# Patient Record
Sex: FEMALE | Race: BLACK OR AFRICAN AMERICAN | Employment: FULL TIME | URBAN - METROPOLITAN AREA
[De-identification: names, ages, dates, MRNs, and addresses within clinical notes are randomized per-mention and may not be internally consistent; named-entity substitution may affect disease eponyms.]

---

## 2015-04-10 LAB — LEFT VENTRICULAR EJECTION FRACTION, EXTERNAL: 55

## 2017-01-04 ENCOUNTER — OFFICE VISIT (OUTPATIENT)
Dept: CARDIOLOGY CLINIC | Age: 57
End: 2017-01-04

## 2017-01-04 VITALS
SYSTOLIC BLOOD PRESSURE: 124 MMHG | HEART RATE: 88 BPM | HEIGHT: 66 IN | BODY MASS INDEX: 33.59 KG/M2 | DIASTOLIC BLOOD PRESSURE: 70 MMHG | WEIGHT: 209 LBS

## 2017-01-04 DIAGNOSIS — I50.32 CHRONIC DIASTOLIC HEART FAILURE (HCC): Primary | ICD-10-CM

## 2017-01-04 DIAGNOSIS — E78.2 MIXED HYPERLIPIDEMIA: ICD-10-CM

## 2017-01-04 DIAGNOSIS — I10 ESSENTIAL HYPERTENSION: ICD-10-CM

## 2017-01-04 DIAGNOSIS — Z86.79 HISTORY OF SUBARACHNOID HEMORRHAGE: ICD-10-CM

## 2017-01-04 RX ORDER — FLUTICASONE PROPIONATE 50 MCG
2 SPRAY, SUSPENSION (ML) NASAL
COMMUNITY
End: 2020-07-09 | Stop reason: SDUPTHER

## 2017-01-04 RX ORDER — ROSUVASTATIN CALCIUM 10 MG/1
10 TABLET, COATED ORAL
COMMUNITY
End: 2017-08-01 | Stop reason: SDUPTHER

## 2017-01-04 RX ORDER — EZETIMIBE 10 MG/1
TABLET ORAL
COMMUNITY
End: 2017-04-20 | Stop reason: ALTCHOICE

## 2017-01-04 NOTE — PATIENT INSTRUCTIONS
Medications Discontinued During This Encounter   Medication Reason    ezetimibe-simvastatin (VYTORIN) 10-10 mg per tablet Alternate Therapy       No orders of the defined types were placed in this encounter. Body Mass Index: Care Instructions  Your Care Instructions    Body mass index (BMI) can help you see if your weight is raising your risk for health problems. It uses a formula to compare how much you weigh with how tall you are. A BMI between 18.5 and 24.9 is considered healthy. A BMI between 25 and 29.9 is considered overweight. A BMI of 30 or higher is considered obese. If your BMI is in the normal range, it means that you have a lower risk for weight-related health problems. If your BMI is in the overweight or obese range, you may be at increased risk for weight-related health problems, such as high blood pressure, heart disease, stroke, arthritis or joint pain, and diabetes. BMI is just one measure of your risk for weight-related health problems. You may be at higher risk for health problems if you are not active, you eat an unhealthy diet, or you drink too much alcohol or use tobacco products. Follow-up care is a key part of your treatment and safety. Be sure to make and go to all appointments, and call your doctor if you are having problems. It's also a good idea to know your test results and keep a list of the medicines you take. How can you care for yourself at home? · Practice healthy eating habits. This includes eating plenty of fruits, vegetables, whole grains, lean protein, and low-fat dairy. · Get at least 30 minutes of exercise 5 days a week or more. Brisk walking is a good choice. You also may want to do other activities, such as running, swimming, cycling, or playing tennis or team sports. · Do not smoke. Smoking can increase your risk for health problems. If you need help quitting, talk to your doctor about stop-smoking programs and medicines.  These can increase your chances of quitting for good. · Limit alcohol to 2 drinks a day for men and 1 drink a day for women. Too much alcohol can cause health problems. If you have a BMI higher than 25  · Your doctor may do other tests to check your risk for weight-related health problems. This may include measuring the distance around your waist. A waist measurement of more than 40 inches in men or 35 inches in women can increase the risk of weight-related health problems. · Talk with your doctor about steps you can take to stay healthy or improve your health. You may need to make lifestyle changes to lose weight and stay healthy, such as changing your diet and getting regular exercise. Where can you learn more? Go to http://monique-digna.info/. Enter S176 in the search box to learn more about \"Body Mass Index: Care Instructions. \"  Current as of: February 16, 2016  Content Version: 11.1  © 5768-8841 [employer]. Care instructions adapted under license by [employer] (which disclaims liability or warranty for this information). If you have questions about a medical condition or this instruction, always ask your healthcare professional. [address] any warranty or liability for your use of this information.

## 2017-01-04 NOTE — PROGRESS NOTES
HISTORY OF PRESENT ILLNESS  Marletta Cheadle is a 60 y.o. female. CHF   The history is provided by the medical records. This is a chronic problem. Associated symptoms include shortness of breath. Pertinent negatives include no chest pain and no headaches. Hypertension   The history is provided by the medical records and patient. This is a chronic problem. Associated symptoms include shortness of breath. Pertinent negatives include no chest pain and no headaches. Cholesterol Problem   The history is provided by the medical records. This is a chronic problem. Associated symptoms include shortness of breath. Pertinent negatives include no chest pain and no headaches. Shortness of Breath   The history is provided by the patient. This is a recurrent problem. The problem occurs frequently. The current episode started more than 1 week ago. The problem has not changed since onset. Associated symptoms include leg swelling. Pertinent negatives include no fever, no headaches, no cough, no wheezing, no PND, no orthopnea, no chest pain, no vomiting, no rash and no claudication. The problem's precipitants include exercise (walking thru parking lot; 9/15 more; 1/17 better except when exosed to smoke/dust). She has tried beta-agonist inhalers for the symptoms. The treatment provided significant relief. Leg Swelling   The history is provided by the patient. This is a recurrent problem. The current episode started more than 1 week ago. The problem occurs daily (R>L). The problem has not changed since onset. Associated symptoms include shortness of breath. Pertinent negatives include no chest pain and no headaches. The symptoms are aggravated by standing. The symptoms are relieved by rest.       Review of Systems   Constitutional: Negative for chills, fever, malaise/fatigue and weight loss. HENT: Negative for nosebleeds. Eyes: Negative for discharge. Respiratory: Positive for shortness of breath.  Negative for cough and wheezing. Cardiovascular: Positive for leg swelling. Negative for chest pain, palpitations, orthopnea, claudication and PND. Gastrointestinal: Negative for diarrhea, nausea and vomiting. Genitourinary: Negative for dysuria and hematuria. Musculoskeletal: Negative for joint pain. Skin: Negative for rash. Neurological: Negative for dizziness, seizures, loss of consciousness and headaches. Endo/Heme/Allergies: Negative for polydipsia. Does not bruise/bleed easily. Psychiatric/Behavioral: Negative for depression and substance abuse. The patient does not have insomnia. Allergies   Allergen Reactions    Banana Hives     Asthma attach, throat swelling, throat scratching, lip swelling    Clonidine Other (comments)     Memory loss    Iodine Rash    Keflex [Cephalexin] Hives    Seafood Hives    Watermelon Hives     Throat closes       Past Medical History   Diagnosis Date    Asthma 2/4/2010    Chronic diastolic heart failure ([facility] 3/18/2015    COPD (chronic obstructive pulmonary disease) ([facility] [facility]      PFT confirmed 8/18/16    Diabetes mellitus ([facility] [address] 5/27/2014    HTN (hypertension) 2/4/2010    Hyperlipidemia LDL goal < 70 2014    Magnesium deficiency     Obesity, unspecified 4/15/2015    SAH (subarachnoid hemorrhage) ([facility] [address] 2/20/15     admitted in [facility], Dr. Darwin Olea. [facility] and ref to Dr. Tracie Aase, Neurology    Vitamin D deficiency 2000       Family History   Problem Relation Age of Onset    Diabetes Mother     Hypertension Mother           Heart Attack Mother 62    Alcohol abuse Father      cirrhosis    Diabetes Sister     Heart Attack Sister 64       Social History   Substance Use Topics    Smoking status: Never Smoker    Smokeless tobacco: Never Used    Alcohol use 0.0 oz/week     0 Standard drinks or equivalent per week      Comment: occasionally - social,        Current Outpatient Prescriptions   Medication Sig    rosuvastatin (CRESTOR) 10 mg tablet Take 10 mg by mouth nightly.     ezetimibe (ZETIA) 10 mg tablet Take  by mouth.  fluticasone (FLONASE) 50 mcg/actuation nasal spray 2 Sprays by Both Nostrils route daily.  TOPAMAX 25 mg tablet TAKE ONE TABLET BY MOUTH TWICE DAILY    Olmesartan-amLODIPine-HCTZ (TRIBENZOR) 20-5-12.5 mg tab Take 1 Tab by mouth daily.  dexlansoprazole (DEXILANT) 30 mg capsule Take 1 Cap by mouth daily as needed. Indications: GASTROESOPHAGEAL REFLUX    fluticasone-salmeterol (ADVAIR) 250-50 mcg/dose diskus inhaler Take 1 Puff by inhalation every twelve (12) hours.  SITagliptin-metFORMIN (JANUMET) 50-1,000 mg per tablet Take 1 Tab by mouth two (2) times daily (with meals).  multivitamin-iron-FA, hematinic, (CENTRUM COMPLETE)  mg-mcg tab tablet Take 1 Tab by mouth daily.  albuterol (PROVENTIL HFA, VENTOLIN HFA, PROAIR HFA) 90 mcg/actuation inhaler Take 2 Puffs by inhalation every four (4) hours as needed for Wheezing.  ergocalciferol (VITAMIN D2) 50,000 unit capsule Take 1 Cap by mouth every seven (7) days.  magnesium oxide (MAG-OX) 400 mg tablet Take 1 Tab by mouth daily. Indications: HYPOMAGNESEMIA    Cetirizine (ZYRTEC) 10 mg cap Take 10 mg PE/day by mouth as needed. No current facility-administered medications for this visit. Past Surgical History   Procedure Laterality Date    Hx hysterectomy  2004     abd approach with ovaries and cervix intact    Hx other surgical  8/25/15     SAH, cerebral arteriopathy, Dr. Amado Hare, Interventional Neurology [facility]       Diagnostic Studies:  CARDIOLOGY STUDIES 4/10/2015   Echocardiogram - Complete Result 55%EF, tr MR       Visit Vitals    /70    Pulse 88    Ht 5' 6\" (1.676 m)    Wt 94.8 kg (209 lb)    BMI 33.73 kg/m2       Ms. Arely Lala has a reminder for a \"due or due soon\" health maintenance. I have asked that she contact her primary care provider for follow-up on this health maintenance. Physical Exam   Constitutional: She is oriented to person, place, and time.  She appears well-developed and well-nourished. No distress. HENT:   Head: Normocephalic and atraumatic. Mouth/Throat: Normal dentition. Eyes: Right eye exhibits no discharge. Left eye exhibits no discharge. No scleral icterus. Neck: Neck supple. No JVD present. Carotid bruit is not present. No thyromegaly present. Cardiovascular: Normal rate, regular rhythm, S1 normal, S2 normal, normal heart sounds and intact distal pulses. Exam reveals no gallop and no friction rub. No murmur heard. Pulmonary/Chest: Effort normal and breath sounds normal. She has no wheezes. She has no rales. Abdominal: Soft. She exhibits no mass. There is no tenderness. Musculoskeletal: She exhibits edema (trace). Lymphadenopathy:        Right cervical: No superficial cervical adenopathy present. Left cervical: No superficial cervical adenopathy present. Neurological: She is alert and oriented to person, place, and time. Skin: Skin is warm and dry. No rash noted. Psychiatric: She has a normal mood and affect. Her behavior is normal.       ASSESSMENT and PLAN    Discussed the patient's above normal BMI with her. I have recommended the following interventions: dietary management education, guidance, and counseling . The BMI follow up plan is as follows: BMI is out of normal parameters and plan is as follows: I have counseled this patient on diet and exercise regimens           Evans Del Rosario was seen today for chf, hypertension, cholesterol problem, shortness of breath and leg swelling.     Diagnoses and all orders for this visit:    Chronic diastolic heart failure ([address]  Comments:  1/17 Bradford Torrez; SOB with smoke/dust ?sec to asthma; exposed to passive smoke    Essential hypertension  Comments:  1/17 controlled      Mixed hyperlipidemia  Comments:  10/16 LDL57; 1/16 LDL56; 4/15 [id number]      History of subarachnoid hemorrhage  Comments:  2/15      BMI 33.0-33.9,adult  Comments:  Weight loss has been strongly encouraged by following dietary restrictions and an exercise routine. Pertinent laboratory and test data reviewed and discussed with patient. See patient instructions also for other medical advice given    Medications Discontinued During This Encounter   Medication Reason    ezetimibe-simvastatin (VYTORIN) 10-10 mg per tablet Alternate Therapy       Follow-up Disposition:  Return in about 1 year (around 1/4/2018), or if symptoms worsen or fail to improve.

## 2017-01-04 NOTE — PROGRESS NOTES
1. Have you been to the ER, urgent care clinic since your last visit? Hospitalized since your last visit? No    2. Have you seen or consulted any other health care providers outside of the [address] [place] [address] since your last visit? Include any pap smears or colon screening. No     3. Since your last visit, have you had any of the following symptoms? SOB/ swelling in leg    4. Have you had any blood work, X-rays or cardiac testing? Labs in [facility]      5. Where do you normally have your labs drawn? [facility]    6. Do you need any refills today?   None

## 2017-01-04 NOTE — MR AVS SNAPSHOT
Visit Information Date & Time Provider Department Dept. Phone Encounter #  
 1/4/2017  2:00 PM Estefani Castro MD [facility] [address] [place] [address] [id number] Follow-up Instructions Return in about 1 year (around 1/4/2018), or if symptoms worsen or fail to improve. Your Appointments 4/12/2017  9:30 AM  
Follow Up with Finesse Garcia NP [address] [facility] ([address]) Appt Note: 5 mth f/u  
 [address] [facility] [id number] [address] [facility] [id number]  
  
    
 1/3/2018  1:00 PM  
ESTABLISHED PATIENT with Estefani Castro MD  
[facility]) Appt Note: 1 yr  
 [address] [facility], [address] [facility] [address]  
[address] [facility], [address] Upcoming Health Maintenance Date Due  
 FOOT EXAM Q1 8/3/1968 EYE EXAM RETINAL OR DILATED Q1 8/3/1968 DTaP/Tdap/Td series (1 - Tdap) 8/3/1979 FOBT Q 1 YEAR AGE 50-75 8/3/2008 MICROALBUMIN Q1 1/20/2017 BREAST CANCER SCRN MAMMOGRAM 3/17/2017 HEMOGLOBIN A1C Q6M 4/26/2017 LIPID PANEL Q1 10/26/2017 Allergies as of 1/4/2017  Review Complete On: 1/4/2017 By: Estefani Castro MD  
  
 Severity Noted Reaction Type Reactions Banana  04/02/2015    Hives Asthma attach, throat swelling, throat scratching, lip swelling Clonidine  08/24/2015    Other (comments) Memory loss Iodine  02/04/2010   Systemic Rash Keflex [Cephalexin]  09/26/2011    Hives Seafood  03/13/2015    Hives Watermelon  09/08/2015    Hives Throat closes Current Immunizations  Reviewed on 1/27/2016 Name Date Influenza Vaccine 10/29/2014 Influenza Vaccine (Quad) PF 11/2/2016, 1/27/2016 Influenza Vaccine Split 12/7/2010 Not reviewed this visit You Were Diagnosed With   
  
 Codes Comments Chronic diastolic heart failure ([facility]    -  Primary ICD-10-CM: I50.32 
ICD-9-CM: 428.32 1/17 NYHA2; SOB with smoke/dust ?sec to asthma; exposed to passive smoke Essential hypertension     ICD-10-CM: I10 
ICD-9-CM: 401.9 1/17 controlled Mixed hyperlipidemia     ICD-10-CM: E78.2 ICD-9-CM: 272.2 10/16 LDL57; 1/16 PFA75; 4/15 QQV517 History of subarachnoid hemorrhage     ICD-10-CM: Z86.79 
ICD-9-CM: V12.59 2/15 BMI 33.0-33.9,adult     ICD-10-CM: O05.62 
ICD-9-CM: V85.33 Weight loss has been strongly encouraged by following dietary restrictions and an exercise routine. Vitals BP Pulse Height(growth percentile) Weight(growth percentile) BMI OB Status 124/70 88 5' 6\" (1.676 m) 209 lb (94.8 kg) 33.73 kg/m2 Hysterectomy Smoking Status Never Smoker Vitals History BMI and BSA Data Body Mass Index Body Surface Area  
 33.73 kg/m 2 2.1 m 2 Preferred Pharmacy Pharmacy Name Phone [facility] [address] [phone] Your Updated Medication List  
  
   
This list is accurate as of: 1/4/17  2:49 PM.  Always use your most recent med list.  
  
  
  
  
 albuterol 90 mcg/actuation inhaler Commonly known as:  PROVENTIL HFA, VENTOLIN HFA, PROAIR HFA Take 2 Puffs by inhalation every four (4) hours as needed for Wheezing. CRESTOR 10 mg tablet Generic drug:  rosuvastatin Take 10 mg by mouth nightly. dexlansoprazole 30 mg capsule Commonly known as:  DEXILANT Take 1 Cap by mouth daily as needed. Indications: GASTROESOPHAGEAL REFLUX  
  
 ergocalciferol 50,000 unit capsule Commonly known as:  VITAMIN D2 Take 1 Cap by mouth every seven (7) days. FLONASE 50 mcg/actuation nasal spray Generic drug:  fluticasone 2 Sprays by Both Nostrils route daily. fluticasone-salmeterol 250-50 mcg/dose diskus inhaler Commonly known as:  ADVAIR  
 Take 1 Puff by inhalation every twelve (12) hours. magnesium oxide 400 mg tablet Commonly known as:  MAG-OX Take 1 Tab by mouth daily. Indications: HYPOMAGNESEMIA  
  
 multivitamin-iron-FA (hematinic)  mg-mcg Tab tablet Commonly known as:  CENTRUM COMPLETE Take 1 Tab by mouth daily. Olmesartan-amLODIPine-HCTZ 20-5-12.5 mg Tab Commonly known as:  [employer] Take 1 Tab by mouth daily. SITagliptin-metFORMIN 50-1,000 mg per tablet Commonly known as:  Odella Ruth Take 1 Tab by mouth two (2) times daily (with meals). TOPAMAX 25 mg tablet Generic drug:  topiramate TAKE ONE TABLET BY MOUTH TWICE DAILY  
  
 ZETIA 10 mg tablet Generic drug:  ezetimibe Take  by mouth. ZyrTEC 10 mg Cap Generic drug:  Cetirizine Take 10 mg PE/day by mouth as needed. Follow-up Instructions Return in about 1 year (around 1/4/2018), or if symptoms worsen or fail to improve. Patient Instructions Medications Discontinued During This Encounter Medication Reason  ezetimibe-simvastatin (VYTORIN) 10-10 mg per tablet Alternate Therapy No orders of the defined types were placed in this encounter. Body Mass Index: Care Instructions Your Care Instructions Body mass index (BMI) can help you see if your weight is raising your risk for health problems. It uses a formula to compare how much you weigh with how tall you are. A BMI between 18.5 and 24.9 is considered healthy. A BMI between 25 and 29.9 is considered overweight. A BMI of 30 or higher is considered obese. If your BMI is in the normal range, it means that you have a lower risk for weight-related health problems. If your BMI is in the overweight or obese range, you may be at increased risk for weight-related health problems, such as high blood pressure, heart disease, stroke, arthritis or joint pain, and diabetes. BMI is just one measure of your risk for weight-related health problems. You may be at higher risk for health problems if you are not active, you eat an unhealthy diet, or you drink too much alcohol or use tobacco products. Follow-up care is a key part of your treatment and safety. Be sure to make and go to all appointments, and call your doctor if you are having problems. It's also a good idea to know your test results and keep a list of the medicines you take. How can you care for yourself at home? · Practice healthy eating habits. This includes eating plenty of fruits, vegetables, whole grains, lean protein, and low-fat dairy. · Get at least 30 minutes of exercise 5 days a week or more. Brisk walking is a good choice. You also may want to do other activities, such as running, swimming, cycling, or playing tennis or team sports. · Do not smoke. Smoking can increase your risk for health problems. If you need help quitting, talk to your doctor about stop-smoking programs and medicines. These can increase your chances of quitting for good. · Limit alcohol to 2 drinks a day for men and 1 drink a day for women. Too much alcohol can cause health problems. If you have a BMI higher than 25 · Your doctor may do other tests to check your risk for weight-related health problems. This may include measuring the distance around your waist. A waist measurement of more than 40 inches in men or 35 inches in women can increase the risk of weight-related health problems. · Talk with your doctor about steps you can take to stay healthy or improve your health. You may need to make lifestyle changes to lose weight and stay healthy, such as changing your diet and getting regular exercise. Where can you learn more? Go to http://monique-digna.info/. Enter S176 in the search box to learn more about \"Body Mass Index: Care Instructions. \" Current as of: February 16, 2016 Content Version: 11.1 © 1542-3735 [employer].  Care instructions adapted under license by [address] (which disclaims liability or warranty for this information). If you have questions about a medical condition or this instruction, always ask your healthcare professional. [address] any warranty or liability for your use of this information. Introducing [facility]! Tiburcio Daniel introduces [facility] patient portal. Now you can access parts of your medical record, email your doctor's office, and request medication refills online. 1. In your internet browser, go to [email or web address] 2. Click on the First Time User? Click Here link in the Sign In box. You will see the New Member Sign Up page. 3. Enter your [facility] Access Code exactly as it appears below. You will not need to use this code after youve completed the sign-up process. If you do not sign up before the expiration date, you must request a new code. · [facility] Access Code: [email or web address] Expires: 1/31/2017  9:00 AM 
 
4. Enter the last four digits of your Social Security Number (xxxx) and Date of Birth (mm/dd/yyyy) as indicated and click Submit. You will be taken to the next sign-up page. 5. Create a [facility] ID. This will be your [facility] login ID and cannot be changed, so think of one that is secure and easy to remember. 6. Create a [facility] password. You can change your password at any time. 7. Enter your Password Reset Question and Answer. This can be used at a later time if you forget your password. 8. Enter your e-mail address. You will receive e-mail notification when new information is available in [address] 9. Click Sign Up. You can now view and download portions of your medical record. 10. Click the Download Summary menu link to download a portable copy of your medical information. If you have questions, please visit the Frequently Asked Questions section of the [facility] website.  Remember, [facility] is NOT to be used for urgent needs. For medical emergencies, dial 911. Now available from your iPhone and Android! Please provide this summary of care documentation to your next provider. Your primary care clinician is listed as Baby Adia. If you have any questions after today's visit, please call [phone].

## 2017-01-04 NOTE — LETTER
Evangelista Whitejean-paul 1960 
 
1/4/2017 Dear Lalitha Pena NP I had the pleasure of evaluating  Ms. Anaya Stein in office today. Below are the relevant portions of my assessment and plan of care. ICD-10-CM ICD-9-CM 1. Chronic diastolic heart failure (HCC) I50.32 428.32   
 1/17 NYHA2; SOB with smoke/dust ?sec to asthma; exposed to passive smoke 2. Essential hypertension I10 401.9 1/17 controlled 3. Mixed hyperlipidemia E78.2 272.2   
 10/16 LDL57; 1/16 LDL56; 4/15 [id number] 
  
4. History of subarachnoid hemorrhage Z86.79 V12.59   
 2/15 5. BMI 33.0-33.9,adult Z68.33 V85.33 Weight loss has been strongly encouraged by following dietary restrictions and an exercise routine. Current Outpatient Prescriptions Medication Sig Dispense Refill  rosuvastatin (CRESTOR) 10 mg tablet Take 10 mg by mouth nightly.  ezetimibe (ZETIA) 10 mg tablet Take  by mouth.  fluticasone (FLONASE) 50 mcg/actuation nasal spray 2 Sprays by Both Nostrils route daily.  TOPAMAX 25 mg tablet TAKE ONE TABLET BY MOUTH TWICE DAILY 60 Tab 0  
 Olmesartan-amLODIPine-HCTZ (TRIBENZOR) 20-5-12.5 mg tab Take 1 Tab by mouth daily. 90 Tab 3  
 dexlansoprazole (DEXILANT) 30 mg capsule Take 1 Cap by mouth daily as needed. Indications: GASTROESOPHAGEAL REFLUX 90 Cap 3  
 fluticasone-salmeterol (ADVAIR) 250-50 mcg/dose diskus inhaler Take 1 Puff by inhalation every twelve (12) hours. 3 Inhaler 3  
 SITagliptin-metFORMIN (JANUMET) 50-1,000 mg per tablet Take 1 Tab by mouth two (2) times daily (with meals). 180 Tab 3  
 multivitamin-iron-FA, hematinic, (CENTRUM COMPLETE)  mg-mcg tab tablet Take 1 Tab by mouth daily. 90 Tab 3  
 albuterol (PROVENTIL HFA, VENTOLIN HFA, PROAIR HFA) 90 mcg/actuation inhaler Take 2 Puffs by inhalation every four (4) hours as needed for Wheezing. 3 Inhaler 3  
 ergocalciferol (VITAMIN D2) 50,000 unit capsule Take 1 Cap by mouth every seven (7) days.  4 Cap 11  
  magnesium oxide (MAG-OX) 400 mg tablet Take 1 Tab by mouth daily. Indications: HYPOMAGNESEMIA 30 Tab 11  Cetirizine (ZYRTEC) 10 mg cap Take 10 mg PE/day by mouth as needed. Orders Placed This Encounter  rosuvastatin (CRESTOR) 10 mg tablet Sig: Take 10 mg by mouth nightly.  ezetimibe (ZETIA) 10 mg tablet Sig: Take  by mouth.  fluticasone (FLONASE) 50 mcg/actuation nasal spray Si Sprays by Both Nostrils route daily. If you have questions, please do not hesitate to call me. I look forward to following Ms. Leyla Nunez along with you. Sincerely, Yinka Thomas MD

## 2017-01-05 ENCOUNTER — TELEPHONE (OUTPATIENT)
Dept: FAMILY MEDICINE CLINIC | Age: 57
End: 2017-01-05

## 2017-01-05 DIAGNOSIS — E78.5 HYPERLIPIDEMIA LDL GOAL <100: Primary | ICD-10-CM

## 2017-01-05 RX ORDER — EZETIMIBE AND SIMVASTATIN 10; 10 MG/1; MG/1
1 TABLET ORAL
Qty: 90 TAB | Refills: 3 | Status: SHIPPED | COMMUNITY
Start: 2017-01-05 | End: 2017-08-02 | Stop reason: ALTCHOICE

## 2017-01-05 NOTE — TELEPHONE ENCOUNTER
Per last visit with PCP. Crestor is no longer available via [facility]. The patient is also on zetia. VYtorin was prescribed to combine the Zetia and statin. On visit 11/2/16 The patient was instructed to continue whats left of Crestor and Zetia until complete then start Vytorin and the patient verbalized understanding. Vytorin was D/C'd 1/4/17 when the patient visited Cardiology because the patient stated that she didn't have this medication. The medication has been at [facility] since 12/1/16 and the patient was notified for  on 12/5/16. Spoke with [facility] pharm tech and the patient has not picked up any medications. Will re-enter order for Vytorin. Designated tech will call the patient to  medications again and instruct the patient to complete whats left of the zetia and crestor then start the vytorin as previously prescribed.        Arya Nascimento, MSN, RN, FNP-C     [facility]

## 2017-01-17 DIAGNOSIS — E78.5 HYPERLIPIDEMIA, UNSPECIFIED HYPERLIPIDEMIA TYPE: Primary | ICD-10-CM

## 2017-02-06 DIAGNOSIS — E55.9 VITAMIN D DEFICIENCY: ICD-10-CM

## 2017-02-06 DIAGNOSIS — E83.42 HYPOMAGNESEMIA: ICD-10-CM

## 2017-02-07 RX ORDER — ERGOCALCIFEROL 1.25 MG/1
50000 CAPSULE ORAL
Qty: 4 CAP | Refills: 11 | Status: SHIPPED | OUTPATIENT
Start: 2017-02-07 | End: 2018-03-26 | Stop reason: SDUPTHER

## 2017-02-07 RX ORDER — LANOLIN ALCOHOL/MO/W.PET/CERES
400 CREAM (GRAM) TOPICAL DAILY
Qty: 30 TAB | Refills: 11 | Status: SHIPPED | OUTPATIENT
Start: 2017-02-07 | End: 2018-02-21 | Stop reason: SDUPTHER

## 2017-03-28 ENCOUNTER — TELEPHONE (OUTPATIENT)
Dept: FAMILY MEDICINE CLINIC | Age: 57
End: 2017-03-28

## 2017-03-29 NOTE — TELEPHONE ENCOUNTER
Medication: Ventolin HFA, dose:2 puffs, how often: every 4 hours as needed, current number of medication days provided: 90, refill per application. LOT #[id number], EXP.05/18    . This medication was received and verified for the following 1. Correct Patient, 2. Correct Diagnosis, 3. Correct Drug, 4. Correct route, and no current allergy to medication. Medication: Advair 250/50, dose: 1 inhalation, how often: twice daily, current number of medication days provided: 90, refill per application. LOT #[id number], EXP.10/18This medication was received and verified for the following 1. Correct Patient, 2. Correct Diagnosis, 3. Correct Drug, 4. Correct route, and no current allergy to medication. Medication: Janumet 50/1000mg , dose: 1 tab, how often: BID , current number of medication days provided: 90, refill per application. LOT #[id number], EXP.10/18     This medication was received and verified for the following 1. Correct Patient, 2. Correct Diagnosis, 3. Correct Drug, 4. Correct route, and no current allergy to medication. Medication: Tribenzor 20/5/12.5 , dose: 1 tab, how often: qd , current number of medication days provided: 90, refill per application. LOT #[id number], EXP.02/18 This medication was received and verified for the following 1. Correct Patient, 2. Correct Diagnosis, 3. Correct Drug, 4. Correct route, and no current allergy to medication. Medication: Vytorin 10/10mg, dose: 1 tab, how often: daily , current number of medication days provided: 90, refill per application.     , LOT #[id number], EXP.03/18This medication was received and verified for the following 1. Correct Patient, 2. Correct Diagnosis, 3. Correct Drug, 4. Correct route, and no current allergy to medication.

## 2017-04-18 ENCOUNTER — TELEPHONE (OUTPATIENT)
Dept: FAMILY MEDICINE CLINIC | Age: 57
End: 2017-04-18

## 2017-04-19 ENCOUNTER — OFFICE VISIT (OUTPATIENT)
Dept: FAMILY MEDICINE CLINIC | Age: 57
End: 2017-04-19

## 2017-04-19 VITALS
SYSTOLIC BLOOD PRESSURE: 124 MMHG | OXYGEN SATURATION: 96 % | RESPIRATION RATE: 18 BRPM | HEIGHT: 66 IN | TEMPERATURE: 98.6 F | DIASTOLIC BLOOD PRESSURE: 67 MMHG | WEIGHT: 206.6 LBS | HEART RATE: 93 BPM | BODY MASS INDEX: 33.2 KG/M2

## 2017-04-19 DIAGNOSIS — E78.2 MIXED HYPERLIPIDEMIA: ICD-10-CM

## 2017-04-19 DIAGNOSIS — J32.9 CHRONIC SINUSITIS, UNSPECIFIED LOCATION: ICD-10-CM

## 2017-04-19 DIAGNOSIS — E11.9 CONTROLLED TYPE 2 DIABETES MELLITUS WITHOUT COMPLICATION, WITHOUT LONG-TERM CURRENT USE OF INSULIN (HCC): ICD-10-CM

## 2017-04-19 DIAGNOSIS — I10 ESSENTIAL HYPERTENSION: ICD-10-CM

## 2017-04-19 DIAGNOSIS — I50.32 CHRONIC DIASTOLIC HEART FAILURE (HCC): ICD-10-CM

## 2017-04-19 DIAGNOSIS — I50.32 CHRONIC DIASTOLIC HEART FAILURE (HCC): Primary | ICD-10-CM

## 2017-04-19 RX ORDER — SULFAMETHOXAZOLE AND TRIMETHOPRIM 800; 160 MG/1; MG/1
1 TABLET ORAL 2 TIMES DAILY
Qty: 20 TAB | Refills: 0 | Status: SHIPPED | OUTPATIENT
Start: 2017-04-19 | End: 2017-04-29

## 2017-04-19 NOTE — PROGRESS NOTES
Chronic Illness Visit    Today's Date:  2017  Patient's Name: Rupert Dunn   Patient's :  1960     History:     Chief Complaint   Patient presents with   [address]     brain bleed two years ago     Pressure Behind the Eyes     headache, coughing up clear to yellow mucus, tried zyrtec, albuteral, and advair       Rupert Dunn is a 60 y.o. female presenting for a follow up of Chronic Illness. Diabetes/Hypertension/Hyperlipidemia    BP today is at goal today <130/80. Patients risk factors include: obese  Home Blood Sugars are not being checked. .Denies hypoglycemic episodes  Recent hospitalizations: denies  Medications regimen is as follows: see below  Last HgbA1C: 5.8 (Oct 2016)  Daily exercise and diet are as follows: denies healthy diet stays active  Weight is stable  Takes the below meds regularly with no side effects. Patient is on a statin denies myalgia  Last LDL: 57  Last DM eye exam: unknown  Last DM foot exam: unknown  Last urine microalbumin: unknown    Bronchitis    Onset: intermittent symptoms off and on   Reports productive cough w/ yellow sputum, fevers, chills, chest congestion  Aggravating/Alleviating factors: worse with weather changes  Treatment at home:  On advair, zyrtec (was told by Neuro that she can't use flonase due to hx of brain bleed)  SIck contacts: denies  Smoking history: denies      Past Medical History:   Diagnosis Date    Asthma 2010    Brain bleed ([address] 2015    Chronic diastolic heart failure ([address] 3/18/2015    COPD (chronic obstructive pulmonary disease) ([address]     PFT confirmed 16    Diabetes mellitus ([address] 2014    HTN (hypertension) 2010    Hyperlipidemia LDL goal < 70     Magnesium deficiency     Obesity, unspecified 4/15/2015    SAH (subarachnoid hemorrhage) ([address] 2/20/15    ad[facility] in [facility], Dr. Marguerite Troy.  Juliano Hartley and ref to Dr. Hortensia Mccormick, Neurology    Stroke [facility])     Vitamin D deficiency      Past Surgical History: Procedure Laterality Date    HX HYSTERECTOMY  2004    abd approach with ovaries and cervix intact    HX HYSTERECTOMY N/A 2004    HX OTHER SURGICAL  8/25/15    SAH, cerebral arteriopathy, Dr. Mita Simmons, Interventional Neurology [facility]     Social History     Social History    Marital status: SINGLE     Spouse name: N/A    Number of children: 1    Years of education: 13     Occupational History     [employer]     unemployed since Jan 2014, applied to [employer] for job July 2014     Social History Main Topics    Smoking status: Never Smoker    Smokeless tobacco: Never Used    Alcohol use 1.2 oz/week     0 Standard drinks or equivalent, 2 Shots of liquor per week      Comment: occasionally - social,    Drug use: No    Sexual activity: Yes     Partners: Male     Birth control/ protection: Condom     Other Topics Concern     Service No    Blood Transfusions Yes    Caffeine Concern Yes    Occupational Exposure No    Hobby Hazards No    Sleep Concern No    Stress Concern No    Weight Concern No    Special Diet No    Back Care No    Exercise No    Bike Helmet No    Seat Belt Yes    Self-Exams Yes     Social History Narrative     Family History   Problem Relation Age of Onset    Diabetes Mother     Hypertension Mother           Heart Attack Mother 62    Alcohol abuse Father      cirrhosis    Diabetes Sister     Heart Attack Sister 64     Allergies   Allergen Reactions    Banana Hives     Asthma attach, throat swelling, throat scratching, lip swelling    Clonidine Other (comments)     Memory loss    Iodine Rash    Keflex [Cephalexin] Hives    Seafood Hives    Watermelon Hives     Throat closes       Problem List:      Patient Active Problem List   Diagnosis Code    Asthma J45.909    Anxiety F41.9    Mixed hyperlipidemia E78.2    Vitamin D deficiency E55.9    Asthma with exacerbation J45. [address] and depression F41.9, F32.9    Encounter for long-term (current) use of other medications Z79.899    Subarachnoid hemorrhage (HCC) I60.9    Abnormal glucose R73.09    Essential hypertension I10    Chronic diastolic heart failure (HCC) I50.32    Obesity, unspecified E66.9    New onset of headaches after age 46 R46    Drug therapy continued Z79.899    Iron deficiency anemia D50.9    Gastroesophageal reflux disease without esophagitis K21.9    Hypomagnesemia E83.42    History of subarachnoid hemorrhage Z86.79    BMI 33.0-33.9,adult Z68.33    Hypokalemia E87.6    Post-menopausal P04.1    Uncomplicated asthma H82.968       Medications:     Current Outpatient Prescriptions   Medication Sig    UBIDECARENONE/VITAMIN E MIXED (COQ10  PO) Take  by mouth.  trimethoprim-sulfamethoxazole (BACTRIM DS, SEPTRA DS) 160-800 mg per tablet Take 1 Tab by mouth two (2) times a day for 10 days.  ergocalciferol (VITAMIN D2) 50,000 unit capsule Take 1 Cap by mouth every seven (7) days.  magnesium oxide (MAG-OX) 400 mg tablet Take 1 Tab by mouth daily. Indications: HYPOMAGNESEMIA    ezetimibe-simvastatin (VYTORIN) 10-10 mg per tablet Take 1 Tab by mouth nightly.  fluticasone (FLONASE) 50 mcg/actuation nasal spray 2 Sprays by Both Nostrils route daily.  TOPAMAX 25 mg tablet TAKE ONE TABLET BY MOUTH TWICE DAILY    Olmesartan-amLODIPine-HCTZ (TRIBENZOR) 20-5-12.5 mg tab Take 1 Tab by mouth daily.  dexlansoprazole (DEXILANT) 30 mg capsule Take 1 Cap by mouth daily as needed. Indications: GASTROESOPHAGEAL REFLUX    fluticasone-salmeterol (ADVAIR) 250-50 mcg/dose diskus inhaler Take 1 Puff by inhalation every twelve (12) hours.  SITagliptin-metFORMIN (JANUMET) 50-1,000 mg per tablet Take 1 Tab by mouth two (2) times daily (with meals).  multivitamin-iron-FA, hematinic, (CENTRUM COMPLETE)  mg-mcg tab tablet Take 1 Tab by mouth daily.     albuterol (PROVENTIL HFA, VENTOLIN HFA, PROAIR HFA) 90 mcg/actuation inhaler Take 2 Puffs by inhalation every four (4) hours as needed for Wheezing.  Cetirizine (ZYRTEC) 10 mg cap Take 10 mg PE/day by mouth as needed.  rosuvastatin (CRESTOR) 10 mg tablet Take 10 mg by mouth nightly. No current facility-administered medications for this visit. Constitutional: negative for fevers, chills, sweats and fatigue  Respiratory: negative for cough, sputum or wheezing  Cardiovascular: negative for chest pain, chest pressure/discomfort, dyspnea  Gastrointestinal: negative for nausea, vomiting, diarrhea, constipation and abdominal pain  Musculoskeletal:negative for myalgias and arthralgias  Neurological: positive for headaches, negative for dizziness  Behavioral/Psych: negative for anxiety and depression    Physical Assessment:   VS:    Visit Vitals    /67 (BP 1 Location: Right arm, BP Patient Position: Sitting)    Pulse 93    Temp 98.6 °F (37 °C) (Oral)    Resp 18    Ht 5' 6\" (1.676 m)    Wt 206 lb 9.6 oz (93.7 kg)    SpO2 96%    BMI 33.35 kg/m2       Lab Results   Component Value Date/Time    Sodium 143 10/26/2016 09:04 AM    Potassium 3.8 10/26/2016 09:04 AM    Chloride 106 10/26/2016 09:04 AM    CO2 31 10/26/2016 09:04 AM    Anion gap 6 10/26/2016 09:04 AM    Glucose 114 10/26/2016 09:04 AM    BUN 17 10/26/2016 09:04 AM    Creatinine 1.03 10/26/2016 09:04 AM    BUN/Creatinine ratio 17 10/26/2016 09:04 AM    GFR est AA >60 10/26/2016 09:04 AM    GFR est non-AA 55 10/26/2016 09:04 AM    Calcium 8.8 10/26/2016 09:04 AM       General:   Well-groomed, well-nourished, in no distress, pleasant, alert, appropriate and conversant. ENT:  Normal TMs and nasal mucsoa  Mouth:  Good dentition, oropharynx WNL without membranes, exudates, petechiae or ulcers  Neck:   Neck supple, no swelling, mass or tenderness, no thyromegaly  Cardiovasc:   RRR, no MRG. Pulses 2+ and symmetric at distal extremities. Pulmonary:   Lungs clear bilaterally. Normal respiratory effort. Abdomen:   Abdomen soft, NT, ND, NAB.    Extremities:   No edema, no TTP bilateral calves. LEs warm and well-perfused. Neuro:   Alert and oriented, no focal deficits. No facial asymmetry noted. Skin:    No rashes or jaundice  MSK:   Normal ROM, 5/5 muscle strength  Psych:  No pressured speech or abnormal thought content        Assessment/Plan & Orders:       1. Chronic diastolic heart failure ([address]    2. Mixed hyperlipidemia    3. Essential hypertension    4. Chronic sinusitis, unspecified location        Orders Placed This Encounter    LIPID PANEL    METABOLIC PANEL, COMPREHENSIVE    HEMOGLOBIN A1C WITH EAG    VITAMIN D, 25 HYDROXY    MAGNESIUM    UBIDECARENONE/VITAMIN E MIXED (COQ10  PO)    trimethoprim-sulfamethoxazole (BACTRIM DS, SEPTRA DS) 160-800 mg per tablet       DM stable on current meds. Baseline labs and HgbA1c ordered. HTN/Hyperlipidemia stable continue with current meds  Acute on chronic sinusitis recommend patient continue with antihistamine and will add a course of bactrim    Follow up in 6 months    *Plan of care reviewed with patient. Patient in agreement with plan and expresses understanding. All questions answered and patient encouraged to call or RTO if further questions or concerns.     Elsy Patel MD  Family Medicine  4/19/2017  11:21 AM

## 2017-04-19 NOTE — TELEPHONE ENCOUNTER
Medication: Dexilant 30mg  , dose: 1 cap, how often: every day as needed , current number of medication days provided: 90, refill per application. LOT #[id number], EXP.04/18   . This medication was received and verified for the following 1. Correct Patient, 2. Correct Diagnosis, 3. Correct Drug, 4. Correct route, and no current allergy to medication.

## 2017-05-03 ENCOUNTER — HOSPITAL ENCOUNTER (OUTPATIENT)
Dept: LAB | Age: 57
Discharge: HOME | End: 2017-05-03

## 2017-05-03 LAB
A-G RATIO,AGRAT: 1.3 RATIO (ref 1.1–2.6)
ALBUMIN SERPL-MCNC: 4.1 G/DL (ref 3.5–5)
ALP SERPL-CCNC: 62 U/L (ref 25–115)
ALT SERPL-CCNC: 12 U/L (ref 5–40)
ANION GAP SERPL CALC-SCNC: 16 MMOL/L
AST SERPL W P-5'-P-CCNC: 10 U/L (ref 10–37)
AVG GLU, 10930: 141 MG/DL (ref 91–123)
BILIRUB SERPL-MCNC: 0.5 MG/DL (ref 0.2–1.2)
BUN SERPL-MCNC: 13 MG/DL (ref 6–22)
CALCIUM SERPL-MCNC: 9.4 MG/DL (ref 8.4–10.5)
CHLORIDE SERPL-SCNC: 98 MMOL/L (ref 98–110)
CHOLEST SERPL-MCNC: 151 MG/DL (ref 110–200)
CO2 SERPL-SCNC: 26 MMOL/L (ref 20–32)
CREAT SERPL-MCNC: 1 MG/DL (ref 0.5–1.2)
GFRAA, 66117: >60
GFRNA, 66118: 54.4
GLOBULIN,GLOB: 3.1 G/DL (ref 2–4)
GLUCOSE SERPL-MCNC: 109 MG/DL (ref 65–99)
HBA1C MFR BLD HPLC: 6.5 % (ref 4.8–5.9)
HDLC SERPL-MCNC: 42 MG/DL (ref 40–59)
LDLC SERPL CALC-MCNC: 94 MG/DL (ref 50–99)
POTASSIUM SERPL-SCNC: 4.2 MMOL/L (ref 3.5–5.5)
PROT SERPL-MCNC: 7.2 G/DL (ref 6.4–8.3)
SENTARA SPECIMEN COL,SENBCF: NORMAL
SODIUM SERPL-SCNC: 140 MMOL/L (ref 133–145)
TRIGL SERPL-MCNC: 73 MG/DL (ref 40–149)
VLDLC SERPL CALC-MCNC: 15 MG/DL (ref 8–30)

## 2017-05-03 PROCEDURE — 99001 SPECIMEN HANDLING PT-LAB: CPT | Performed by: FAMILY MEDICINE

## 2017-07-26 DIAGNOSIS — E11.9 TYPE 2 DIABETES MELLITUS WITHOUT COMPLICATION, WITHOUT LONG-TERM CURRENT USE OF INSULIN (HCC): ICD-10-CM

## 2017-07-26 DIAGNOSIS — I10 ESSENTIAL HYPERTENSION: ICD-10-CM

## 2017-07-26 NOTE — TELEPHONE ENCOUNTER
Call made to Pt using two identifiers, name and . Pt made aware that her refill request will lbe sent to the provider but also that she needed to schedule a Chronic Disease f/u with DR. Lg Espinal in Oct. Pt verbalized understanding and was scheduled for Oct. 12-.  Forwarded to provider for refill approval.

## 2017-07-26 NOTE — TELEPHONE ENCOUNTER
Requested Prescriptions     Pending Prescriptions Disp Refills    Olmesartan-amLODIPine-HCTZ (TRIBENZOR) 20-5-12.5 mg tab 90 Tab 3     Sig: Take 1 Tab by mouth daily.  SITagliptin-metFORMIN (JANUMET) 50-1,000 mg per tablet 180 Tab 3     Sig: Take 1 Tab by mouth two (2) times daily (with meals).      The metformin is to go to the [facility] on [address]

## 2017-07-27 RX ORDER — OLMESARTAN MEDOXOMIL, AMLODIPINE AND HYDROCHLOROTHIAZIDE TABLET 20/5/12.5 MG 20; 5; 12.5 MG/1; MG/1; MG/1
1 TABLET ORAL DAILY
Qty: 90 TAB | Refills: 0 | Status: SHIPPED | OUTPATIENT
Start: 2017-07-27 | End: 2017-08-01 | Stop reason: SDUPTHER

## 2017-07-27 NOTE — TELEPHONE ENCOUNTER
7/27/2017  4:04 PM    Chief Complaint   Patient presents with    Medication Refill       Noted refill request for below medications. PCP Dr Maria T Viera. Last visit 4/2017 and upcoming 10/2017. Refills completed. Requested Prescriptions     Pending Prescriptions Disp Refills    Olmesartan-amLODIPine-HCTZ (TRIBENZOR) 20-5-12.5 mg tab 90 Tab 3     Sig: Take 1 Tab by mouth daily.  SITagliptin-metFORMIN (JANUMET) 50-1,000 mg per tablet 180 Tab 3     Sig: Take 1 Tab by mouth two (2) times daily (with meals).

## 2017-08-01 DIAGNOSIS — I10 ESSENTIAL HYPERTENSION: ICD-10-CM

## 2017-08-02 RX ORDER — OLMESARTAN MEDOXOMIL, AMLODIPINE AND HYDROCHLOROTHIAZIDE TABLET 20/5/12.5 MG 20; 5; 12.5 MG/1; MG/1; MG/1
1 TABLET ORAL DAILY
Qty: 90 TAB | Refills: 1 | Status: SHIPPED | OUTPATIENT
Start: 2017-08-02 | End: 2017-08-14 | Stop reason: CLARIF

## 2017-08-02 RX ORDER — ROSUVASTATIN CALCIUM 10 MG/1
10 TABLET, COATED ORAL
Qty: 90 TAB | Refills: 1 | Status: SHIPPED | OUTPATIENT
Start: 2017-08-02 | End: 2017-12-27 | Stop reason: ALTCHOICE

## 2017-08-14 ENCOUNTER — TELEPHONE (OUTPATIENT)
Dept: FAMILY MEDICINE CLINIC | Age: 57
End: 2017-08-14

## 2017-08-14 DIAGNOSIS — I10 ESSENTIAL HYPERTENSION: Primary | ICD-10-CM

## 2017-08-14 RX ORDER — OLMESARTAN MEDOXOMIL AND HYDROCHLOROTHIAZIDE 20/12.5 20; 12.5 MG/1; MG/1
1 TABLET ORAL DAILY
Qty: 90 TAB | Refills: 0 | Status: SHIPPED | OUTPATIENT
Start: 2017-08-14 | End: 2017-08-29 | Stop reason: ALTCHOICE

## 2017-08-14 RX ORDER — AMLODIPINE BESYLATE 5 MG/1
5 TABLET ORAL DAILY
Qty: 90 TAB | Refills: 0 | Status: SHIPPED | OUTPATIENT
Start: 2017-08-14 | End: 2017-08-29 | Stop reason: SDUPTHER

## 2017-08-14 NOTE — TELEPHONE ENCOUNTER
Received notice that patients Olmesartan-amlodipine-HCTZ (Ramona Rene) is not covered under her insurance. This was [facility] by [facility]. This medication is a combination of 3 meds. I am going to try breaking it down into 2 meds, Olmesartan-HCTZ (Benicar HCT) and amlodipine 5 mg in hopes that this is approved. Please make sure patient is aware of this change.

## 2017-08-14 NOTE — TELEPHONE ENCOUNTER
Received a return call from pt, using two identifiers, name and . Pt given information stated in Mayda Hameed note in reference to the change in the medication. Pt made aware that her insurance will not cover the Kevin Flax, which was three medications in one pill. The provider sent in  [place] ,which is two of the medications combined and the Norvasc separately, to see if the insurance will approve it. Pt stated that she has been talking Lisinopril,HCTZ and Norvasc. I made her aware that the Lisinopril Is not on her medication list. She stated that it was an old med that she was taking because she was out of her other medications. Pt advised to stop taking the Lisinopril and take what was prescribed. She was advised to call the pharmacy to check to see when the medication will be ready for  and she verbalized understanding.

## 2017-08-22 RX ORDER — HYDROCHLOROTHIAZIDE 12.5 MG/1
CAPSULE ORAL
Qty: 30 CAP | Refills: 6 | Status: SHIPPED | OUTPATIENT
Start: 2017-08-22 | End: 2018-03-30 | Stop reason: SDUPTHER

## 2017-08-28 DIAGNOSIS — I10 ESSENTIAL HYPERTENSION: ICD-10-CM

## 2017-08-29 RX ORDER — LISINOPRIL 20 MG/1
20 TABLET ORAL DAILY
Qty: 30 TAB | Refills: 6 | Status: SHIPPED | OUTPATIENT
Start: 2017-08-29 | End: 2018-01-29 | Stop reason: SDUPTHER

## 2017-08-29 RX ORDER — AMLODIPINE BESYLATE 5 MG/1
5 TABLET ORAL DAILY
Qty: 90 TAB | Refills: 2 | Status: SHIPPED | OUTPATIENT
Start: 2017-08-29 | End: 2018-06-28 | Stop reason: SDUPTHER

## 2017-08-29 RX ORDER — LISINOPRIL 20 MG/1
TABLET ORAL
Qty: 30 TAB | Refills: 5 | OUTPATIENT
Start: 2017-08-29

## 2017-08-29 NOTE — TELEPHONE ENCOUNTER
SOILA Boyce       8/14/17 1:22 PM   Note     Received notice that patients Olmesartan-amlodipine-HCTZ (Jessica Allen) is not covered under her insurance. This was previously prescribed by [facility]. This medication is a combination of 3 meds. I am going to try breaking it down into 2 meds, Olmesartan-HCTZ (Benicar HCT) and amlodipine 5 mg in hopes that this is approved. Please make sure patient is aware of this change.

## 2017-10-12 ENCOUNTER — OFFICE VISIT (OUTPATIENT)
Dept: FAMILY MEDICINE CLINIC | Age: 57
End: 2017-10-12

## 2017-10-12 VITALS
HEIGHT: 66 IN | HEART RATE: 77 BPM | WEIGHT: 206 LBS | BODY MASS INDEX: 33.11 KG/M2 | DIASTOLIC BLOOD PRESSURE: 62 MMHG | SYSTOLIC BLOOD PRESSURE: 105 MMHG | RESPIRATION RATE: 18 BRPM | TEMPERATURE: 98.4 F | OXYGEN SATURATION: 99 %

## 2017-10-12 DIAGNOSIS — E78.2 MIXED HYPERLIPIDEMIA: ICD-10-CM

## 2017-10-12 DIAGNOSIS — E11.9 CONTROLLED TYPE 2 DIABETES MELLITUS WITHOUT COMPLICATION, WITHOUT LONG-TERM CURRENT USE OF INSULIN (HCC): Primary | ICD-10-CM

## 2017-10-12 DIAGNOSIS — M79.10 MYALGIA: ICD-10-CM

## 2017-10-12 DIAGNOSIS — Z23 ENCOUNTER FOR IMMUNIZATION: ICD-10-CM

## 2017-10-12 DIAGNOSIS — I10 ESSENTIAL HYPERTENSION: ICD-10-CM

## 2017-10-12 LAB
HBA1C MFR BLD HPLC: 5.7 %
MICROALBUMIN UR TEST STR-MCNC: 10 MG/L
MICROALBUMIN/CREAT RATIO POC: 200 MG/DL

## 2017-10-12 RX ORDER — CYCLOBENZAPRINE HCL 10 MG
5 TABLET ORAL 2 TIMES DAILY
Qty: 30 TAB | Refills: 3 | Status: SHIPPED | OUTPATIENT
Start: 2017-10-12 | End: 2018-08-26

## 2017-10-12 NOTE — PATIENT INSTRUCTIONS
Type 2 Diabetes: Care Instructions  Your Care Instructions  Type 2 diabetes is a disease that develops when the body's tissues cannot use insulin properly. Over time, the pancreas cannot make enough insulin. Insulin is a hormone that helps the body's cells use sugar (glucose) for energy. It also helps the body store extra sugar in muscle, fat, and liver cells. Without insulin, the sugar cannot get into the cells to do its work. It stays in the blood instead. This can cause high blood sugar levels. A person has diabetes when the blood sugar stays too high too much of the time. Over time, diabetes can lead to diseases of the heart, blood vessels, nerves, kidneys, and eyes. You may be able to control your blood sugar by losing weight, eating a healthy diet, and getting daily exercise. You may also have to take insulin or other diabetes medicine. Follow-up care is a key part of your treatment and safety. Be sure to make and go to all appointments. Call your doctor if you are having problems. It's also a good idea to know your test results and keep a list of the medicines you take. How can you care for yourself at home? · Keep your blood sugar at a target level (which you set with your doctor). ¨ Eat a good diet that spreads carbohydrate throughout the day. Carbohydrate--the body's main source of fuel--affects blood sugar more than any other nutrient. Carbohydrate is in fruits, vegetables, milk, and yogurt. It also is in breads, cereals, vegetables such as potatoes and corn, and sugary foods such as candy and cakes. ¨ Aim for 30 minutes of exercise on most, preferably all, days of the week. Walking is a good choice. You also may want to do other activities, such as running, swimming, cycling, or playing tennis or team sports. If your doctor says it's okay, do muscle-strengthening exercises at least 2 times a week. ¨ Take your medicines exactly as prescribed.  Call your doctor if you think you are having a problem with your medicine. You will get more details on the specific medicines your doctor prescribes. · Check your blood sugar as often as your doctor recommends. It is important to keep track of any symptoms you have, such as low blood sugar. Also tell your doctor if you have any changes in your activities, diet, or insulin use. · Talk to your doctor before you start taking aspirin every day. Aspirin can help certain people lower their risk of a heart attack or stroke. But taking aspirin isn't right for everyone, because it can cause serious bleeding. · Do not smoke. If you need help quitting, talk to your doctor about stop-smoking programs and medicines. These can increase your chances of quitting for good. · Keep your cholesterol and blood pressure at normal levels. You may need to take one or more medicines to reach your goals. Take them exactly as directed. Do not stop or change a medicine without talking to your doctor first.  When should you call for help? Call 911 anytime you think you may need emergency care. For example, call if:  · You passed out (lost consciousness), or you suddenly become very sleepy or confused. (You may have very low blood sugar.)  Call your doctor now or seek immediate medical care if:  · Your blood sugar is 300 mg/dL or is higher than the level your doctor has set for you. · You have symptoms of low blood sugar, such as:  ¨ Sweating. ¨ Feeling nervous, shaky, and weak. ¨ Extreme hunger and slight nausea. ¨ Dizziness and headache. ¨ Blurred vision. ¨ Confusion. Watch closely for changes in your health, and be sure to contact your doctor if:  · You often have problems controlling your blood sugar. · You have symptoms of long-term diabetes problems, such as:  ¨ New vision changes. ¨ New pain, numbness, or tingling in your hands or feet. ¨ Skin problems. Where can you learn more? Go to http://monique-digna.info/.   Enter C553 in the search box to learn more about \"Type 2 Diabetes: Care Instructions. \"  Current as of: March 13, 2017  Content Version: 11.3  © 3078-4671 [employer]. Care instructions adapted under license by [employer] (which disclaims liability or warranty for this information). If you have questions about a medical condition or this instruction, always ask your healthcare professional. [address] any warranty or liability for your use of this information.

## 2017-10-12 NOTE — MR AVS SNAPSHOT
Visit Information Date & Time Provider Department Dept. Phone Encounter #  
 10/12/2017  8:30 AM Winifred Guajardo Shannanosei [phone] [id number] Follow-up Instructions Return in about 6 months (around 4/12/2018). Your Appointments 1/4/2018  1:00 PM  
ESTABLISHED PATIENT with Chip Esposito MD  
[facility]) Appt Note: 1 yr; 1 yr  
 [address] [facility], [address] [place] [id number]  
[facility], [address] [place] [address] Upcoming Health Maintenance Date Due  
 FOOT EXAM Q1 8/3/1968 DTaP/Tdap/Td series (1 - Tdap) 8/3/1979 FOBT Q 1 YEAR AGE 50-75 8/3/2008 MICROALBUMIN Q1 1/20/2017 BREAST CANCER SCRN MAMMOGRAM 3/17/2017 INFLUENZA AGE 9 TO ADULT 8/1/2017 HEMOGLOBIN A1C Q6M 11/3/2017 LIPID PANEL Q1 5/3/2018 EYE EXAM RETINAL OR DILATED Q1 5/24/2018 Allergies as of 10/12/2017  Review Complete On: 10/12/2017 By: Winifred Guajardo MD  
  
 Severity Noted Reaction Type Reactions Banana  04/02/2015    Hives Asthma attach, throat swelling, throat scratching, lip swelling Clonidine  08/24/2015    Other (comments) Memory loss Iodine  02/04/2010   Systemic Rash Keflex [Cephalexin]  09/26/2011    Hives Seafood  03/13/2015    Hives Watermelon  09/08/2015    Hives Throat closes Current Immunizations  Reviewed on 1/27/2016 Name Date Influenza Vaccine 10/29/2014 Influenza Vaccine (Quad) PF  Incomplete, 11/2/2016, 1/27/2016 Influenza Vaccine Split 12/7/2010 Not reviewed this visit You Were Diagnosed With   
  
 Codes Comments Controlled type 2 diabetes mellitus without complication, without long-term current use of insulin ([facility]    -  Primary ICD-10-CM: E11.9 ICD-9-CM: 250.00 Essential hypertension     ICD-10-CM: I10 
ICD-9-CM: 401.9 Mixed hyperlipidemia     ICD-10-CM: E78.2 ICD-9-CM: 272.2 Encounter for immunization     ICD-10-CM: D11 ICD-9-CM: V03.89 Myalgia     ICD-10-CM: M79.1 ICD-9-CM: 729.1 Vitals BP Pulse Temp Resp Height(growth percentile) Weight(growth percentile) 105/62 (BP 1 Location: Right arm, BP Patient Position: Sitting) 77 98.4 °F (36.9 °C) (Oral) 18 5' 6\" (1.676 m) 206 lb (93.4 kg) SpO2 BMI OB Status Smoking Status 99% 33.25 kg/m2 Hysterectomy Never Smoker Vitals History BMI and BSA Data Body Mass Index Body Surface Area  
 33.25 kg/m 2 2.09 m 2 Preferred Pharmacy Pharmacy Name Phone [facility], [address] [phone] Your Updated Medication List  
  
   
This list is accurate as of: 10/12/17  9:18 AM.  Always use your most recent med list.  
  
  
  
  
 albuterol 90 mcg/actuation inhaler Commonly known as:  PROVENTIL HFA, VENTOLIN HFA, PROAIR HFA Take 2 Puffs by inhalation every four (4) hours as needed for Wheezing. amLODIPine 5 mg tablet Commonly known as:  Laina Aleta Take 1 Tab by mouth daily. COQ10  PO Take  by mouth. cyclobenzaprine 10 mg tablet Commonly known as:  FLEXERIL Take 0.5 Tabs by mouth two (2) times a day. dexlansoprazole 30 mg capsule Commonly known as:  DEXILANT Take 1 Cap by mouth daily as needed. Indications: GASTROESOPHAGEAL REFLUX  
  
 ergocalciferol 50,000 unit capsule Commonly known as:  VITAMIN D2 Take 1 Cap by mouth every seven (7) days. FLONASE 50 mcg/actuation nasal spray Generic drug:  fluticasone 2 Sprays by Both Nostrils route daily. fluticasone-salmeterol 250-50 mcg/dose diskus inhaler Commonly known as:  ADVAIR Take 1 Puff by inhalation every twelve (12) hours. hydroCHLOROthiazide 12.5 mg capsule Commonly known as:  Bill Scales TAKE ONE CAPSULE BY MOUTH ONCE DAILY(GENERIC FOR Bill Scales) lisinopril 20 mg tablet Commonly known as:  Maribel Triana Take 1 Tab by mouth daily. magnesium oxide 400 mg tablet Commonly known as:  MAG-OX Take 1 Tab by mouth daily. Indications: HYPOMAGNESEMIA  
  
 multivitamin-iron-FA (hematinic)  mg-mcg Tab tablet Commonly known as:  CENTRUM COMPLETE Take 1 Tab by mouth daily. rosuvastatin 10 mg tablet Commonly known as:  CRESTOR Take 1 Tab by mouth nightly. SITagliptin-metFORMIN 50-1,000 mg per tablet Commonly known as:  Joanie Angelina Take 1 Tab by mouth two (2) times daily (with meals). TOPAMAX 25 mg tablet Generic drug:  topiramate TAKE ONE TABLET BY MOUTH TWICE DAILY ZyrTEC 10 mg Cap Generic drug:  Cetirizine Take 10 mg PE/day by mouth as needed. Prescriptions Sent to Pharmacy Refills  
 cyclobenzaprine (FLEXERIL) 10 mg tablet 3 Sig: Take 0.5 Tabs by mouth two (2) times a day. Class: Normal  
 Pharmacy: Leatha Hodge at [address] [place] [address], [address] [place] [address] Ph #: [phone] Route: Oral  
  
We Performed the Following AMB POC HEMOGLOBIN A1C [15931 CPT(R)] AMB POC URINE, MICROALBUMIN, SEMIQUANTITATIVE [12521 CPT(R)]  DIABETES FOOT EXAM [7 Custom] INFLUENZA VIRUS VAC QUAD,SPLIT,PRESV FREE SYRINGE IM [id number] CPT(R)] NC IMMUNIZ ADMIN,1 SINGLE/COMB VAC/TOXOID [id number] CPT(R)] Follow-up Instructions Return in about 6 months (around 4/12/2018). Patient Instructions Type 2 Diabetes: Care Instructions Your Care Instructions Type 2 diabetes is a disease that develops when the body's tissues cannot use insulin properly. Over time, the pancreas cannot make enough insulin. Insulin is a hormone that helps the body's cells use sugar (glucose) for energy. It also helps the body store extra sugar in muscle, fat, and liver cells. Without insulin, the sugar cannot get into the cells to do its work.  It stays in the blood instead. This can cause high blood sugar levels. A person has diabetes when the blood sugar stays too high too much of the time. Over time, diabetes can lead to diseases of the heart, blood vessels, nerves, kidneys, and eyes. You may be able to control your blood sugar by losing weight, eating a healthy diet, and getting daily exercise. You may also have to take insulin or other diabetes medicine. Follow-up care is a key part of your treatment and safety. Be sure to make and go to all appointments. Call your doctor if you are having problems. It's also a good idea to know your test results and keep a list of the medicines you take. How can you care for yourself at home? · Keep your blood sugar at a target level (which you set with your doctor). ¨ Eat a good diet that spreads carbohydrate throughout the day. Carbohydratethe body's main source of fuelaffects blood sugar more than any other nutrient. Carbohydrate is in fruits, vegetables, milk, and yogurt. It also is in breads, cereals, vegetables such as potatoes and corn, and sugary foods such as candy and cakes. ¨ Aim for 30 minutes of exercise on most, preferably all, days of the week. Walking is a good choice. You also may want to do other activities, such as running, swimming, cycling, or playing tennis or team sports. If your doctor says it's okay, do muscle-strengthening exercises at least 2 times a week. ¨ Take your medicines exactly as prescribed. Call your doctor if you think you are having a problem with your medicine. You will get more details on the specific medicines your doctor prescribes. · Check your blood sugar as often as your doctor recommends. It is important to keep track of any symptoms you have, such as low blood sugar. Also tell your doctor if you have any changes in your activities, diet, or insulin use. · Talk to your doctor before you start taking aspirin every day.  Aspirin can help certain people lower their risk of a heart attack or stroke. But taking aspirin isn't right for everyone, because it can cause serious bleeding. · Do not smoke. If you need help quitting, talk to your doctor about stop-smoking programs and medicines. These can increase your chances of quitting for good. · Keep your cholesterol and blood pressure at normal levels. You may need to take one or more medicines to reach your goals. Take them exactly as directed. Do not stop or change a medicine without talking to your doctor first. 
When should you call for help? Call 911 anytime you think you may need emergency care. For example, call if: 
· You passed out (lost consciousness), or you suddenly become very sleepy or confused. (You may have very low blood sugar.) Call your doctor now or seek immediate medical care if: 
· Your blood sugar is 300 mg/dL or is higher than the level your doctor has set for you. · You have symptoms of low blood sugar, such as: ¨ Sweating. ¨ Feeling nervous, shaky, and weak. ¨ Extreme hunger and slight nausea. ¨ Dizziness and headache. ¨ Blurred vision. ¨ Confusion. Watch closely for changes in your health, and be sure to contact your doctor if: 
· You often have problems controlling your blood sugar. · You have symptoms of long-term diabetes problems, such as: ¨ New vision changes. ¨ New pain, numbness, or tingling in your hands or feet. ¨ Skin problems. Where can you learn more? Go to http://monique-digna.info/. Enter C553 in the search box to learn more about \"Type 2 Diabetes: Care Instructions. \" Current as of: March 13, 2017 Content Version: 11.3 © 3304-0338 [employer]. Care instructions adapted under license by [employer] (which disclaims liability or warranty for this information).  If you have questions about a medical condition or this instruction, always ask your healthcare professional. [employer] disclaims any warranty or liability for your use of this information. Introducing [facility]! [employer] introduces MatchMine patient portal. Now you can access parts of your medical record, email your doctor's office, and request medication refills online. 1. In your internet browser, go to [email or web address] 2. Click on the First Time User? Click Here link in the Sign In box. You will see the New Member Sign Up page. 3. Enter your MatchMine Access Code exactly as it appears below. You will not need to use this code after youve completed the sign-up process. If you do not sign up before the expiration date, you must request a new code. · MatchMine Access Code: [email or web address] Expires: 1/10/2018  9:18 AM 
 
4. Enter the last four digits of your Social Security Number (xxxx) and Date of Birth (mm/dd/yyyy) as indicated and click Submit. You will be taken to the next sign-up page. 5. Create a MatchMine ID. This will be your MatchMine login ID and cannot be changed, so think of one that is secure and easy to remember. 6. Create a MatchMine password. You can change your password at any time. 7. Enter your Password Reset Question and Answer. This can be used at a later time if you forget your password. 8. Enter your e-mail address. You will receive e-mail notification when new information is available in [address] 9. Click Sign Up. You can now view and download portions of your medical record. 10. Click the Download Summary menu link to download a portable copy of your medical information. If you have questions, please visit the Frequently Asked Questions section of the MatchMine website. Remember, MatchMine is NOT to be used for urgent needs. For medical emergencies, dial 911. Now available from your iPhone and Android! Please provide this summary of care documentation to your next provider. Your primary care clinician is listed as Shivani Blanco. If you have any questions after today's visit, please call [phone].

## 2017-10-12 NOTE — PROGRESS NOTES
Chronic Illness Visit    Today's Date:  2017  Patient's Name: Connie Lorenzo   Patient's :  1960     History:     Chief Complaint   Patient presents with    Follow Up Chronic Condition     HTN, Diabetes       Connie Lorenzo is a 59 y.o. female presenting for a follow up of Chronic Illness. Diabetes/Hypertension/Hyperlipidemia    BP today is at goal today <130/80. Patients risk factors include: obese  Home Blood Sugars are being checked 100-160. Denies hypoglycemic episodes  Recent hospitalizations: denies  Medications regimen is as follows: see below  Last HgbA1C: 5.8 (Oct 2016) and 5.7 (10/12/2017)  Daily exercise and diet are as follows: denies healthy diet stays active  Weight is stable  Takes the below meds regularly with no side effects. Patient is on a statin denies myalgia  Last LDL: 57  Last DM eye exam: unknown  Last DM foot exam: unknown  Last urine microalbumin: unknown    Right Arm Pain    Onset: worsened about 6 months ago. Patient reports doing a lot of lifting/pushing/pulling at work  Occurs in the right upper arm  Denies acute injuries or falls. Patient usually takes NSAID for pain relief  At worst pain can be 10/10. Reports that it feels achy/throbbing. Past Medical History:   Diagnosis Date    Asthma 2010    [facility] 2015    Chronic diastolic heart failure ([address] 3/18/2015    COPD [facility]ch[facility] obstructive pulmonary disease[facility] ([facility])     PFT confirmed 16    Diabetes mellitus ([address] 2014    Diabetic eye exam ([facility] 2017    No retinopathy Best corrected vision 20/20 OU    HTN (hypertension) 2010    Hyperlipidemia LDL goal < 70     Magnesium deficiency     Obesity, unspecified 4/15/2015    SAH (subarachnoid hemorrhage) ([address] 2/20/15    admitted in [facility], Dr. Arabella ng.  Adrian Gonaclves and ref to Dr. Estefania Bradshaw, Neurology    Stroke [facility])     Vitamin D deficiency      Past Surgical History:   Procedure Laterality Date    HX HYSTERECTOMY      abd approach with ovaries and cervix intact    HX HYSTERECTOMY N/A 2004    HX OTHER SURGICAL  8/25/15    SAH, cerebral arteriopathy, Dr. Sharifa Blum, Interventional Neurology [facility]     Social History     Social History    Marital status: SINGLE     Spouse name: N/A    Number of children: 1    Years of education: 13     Occupational History     [employer]     unemployed since Jan 2014, applied to [employer] for job July 2014     Social History Main Topics    Smoking status: Never Smoker    Smokeless tobacco: Never Used    Alcohol use 0.6 oz/week     0 Standard drinks or equivalent, 1 Glasses of wine per week      Comment: occasionally - social,    Drug use: No    Sexual activity: Yes     Partners: Male     Birth control/ protection: Condom     Other Topics Concern     Service No    Blood Transfusions Yes    Caffeine Concern Yes    Occupational Exposure No    Hobby Hazards No    Sleep Concern No    Stress Concern No    Weight Concern No    Special Diet No    Back Care No    Exercise No    Bike Helmet No    Seat Belt Yes    Self-Exams Yes     Social History Narrative     Family History   Problem Relation Age of Onset    Diabetes Mother     Hypertension Mother           Heart Attack Mother 62    Alcohol abuse Father      cirrhosis    Diabetes Sister     Heart Attack Sister 64     Allergies   Allergen Reactions    Banana Hives     Asthma attach, throat swelling, throat scratching, lip swelling    Clonidine Other (comments)     Memory loss    Iodine Rash    Keflex [Cephalexin] Hives    Seafood Hives    Watermelon Hives     Throat closes       Problem List:      Patient Active Problem List   Diagnosis Code    Asthma J45.909    Anxiety F41.9    Mixed hyperlipidemia E78.2    Vitamin D deficiency E55.9    Asthma with exacerbation [address]. [address] and depression F41.8    Encounter for long-term (current) use of other medications Z79.899    Subarachnoid hemorrhage (HCC) I60.9    Abnormal glucose R73.09    Essential hypertension I10    Chronic diastolic heart failure (HCC) I50.32    Obesity, unspecified E66.9    New onset of headaches after age 48 R48    Drug therapy continued Z79.899    Iron deficiency anemia D50.9    Gastroesophageal reflux disease without esophagitis K21.9    Hypomagnesemia E83.42    History of subarachnoid hemorrhage Z86.79    BMI 33.0-33.9,adult Z68.33    Hypokalemia E87.6    Post-menopausal M07.3    Uncomplicated asthma S96.518       Medications:     Current Outpatient Prescriptions   Medication Sig    cyclobenzaprine (FLEXERIL) 10 mg tablet Take 0.5 Tabs by mouth two (2) times a day.  amLODIPine (NORVASC) 5 mg tablet Take 1 Tab by mouth daily.  lisinopril (PRINIVIL, ZESTRIL) 20 mg tablet Take 1 Tab by mouth daily.  hydroCHLOROthiazide (MICROZIDE) 12.5 mg capsule TAKE ONE CAPSULE BY MOUTH ONCE DAILY(GENERIC FOR MICROZIDE)    SITagliptin-metFORMIN (JANUMET) 50-1,000 mg per tablet Take 1 Tab by mouth two (2) times daily (with meals).  UBIDECARENONE/VITAMIN E MIXED (COQ10  PO) Take  by mouth.  ergocalciferol (VITAMIN D2) 50,000 unit capsule Take 1 Cap by mouth every seven (7) days.  magnesium oxide (MAG-OX) 400 mg tablet Take 1 Tab by mouth daily. Indications: HYPOMAGNESEMIA    fluticasone (FLONASE) 50 mcg/actuation nasal spray 2 Sprays by Both Nostrils route daily.  TOPAMAX 25 mg tablet TAKE ONE TABLET BY MOUTH TWICE DAILY    dexlansoprazole (DEXILANT) 30 mg capsule Take 1 Cap by mouth daily as needed. Indications: GASTROESOPHAGEAL REFLUX    fluticasone-salmeterol (ADVAIR) 250-50 mcg/dose diskus inhaler Take 1 Puff by inhalation every twelve (12) hours.  multivitamin-iron-FA, hematinic, (CENTRUM COMPLETE)  mg-mcg tab tablet Take 1 Tab by mouth daily.  albuterol (PROVENTIL HFA, VENTOLIN HFA, PROAIR HFA) 90 mcg/actuation inhaler Take 2 Puffs by inhalation every four (4) hours as needed for Wheezing.     Cetirizine (ZYRTEC) 10 mg cap Take 10 mg PE/day by mouth as needed.  rosuvastatin (CRESTOR) 10 mg tablet Take 1 Tab by mouth nightly. No current facility-administered medications for this visit. Constitutional: negative for fevers, chills, sweats and fatigue  Respiratory: negative for cough, sputum or wheezing  Cardiovascular: negative for chest pain, chest pressure/discomfort, dyspnea  Gastrointestinal: negative for nausea, vomiting, diarrhea, constipation and abdominal pain  Musculoskeletal:negative for myalgias and arthralgias  Neurological: positive for headaches, negative for dizziness  Behavioral/Psych: negative for anxiety and depression    Physical Assessment:   VS:    Visit Vitals    /62 (BP 1 Location: Right arm, BP Patient Position: Sitting)    Pulse 77    Temp 98.4 °F (36.9 °C) (Oral)    Resp 18    Ht 5' 6\" (1.676 m)    Wt 206 lb (93.4 kg)    SpO2 99%    BMI 33.25 kg/m2       Lab Results   Component Value Date/Time    Sodium 140 05/03/2017 10:58 AM    Potassium 4.2 05/03/2017 10:58 AM    Chloride 98 05/03/2017 10:58 AM    CO2 26 05/03/2017 10:58 AM    Anion gap 16.0 05/03/2017 10:58 AM    Glucose 109 05/03/2017 10:58 AM    BUN 13 05/03/2017 10:58 AM    Creatinine 1.0 05/03/2017 10:58 AM    BUN/Creatinine ratio 17 10/26/2016 09:04 AM    GFR est AA >60 10/26/2016 09:04 AM    GFR est non-AA 55 10/26/2016 09:04 AM    Calcium 9.4 05/03/2017 10:58 AM       General:   Well-groomed, well-nourished, in no distress, pleasant, alert, appropriate and conversant. ENT:  Normal TMs and nasal mucsoa  Mouth:  Good dentition, oropharynx WNL without membranes, exudates, petechiae or ulcers  Neck:   Neck supple, no swelling, mass or tenderness, no thyromegaly  Cardiovasc:   RRR, no MRG. Pulses 2+ and symmetric at distal extremities. Pulmonary:   Lungs clear bilaterally. Normal respiratory effort. Abdomen:   Abdomen soft, NT, ND, NAB. Extremities:   No edema, no TTP bilateral calves.   LEs warm and well-perfused. Neuro:   Alert and oriented, no focal deficits. No facial asymmetry noted. Skin:    No rashes or jaundice  MSK:   Normal ROM, 5/5 muscle strength  Psych:  No pressured speech or abnormal thought content    Diabetic foot exam:     Left: Reflexes 2+     Vibratory sensation normal    Proprioception normal   Sharp/dull discrimination normal    Filament test normal sensation with micro filament   Pulse DP: 2+ (normal)   Pulse PT: 2+ (normal)   Deformities: Yes thickened toe nails present  Right: Reflexes 2+   Vibratory sensation normal   Proprioception normal   Sharp/dull discrimination normal   Filament test normal sensation with micro filament   Pulse DP: 2+ (normal)   Pulse PT: 2+ (normal)   Deformities: Yes - large toe nail absent      Assessment/Plan & Orders:       1. Controlled type 2 diabetes mellitus without complication, without long-term current use of insulin ([address]    2. Essential hypertension    3. Mixed hyperlipidemia    4. Encounter for immunization    5. Myalgia        Orders Placed This Encounter    PA IMMUNIZ ADMIN,1 SINGLE/COMB VAC/TOXOID    INFLUENZA VIRUS VACCINE QUADRIVALENT, PRESERVATIVE FREE SYRINGE (24903)    AMB POC HEMOGLOBIN A1C    AMB POC URINE, MICROALBUMIN, SEMIQUANTITATIVE    HM DIABETES FOOT EXAM    cyclobenzaprine (FLEXERIL) 10 mg tablet       DM stable on current meds. HgbA1c 5.7 today will have patient continue with current meds. HTN/Hyperlipidemia stable continue with current meds  Myalgia will refill flexeril    Follow up in 6 months    *Plan of care reviewed with patient. Patient in agreement with plan and expresses understanding. All questions answered and patient encouraged to call or RTO if further questions or concerns.     Tori Hudson MD  Family Medicine  10/12/2017  8:45 AM

## 2017-12-27 ENCOUNTER — HOSPITAL ENCOUNTER (OUTPATIENT)
Dept: VASCULAR SURGERY | Age: 57
Discharge: HOME | End: 2017-12-27
Attending: PODIATRIST
Payer: COMMERCIAL

## 2017-12-27 ENCOUNTER — OFFICE VISIT (OUTPATIENT)
Dept: CARDIOLOGY CLINIC | Age: 57
End: 2017-12-27

## 2017-12-27 VITALS
HEIGHT: 66 IN | HEART RATE: 92 BPM | BODY MASS INDEX: 32.3 KG/M2 | SYSTOLIC BLOOD PRESSURE: 131 MMHG | WEIGHT: 201 LBS | DIASTOLIC BLOOD PRESSURE: 71 MMHG

## 2017-12-27 DIAGNOSIS — I50.32 CHRONIC DIASTOLIC HEART FAILURE (HCC): Primary | ICD-10-CM

## 2017-12-27 DIAGNOSIS — E78.2 MIXED HYPERLIPIDEMIA: ICD-10-CM

## 2017-12-27 DIAGNOSIS — Z86.79 HISTORY OF SUBARACHNOID HEMORRHAGE: ICD-10-CM

## 2017-12-27 DIAGNOSIS — E66.9 OBESITY (BMI 30.0-34.9): ICD-10-CM

## 2017-12-27 DIAGNOSIS — E11.51 TYPE II DIABETES MELLITUS WITH PERIPHERAL CIRCULATORY DISORDER (HCC): ICD-10-CM

## 2017-12-27 DIAGNOSIS — I10 ESSENTIAL HYPERTENSION: ICD-10-CM

## 2017-12-27 PROCEDURE — 93922 UPR/L XTREMITY ART 2 LEVELS: CPT

## 2017-12-27 NOTE — LETTER
Wilber Stauffer 1960 
 
12/27/2017 Dear Wes Dorantes MD 
 
I had the pleasure of evaluating  Ms. Glenford Galeazzi in office today. Below are the relevant portions of my assessment and plan of care. ICD-10-CM ICD-9-CM 1. Chronic diastolic heart failure (HCC) I50.32 428.32 AMB POC EKG ROUTINE W/ 12 LEADS, INTER & REP  
   SCHEDULE NUCLEAR STUDY 2. Essential hypertension I10 401.9   
 12/17; 1/17 controlled 3. Mixed hyperlipidemia E78.2 272.2   
 5/18 LDL94; 10/16 LDL57; 1/16 LDL56; 4/15 [id number] 
  
4. Obesity (BMI 30.0-34. 9) E66.9 278.00 Weight loss has been strongly encouraged by following dietary restrictions and an exercise routine. 5. History of subarachnoid hemorrhage Z86.79 V12.59   
 2/15 Current Outpatient Prescriptions Medication Sig Dispense Refill  OTHER vytorin  cyclobenzaprine (FLEXERIL) 10 mg tablet Take 0.5 Tabs by mouth two (2) times a day. (Patient taking differently: Take 10 mg by mouth nightly.) 30 Tab 3  
 amLODIPine (NORVASC) 5 mg tablet Take 1 Tab by mouth daily. 90 Tab 2  
 lisinopril (PRINIVIL, ZESTRIL) 20 mg tablet Take 1 Tab by mouth daily. 30 Tab 6  
 hydroCHLOROthiazide (MICROZIDE) 12.5 mg capsule TAKE ONE CAPSULE BY MOUTH ONCE DAILY(GENERIC FOR MICROZIDE) 30 Cap 6  
 SITagliptin-metFORMIN (JANUMET) 50-1,000 mg per tablet Take 1 Tab by mouth two (2) times daily (with meals). 180 Tab 0  
 ergocalciferol (VITAMIN D2) 50,000 unit capsule Take 1 Cap by mouth every seven (7) days. 4 Cap 11  
 magnesium oxide (MAG-OX) 400 mg tablet Take 1 Tab by mouth daily. Indications: HYPOMAGNESEMIA 30 Tab 11  
 fluticasone (FLONASE) 50 mcg/actuation nasal spray 2 Sprays by Both Nostrils route daily.  TOPAMAX 25 mg tablet TAKE ONE TABLET BY MOUTH TWICE DAILY 60 Tab 0  
 dexlansoprazole (DEXILANT) 30 mg capsule Take 1 Cap by mouth daily as needed.  Indications: GASTROESOPHAGEAL REFLUX 90 Cap 3  
  fluticasone-salmeterol (ADVAIR) 250-50 mcg/dose diskus inhaler Take 1 Puff by inhalation every twelve (12) hours. 3 Inhaler 3  
 multivitamin-iron-FA, hematinic, (CENTRUM COMPLETE)  mg-mcg tab tablet Take 1 Tab by mouth daily. 90 Tab 3  
 albuterol (PROVENTIL HFA, VENTOLIN HFA, PROAIR HFA) 90 mcg/actuation inhaler Take 2 Puffs by inhalation every four (4) hours as needed for Wheezing. 3 Inhaler 3  
 Cetirizine (ZYRTEC) 10 mg cap Take 10 mg PE/day by mouth as needed.  UBIDECARENONE/VITAMIN E MIXED (COQ10  PO) Take  by mouth. Orders Placed This Encounter  SCHEDULE NUCLEAR STUDY  
  exercise  AMB POC EKG ROUTINE W/ 12 LEADS, INTER & REP Order Specific Question:   Reason for Exam: Answer:   chf  
 OTHER Sig: vytorin If you have questions, please do not hesitate to call me. I look forward to following Ms. Reece Chadwick along with you. Sincerely, Fani Delcid MD

## 2017-12-27 NOTE — PATIENT INSTRUCTIONS
Medications Discontinued During This Encounter   Medication Reason    rosuvastatin (CRESTOR) 10 mg tablet Alternate Therapy       Orders Placed This Encounter    SCHEDULE NUCLEAR STUDY     exercise    AMB POC EKG ROUTINE W/ 12 LEADS, INTER & REP     Order Specific Question:   Reason for Exam:     Answer:   chf          Body Mass Index: Care Instructions  Your Care Instructions    Body mass index (BMI) can help you see if your weight is raising your risk for health problems. It uses a formula to compare how much you weigh with how tall you are. · A BMI lower than 18.5 is considered underweight. · A BMI between 18.5 and 24.9 is considered healthy. · A BMI between 25 and 29.9 is considered overweight. A BMI of 30 or higher is considered obese. If your BMI is in the normal range, it means that you have a lower risk for weight-related health problems. If your BMI is in the overweight or obese range, you may be at increased risk for weight-related health problems, such as high blood pressure, heart disease, stroke, arthritis or joint pain, and diabetes. If your BMI is in the underweight range, you may be at increased risk for health problems such as fatigue, lower protection (immunity) against illness, muscle loss, bone loss, hair loss, and hormone problems. BMI is just one measure of your risk for weight-related health problems. You may be at higher risk for health problems if you are not active, you eat an unhealthy diet, or you drink too much alcohol or use tobacco products. Follow-up care is a key part of your treatment and safety. Be sure to make and go to all appointments, and call your doctor if you are having problems. It's also a good idea to know your test results and keep a list of the medicines you take. How can you care for yourself at home? · Practice healthy eating habits. This includes eating plenty of fruits, vegetables, whole grains, lean protein, and low-fat dairy.   · If your doctor recommends it, get more exercise. Walking is a good choice. Bit by bit, increase the amount you walk every day. Try for at least 30 minutes on most days of the week. · Do not smoke. Smoking can increase your risk for health problems. If you need help quitting, talk to your doctor about stop-smoking programs and medicines. These can increase your chances of quitting for good. · Limit alcohol to 2 drinks a day for men and 1 drink a day for women. Too much alcohol can cause health problems. If you have a BMI higher than 25  · Your doctor may do other tests to check your risk for weight-related health problems. This may include measuring the distance around your waist. A waist measurement of more than 40 inches in men or 35 inches in women can increase the risk of weight-related health problems. · Talk with your doctor about steps you can take to stay healthy or improve your health. You may need to make lifestyle changes to lose weight and stay healthy, such as changing your diet and getting regular exercise. If you have a BMI lower than 18.5  · Your doctor may do other tests to check your risk for health problems. · Talk with your doctor about steps you can take to stay healthy or improve your health. You may need to make lifestyle changes to gain or maintain weight and stay healthy, such as getting more healthy foods in your diet and doing exercises to build muscle. Where can you learn more? Go to http://monique-digna.info/. Enter S176 in the search box to learn more about \"Body Mass Index: Care Instructions. \"  Current as of: October 13, 2016  Content Version: 11.4  © 7123-5818 [employer]. Care instructions adapted under license by [employer] (which disclaims liability or warranty for this information).  If you have questions about a medical condition or this instruction, always ask your healthcare professional. Tarah Juarez disclaims any warranty or liability for your use of this information.

## 2017-12-27 NOTE — PROCEDURES
[facility]  *** [facility]ARY REPORT ***    Name: Mt Rebollar  MRN: [id number]    Outpatient  : 03 Aug 1960  HIS Order #: [id number]  [id number] [facility] Visit #: [id number]  Date: 27 Dec 2017    TYPE OF TEST: Peripheral Arterial Testing    REASON FOR TEST    Right Leg  Doppler:    Normal  Ankle/Brachial: 1.17    Left Leg  Doppler:    Normal  Ankle/Brachial: 1.18    INTERPRETATION/FINDINGS  Physiologic testing was performed using continuous wave Doppler and  segmental pressures. 1. No evidence of arterial insufficiency in the bilateral lower  extremities at rest.  2. The right ankle/brachial index is 1.17 and the left ankle/brachial  index is 1. 18.    ADDITIONAL COMMENTS    I have personally reviewed the data relevant to the interpretation of  this study.     TECHNOLOGIST: Elke Dorantes RVT  Signed: 2017 02:51 PM

## 2017-12-27 NOTE — PROGRESS NOTES
HISTORY OF PRESENT ILLNESS  Rodney Kc is a 59 y.o. female. CHF   The history is provided by the medical records. This is a chronic problem. Associated symptoms include shortness of breath. Pertinent negatives include no chest pain and no headaches. Hypertension   The history is provided by the medical records and patient. This is a chronic problem. Associated symptoms include shortness of breath. Pertinent negatives include no chest pain and no headaches. Cholesterol Problem   The history is provided by the medical records. This is a chronic problem. Associated symptoms include shortness of breath. Pertinent negatives include no chest pain and no headaches. Shortness of Breath   The history is provided by the patient. This is a recurrent problem. The problem occurs frequently. The current episode started more than 1 week ago. The problem has not changed since onset. Associated symptoms include leg swelling. Pertinent negatives include no fever, no headaches, no cough, no wheezing, no PND, no orthopnea, no chest pain, no vomiting, no rash and no claudication. The problem's precipitants include exercise (walking thru parking lot; 9/15 more; 1/17 better except when exosed to smoke/dust). She has tried beta-agonist inhalers for the symptoms. The treatment provided significant relief. Leg Swelling   The history is provided by the patient. This is a recurrent problem. The current episode started more than 1 week ago. The problem occurs daily (R>L). The problem has not changed since onset. Associated symptoms include shortness of breath. Pertinent negatives include no chest pain and no headaches. The symptoms are aggravated by standing. The symptoms are relieved by rest.       Review of Systems   Constitutional: Negative for chills, fever, malaise/fatigue and weight loss. HENT: Negative for nosebleeds. Eyes: Negative for discharge. Respiratory: Positive for shortness of breath.  Negative for cough and wheezing. Cardiovascular: Positive for leg swelling. Negative for chest pain, palpitations, orthopnea, claudication and PND. Gastrointestinal: Negative for diarrhea, nausea and vomiting. Genitourinary: Negative for dysuria and hematuria. Musculoskeletal: Negative for joint pain. Skin: Negative for rash. Neurological: Negative for dizziness, seizures, loss of consciousness and headaches. Endo/Heme/Allergies: Negative for polydipsia. Does not bruise/bleed easily. Psychiatric/Behavioral: Negative for depression and substance abuse. The patient does not have insomnia. Allergies   Allergen Reactions    Banana Hives     Asthma attach, throat swelling, throat scratching, lip swelling    Clonidine Other (comments)     Memory loss    Iodine Rash    Keflex [Cephalexin] Hives    Seafood Hives    Watermelon Hives     Throat closes       Past Medical History:   Diagnosis Date    Asthma 2/4/2010    Brain bleed ([facility] [address] 02/20/2015    Chronic diastolic heart failure ([facility] [address] 3/18/2015    COPD (chronic obstructive pulmonary disease) ([facility] [address]     PFT confirmed 8/18/16    Diabetes mellitus ([address] 5/27/2014    Diabetic eye exam ([facility] [address] 01/26/2017    No retinopathy Best corrected vision 20/20 OU    HTN (hypertension) 2/4/2010    Hyperlipidemia LDL goal < 70 2014    Magnesium deficiency     Obesity, unspecified 4/15/2015    SAH (subarachnoid hemorrhage) ([facility] [address] 2/20/15    admitted in [facility], Dr. Gerard Babb.  Yaneli Bustos and ref to Dr. Kunal Low, Neurology    Stroke [facility])     Vitamin D deficiency 2000       Family History   Problem Relation Age of Onset    Diabetes Mother     Hypertension Mother           Heart Attack Mother 62    Alcohol abuse Father      cirrhosis    Diabetes Sister     Heart Attack Sister 64       Social History   Substance Use Topics    Smoking status: Never Smoker    Smokeless tobacco: Never Used    Alcohol use 0.6 oz/week     0 Standard drinks or equivalent, 1 Glasses of wine per week      Comment: occasionally - social,        Current Outpatient Prescriptions   Medication Sig    cyclobenzaprine (FLEXERIL) 10 mg tablet Take 0.5 Tabs by mouth two (2) times a day.  amLODIPine (NORVASC) 5 mg tablet Take 1 Tab by mouth daily.  lisinopril (PRINIVIL, ZESTRIL) 20 mg tablet Take 1 Tab by mouth daily.  hydroCHLOROthiazide (MICROZIDE) 12.5 mg capsule TAKE ONE CAPSULE BY MOUTH ONCE DAILY(GENERIC FOR MICROZIDE)    rosuvastatin (CRESTOR) 10 mg tablet Take 1 Tab by mouth nightly.  SITagliptin-metFORMIN (JANUMET) 50-1,000 mg per tablet Take 1 Tab by mouth two (2) times daily (with meals).  UBIDECARENONE/VITAMIN E MIXED (COQ10  PO) Take  by mouth.  ergocalciferol (VITAMIN D2) 50,000 unit capsule Take 1 Cap by mouth every seven (7) days.  magnesium oxide (MAG-OX) 400 mg tablet Take 1 Tab by mouth daily. Indications: HYPOMAGNESEMIA    fluticasone (FLONASE) 50 mcg/actuation nasal spray 2 Sprays by Both Nostrils route daily.  TOPAMAX 25 mg tablet TAKE ONE TABLET BY MOUTH TWICE DAILY    dexlansoprazole (DEXILANT) 30 mg capsule Take 1 Cap by mouth daily as needed. Indications: GASTROESOPHAGEAL REFLUX    fluticasone-salmeterol (ADVAIR) 250-50 mcg/dose diskus inhaler Take 1 Puff by inhalation every twelve (12) hours.  multivitamin-iron-FA, hematinic, (CENTRUM COMPLETE)  mg-mcg tab tablet Take 1 Tab by mouth daily.  albuterol (PROVENTIL HFA, VENTOLIN HFA, PROAIR HFA) 90 mcg/actuation inhaler Take 2 Puffs by inhalation every four (4) hours as needed for Wheezing.  Cetirizine (ZYRTEC) 10 mg cap Take 10 mg PE/day by mouth as needed. No current facility-administered medications for this visit.          Past Surgical History:   Procedure Laterality Date    HX HYSTERECTOMY  2004    abd approach with ovaries and cervix intact    HX HYSTERECTOMY N/A 2004    HX OTHER SURGICAL  8/25/15    SAH, cerebral arteriopathy, Dr. Rowan Askew, Interventional Neurology [facility]       Diagnostic Studies:  CARDIOLOGY STUDIES 4/10/2015   Echocardiogram - Complete Result 55%EF, tr MR   Some recent data might be hidden       Visit Vitals    /71 (BP 1 Location: Left arm, BP Patient Position: Sitting)    Pulse 92    Ht 5' 6\" (1.676 m)    Wt 201 lb (91.2 kg)    BMI 32.44 kg/m2       Ms. Georgia Enriquez has a reminder for a \"due or due soon\" health maintenance. I have asked that she contact her primary care provider for follow-up on this health maintenance. Physical Exam   Constitutional: She is oriented to person, place, and time. She appears well-developed and well-nourished. No distress. HENT:   Head: Normocephalic and atraumatic. Mouth/Throat: Normal dentition. Eyes: Right eye exhibits no discharge. Left eye exhibits no discharge. No scleral icterus. Neck: Neck supple. No JVD present. Carotid bruit is not present. No thyromegaly present. Cardiovascular: Normal rate, regular rhythm, S1 normal, S2 normal, normal heart sounds and intact distal pulses. Exam reveals no gallop and no friction rub. No murmur heard. Pulmonary/Chest: Effort normal and breath sounds normal. She has no wheezes. She has no rales. Abdominal: Soft. She exhibits no mass. There is no tenderness. Musculoskeletal: She exhibits edema (trace). Lymphadenopathy:        Right cervical: No superficial cervical adenopathy present. Left cervical: No superficial cervical adenopathy present. Neurological: She is alert and oriented to person, place, and time. Skin: Skin is warm and dry. No rash noted. Psychiatric: She has a normal mood and affect. Her behavior is normal.       ASSESSMENT and PLAN    Discussed the patient's above normal BMI with her. I have recommended the following interventions: dietary management education, guidance, and counseling .   The BMI follow up plan is as follows: BMI is out of normal parameters and plan is as follows: I have counseled this patient on diet and exercise regimens Diagnoses and all orders for this visit:    1. Chronic diastolic heart failure (HCC)  -     AMB POC EKG ROUTINE W/ 12 LEADS, INTER & REP  -     SCHEDULE NUCLEAR STUDY    2. Essential hypertension  Comments:  12/17; 1/17 controlled      3. Mixed hyperlipidemia  Comments:  5/18 ZFK42; 10/16 GKW12; 1/16 LDL56; 4/15 HFU413      4. Obesity (BMI 30.0-34. 9)  Comments:  Weight loss has been strongly encouraged by following dietary restrictions and an exercise routine. 5. History of subarachnoid hemorrhage  Comments:  2/15          Pertinent laboratory and test data reviewed and discussed with patient.   See patient instructions also for other medical advice given    Medications Discontinued During This Encounter   Medication Reason    rosuvastatin (CRESTOR) 10 mg tablet Alternate Therapy       Follow-up Disposition:  Return in about 6 weeks (around 2/7/2018), or if symptoms worsen or fail to improve, for same day post test.

## 2017-12-27 NOTE — PROGRESS NOTES
1. Have you been to the ER, urgent care clinic since your last visit? Hospitalized since your last visit? No     2. Have you seen or consulted any other health care providers outside of the [address] [place] [address] since your last visit? Include any pap smears or colon screening. no    3. Since your last visit, have you had any of the following symptoms? SOB     4. Have you had any blood work, X-rays or cardiac testing?     Vascular testing 12/27/2017

## 2017-12-27 NOTE — MR AVS SNAPSHOT
Visit Information Date & Time Provider Department Dept. Phone Encounter #  
 12/27/2017  2:45 PM Levi Vanegas MD [facility] [id number] Follow-up Instructions Return in about 6 weeks (around 2/7/2018), or if symptoms worsen or fail to improve, for same day post test.  
  
Your Appointments 2/2/2018  7:30 AM  
PROCEDURE with [place] N[facility] ([facility]) Appt Note: mims [address], [address] [facility] [address]  
[address], [address] [address]  
  
    
 2/26/2018  8:15 AM  
ESTABLISHED PATIENT with Levi Vanegas MD  
[facility]) Appt Note: post nuc [address], [address] [facility] [address]  
[address], [address] [address] 4/18/2018 12:30 PM  
FOLLOW UP EXAM with SOILA Segal [facility] ([facility]) Appt Note: 6 month f/u  
 [address] [facility] [address]  
[facility] [address] Upcoming Health Maintenance Date Due DTaP/Tdap/Td series (1 - Tdap) 8/3/1979 FOBT Q 1 YEAR AGE 50-75 8/3/2008 HEMOGLOBIN A1C Q6M 4/12/2018 LIPID PANEL Q1 5/3/2018 EYE EXAM RETINAL OR DILATED Q1 5/24/2018 FOOT EXAM Q1 10/12/2018 MICROALBUMIN Q1 10/12/2018 Allergies as of 12/27/2017  Review Complete On: 12/27/2017 By: Levi Vanegas MD  
  
 Severity Noted Reaction Type Reactions Banana  04/02/2015    Hives Asthma attach, throat swelling, throat scratching, lip swelling Clonidine  08/24/2015    Other (comments) Memory loss Iodine  02/04/2010   Systemic Rash Keflex [Cephalexin]  09/26/2011    Hives Seafood  03/13/2015    Hives Watermelon  09/08/2015    Hives Throat closes Current Immunizations  Reviewed on 1/27/2016 Name Date Influenza Vaccine 10/29/2014 Influenza Vaccine (Quad) PF 10/12/2017, 11/2/2016, 1/27/2016 Influenza Vaccine Split 12/7/2010 Not reviewed this visit You Were Diagnosed With   
  
 Codes Comments Chronic diastolic heart failure (HCC)    -  Primary ICD-10-CM: I50.32 
ICD-9-CM: 428.32 Essential hypertension     ICD-10-CM: I10 
ICD-9-CM: 401.9 12/17; 1/17 controlled Mixed hyperlipidemia     ICD-10-CM: E78.2 ICD-9-CM: 272.2 5/18 CMS14; 10/16 YFY07; 1/16 AZY55; 4/15 DFM254 Obesity (BMI 30.0-34.9)     ICD-10-CM: D56.4 ICD-9-CM: 278.00 Weight loss has been strongly encouraged by following dietary restrictions and an exercise routine. History of subarachnoid hemorrhage     ICD-10-CM: Z86.79 
ICD-9-CM: V12.59 2/15 Vitals BP Pulse Height(growth percentile) Weight(growth percentile) BMI OB Status 131/71 (BP 1 Location: Left arm, BP Patient Position: Sitting) 92 5' 6\" (1.676 m) 201 lb (91.2 kg) 32.44 kg/m2 Hysterectomy Smoking Status Never Smoker Vitals History BMI and BSA Data Body Mass Index Body Surface Area  
 32.44 kg/m 2 2.06 m 2 Preferred Pharmacy Pharmacy Name Phone Teddy Robles [facility] [address], [address] [facility] [phone] Your Updated Medication List  
  
   
This list is accurate as of: 12/27/17  4:17 PM.  Always use your most recent med list.  
  
  
  
  
 albuterol 90 mcg/actuation inhaler Commonly known as:  PROVENTIL HFA, VENTOLIN HFA, PROAIR HFA Take 2 Puffs by inhalation every four (4) hours as needed for Wheezing. amLODIPine 5 mg tablet Commonly known as:  Nunu Peach Take 1 Tab by mouth daily. COQ10  PO Take  by mouth. cyclobenzaprine 10 mg tablet Commonly known as:  FLEXERIL Take 0.5 Tabs by mouth two (2) times a day. dexlansoprazole 30 mg capsule Commonly known as:  DEXILANT Take 1 Cap by mouth daily as needed. Indications: GASTROESOPHAGEAL REFLUX  
  
 ergocalciferol 50,000 unit capsule Commonly known as:  VITAMIN D2 Take 1 Cap by mouth every seven (7) days. FLONASE 50 mcg/actuation nasal spray Generic drug:  fluticasone 2 Sprays by Both Nostrils route daily. fluticasone-salmeterol 250-50 mcg/dose diskus inhaler Commonly known as:  ADVAIR Take 1 Puff by inhalation every twelve (12) hours. hydroCHLOROthiazide 12.5 mg capsule Commonly known as:  Marikay Portal TAKE ONE CAPSULE BY MOUTH ONCE DAILY(GENERIC FOR Marikay Portal) lisinopril 20 mg tablet Commonly known as:  Natividad Deandra Take 1 Tab by mouth daily. magnesium oxide 400 mg tablet Commonly known as:  MAG-OX Take 1 Tab by mouth daily. Indications: HYPOMAGNESEMIA  
  
 multivitamin-iron-FA (hematinic)  mg-mcg Tab tablet Commonly known as:  CENTRUM COMPLETE Take 1 Tab by mouth daily. OTHER  
vytorin SITagliptin-metFORMIN 50-1,000 mg per tablet Commonly known as:  Adel Garter Take 1 Tab by mouth two (2) times daily (with meals). TOPAMAX 25 mg tablet Generic drug:  topiramate TAKE ONE TABLET BY MOUTH TWICE DAILY ZyrTEC 10 mg Cap Generic drug:  Cetirizine Take 10 mg PE/day by mouth as needed. We Performed the Following AMB POC EKG ROUTINE W/ 12 LEADS, INTER & REP [29402 CPT(R)] SCHEDULE NUCLEAR STUDY [[id number] Custom] Comments:  
 exercise Follow-up Instructions Return in about 6 weeks (around 2/7/2018), or if symptoms worsen or fail to improve, for same day post test.  
  
  
Patient Instructions Medications Discontinued During This Encounter Medication Reason  rosuvastatin (CRESTOR) 10 mg tablet Alternate Therapy Orders Placed This Encounter  SCHEDULE NUCLEAR STUDY  
  exercise  AMB POC EKG ROUTINE W/ 12 LEADS, INTER & REP Order Specific Question:   Reason for Exam:   Answer:   Cortney Barnard  
 
 
 Body Mass Index: Care Instructions Your Care Instructions Body mass index (BMI) can help you see if your weight is raising your risk for health problems. It uses a formula to compare how much you weigh with how tall you are. · A BMI lower than 18.5 is considered underweight. · A BMI between 18.5 and 24.9 is considered healthy. · A BMI between 25 and 29.9 is considered overweight. A BMI of 30 or higher is considered obese. If your BMI is in the normal range, it means that you have a lower risk for weight-related health problems. If your BMI is in the overweight or obese range, you may be at increased risk for weight-related health problems, such as high blood pressure, heart disease, stroke, arthritis or joint pain, and diabetes. If your BMI is in the underweight range, you may be at increased risk for health problems such as fatigue, lower protection (immunity) against illness, muscle loss, bone loss, hair loss, and hormone problems. BMI is just one measure of your risk for weight-related health problems. You may be at higher risk for health problems if you are not active, you eat an unhealthy diet, or you drink too much alcohol or use tobacco products. Follow-up care is a key part of your treatment and safety. Be sure to make and go to all appointments, and call your doctor if you are having problems. It's also a good idea to know your test results and keep a list of the medicines you take. How can you care for yourself at home? · Practice healthy eating habits. This includes eating plenty of fruits, vegetables, whole grains, lean protein, and low-fat dairy. · If your doctor recommends it, get more exercise. Walking is a good choice. Bit by bit, increase the amount you walk every day. Try for at least 30 minutes on most days of the week. · Do not smoke. Smoking can increase your risk for health problems.  If you need help quitting, talk to your doctor about stop-smoking programs and medicines. These can increase your chances of quitting for good. · Limit alcohol to 2 drinks a day for men and 1 drink a day for women. Too much alcohol can cause health problems. If you have a BMI higher than 25 · Your doctor may do other tests to check your risk for weight-related health problems. This may include measuring the distance around your waist. A waist measurement of more than 40 inches in men or 35 inches in women can increase the risk of weight-related health problems. · Talk with your doctor about steps you can take to stay healthy or improve your health. You may need to make lifestyle changes to lose weight and stay healthy, such as changing your diet and getting regular exercise. If you have a BMI lower than 18.5 · Your doctor may do other tests to check your risk for health problems. · Talk with your doctor about steps you can take to stay healthy or improve your health. You may need to make lifestyle changes to gain or maintain weight and stay healthy, such as getting more healthy foods in your diet and doing exercises to build muscle. Where can you learn more? Go to http://monique-digna.info/. Enter S176 in the search box to learn more about \"Body Mass Index: Care Instructions. \" Current as of: October 13, 2016 Content Version: 11.4 © 0307-8319 Healthwise, Incorporated. Care instructions adapted under license by [employer] (which disclaims liability or warranty for this information). If you have questions about a medical condition or this instruction, always ask your healthcare professional. [place] [address] any warranty or liability for your use of this information. Introducing [facility]! [facility] introduces [employer] patient portal. Now you can access parts of your medical record, email your doctor's office, and request medication refills online.    
 
1. In your internet browser, go to [email or web address] 2. Click on the First Time User? Click Here link in the Sign In box. You will see the New Member Sign Up page. 3. Enter your [email or web address][employer] Access Code exactly as it appears below. You will not need to use this code after youve completed the sign-up process. If you do not sign up before the expiration date, you must request a new code. · [email or web address] Access Code: [email or web address] Expires: 1/10/2018  8:18 AM 
 
4. Enter the last four digits of your Social Security Number (xxxx) and Date of Birth (mm/dd/yyyy) as indicated and click Submit. You will be taken to the next sign-up page. 5. Create a [email or web address] ID. This will be your [email or web address] login ID and cannot be changed, so think of one that is secure and easy to remember. 6. Create a [email or web address] password. You can change your password at any time. 7. Enter your Password Reset Question and Answer. This can be used at a later time if you forget your password. 8. Enter your e-mail address. You will receive e-mail notification when new information is available in [address] 9. Click Sign Up. You can now view and download portions of your medical record. 10. Click the Download Summary menu link to download a portable copy of your medical information. If you have questions, please visit the Frequently Asked Questions section of the [email or web address] website. Remember, [email or web address] is NOT to be used for urgent needs. For medical emergencies, dial 911. Now available from your iPhone and Android! Please provide this summary of care documentation to your next provider. Your primary care clinician is listed as Sinan Thayer. If you have any questions after today's visit, please call [phone].

## 2017-12-28 NOTE — PROCEDURES
[facility]  *** [facility] ***    Name: Jacki Nagel  MRN: [id number]    Outpatient  : 03 Aug 1960  HIS Order #: [id number]  [address] [facility] Visit #: [id number]  Date: 27 Dec 2017    TYPE OF TEST: Peripheral Arterial Testing    REASON FOR TEST    Right Leg  Doppler:    Normal  Ankle/Brachial: 1.17    Left Leg  Doppler:    Normal  Ankle/Brachial: 1.18    INTERPRETATION/FINDINGS  Physiologic testing was performed using continuous wave Doppler and  segmental pressures. 1. No evidence of arterial insufficiency in the bilateral lower  extremities at rest.  2. The right ankle/brachial index is 1.17 and the left ankle/brachial  index is 1. 18.    ADDITIONAL COMMENTS    I have personally reviewed the data relevant to the interpretation of  this  study. TECHNOLOGIST: Red Patel RVT  Signed: 2017 09:39 AM    PHYSICIAN: PATO Bourgeois   Signed: 2017 03:36 PM

## 2018-01-29 DIAGNOSIS — J45.20 MILD INTERMITTENT ASTHMA WITHOUT COMPLICATION: ICD-10-CM

## 2018-01-29 DIAGNOSIS — I10 ESSENTIAL HYPERTENSION: Primary | ICD-10-CM

## 2018-01-29 RX ORDER — LISINOPRIL 20 MG/1
20 TABLET ORAL DAILY
Qty: 30 TAB | Refills: 3 | Status: SHIPPED | OUTPATIENT
Start: 2018-01-29 | End: 2018-08-20 | Stop reason: DRUGHIGH

## 2018-01-29 RX ORDER — FLUTICASONE PROPIONATE AND SALMETEROL 250; 50 UG/1; UG/1
1 POWDER RESPIRATORY (INHALATION) EVERY 12 HOURS
Qty: 3 INHALER | Refills: 3 | Status: CANCELLED | OUTPATIENT
Start: 2018-01-29

## 2018-01-29 NOTE — TELEPHONE ENCOUNTER
Requested Prescriptions     Pending Prescriptions Disp Refills    fluticasone-salmeterol (ADVAIR) 250-50 mcg/dose diskus inhaler 3 Inhaler 3     Sig: Take 1 Puff by inhalation every twelve (12) hours. Patient confirms pharmacy: [facility]  Patient aware of 24-48 hour turn around.

## 2018-01-29 NOTE — TELEPHONE ENCOUNTER
Requested Prescriptions     Pending Prescriptions Disp Refills    lisinopril (PRINIVIL, ZESTRIL) 20 mg tablet 30 Tab 3     Sig: Take 1 Tab by mouth daily.

## 2018-02-02 ENCOUNTER — CLINICAL SUPPORT (OUTPATIENT)
Dept: CARDIOLOGY CLINIC | Age: 58
End: 2018-02-02

## 2018-02-02 DIAGNOSIS — I50.32 CHRONIC DIASTOLIC HEART FAILURE (HCC): Primary | ICD-10-CM

## 2018-02-02 DIAGNOSIS — E78.2 MIXED HYPERLIPIDEMIA: ICD-10-CM

## 2018-02-02 DIAGNOSIS — I10 ESSENTIAL HYPERTENSION: ICD-10-CM

## 2018-02-02 NOTE — PROGRESS NOTES
[facility]  [address], [address] [place] [address], [place], [address] [place] [address]  [phone] [phone]      Name: Jolanta Odom         MRN#: [id number]      YOB: 1960     Gender: female Ht:5'6 \" Wt:201 lbs            Date of Rest/Stress Images: 2/2/2018   Referring Provider: Sinan Thayer MD  Ordering Provider: Saluo Ang. Stephan Camacho MD, [facility]  Technologist: Shayla Moralez. Toma RODRIGUEZ, C.N.M.T. Diagnosis:   1. Chronic diastolic heart failure ([address]    2. Essential hypertension    3. Mixed hyperlipidemia          Rest/Stress Myoview SPECT Myocardial Perfusion Imaging with  Exercise Stress and Gated SPECT Imaging      PROCEDURE:      Myocardial perfusion imaging was performed at rest approximately 30 mins following the intravenous injection,(Right hand ) of 11.9 mCi of Tc99m Myoview for evaluation of myocardial function and perfusion at rest.     Baseline:   Heart rate 79. Blood pressure 126/72 shows sinus rhythm normal axis and is within normal limits. Exercise data:  Patient exercised using standard Irvin protocol. Patient exercised for a total of 4 minutes and 30 seconds achieving 6.5 METS. Exercise was stopped due to fatigue at patient's request.  Max heart rate is 144 which is 88 % of predicted maximal.  Max BP is 180/116 EKG shows upsloping ST segment without any definite ST-T changes by standard criteria. It normalizes in 1 minute or less in the recovery. .  Conclusions :  1. No ischemic ST-T changes up to 88 % of predicted maximum heart rate. 2.  Low normal functional capacity. 3.  No symptoms or arrhythmias with exercise. 4.  Appropriate heart rate and blood pressure response. 5.  Perfusion images report to follow    Exercise: At peak exercise, the patient was injected intravenously with 36.7 mCi of Tc99m Myoview and exercise was continued for 15 to 45 seconds.  The stress images were obtained approximately 30 minutes post tracer injection. The stress SPECT study was gated to evaluate regional wall motion and calculate the left ventricular ejection fraction. The data was reconstructed in the short, horizontal long and vertical long axis views and tomographic slices were generated. NUCLEAR IMAGING:     Findings:   1. Stress images reveal normal Myoview distrubution in all the LV segments in short axis, vertical and horizontal long axis views. 2. Resting images have a normal uptake. 3. Gated images reveal normal wall motion and the ejection fraction is calculated to be 51%. Conclusion:   1. Normal perfusion scan. 2. Normal wall motion and ejection fractions calculated to be 51%. 3. No evidence of significant fixed or reversible defect suggesting ischemia or myocardial infarction noted from this nuclear study. 4. Low risk scan. Thank you for the referral.    E-signed and Interpreting Physician:    Elba Valdez.  Luis Daniel Amos MD, [facility]     Date of interpretation: 2/2/2018  Date of final report: 2/2/2018

## 2018-02-06 NOTE — TELEPHONE ENCOUNTER
Previous patient of Dr. Rudy Whiting, I have never seen her. Patient's 6 month follow up [facility].. Please contact her to reschedule this appointment with a covering provider, and route refill request to that provider. thanks!

## 2018-02-21 DIAGNOSIS — E83.42 HYPOMAGNESEMIA: ICD-10-CM

## 2018-02-21 DIAGNOSIS — J45.20 MILD INTERMITTENT ASTHMA WITHOUT COMPLICATION: ICD-10-CM

## 2018-02-21 RX ORDER — FLUTICASONE PROPIONATE AND SALMETEROL 250; 50 UG/1; UG/1
1 POWDER RESPIRATORY (INHALATION) EVERY 12 HOURS
Qty: 3 INHALER | Refills: 0 | Status: SHIPPED | OUTPATIENT
Start: 2018-02-21 | End: 2018-06-28 | Stop reason: SDUPTHER

## 2018-02-21 RX ORDER — LANOLIN ALCOHOL/MO/W.PET/CERES
400 CREAM (GRAM) TOPICAL DAILY
Qty: 30 TAB | Refills: 11 | OUTPATIENT
Start: 2018-02-21

## 2018-02-21 NOTE — TELEPHONE ENCOUNTER
Requested Prescriptions     Pending Prescriptions Disp Refills    fluticasone-salmeterol (ADVAIR) 250-50 mcg/dose diskus inhaler 3 Inhaler 3     Sig: Take 1 Puff by inhalation every twelve (12) hours.  magnesium oxide (MAG-OX) 400 mg tablet 30 Tab 11     Sig: Take 1 Tab by mouth daily. Indications: hypomagnesemia     PT is completley out.     [address] [facility]

## 2018-02-22 RX ORDER — LANOLIN ALCOHOL/MO/W.PET/CERES
CREAM (GRAM) TOPICAL
Qty: 30 TAB | Refills: 7 | Status: SHIPPED | OUTPATIENT
Start: 2018-02-22 | End: 2018-10-24 | Stop reason: SDUPTHER

## 2018-02-26 ENCOUNTER — OFFICE VISIT (OUTPATIENT)
Dept: CARDIOLOGY CLINIC | Age: 58
End: 2018-02-26

## 2018-02-26 VITALS
BODY MASS INDEX: 31.82 KG/M2 | DIASTOLIC BLOOD PRESSURE: 61 MMHG | WEIGHT: 198 LBS | HEIGHT: 66 IN | SYSTOLIC BLOOD PRESSURE: 112 MMHG | HEART RATE: 69 BPM

## 2018-02-26 DIAGNOSIS — I10 ESSENTIAL HYPERTENSION: ICD-10-CM

## 2018-02-26 DIAGNOSIS — E78.2 MIXED HYPERLIPIDEMIA: ICD-10-CM

## 2018-02-26 DIAGNOSIS — I50.32 CHRONIC DIASTOLIC HEART FAILURE (HCC): Primary | ICD-10-CM

## 2018-02-26 DIAGNOSIS — E66.9 OBESITY (BMI 30.0-34.9): ICD-10-CM

## 2018-02-26 RX ORDER — EZETIMIBE AND SIMVASTATIN 10; 10 MG/1; MG/1
1 TABLET ORAL
COMMUNITY
End: 2018-08-20 | Stop reason: SDUPTHER

## 2018-02-26 NOTE — LETTER
Francois Kearns 1960 
 
2/26/2018 Dear Tylor Natarajan MD 
 
I had the pleasure of evaluating  Ms. Layne Bueno in office today. Below are the relevant portions of my assessment and plan of care. ICD-10-CM ICD-9-CM 1. Chronic diastolic heart failure ([facility] [address] I50.32 428.32   
 2/18; 1/17 NYHA2; SOB with smoke/dust ?sec to asthma; exposed to passive smoke 2. Essential hypertension I10 401.9   
 2/18; 12/17; 1/17 controlled 3. Mixed hyperlipidemia E78.2 272.2   
 2/18 , HDL52; 5/17 PEE43; 10/16 ZHV92; 1/16 LDL56; 4/15 HNX845 
  
4. Obesity (BMI 30.0-34. 9) E66.9 278.00   
 2/18 losing slowly; Weight loss has been strongly encouraged by following dietary restrictions and an exercise routine. Current Outpatient Prescriptions Medication Sig Dispense Refill  ezetimibe-simvastatin (VYTORIN 10/10) 10-10 mg per tablet Take 1 Tab by mouth nightly.  magnesium oxide (MAG-OX) 400 mg tablet TAKE ONE TABLET BY MOUTH ONCE DAILY 30 Tab 7  
 fluticasone-salmeterol (ADVAIR) 250-50 mcg/dose diskus inhaler Take 1 Puff by inhalation every twelve (12) hours. 3 Inhaler 0  
 lisinopril (PRINIVIL, ZESTRIL) 20 mg tablet Take 1 Tab by mouth daily. (Patient taking differently: Take 10 mg by mouth daily.) 30 Tab 3  cyclobenzaprine (FLEXERIL) 10 mg tablet Take 0.5 Tabs by mouth two (2) times a day. (Patient taking differently: Take 10 mg by mouth as needed.) 30 Tab 3  
 amLODIPine (NORVASC) 5 mg tablet Take 1 Tab by mouth daily. 90 Tab 2  
 hydroCHLOROthiazide (MICROZIDE) 12.5 mg capsule TAKE ONE CAPSULE BY MOUTH ONCE DAILY(GENERIC FOR MICROZIDE) 30 Cap 6  
 SITagliptin-metFORMIN (JANUMET) 50-1,000 mg per tablet Take 1 Tab by mouth two (2) times daily (with meals). 180 Tab 0  
 ergocalciferol (VITAMIN D2) 50,000 unit capsule Take 1 Cap by mouth every seven (7) days. 4 Cap 11  
 fluticasone (FLONASE) 50 mcg/actuation nasal spray 2 Sprays by Both Nostrils route daily.  TOPAMAX 25 mg tablet TAKE ONE TABLET BY MOUTH TWICE DAILY (Patient taking differently: as needed) 60 Tab 0  
 dexlansoprazole (DEXILANT) 30 mg capsule Take 1 Cap by mouth daily as needed. Indications: GASTROESOPHAGEAL REFLUX 90 Cap 3  
 multivitamin-iron-FA, hematinic, (CENTRUM COMPLETE)  mg-mcg tab tablet Take 1 Tab by mouth daily. 90 Tab 3  
 albuterol (PROVENTIL HFA, VENTOLIN HFA, PROAIR HFA) 90 mcg/actuation inhaler Take 2 Puffs by inhalation every four (4) hours as needed for Wheezing. 3 Inhaler 3  
 Cetirizine (ZYRTEC) 10 mg cap Take 10 mg PE/day by mouth as needed.  UBIDECARENONE/VITAMIN E MIXED (COQ10  PO) Take  by mouth. Orders Placed This Encounter  ezetimibe-simvastatin (VYTORIN 10/10) 10-10 mg per tablet Sig: Take 1 Tab by mouth nightly. If you have questions, please do not hesitate to call me. I look forward to following Ms. Daniel Edge along with you. Sincerely, Gary Mohr MD

## 2018-02-26 NOTE — PROGRESS NOTES
HISTORY OF PRESENT ILLNESS  Wilfred Snow is a 59 y.o. female. CHF   The history is provided by the medical records. This is a chronic problem. Associated symptoms include shortness of breath. Pertinent negatives include no chest pain and no headaches. Hypertension   The history is provided by the medical records and patient. This is a chronic problem. Associated symptoms include shortness of breath. Pertinent negatives include no chest pain and no headaches. Cholesterol Problem   The history is provided by the medical records. This is a chronic problem. Associated symptoms include shortness of breath. Pertinent negatives include no chest pain and no headaches. Shortness of Breath   The history is provided by the patient. This is a recurrent problem. The problem occurs frequently. The current episode started more than 1 week ago. The problem has not changed since onset. Associated symptoms include leg swelling. Pertinent negatives include no fever, no headaches, no cough, no wheezing, no PND, no orthopnea, no chest pain, no vomiting, no rash and no claudication. The problem's precipitants include exercise (walking thru parking lot; 9/15 more; 1/17 better except when exosed to smoke/dust). She has tried beta-agonist inhalers for the symptoms. The treatment provided significant relief. Leg Swelling   The history is provided by the patient. This is a recurrent problem. The current episode started more than 1 week ago. The problem occurs daily (R>L). The problem has not changed since onset. Associated symptoms include shortness of breath. Pertinent negatives include no chest pain and no headaches. The symptoms are aggravated by standing. The symptoms are relieved by rest.       Review of Systems   Constitutional: Negative for chills, fever, malaise/fatigue and weight loss. HENT: Negative for nosebleeds. Eyes: Negative for discharge. Respiratory: Positive for shortness of breath.  Negative for cough and wheezing. Cardiovascular: Positive for leg swelling. Negative for chest pain, palpitations, orthopnea, claudication and PND. Gastrointestinal: Negative for diarrhea, nausea and vomiting. Genitourinary: Negative for dysuria and hematuria. Musculoskeletal: Negative for joint pain. Skin: Negative for rash. Neurological: Negative for dizziness, seizures, loss of consciousness and headaches. Endo/Heme/Allergies: Negative for polydipsia. Does not bruise/bleed easily. Psychiatric/Behavioral: Negative for depression and substance abuse. The patient does not have insomnia. Allergies   Allergen Reactions    Banana Hives     Asthma attach, throat swelling, throat scratching, lip swelling    Clonidine Other (comments)     Memory loss    Iodine Rash    Keflex [Cephalexin] Hives    Seafood Hives    Watermelon Hives     Throat closes       Past Medical History:   Diagnosis Date    Asthma 2/4/2010    Brain bleed ([address] 02/20/2015    Chronic diastolic heart failure ([address] 3/18/2015    COPD (chronic obstructive pulmonary disease) ([address]     PFT confirmed 8/18/16    Diabetes mellitus ([address] 5/27/2014    Diabetic eye exam ([address] 01/26/2017    No retinopathy Best corrected vision 20/20 OU    HTN (hypertension) 2/4/2010    Hyperlipidemia LDL goal < 70 2014    Magnesium deficiency     Obesity, unspecified 4/15/2015    SAH (subarachnoid hemorrhage) ([address] 2/20/15    admitted [facility],  [facility].  Armani Napoles and ref to Dr. Zeenat Carrasco, Neurology    Stroke [facility])     Vitamin D deficiency 2000       Family History   Problem Relation Age of Onset    Diabetes Mother     Hypertension Mother           Heart Attack Mother 62    Alcohol abuse Father      cirrhosis    Diabetes Sister     Heart Attack Sister 64       Social History   Substance Use Topics    Smoking status: Never Smoker    Smokeless tobacco: Never Used    Alcohol use 0.6 oz/week     0 Standard drinks or equivalent, 1 Glasses of wine per week      Comment: occasionally - social,        Current Outpatient Prescriptions   Medication Sig    ezetimibe-simvastatin (VYTORIN 10/10) 10-10 mg per tablet Take 1 Tab by mouth nightly.  magnesium oxide (MAG-OX) 400 mg tablet TAKE ONE TABLET BY MOUTH ONCE DAILY    fluticasone-salmeterol (ADVAIR) 250-50 mcg/dose diskus inhaler Take 1 Puff by inhalation every twelve (12) hours.  lisinopril (PRINIVIL, ZESTRIL) 20 mg tablet Take 1 Tab by mouth daily. (Patient taking differently: Take 10 mg by mouth daily.)    cyclobenzaprine (FLEXERIL) 10 mg tablet Take 0.5 Tabs by mouth two (2) times a day. (Patient taking differently: Take 10 mg by mouth as needed.)    amLODIPine (NORVASC) 5 mg tablet Take 1 Tab by mouth daily.  hydroCHLOROthiazide (MICROZIDE) 12.5 mg capsule TAKE ONE CAPSULE BY MOUTH ONCE DAILY(GENERIC FOR MICROZIDE)    SITagliptin-metFORMIN (JANUMET) 50-1,000 mg per tablet Take 1 Tab by mouth two (2) times daily (with meals).  ergocalciferol (VITAMIN D2) 50,000 unit capsule Take 1 Cap by mouth every seven (7) days.  fluticasone (FLONASE) 50 mcg/actuation nasal spray 2 Sprays by Both Nostrils route daily.  TOPAMAX 25 mg tablet TAKE ONE TABLET BY MOUTH TWICE DAILY (Patient taking differently: as needed)    dexlansoprazole (DEXILANT) 30 mg capsule Take 1 Cap by mouth daily as needed. Indications: GASTROESOPHAGEAL REFLUX    multivitamin-iron-FA, hematinic, (CENTRUM COMPLETE)  mg-mcg tab tablet Take 1 Tab by mouth daily.  albuterol (PROVENTIL HFA, VENTOLIN HFA, PROAIR HFA) 90 mcg/actuation inhaler Take 2 Puffs by inhalation every four (4) hours as needed for Wheezing.  Cetirizine (ZYRTEC) 10 mg cap Take 10 mg PE/day by mouth as needed.  UBIDECARENONE/VITAMIN E MIXED (COQ10  PO) Take  by mouth. No current facility-administered medications for this visit.          Past Surgical History:   Procedure Laterality Date    HX HYSTERECTOMY  2004    abd approach with ovaries and cervix intact    HX HYSTERECTOMY N/A 2004    HX OTHER SURGICAL  8/25/15    SAH, cerebral arteriopathy, Dr. Ranjan Roman, Interventional Neurology [facility]       Diagnostic Studies:  CARDIOLOGY STUDIES 4/10/2015   Echocardiogram - Complete Result 55%EF, tr MR   Some recent data might be hidden   2/18 Nuc Stress  Conclusion:   1. Normal perfusion scan. 2. Normal wall motion and ejection fractions calculated to be 51%. 3. No evidence of significant fixed or reversible defect suggesting ischemia or myocardial infarction noted from this nuclear study. 4. Low risk scan. Visit Vitals    /61    Pulse 69    Ht 5' 6\" (1.676 m)    Wt 198 lb (89.8 kg)    BMI 31.96 kg/m2       Ms. Gautam Hoffman has a reminder for a \"due or due soon\" health maintenance. I have asked that she contact her primary care provider for follow-up on this health maintenance. Physical Exam   Constitutional: She is oriented to person, place, and time. She appears well-developed and well-nourished. No distress. HENT:   Head: Normocephalic and atraumatic. Mouth/Throat: Normal dentition. Eyes: Right eye exhibits no discharge. Left eye exhibits no discharge. No scleral icterus. Neck: Neck supple. No JVD present. Carotid bruit is not present. No thyromegaly present. Cardiovascular: Normal rate, regular rhythm, S1 normal, S2 normal, normal heart sounds and intact distal pulses. Exam reveals no gallop and no friction rub. No murmur heard. Pulmonary/Chest: Effort normal and breath sounds normal. She has no wheezes. She has no rales. Abdominal: Soft. She exhibits no mass. There is no tenderness. Musculoskeletal: She exhibits edema (trace). Lymphadenopathy:        Right cervical: No superficial cervical adenopathy present. Left cervical: No superficial cervical adenopathy present. Neurological: She is alert and oriented to person, place, and time. Skin: Skin is warm and dry. No rash noted. Psychiatric: She has a normal mood and affect.  Her behavior is normal.       ASSESSMENT and PLAN    Discussed the patient's above normal BMI with her. I have recommended the following interventions: dietary management education, guidance, and counseling . The BMI follow up plan is as follows: BMI is out of normal parameters and plan is as follows: I have counseled this patient on diet and exercise regimens               Diagnoses and all orders for this visit:    1. Chronic diastolic heart failure ([address]  Comments:  2/18; 1/17 NYHA2; SOB with smoke/dust ?sec to asthma; exposed to passive smoke      2. Essential hypertension  Comments:  2/18; 12/17; 1/17 controlled      3. Mixed hyperlipidemia  Comments:  2/18 , HDL52; 5/17 ITO45; 10/16 FSS97; 1/16 LDL56; 4/15 CYR520      4. Obesity (BMI 30.0-34. 9)  Comments:  2/18 losing slowly; Weight loss has been strongly encouraged by following dietary restrictions and an exercise routine. Pertinent laboratory and test data reviewed and discussed with patient. See patient instructions also for other medical advice given    Medications Discontinued During This Encounter   Medication Reason    OTHER Error       Follow-up Disposition:  Return in about 1 year (around 2/26/2019), or if symptoms worsen or fail to improve.

## 2018-02-26 NOTE — MR AVS SNAPSHOT
[address] [facility] [address] 
 
 
 [address] [facility] [address], [address] [facility] [address] 
[phone] Patient: Abbey Cadena MRN: [id number] Visit Information Date & Time Provider Department Dept. Phone Encounter #  
 2/26/2018  8:15 AM Fani Delcid MD [facility] [address] [place] [address] [id number] Follow-up Instructions Return in about 1 year (around 2/26/2019), or if symptoms worsen or fail to improve. Your Appointments 4/18/2018  1:00 PM  
FOLLOW UP EXAM with [address], PA-C [employer] ([facility]) Appt Note: 6 month f/u  
 [address] [facility] [id number]  
[facility] [address]  
  
    
 2/11/2019 10:00 AM  
Office Visit with Fani Delcid MD  
[facility]) Appt Note: 1 yr  
 [address], [address] [facility] [address]  
[address] [facility], [address] [place] [address] [address] [facility] Upcoming Health Maintenance Date Due DTaP/Tdap/Td series (1 - Tdap) 8/3/1979 FOBT Q 1 YEAR AGE 50-75 8/3/2008 BREAST CANCER SCRN MAMMOGRAM 3/17/2017 HEMOGLOBIN A1C Q6M 4/12/2018 LIPID PANEL Q1 5/3/2018 EYE EXAM RETINAL OR DILATED Q1 5/24/2018 FOOT EXAM Q1 10/12/2018 MICROALBUMIN Q1 10/12/2018 Allergies as of 2/26/2018  Review Complete On: 2/26/2018 By: Fani Delcid MD  
  
 Severity Noted Reaction Type Reactions Banana  04/02/2015    Hives Asthma attach, throat swelling, throat scratching, lip swelling Clonidine  08/24/2015    Other (comments) Memory loss Iodine  02/04/2010   Systemic Rash Keflex [Cephalexin]  09/26/2011    Hives Seafood  03/13/2015    Hives Watermelon  09/08/2015    Hives Throat closes Current Immunizations  Reviewed on 1/27/2016 Name Date Influenza Vaccine 10/29/2014 Influenza Vaccine (Quad) PF 10/12/2017, 11/2/2016, 1/27/2016 Influenza Vaccine Split 12/7/2010 Not reviewed this visit You Were Diagnosed With   
  
 Codes Comments Chronic diastolic heart failure ([facility]    -  Primary ICD-10-CM: I50.32 
ICD-9-CM: 428.32 2/18; 1/17 NYHA2; SOB with smoke/dust ?sec to asthma; exposed to passive smoke Essential hypertension     ICD-10-CM: I10 
ICD-9-CM: 401.9 2/18; 12/17; 1/17 controlled Mixed hyperlipidemia     ICD-10-CM: E78.2 ICD-9-CM: 272.2 2/18 , HDL52; 5/17 XTR84; 10/16 KEU35; 1/16 EVG31; 4/15 AHM903 Obesity (BMI 30.0-34.9)     ICD-10-CM: L03.7 ICD-9-CM: 278.00 2/18 losing slowly; Weight loss has been strongly encouraged by following dietary restrictions and an exercise routine. Vitals BP Pulse Height(growth percentile) Weight(growth percentile) BMI OB Status 112/61 69 5' 6\" (1.676 m) 198 lb (89.8 kg) 31.96 kg/m2 Hysterectomy Smoking Status Never Smoker Vitals History BMI and BSA Data Body Mass Index Body Surface Area 31.96 kg/m 2 2.04 m 2 Preferred Pharmacy Pharmacy Name Phone Teddy Robles [facility] [address], [address] [facility] [phone] Your Updated Medication List  
  
   
This list is accurate as of 2/26/18  9:11 AM.  Always use your most recent med list.  
  
  
  
  
 albuterol 90 mcg/actuation inhaler Commonly known as:  PROVENTIL HFA, VENTOLIN HFA, PROAIR HFA Take 2 Puffs by inhalation every four (4) hours as needed for Wheezing. amLODIPine 5 mg tablet Commonly known as:  Robbie Nearing Take 1 Tab by mouth daily. COQ10  PO Take  by mouth. cyclobenzaprine 10 mg tablet Commonly known as:  FLEXERIL Take 0.5 Tabs by mouth two (2) times a day. dexlansoprazole 30 mg capsule Commonly known as:  DEXILANT Take 1 Cap by mouth daily as needed. Indications: GASTROESOPHAGEAL REFLUX ergocalciferol 50,000 unit capsule Commonly known as:  VITAMIN D2 Take 1 Cap by mouth every seven (7) days. FLONASE 50 mcg/actuation nasal spray Generic drug:  fluticasone 2 Sprays by Both Nostrils route daily. fluticasone-salmeterol 250-50 mcg/dose diskus inhaler Commonly known as:  ADVAIR Take 1 Puff by inhalation every twelve (12) hours. hydroCHLOROthiazide 12.5 mg capsule Commonly known as:  Birder Mura TAKE ONE CAPSULE BY MOUTH ONCE DAILY(GENERIC FOR Birder Mura) lisinopril 20 mg tablet Commonly known as:  Patricia Best Take 1 Tab by mouth daily. magnesium oxide 400 mg tablet Commonly known as:  MAG-OX  
TAKE ONE TABLET BY MOUTH ONCE DAILY  
  
 multivitamin-iron-FA (hematinic)  mg-mcg Tab tablet Commonly known as:  CENTRUM COMPLETE Take 1 Tab by mouth daily. SITagliptin-metFORMIN 50-1,000 mg per tablet Commonly known as:  Jennie Osier Take 1 Tab by mouth two (2) times daily (with meals). TOPAMAX 25 mg tablet Generic drug:  topiramate TAKE ONE TABLET BY MOUTH TWICE DAILY Vytorin 10/10 10-10 mg per tablet Generic drug:  ezetimibe-simvastatin Take 1 Tab by mouth nightly. ZyrTEC 10 mg Cap Generic drug:  Cetirizine Take 10 mg PE/day by mouth as needed. Follow-up Instructions Return in about 1 year (around 2/26/2019), or if symptoms worsen or fail to improve. Patient Instructions Medications Discontinued During This Encounter Medication Reason  OTHER Error No orders of the defined types were placed in this encounter. Body Mass Index: Care Instructions Your Care Instructions Body mass index (BMI) can help you see if your weight is raising your risk for health problems. It uses a formula to compare how much you weigh with how tall you are. · A BMI lower than 18.5 is considered underweight. · A BMI between 18.5 and 24.9 is considered healthy. · A BMI between 25 and 29.9 is considered overweight. A BMI of 30 or higher is considered obese. If your BMI is in the normal range, it means that you have a lower risk for weight-related health problems. If your BMI is in the overweight or obese range, you may be at increased risk for weight-related health problems, such as high blood pressure, heart disease, stroke, arthritis or joint pain, and diabetes. If your BMI is in the underweight range, you may be at increased risk for health problems such as fatigue, lower protection (immunity) against illness, muscle loss, bone loss, hair loss, and hormone problems. BMI is just one measure of your risk for weight-related health problems. You may be at higher risk for health problems if you are not active, you eat an unhealthy diet, or you drink too much alcohol or use tobacco products. Follow-up care is a key part of your treatment and safety. Be sure to make and go to all appointments, and call your doctor if you are having problems. It's also a good idea to know your test results and keep a list of the medicines you take. How can you care for yourself at home? · Practice healthy eating habits. This includes eating plenty of fruits, vegetables, whole grains, lean protein, and low-fat dairy. · If your doctor recommends it, get more exercise. Walking is a good choice. Bit by bit, increase the amount you walk every day. Try for at least 30 minutes on most days of the week. · Do not smoke. Smoking can increase your risk for health problems. If you need help quitting, talk to your doctor about stop-smoking programs and medicines. These can increase your chances of quitting for good. · Limit alcohol to 2 drinks a day for men and 1 drink a day for women. Too much alcohol can cause health problems. If you have a BMI higher than 25 · Your doctor may do other tests to check your risk for weight-related health problems.  This may include measuring the distance around your waist. A waist measurement of more than 40 inches in men or 35 inches in women can increase the risk of weight-related health problems. · Talk with your doctor about steps you can take to stay healthy or improve your health. You may need to make lifestyle changes to lose weight and stay healthy, such as changing your diet and getting regular exercise. If you have a BMI lower than 18.5 · Your doctor may do other tests to check your risk for health problems. · Talk with your doctor about steps you can take to stay healthy or improve your health. You may need to make lifestyle changes to gain or maintain weight and stay healthy, such as getting more healthy foods in your diet and doing exercises to build muscle. Where can you learn more? Go to http://monique-digna.info/. Enter S176 in the search box to learn more about \"Body Mass Index: Care Instructions. \" Current as of: October 13, 2016 Content Version: 11.4 © 7125-7639 [employer]. Care instructions adapted under license by [employer] (which disclaims liability or warranty for this information). If you have questions about a medical condition or this instruction, always ask your healthcare professional. [place] [address] any warranty or liability for your use of this information. Introducing [facility]! Yane Springer introduces [employer] patient portal. Now you can access parts of your medical record, email your doctor's office, and request medication refills online. 1. In your internet browser, go to [email or web address] 2. Click on the First Time User? Click Here link in the Sign In box. You will see the New Member Sign Up page. 3. Enter your [employer] Access Code exactly as it appears below. You will not need to use this code after youve completed the sign-up process. If you do not sign up before the expiration date, you must request a new code. · Nvest Access Code: [email or web address] Expires: 5/27/2018  8:17 AM 
 
4. Enter the last four digits of your Social Security Number (xxxx) and Date of Birth (mm/dd/yyyy) as indicated and click Submit. You will be taken to the next sign-up page. 5. Create a Nvest ID. This will be your Nvest login ID and cannot be changed, so think of one that is secure and easy to remember. 6. Create a Nvest password. You can change your password at any time. 7. Enter your Password Reset Question and Answer. This can be used at a later time if you forget your password. 8. Enter your e-mail address. You will receive e-mail notification when new information is available in [address] 9. Click Sign Up. You can now view and download portions of your medical record. 10. Click the Download Summary menu link to download a portable copy of your medical information. If you have questions, please visit the Frequently Asked Questions section of the Nvest website. Remember, Nvest is NOT to be used for urgent needs. For medical emergencies, dial 911. Now available from your iPhone and Android! Please provide this summary of care documentation to your next provider. Your primary care clinician is listed as Sinan Thayer. If you have any questions after today's visit, please call [phone].

## 2018-02-26 NOTE — LETTER
Sandra Arauz 1960 
 
2/26/2018 Dear Rogelio Castillo PA-C I had the pleasure of evaluating  Ms. Derian Blanco in office today. Below are the relevant portions of my assessment and plan of care. ICD-10-CM ICD-9-CM 1. Chronic diastolic heart failure ([address] I50.32 428.32   
 2/18; 1/17 NYHA2; SOB with smoke/dust ?sec to asthma; exposed to passive smoke 2. Essential hypertension I10 401.9   
 2/18; 12/17; 1/17 controlled 3. Mixed hyperlipidemia E78.2 272.2   
 2/18 , HDL52; 5/17 MHL61; 10/16 UMN10; 1/16 LDL56; 4/15 PDT216 
  
4. Obesity (BMI 30.0-34. 9) E66.9 278.00   
 2/18 losing slowly; Weight loss has been strongly encouraged by following dietary restrictions and an exercise routine. Current Outpatient Prescriptions Medication Sig Dispense Refill  ezetimibe-simvastatin (VYTORIN 10/10) 10-10 mg per tablet Take 1 Tab by mouth nightly.  magnesium oxide (MAG-OX) 400 mg tablet TAKE ONE TABLET BY MOUTH ONCE DAILY 30 Tab 7  
 fluticasone-salmeterol (ADVAIR) 250-50 mcg/dose diskus inhaler Take 1 Puff by inhalation every twelve (12) hours. 3 Inhaler 0  
 lisinopril (PRINIVIL, ZESTRIL) 20 mg tablet Take 1 Tab by mouth daily. (Patient taking differently: Take 10 mg by mouth daily.) 30 Tab 3  cyclobenzaprine (FLEXERIL) 10 mg tablet Take 0.5 Tabs by mouth two (2) times a day. (Patient taking differently: Take 10 mg by mouth as needed.) 30 Tab 3  
 amLODIPine (NORVASC) 5 mg tablet Take 1 Tab by mouth daily. 90 Tab 2  
 hydroCHLOROthiazide (MICROZIDE) 12.5 mg capsule TAKE ONE CAPSULE BY MOUTH ONCE DAILY(GENERIC FOR MICROZIDE) 30 Cap 6  
 SITagliptin-metFORMIN (JANUMET) 50-1,000 mg per tablet Take 1 Tab by mouth two (2) times daily (with meals). 180 Tab 0  
 ergocalciferol (VITAMIN D2) 50,000 unit capsule Take 1 Cap by mouth every seven (7) days. 4 Cap 11  
 fluticasone (FLONASE) 50 mcg/actuation nasal spray 2 Sprays by Both Nostrils route daily.  TOPAMAX 25 mg tablet TAKE ONE TABLET BY MOUTH TWICE DAILY (Patient taking differently: as needed) 60 Tab 0  
 dexlansoprazole (DEXILANT) 30 mg capsule Take 1 Cap by mouth daily as needed. Indications: GASTROESOPHAGEAL REFLUX 90 Cap 3  
 multivitamin-iron-FA, hematinic, (CENTRUM COMPLETE)  mg-mcg tab tablet Take 1 Tab by mouth daily. 90 Tab 3  
 albuterol (PROVENTIL HFA, VENTOLIN HFA, PROAIR HFA) 90 mcg/actuation inhaler Take 2 Puffs by inhalation every four (4) hours as needed for Wheezing. 3 Inhaler 3  
 Cetirizine (ZYRTEC) 10 mg cap Take 10 mg PE/day by mouth as needed.  UBIDECARENONE/VITAMIN E MIXED (COQ10  PO) Take  by mouth. Orders Placed This Encounter  ezetimibe-simvastatin (VYTORIN 10/10) 10-10 mg per tablet Sig: Take 1 Tab by mouth nightly. If you have questions, please do not hesitate to call me. I look forward to following Ms. Kendell Reece along with you. Sincerely, Levi Vanegas MD

## 2018-02-26 NOTE — PROGRESS NOTES
Patient didn't bring medications, verbally reviewed    1. Have you been to the ER, urgent care clinic since your last visit? Hospitalized since your last visit? No    2. Have you seen or consulted any other health care providers outside of the [address] [place] [address] since your last visit? Include any pap smears or colon screening. Yes Where: Podiatrist Routine     3. Since your last visit, have you had any of the following symptoms? shortness of breath and swelling in legs/arms. 4.  Have you had any blood work, X-rays or cardiac testing? No    5. Where do you normally have your labs drawn? [address]     6. Do you need any refills today?    No

## 2018-02-26 NOTE — PATIENT INSTRUCTIONS
Medications Discontinued During This Encounter   Medication Reason    OTHER Error       No orders of the defined types were placed in this encounter. Body Mass Index: Care Instructions  Your Care Instructions    Body mass index (BMI) can help you see if your weight is raising your risk for health problems. It uses a formula to compare how much you weigh with how tall you are. · A BMI lower than 18.5 is considered underweight. · A BMI between 18.5 and 24.9 is considered healthy. · A BMI between 25 and 29.9 is considered overweight. A BMI of 30 or higher is considered obese. If your BMI is in the normal range, it means that you have a lower risk for weight-related health problems. If your BMI is in the overweight or obese range, you may be at increased risk for weight-related health problems, such as high blood pressure, heart disease, stroke, arthritis or joint pain, and diabetes. If your BMI is in the underweight range, you may be at increased risk for health problems such as fatigue, lower protection (immunity) against illness, muscle loss, bone loss, hair loss, and hormone problems. BMI is just one measure of your risk for weight-related health problems. You may be at higher risk for health problems if you are not active, you eat an unhealthy diet, or you drink too much alcohol or use tobacco products. Follow-up care is a key part of your treatment and safety. Be sure to make and go to all appointments, and call your doctor if you are having problems. It's also a good idea to know your test results and keep a list of the medicines you take. How can you care for yourself at home? · Practice healthy eating habits. This includes eating plenty of fruits, vegetables, whole grains, lean protein, and low-fat dairy. · If your doctor recommends it, get more exercise. Walking is a good choice. Bit by bit, increase the amount you walk every day. Try for at least 30 minutes on most days of the week.   · Do not smoke. Smoking can increase your risk for health problems. If you need help quitting, talk to your doctor about stop-smoking programs and medicines. These can increase your chances of quitting for good. · Limit alcohol to 2 drinks a day for men and 1 drink a day for women. Too much alcohol can cause health problems. If you have a BMI higher than 25  · Your doctor may do other tests to check your risk for weight-related health problems. This may include measuring the distance around your waist. A waist measurement of more than 40 inches in men or 35 inches in women can increase the risk of weight-related health problems. · Talk with your doctor about steps you can take to stay healthy or improve your health. You may need to make lifestyle changes to lose weight and stay healthy, such as changing your diet and getting regular exercise. If you have a BMI lower than 18.5  · Your doctor may do other tests to check your risk for health problems. · Talk with your doctor about steps you can take to stay healthy or improve your health. You may need to make lifestyle changes to gain or maintain weight and stay healthy, such as getting more healthy foods in your diet and doing exercises to build muscle. Where can you learn more? Go to http://monique-digna.info/. Enter S176 in the search box to learn more about \"Body Mass Index: Care Instructions. \"  Current as of: October 13, 2016  Content Version: 11.4  © 7029-5707 [employer], [employer]. Care instructions adapted under license by [employer] (which disclaims liability or warranty for this information). If you have questions about a medical condition or this instruction, always ask your healthcare professional. [place] [address] any warranty or liability for your use of this information.

## 2018-03-28 DIAGNOSIS — E11.9 TYPE 2 DIABETES MELLITUS WITHOUT COMPLICATION, WITHOUT LONG-TERM CURRENT USE OF INSULIN (HCC): ICD-10-CM

## 2018-03-29 NOTE — TELEPHONE ENCOUNTER
3/29/2018  10:19 AM    Chief Complaint   Patient presents with    Medication Refill       Noted refill request for Janumet. PCP listed as PMA at this time and she used to see Dr Iraj Schwartz. Last seen by Dr Iraj Schwartz 10/2017 but is no longer with the practice. Upcoming appointment with Rosi Gardiner PA-C on 4/18. Temporary refill provided.

## 2018-03-30 RX ORDER — HYDROCHLOROTHIAZIDE 12.5 MG/1
CAPSULE ORAL
Qty: 30 CAP | Refills: 6 | Status: SHIPPED | OUTPATIENT
Start: 2018-03-30 | End: 2018-08-20 | Stop reason: SDUPTHER

## 2018-04-02 DIAGNOSIS — E11.9 TYPE 2 DIABETES MELLITUS WITHOUT COMPLICATION, WITHOUT LONG-TERM CURRENT USE OF INSULIN (HCC): ICD-10-CM

## 2018-04-03 RX ORDER — HYDROCHLOROTHIAZIDE 12.5 MG/1
CAPSULE ORAL
Qty: 30 CAP | Refills: 6 | OUTPATIENT
Start: 2018-04-03

## 2018-04-05 DIAGNOSIS — E11.9 TYPE 2 DIABETES MELLITUS WITHOUT COMPLICATION, WITHOUT LONG-TERM CURRENT USE OF INSULIN (HCC): ICD-10-CM

## 2018-04-05 NOTE — TELEPHONE ENCOUNTER
Requested Prescriptions     Pending Prescriptions Disp Refills    SITagliptin-metFORMIN (CHASEUMET) 50-1,000 mg per tablet 60 Tab 0     Sig: Take 1 Tab by mouth two (2) times daily (with meals).

## 2018-04-06 NOTE — TELEPHONE ENCOUNTER
4/6/2018  9:00 AM    Chief Complaint   Patient presents with    Medication Refill       Resent Briat to Loraine Rodriguez as requested. PCP listed as PMA at this time and she used to see Dr Vannesa Noe. Last seen by Dr Vannesa Noe 10/2017 but is no longer with the practice. Upcoming appointment with Nazia Pederson PA-C on 4/18. Temporary refill provided.

## 2018-04-18 ENCOUNTER — OFFICE VISIT (OUTPATIENT)
Dept: FAMILY MEDICINE CLINIC | Age: 58
End: 2018-04-18

## 2018-04-18 VITALS
BODY MASS INDEX: 31.5 KG/M2 | RESPIRATION RATE: 18 BRPM | TEMPERATURE: 98.7 F | SYSTOLIC BLOOD PRESSURE: 135 MMHG | WEIGHT: 196 LBS | OXYGEN SATURATION: 98 % | HEART RATE: 97 BPM | DIASTOLIC BLOOD PRESSURE: 87 MMHG | HEIGHT: 66 IN

## 2018-04-18 DIAGNOSIS — Z12.39 BREAST CANCER SCREENING: ICD-10-CM

## 2018-04-18 DIAGNOSIS — E11.9 TYPE 2 DIABETES MELLITUS WITHOUT COMPLICATION, WITHOUT LONG-TERM CURRENT USE OF INSULIN (HCC): ICD-10-CM

## 2018-04-18 DIAGNOSIS — R73.09 ABNORMAL GLUCOSE: Primary | ICD-10-CM

## 2018-04-18 DIAGNOSIS — H66.91 RIGHT OTITIS MEDIA, UNSPECIFIED OTITIS MEDIA TYPE: ICD-10-CM

## 2018-04-18 DIAGNOSIS — Z13.220 SCREENING, LIPID: ICD-10-CM

## 2018-04-18 DIAGNOSIS — Z12.11 COLON CANCER SCREENING: ICD-10-CM

## 2018-04-18 LAB — HBA1C MFR BLD HPLC: 5.9 %

## 2018-04-18 RX ORDER — AMOXICILLIN 500 MG/1
500 CAPSULE ORAL 2 TIMES DAILY
Qty: 20 CAP | Refills: 0 | Status: SHIPPED | OUTPATIENT
Start: 2018-04-18 | End: 2018-04-28

## 2018-04-18 NOTE — MR AVS SNAPSHOT
[address] [facility] [address] [phone] Patient: Arlin Gaines MRN: [id number] TYRON:[birth year] Visit Information Date & Time Provider Department Dept. Phone Encounter #  
 4/18/2018  1:00 PM Padmini Rowlnad [phone] [id number] Your Appointments 2/11/2019 10:00 AM  
Office Visit with Marcelo Vasquez MD  
[facility]) Appt Note: 1 yr  
 [address] [facility], [address] [facility] [address]  
[address], [address] [place] [address] Maggy [place] Upcoming Health Maintenance Date Due DTaP/Tdap/Td series (1 - Tdap) 8/3/1979 FOBT Q 1 YEAR AGE 50-75 8/3/2008 BREAST CANCER SCRN MAMMOGRAM 3/17/2017 LIPID PANEL Q1 5/3/2018 EYE EXAM RETINAL OR DILATED Q1 5/24/2018 FOOT EXAM Q1 10/12/2018 MICROALBUMIN Q1 10/12/2018 HEMOGLOBIN A1C Q6M 10/18/2018 Allergies as of 4/18/2018  Review Complete On: 4/18/2018 By: Karena Camara LPN Severity Noted Reaction Type Reactions Banana  04/02/2015    Hives Asthma attach, throat swelling, throat scratching, lip swelling Clonidine  08/24/2015    Other (comments) Memory loss Iodine  02/04/2010   Systemic Rash Keflex [Cephalexin]  09/26/2011    Hives Seafood  03/13/2015    Hives Watermelon  09/08/2015    Hives Throat closes Current Immunizations  Reviewed on 1/27/2016 Name Date Influenza Vaccine 10/29/2014 Influenza Vaccine (Quad) PF 10/12/2017, 11/2/2016, 1/27/2016 Influenza Vaccine Split 12/7/2010 Not reviewed this visit You Were Diagnosed With   
  
 Codes Comments Abnormal glucose    -  Primary ICD-10-CM: R73.09 
ICD-9-CM: 790.29 Type 2 diabetes mellitus without complication, without long-term current use of insulin (HCC)     ICD-10-CM: E11.9 ICD-9-CM: 250.00 Right otitis media, unspecified otitis media type     ICD-10-CM: H66.91 
ICD-9-CM: 382. 9 Vitals BP Pulse Temp Resp Height(growth percentile) Weight(growth percentile) 135/87 (BP 1 Location: Left arm, BP Patient Position: Sitting) 97 98.7 °F (37.1 °C) (Oral) 18 5' 6\" (1.676 m) 196 lb (88.9 kg) SpO2 BMI OB Status Smoking Status 98% 31.64 kg/m2 Hysterectomy Never Smoker Vitals History BMI and BSA Data Body Mass Index Body Surface Area  
 31.64 kg/m 2 2.03 m 2 Preferred Pharmacy Pharmacy Name Phone [facility] [address], [address] [phone] Your Updated Medication List  
  
   
This list is accurate as of 4/18/18  1:33 PM.  Always use your most recent med list.  
  
  
  
  
 albuterol 90 mcg/actuation inhaler Commonly known as:  PROVENTIL HFA, VENTOLIN HFA, PROAIR HFA Take 2 Puffs by inhalation every four (4) hours as needed for Wheezing. amLODIPine 5 mg tablet Commonly known as:  [place] Cordial Take 1 Tab by mouth daily. amoxicillin 500 mg capsule Commonly known as:  AMOXIL Take 1 Cap by mouth two (2) times a day for 10 days. COQ10  PO Take  by mouth. cyclobenzaprine 10 mg tablet Commonly known as:  FLEXERIL Take 0.5 Tabs by mouth two (2) times a day. dexlansoprazole 30 mg capsule Commonly known as:  DEXILANT Take 1 Cap by mouth daily as needed. Indications: GASTROESOPHAGEAL REFLUX  
  
 FLONASE 50 mcg/actuation nasal spray Generic drug:  fluticasone 2 Sprays by Both Nostrils route daily. fluticasone-salmeterol 250-50 mcg/dose diskus inhaler Commonly known as:  ADVAIR Take 1 Puff by inhalation every twelve (12) hours. hydroCHLOROthiazide 12.5 mg capsule Commonly known as:  Irvin Villagomez TAKE ONE CAPSULE BY MOUTH ONCE DAILY  
  
 lisinopril 20 mg tablet Commonly known as:  Sharri Rivera Take 1 Tab by mouth daily. magnesium oxide 400 mg tablet Commonly known as:  MAG-OX  
TAKE ONE TABLET BY MOUTH ONCE DAILY  
  
 multivitamin-iron-FA (hematinic)  mg-mcg Tab tablet Commonly known as:  CENTRUM COMPLETE Take 1 Tab by mouth daily. SITagliptin-metFORMIN 50-1,000 mg per tablet Commonly known as:  Brooks Bipin Take 1 Tab by mouth two (2) times daily (with meals). TOPAMAX 25 mg tablet Generic drug:  topiramate TAKE ONE TABLET BY MOUTH TWICE DAILY  
  
 VITAMIN D2 50,000 unit capsule Generic drug:  ergocalciferol TAKE ONE CAPSULE BY MOUTH ONCE A WEEK Vytorin 10/10 10-10 mg per tablet Generic drug:  ezetimibe-simvastatin Take 1 Tab by mouth nightly. ZyrTEC 10 mg Cap Generic drug:  Cetirizine Take 10 mg PE/day by mouth as needed. Prescriptions Sent to Pharmacy Refills SITagliptin-metFORMIN (JANUMET) 50-1,000 mg per tablet 3 Sig: Take 1 Tab by mouth two (2) times daily (with meals). Class: Normal  
 Pharmacy: [facility] at [address] [place] [address], [address] [place] [address] Ph #: [phone] Route: Oral  
 amoxicillin (AMOXIL) 500 mg capsule 0 Sig: Take 1 Cap by mouth two (2) times a day for 10 days. Class: Normal  
 Pharmacy: [facility] at [address] [place] [address], [address] [place] [address] Ph #: [phone] Route: Oral  
  
We Performed the Following AMB POC HEMOGLOBIN A1C [39526 CPT(R)] Introducing [facility]! Cristóbal Lutz introduces [employer] patient portal. Now you can access parts of your medical record, email your doctor's office, and request medication refills online. 1. In your internet browser, go to [email or web address] 2. Click on the First Time User? Click Here link in the Sign In box. You will see the New Member Sign Up page. 3. Enter your [employer] Access Code exactly as it appears below. You will not need to use this code after youve completed the sign-up process.  If you do not sign up before the expiration date, you must request a new code. · Neuravi Access Code: [email or web address] Expires: 5/27/2018  9:17 AM 
 
4. Enter the last four digits of your Social Security Number (xxxx) and Date of Birth (mm/dd/yyyy) as indicated and click Submit. You will be taken to the next sign-up page. 5. Create a Neuravi ID. This will be your Neuravi login ID and cannot be changed, so think of one that is secure and easy to remember. 6. Create a Neuravi password. You can change your password at any time. 7. Enter your Password Reset Question and Answer. This can be used at a later time if you forget your password. 8. Enter your e-mail address. You will receive e-mail notification when new information is available in [address] 9. Click Sign Up. You can now view and download portions of your medical record. 10. Click the Download Summary menu link to download a portable copy of your medical information. If you have questions, please visit the Frequently Asked Questions section of the Neuravi website. Remember, Neuravi is NOT to be used for urgent needs. For medical emergencies, dial 911. Now available from your iPhone and Android! Please provide this summary of care documentation to your next provider. Your primary care clinician is listed as Phys Other. If you have any questions after today's visit, please call [phone].

## 2018-04-18 NOTE — PROGRESS NOTES
Catrina Felty is a 59 y.o. female here today for a follow up on her DM. Learning assessment previously completed.

## 2018-04-18 NOTE — PROGRESS NOTES
HISTORY OF PRESENT ILLNESS  Farzaneh Urbano is a 59 y.o. female. HPI  Farzaneh Urbano is a 59 y.o. female who presents to the office today for DM. She comes in for DM. She is compliant with janumet daily. She monitors blood glucose at home, fasting ranges between 149-179. She is UTD on her diabetic eye and foot exam.   She has c/o right ear pain for a few weeks. There is pain when she swallows or pushes on her tragus. She has uncontrolled seasonal allergies despite taking zyrtec and flonase daily. Chief Complaint   Patient presents with    Diabetes       Current Outpatient Prescriptions on File Prior to Visit   Medication Sig Dispense Refill    hydroCHLOROthiazide (MICROZIDE) 12.5 mg capsule TAKE ONE CAPSULE BY MOUTH ONCE DAILY 30 Cap 6    VITAMIN D2 50,000 unit capsule TAKE ONE CAPSULE BY MOUTH ONCE A WEEK 12 Cap 4    ezetimibe-simvastatin (VYTORIN 10/10) 10-10 mg per tablet Take 1 Tab by mouth nightly.  magnesium oxide (MAG-OX) 400 mg tablet TAKE ONE TABLET BY MOUTH ONCE DAILY 30 Tab 7    fluticasone-salmeterol (ADVAIR) 250-50 mcg/dose diskus inhaler Take 1 Puff by inhalation every twelve (12) hours. 3 Inhaler 0    lisinopril (PRINIVIL, ZESTRIL) 20 mg tablet Take 1 Tab by mouth daily. (Patient taking differently: Take 10 mg by mouth daily.) 30 Tab 3    cyclobenzaprine (FLEXERIL) 10 mg tablet Take 0.5 Tabs by mouth two (2) times a day. (Patient taking differently: Take 10 mg by mouth as needed.) 30 Tab 3    amLODIPine (NORVASC) 5 mg tablet Take 1 Tab by mouth daily. 90 Tab 2    fluticasone (FLONASE) 50 mcg/actuation nasal spray 2 Sprays by Both Nostrils route daily.  TOPAMAX 25 mg tablet TAKE ONE TABLET BY MOUTH TWICE DAILY (Patient taking differently: as needed) 60 Tab 0    dexlansoprazole (DEXILANT) 30 mg capsule Take 1 Cap by mouth daily as needed.  Indications: GASTROESOPHAGEAL REFLUX 90 Cap 3    multivitamin-iron-FA, hematinic, (CENTRUM COMPLETE)  mg-mcg tab tablet Take 1 Tab by mouth daily. 90 Tab 3    albuterol (PROVENTIL HFA, VENTOLIN HFA, PROAIR HFA) 90 mcg/actuation inhaler Take 2 Puffs by inhalation every four (4) hours as needed for Wheezing. 3 Inhaler 3    Cetirizine (ZYRTEC) 10 mg cap Take 10 mg PE/day by mouth as needed. No current facility-administered medications on file prior to visit. Allergies   Allergen Reactions    Banana Hives     Asthma attach, throat swelling, throat scratching, lip swelling    Clonidine Other (comments)     Memory loss    Iodine Rash    Keflex [Cephalexin] Hives    Seafood Hives    Watermelon Hives     Throat closes     Past Medical History:   Diagnosis Date    Asthma 2/4/2010    Brain bleed ([facility] 02/20/2015    Chronic diastolic heart failure ([facility] 3/18/2015    COPD (chronic obstructive pulmonary disease) ([facility]     PFT confirmed 8/18/16    Diabetes mellitus ([facility] 5/27/2014    Diabetic eye exam ([facility] 01/26/2017    No retinopathy Best corrected vision 20/20 OU    HTN (hypertension) 2/4/2010    Hyperlipidemia LDL goal < 70 2014    Magnesium deficiency     Obesity, unspecified 4/15/2015    SAH (subarachnoid hemorrhage) ([facility] 2/20/15    admitted in [facility], Dr. Katharina Rider. [facility] and ref to Dr. Radha Oliver, Neurology    Stroke [facility])     Vitamin D deficiency 2000     History   Smoking Status    Never Smoker   Smokeless Tobacco    Never Used     History   Alcohol Use    0.6 oz/week    0 Standard drinks or equivalent, 1 Glasses of wine per week     Comment: occasionally - social,     Family History   Problem Relation Age of Onset    Diabetes Mother     Hypertension Mother           Heart Attack Mother 62    Alcohol abuse Father      cirrhosis    Diabetes Sister     Heart Attack Sister 64     Review of Systems   Constitutional: Positive for weight loss. Negative for chills, diaphoresis, fever and malaise/fatigue. HENT: Positive for congestion and ear pain. Negative for sore throat. Respiratory: Negative for cough.     Cardiovascular: Negative for chest pain, palpitations and leg swelling. Gastrointestinal: Negative for abdominal pain, constipation, diarrhea, nausea and vomiting. Genitourinary: Negative for frequency. Musculoskeletal: Negative for myalgias. Neurological: Negative for dizziness and headaches. Endo/Heme/Allergies: Positive for environmental allergies. Negative for polydipsia. Visit Vitals    /87 (BP 1 Location: Left arm, BP Patient Position: Sitting)    Pulse 97    Temp 98.7 °F (37.1 °C) (Oral)    Resp 18    Ht 5' 6\" (1.676 m)    Wt 196 lb (88.9 kg)    SpO2 98%    BMI 31.64 kg/m2     Physical Exam   Constitutional: She is oriented to person, place, and time. She appears well-developed and well-nourished. No distress. HENT:   Right Ear: Ear canal normal. There is tenderness. Tympanic membrane is erythematous and bulging. Left Ear: Tympanic membrane, external ear and ear canal normal.   Nose: Nose normal.   Mouth/Throat: Uvula is midline, oropharynx is clear and moist and mucous membranes are normal.   Eyes: Conjunctivae are normal.   Neck: Neck supple. No thyromegaly present. Cardiovascular: Normal rate, regular rhythm and normal heart sounds. Pulmonary/Chest: Effort normal and breath sounds normal. No respiratory distress. She has no wheezes. Musculoskeletal: She exhibits no edema. Lymphadenopathy:     She has no cervical adenopathy. Neurological: She is alert and oriented to person, place, and time. Psychiatric: She has a normal mood and affect. Her behavior is normal.   Nursing note and vitals reviewed. Lab Results   Component Value Date/Time    Hemoglobin A1c 6.5 (H) 05/03/2017 10:58 AM    Hemoglobin A1c (POC) 5.9 04/18/2018 01:31 PM     ASSESSMENT and PLAN    ICD-10-CM ICD-9-CM    1. Abnormal glucose R73.09 790.29 AMB POC HEMOGLOBIN A1C   2.  Type 2 diabetes mellitus without complication, without long-term current use of insulin ([facility]) E11.9 250.00 SITagliptin-metFORMIN ([place] [facility]) 50-1,000 mg per tablet      CBC WITH AUTOMATED DIFF      METABOLIC PANEL, COMPREHENSIVE      LIPID PANEL   3. Right otitis media, unspecified otitis media type H66.91 382.9 amoxicillin (AMOXIL) 500 mg capsule   4. Breast cancer screening Z12.31 V76.10 [facility] MAMMO BI SCREENING INCL CAD   5. Colon cancer screening Z12.11 V76.51 REFERRAL TO GASTROENTEROLOGY   6. Screening, lipid Z13.220 V77.91 LIPID PANEL      A1c 5.9. DM is controlled on janumet. Continue medication and follow a diabetic diet. She will be due for a diabetic eye exam end of May. She will continue to work on weight loss. Doing a great job so far! Finish entire course of abx for OM. I have sent in referrals for mammogram and colonoscopy. Reviewed medication and side effects. Patient agrees with the plan and verbalizes understanding. Follow-up Disposition:  Return in about 3 months (around 7/18/2018) for DM. Deshaun Arteaga PA-C  4/18/2018      Discussed the patient's BMI with her. The BMI follow up plan is as follows:     dietary management education, guidance, and counseling. She has lost 10lbs since last office visit  encourage exercise  monitor weight    An After Visit Summary was printed and given to the patient.

## 2018-04-18 NOTE — PATIENT INSTRUCTIONS
Body Mass Index: Care Instructions  Your Care Instructions    Body mass index (BMI) can help you see if your weight is raising your risk for health problems. It uses a formula to compare how much you weigh with how tall you are. · A BMI lower than 18.5 is considered underweight. · A BMI between 18.5 and 24.9 is considered healthy. · A BMI between 25 and 29.9 is considered overweight. A BMI of 30 or higher is considered obese. If your BMI is in the normal range, it means that you have a lower risk for weight-related health problems. If your BMI is in the overweight or obese range, you may be at increased risk for weight-related health problems, such as high blood pressure, heart disease, stroke, arthritis or joint pain, and diabetes. If your BMI is in the underweight range, you may be at increased risk for health problems such as fatigue, lower protection (immunity) against illness, muscle loss, bone loss, hair loss, and hormone problems. BMI is just one measure of your risk for weight-related health problems. You may be at higher risk for health problems if you are not active, you eat an unhealthy diet, or you drink too much alcohol or use tobacco products. Follow-up care is a key part of your treatment and safety. Be sure to make and go to all appointments, and call your doctor if you are having problems. It's also a good idea to know your test results and keep a list of the medicines you take. How can you care for yourself at home? · Practice healthy eating habits. This includes eating plenty of fruits, vegetables, whole grains, lean protein, and low-fat dairy. · If your doctor recommends it, get more exercise. Walking is a good choice. Bit by bit, increase the amount you walk every day. Try for at least 30 minutes on most days of the week. · Do not smoke. Smoking can increase your risk for health problems. If you need help quitting, talk to your doctor about stop-smoking programs and medicines. These can increase your chances of quitting for good. · Limit alcohol to 2 drinks a day for men and 1 drink a day for women. Too much alcohol can cause health problems. If you have a BMI higher than 25  · Your doctor may do other tests to check your risk for weight-related health problems. This may include measuring the distance around your waist. A waist measurement of more than 40 inches in men or 35 inches in women can increase the risk of weight-related health problems. · Talk with your doctor about steps you can take to stay healthy or improve your health. You may need to make lifestyle changes to lose weight and stay healthy, such as changing your diet and getting regular exercise. If you have a BMI lower than 18.5  · Your doctor may do other tests to check your risk for health problems. · Talk with your doctor about steps you can take to stay healthy or improve your health. You may need to make lifestyle changes to gain or maintain weight and stay healthy, such as getting more healthy foods in your diet and doing exercises to build muscle. Where can you learn more? Go to http://monique-digna.info/. Enter S176 in the search box to learn more about \"Body Mass Index: Care Instructions. \"  Current as of: October 13, 2016  Content Version: 11.4  © 1571-5925 Healthwise, Incorporated. Care instructions adapted under license by [employer] (which disclaims liability or warranty for this information). If you have questions about a medical condition or this instruction, always ask your healthcare professional. [place] [address] any warranty or liability for your use of this information. Mammogram: About This Test  What is it? A mammogram is an X-ray of the breast that is used to screen for breast cancer. This test can find tumors that are too small for you or your doctor to feel. Cancer is most easily treated and cured when it is found at an early stage.   Why is this test done? A mammogram is done to:  · Look for breast cancer in women who don't have symptoms. · Find breast cancer in women who have symptoms. Symptoms of breast cancer may include a lump or thickening in the breast, nipple discharge, or dimpling of the skin on one area of the breast.  · Find an area of suspicious breast tissue to remove for an exam under a microscope (biopsy). How can you prepare for the test?  · Tell your doctor if you:  ¨ Are or might be pregnant. ¨ Are breastfeeding. ¨ Have breast implants. ¨ Have previously had a breast biopsy. · On the day of the test, don't use any deodorant, perfume, powders, or ointments. What happens before the test?  · You will need to take off any jewelry that might interfere with the X-ray pictures. · You will need to take off your clothes above the waist.  · You will be given a cloth or paper gown to use during the test.  What happens during the test?  · You usually stand during a mammogram.  · One at a time, your breasts will be placed on a flat plate that contains the X-ray film. · Another plate is then pressed firmly against your breast to help flatten out the breast tissue. You may be asked to lift your arm. · For a few seconds while the X-ray picture is being taken, you will need to hold your breath. · At least two pictures are taken of each breast. One is taken from the top and one from the side. What else should you know about the test?  · The X-ray plate will feel cold when you place your breast on it. Having your breasts flattened and squeezed isn't comfortable. But it is necessary to flatten out the breast tissue to get the best pictures. · Mammograms do not prevent breast cancer or reduce a woman's risk of developing cancer. · Most things that are found during a mammogram are not breast cancer. How long does the test take? · The test will take about 10 to 15 minutes. You may be in the clinic for up to an hour.   What happens after the test?  · You will probably be able to go home right away. · You can go back to your usual activities right away. Follow-up care is a key part of your treatment and safety. Be sure to make and go to all appointments, and call your doctor if you are having problems. It's also a good idea to keep a list of the medicines you take. Ask your doctor when you can expect to have your test results. Where can you learn more? Go to http://monique-digna.info/. Enter H389 in the search box to learn more about \"Mammogram: About This Test.\"  Current as of: May 12, 2017  Content Version: 11.4  © 1665-4361 [employer]. Care instructions adapted under license by [employer] (which disclaims liability or warranty for this information). If you have questions about a medical condition or this instruction, always ask your healthcare professional. [place] [address] any warranty or liability for your use of this information. Learning About Colonoscopy  What is a colonoscopy? A colonoscopy is a test (also called a procedure) that lets a doctor look inside your large intestine. The doctor uses a thin, lighted tube called a colonoscope. The doctor uses it to look for small growths called polyps, colon or rectal cancer (colorectal cancer), or other problems like bleeding. During the procedure, the doctor can take samples of tissue. The samples can then be checked for cancer or other conditions. The doctor can also take out polyps. How is colonoscopy done? This procedure is done in a doctor's office or a clinic or hospital. You will get medicine to help you relax and not feel pain. Some people find that they do not remember having the test because of the medicine. The doctor gently moves the colonoscope, or scope, through the colon. The scope is also a small video camera. It lets the doctor see the colon and take pictures.   A colonoscopy usually takes 30 to 45 minutes. It may take longer if the doctor has to remove polyps. How do you prepare for the procedure? You need to clean out your colon before the procedure so the doctor can see all of your colon. You may start the cleaning process a day or two before the test. This depends on which \"colon prep\" your doctor recommends. To clean your colon, you stop eating solid foods and drink only clear liquids. You can have water, tea, coffee, clear juices, clear broths, flavored ice pops, and gelatin (such as Jell-O). Do not drink anything red or purple, such as grape juice or fruit punch. And do not eat red or purple foods, such as grape ice pops or cherry gelatin. The day or night before the procedure, you drink a large amount of a special liquid. This causes loose, frequent stools. You will go to the bathroom a lot. It is very important to drink all of the colon prep liquid. If you have problems drinking the liquid, call your doctor. For many people, the prep is worse than the test. It may be uncomfortable, and you may feel hungry on the clear liquid diet. Some people do not go to work or do their usual activities on the day of the prep. Arrange to have someone take you home after the test.  What can you expect after a colonoscopy? The nurses will watch you for 1 to 2 hours until the medicines wear off. Then you can go home. You will need a ride. Your doctor will tell you when you can eat and do your usual activities. Your doctor will talk to you about when you will need your next colonoscopy. The results of your test and your risk for colorectal cancer will help your doctor decide how often you need to be checked. Follow-up care is a key part of your treatment and safety. Be sure to make and go to all appointments, and call your doctor if you are having problems. It's also a good idea to know your test results and keep a list of the medicines you take. Where can you learn more?   Go to http://monique-digna.info/. Enter X623 in the search box to learn more about \"Learning About Colonoscopy. \"  Current as of: May 12, 2017  Content Version: 11.4  © 6404-9614 Healthwise, Incorporated. Care instructions adapted under license by [employer] (which disclaims liability or warranty for this information). If you have questions about a medical condition or this instruction, always ask your healthcare professional. [place] [address] any warranty or liability for your use of this information.

## 2018-05-09 ENCOUNTER — HOSPITAL ENCOUNTER (OUTPATIENT)
Dept: MAMMOGRAPHY | Age: 58
Discharge: HOME | End: 2018-05-09
Attending: PHYSICIAN ASSISTANT
Payer: COMMERCIAL

## 2018-05-09 DIAGNOSIS — Z12.39 BREAST CANCER SCREENING: ICD-10-CM

## 2018-05-09 PROCEDURE — 77067 SCR MAMMO BI INCL CAD: CPT

## 2018-05-09 PROCEDURE — 77063 BREAST TOMOSYNTHESIS BI: CPT

## 2018-05-30 ENCOUNTER — HOSPITAL ENCOUNTER (OUTPATIENT)
Dept: LAB | Age: 58
Discharge: HOME | End: 2018-05-30

## 2018-05-30 LAB — SENTARA SPECIMEN COL,SENBCF: NORMAL

## 2018-05-30 PROCEDURE — 99001 SPECIMEN HANDLING PT-LAB: CPT | Performed by: PHYSICIAN ASSISTANT

## 2018-05-31 LAB
A-G RATIO,AGRAT: 1.3 RATIO (ref 1.1–2.6)
ALBUMIN SERPL-MCNC: 4.3 G/DL (ref 3.5–5)
ALP SERPL-CCNC: 76 U/L (ref 25–115)
ALT SERPL-CCNC: 12 U/L (ref 5–40)
ANION GAP SERPL CALC-SCNC: 13 MMOL/L
AST SERPL W P-5'-P-CCNC: 16 U/L (ref 10–37)
BASOPHILS ABS-DIF,2030: 0.2 K/UL (ref 0–0.2)
BASOPHILS ABS-DIF,2030: 0.2 K/UL (ref 0–0.2)
BASOPHILS%-DIF,2033: 2 % (ref 0–2)
BASOPHILS%-DIF,2033: 2 % (ref 0–2)
BILIRUB SERPL-MCNC: 0.5 MG/DL (ref 0.2–1.2)
BUN SERPL-MCNC: 20 MG/DL (ref 6–22)
CALCIUM SERPL-MCNC: 9.4 MG/DL (ref 8.4–10.5)
CHLORIDE SERPL-SCNC: 102 MMOL/L (ref 98–110)
CHOLEST SERPL-MCNC: 141 MG/DL (ref 110–200)
CO2 SERPL-SCNC: 26 MMOL/L (ref 20–32)
CREAT SERPL-MCNC: 1 MG/DL (ref 0.5–1.2)
EOS ABS-DIF,2069: 2.5 K/UL (ref 0–0.5)
EOS ABS-DIF,2069: 2.5 K/UL (ref 0–0.5)
EOSINOPHILS%-DIF,2072: 30 % (ref 0–6)
EOSINOPHILS%-DIF,2072: 30 % (ref 0–6)
ERYTHROCYTE [DISTWIDTH] IN BLOOD BY AUTOMATED COUNT: 14 % (ref 10–15.5)
GFRAA, 66117: >60
GFRNA, 66118: 54.2
GLOBULIN,GLOB: 3.3 G/DL (ref 2–4)
GLUCOSE SERPL-MCNC: 99 MG/DL (ref 70–99)
HCT VFR BLD AUTO: 36.9 % (ref 35.1–48)
HDLC SERPL-MCNC: 2.9 MG/DL (ref 0–5)
HDLC SERPL-MCNC: 48 MG/DL (ref 40–59)
HGB BLD-MCNC: 11.7 G/DL (ref 11.7–16)
LDLC SERPL CALC-MCNC: 86 MG/DL (ref 50–99)
LYMPHOCYTES%-DIF,2108: 28 % (ref 20–45)
LYMPHOCYTES%-DIF,2108: 28 % (ref 20–45)
LYMPHS ABS-DIF,2105: 2.4 K/UL (ref 1–4.8)
LYMPHS ABS-DIF,2105: 2.4 K/UL (ref 1–4.8)
MCH RBC QN AUTO: 29 PG (ref 26–34)
MCHC RBC AUTO-ENTMCNC: 32 G/DL (ref 31–36)
MCV RBC AUTO: 90 FL (ref 80–95)
MONOCYTES ABS-DIF,2141: 0.6 K/UL (ref 0.1–1)
MONOCYTES ABS-DIF,2141: 0.6 K/UL (ref 0.1–1)
MONOCYTES%-DIF,2144: 7 % (ref 3–12)
MONOCYTES%-DIF,2144: 7 % (ref 3–12)
NEUTROPHILS ABS,2156: 2.8 K/UL (ref 1.8–7.7)
NEUTROPHILS ABS,2156: 2.8 K/UL (ref 1.8–7.7)
NEUTROPHILS%-DIF,2159: 33 % (ref 40–75)
NEUTROPHILS%-DIF,2159: 33 % (ref 40–75)
NORMACHROMIC RBC, 868: ABNORMAL
NORMACHROMIC RBC, 868: ABNORMAL
NORMACYTIC RBC, 869: ABNORMAL
NORMACYTIC RBC, 869: ABNORMAL
PATH REVIEW OF SMEAR, 12050: NORMAL
PATHOLOGY REVIEW: ABNORMAL
PATHOLOGY REVIEW: ABNORMAL
PLATELET # BLD AUTO: 367 K/UL (ref 140–440)
PMV BLD AUTO: 10.5 FL (ref 9–13)
POTASSIUM SERPL-SCNC: 4.5 MMOL/L (ref 3.5–5.5)
PROT SERPL-MCNC: 7.6 G/DL (ref 6.4–8.3)
RBC # BLD AUTO: 4.1 M/UL (ref 3.8–5.2)
SMEAR EVAL, 1131: ABNORMAL
SMEAR EVAL, 1131: ABNORMAL
SODIUM SERPL-SCNC: 141 MMOL/L (ref 133–145)
TOTAL CELLS COUNTED, 12021: 100
TOTAL CELLS COUNTED, 12021: 100
TRIGL SERPL-MCNC: 40 MG/DL (ref 40–149)
VLDLC SERPL CALC-MCNC: 8 MG/DL (ref 8–30)
WBC # BLD AUTO: 8.4 K/UL (ref 4–11)

## 2018-06-01 ENCOUNTER — TELEPHONE (OUTPATIENT)
Dept: FAMILY MEDICINE CLINIC | Age: 58
End: 2018-06-01

## 2018-06-01 NOTE — PROGRESS NOTES
CBC- Her Eosinophils are high, review of past results show she has always been consistently elevated, it was up to 30 back in 2014. She has hx of asthma and allergies, so like due to medical hx.

## 2018-06-01 NOTE — TELEPHONE ENCOUNTER
----- Message from Jv Reid PA-C sent at 5/31/2018  4:58 PM EDT -----  Lipids controlled. CBC and CMP look good.  No change to regimen

## 2018-06-04 ENCOUNTER — OFFICE VISIT (OUTPATIENT)
Dept: SURGERY | Age: 58
End: 2018-06-04

## 2018-06-04 VITALS
HEIGHT: 66 IN | OXYGEN SATURATION: 96 % | BODY MASS INDEX: 31.34 KG/M2 | HEART RATE: 104 BPM | TEMPERATURE: 98.8 F | WEIGHT: 195 LBS | RESPIRATION RATE: 17 BRPM

## 2018-06-04 DIAGNOSIS — Z12.11 COLON CANCER SCREENING: Primary | ICD-10-CM

## 2018-06-04 NOTE — MR AVS SNAPSHOT
[address] [place] [address] [address] [place] [address] 
[phone] Patient: Johnathan Thorpe MRN: [id number] NJK:[id number] Visit Information Date & Time Provider Department Dept. Phone Encounter #  
 6/4/2018 10:30 AM [facility] NURSE VISIT [facility] [place] [facility] [phone] [id number] Your Appointments 2/11/2019 10:00 AM  
Office Visit with Deepa Alexander MD  
[facility]) Appt Note: 1 yr  
 [address] [facility], [address] [facility] [address] [address], [address] [place] [address] Upcoming Health Maintenance Date Due DTaP/Tdap/Td series (1 - Tdap) 8/3/1979 FOBT Q 1 YEAR AGE 50-75 8/3/2008 EYE EXAM RETINAL OR DILATED Q1 5/24/2018 ZOSTER VACCINE AGE 60> 6/3/2018 Influenza Age 5 to Adult 8/1/2018 FOOT EXAM Q1 10/12/2018 MICROALBUMIN Q1 10/12/2018 HEMOGLOBIN A1C Q6M 10/18/2018 LIPID PANEL Q1 5/30/2019 BREAST CANCER SCRN MAMMOGRAM 5/9/2020 Allergies as of 6/4/2018  Review Complete On: 4/18/2018 By: Montrell Hernandez PA-C Severity Noted Reaction Type Reactions Banana  04/02/2015    Hives Asthma attach, throat swelling, throat scratching, lip swelling Clonidine  08/24/2015    Other (comments) Memory loss Iodine  02/04/2010   Systemic Rash Keflex [Cephalexin]  09/26/2011    Hives Seafood  03/13/2015    Hives Watermelon  09/08/2015    Hives Throat closes Current Immunizations  Reviewed on 1/27/2016 Name Date Influenza Vaccine 10/29/2014 Influenza Vaccine (Quad) PF 10/12/2017, 11/2/2016, 1/27/2016 Influenza Vaccine Split 12/7/2010 Not reviewed this visit You Were Diagnosed With   
  
 Codes Comments Colon cancer screening    -  Primary ICD-10-CM: Z12.11 ICD-9-CM: V76.51 Vitals Pulse Temp Resp Height(growth percentile) Weight(growth percentile) SpO2 (!) 104 98.8 °F (37.1 °C) (Oral) 17 5' 6\" (1.676 m) 195 lb (88.5 kg) 96% BMI OB Status Smoking Status 31.47 kg/m2 Hysterectomy Never Smoker BMI and BSA Data Body Mass Index Body Surface Area  
 31.47 kg/m 2 2.03 m 2 Preferred Pharmacy Pharmacy Name Phone [facility] [facility] [phone] Your Updated Medication List  
  
   
This list is accurate as of 6/4/18 11:03 AM.  Always use your most recent med list.  
  
  
  
  
 albuterol 90 mcg/actuation inhaler Commonly known as:  PROVENTIL HFA, VENTOLIN HFA, PROAIR HFA Take 2 Puffs by inhalation every four (4) hours as needed for Wheezing. amLODIPine 5 mg tablet Commonly known as:  Chris Bret Take 1 Tab by mouth daily. cyclobenzaprine 10 mg tablet Commonly known as:  FLEXERIL Take 0.5 Tabs by mouth two (2) times a day. dexlansoprazole 30 mg capsule Commonly known as:  DEXILANT Take 1 Cap by mouth daily as needed. Indications: GASTROESOPHAGEAL REFLUX  
  
 FLONASE 50 mcg/actuation nasal spray Generic drug:  fluticasone 2 Sprays by Both Nostrils route daily. fluticasone-salmeterol 250-50 mcg/dose diskus inhaler Commonly known as:  ADVAIR Take 1 Puff by inhalation every twelve (12) hours. hydroCHLOROthiazide 12.5 mg capsule Commonly known as:  Nguyễn Galvez TAKE ONE CAPSULE BY MOUTH ONCE DAILY  
  
 lisinopril 20 mg tablet Commonly known as:  Verna Goldammy Take 1 Tab by mouth daily. magnesium oxide 400 mg tablet Commonly known as:  MAG-OX  
TAKE ONE TABLET BY MOUTH ONCE DAILY  
  
 multivitamin-iron-FA (hematinic)  mg-mcg Tab tablet Commonly known as:  CENTRUM COMPLETE Take 1 Tab by mouth daily. SITagliptin-metFORMIN 50-1,000 mg per tablet Commonly known as:  Dylan Ricco Take 1 Tab by mouth two (2) times daily (with meals). TOPAMAX 25 mg tablet Generic drug:  topiramate TAKE ONE TABLET BY MOUTH TWICE DAILY  
  
 VITAMIN D2 50,000 unit capsule Generic drug:  ergocalciferol TAKE ONE CAPSULE BY MOUTH ONCE A WEEK Vytorin 10/10 10-10 mg per tablet Generic drug:  ezetimibe-simvastatin Take 1 Tab by mouth nightly. ZyrTEC 10 mg Cap Generic drug:  Cetirizine Take 10 mg PE/day by mouth as needed. To-Do List   
 08/04/2018 GI:  COLONOSCOPY Introducing [facility]! [employer] introduces Traffio patient portal. Now you can access parts of your medical record, email your doctor's office, and request medication refills online. 1. In your internet browser, go to [email or web address] 2. Click on the First Time User? Click Here link in the Sign In box. You will see the New Member Sign Up page. 3. Enter your Traffio Access Code exactly as it appears below. You will not need to use this code after youve completed the sign-up process. If you do not sign up before the expiration date, you must request a new code. · Traffio Access Code: [email or web address] Expires: 8/28/2018 11:13 AM 
 
4. Enter the last four digits of your Social Security Number (xxxx) and Date of Birth (mm/dd/yyyy) as indicated and click Submit. You will be taken to the next sign-up page. 5. Create a Traffio ID. This will be your Traffio login ID and cannot be changed, so think of one that is secure and easy to remember. 6. Create a Traffio password. You can change your password at any time. 7. Enter your Password Reset Question and Answer. This can be used at a later time if you forget your password. 8. Enter your e-mail address. You will receive e-mail notification when new information is available in [address] 9. Click Sign Up. You can now view and download portions of your medical record. 10. Click the Download Summary menu link to download a portable copy of your medical information. If you have questions, please visit the Frequently Asked Questions section of the [employer] website. Remember, [employer] is NOT to be used for urgent needs. For medical emergencies, dial 911. Now available from your iPhone and Android! Please provide this summary of care documentation to your next provider. Your primary care clinician is listed as Phys Other. If you have any questions after today's visit, please call [phone].

## 2018-06-04 NOTE — PROGRESS NOTES
Review of Systems   Constitutional: Positive for malaise/fatigue and weight loss. Negative for chills, diaphoresis and fever. HENT: Positive for congestion and sore throat. Negative for ear discharge, ear pain, hearing loss, nosebleeds, sinus pain and tinnitus. Eyes: Negative. Respiratory: Positive for cough, shortness of breath and wheezing. Negative for hemoptysis, sputum production and stridor. Cardiovascular: Positive for leg swelling. Negative for chest pain, palpitations, orthopnea, claudication and PND. Gastrointestinal: Positive for heartburn. Negative for abdominal pain, blood in stool, constipation, diarrhea, melena, nausea and vomiting. Genitourinary: Negative. Musculoskeletal: Positive for back pain and neck pain. Negative for falls, joint pain and myalgias. Skin: Positive for itching and rash. arms   Neurological: Positive for headaches. Negative for dizziness, tingling, tremors, sensory change, speech change, focal weakness, seizures, loss of consciousness and weakness. Endo/Heme/Allergies: Negative. Psychiatric/Behavioral: Negative. Colon Screen    Patient: Mayra Maynard MRN: [id number]  SSN: [id number]    YOB: 1960  Age: 59 y.o. Sex: female        Subjective:   Mayra Maynard was referred by Dr. Shanna Alexander. Patient referred for colonoscopy for   Screening colonoscopy. Patient denies rectal pain or bleeding. Abdominal surgeries as described below, specifically hysterectomy. Family history as described below, specifically none. Patient has never had a colonoscopy.     Allergies   Allergen Reactions    Banana Hives     Asthma attach, throat swelling, throat scratching, lip swelling    Clonidine Other (comments)     Memory loss    Iodine Rash    Keflex [Cephalexin] Hives    Seafood Hives    Watermelon Hives     Throat closes       Past Medical History:   Diagnosis Date    Ankle swelling     Asthma 2/4/2010    Brain bleed ([facility] 02/20/2015    Chronic diastolic heart failure ([facility] 3/18/2015    COPD [facility]ch[facility] obstructive pulmonary disease[facility])     PFT confirmed 8/18/16    Diabetes mellitus ([facility] 5/27/2014    Diabetic eye exam ([facility] 01/26/2017    No retinopathy Best corrected vision 20/20 OU    HTN (hypertension) 2/4/2010    Hyperlipidemia LDL goal < 70 2014    Magnesium deficiency     Obesity, unspecified 4/15/2015    SAH (subarachnoid hemorrhage) ([facility] 2/20/15    ad[facility] in [facility], Dr. Jazmin Winter. Jules Grant and ref to Dr. Gil Grady, Neurology    Stroke [facility]) 2015    mini stroke    Vitamin D deficiency 2000     Past Surgical History:   Procedure Laterality Date    HX HYSTERECTOMY  2004    abd approach with ovaries and cervix intact    HX HYSTERECTOMY N/A 2004    HX OTHER SURGICAL  8/25/15    SAH, cerebral arteriopathy, Dr. Gil Grady, Interventional Neurology [facility]      Family History   Problem Relation Age of Onset    Diabetes Mother     Hypertension Mother           Heart Attack Mother 62    Alcohol abuse Father      cirrhosis    Diabetes Sister     Heart Attack Sister 64     Social History   Substance Use Topics    Smoking status: Never Smoker    Smokeless tobacco: Never Used    Alcohol use 0.6 oz/week     1 Glasses of wine, 0 Standard drinks or equivalent per week      Comment: occasionally - social,      Prior to Admission medications    Medication Sig Start Date End Date Taking? Authorizing Provider   SITagliptin-metFORMIN (JANUMET) 50-1,000 mg per tablet Take 1 Tab by mouth two (2) times daily (with meals). 4/18/18  Yes Zainab Mccrary PA-C   hydroCHLOROthiazide (MICROZIDE) 12.5 mg capsule TAKE ONE CAPSULE BY MOUTH ONCE DAILY 3/30/18  Yes Venecia Bryan NP   VITAMIN D2 50,000 unit capsule TAKE ONE CAPSULE BY MOUTH ONCE A WEEK 3/26/18  Yes Gatito King NP   ezetimibe-simvastatin (VYTORIN 10/10) 10-10 mg per tablet Take 1 Tab by mouth nightly.    Yes Historical Provider   magnesium oxide (MAG-OX) 400 mg tablet TAKE ONE TABLET BY MOUTH ONCE DAILY 2/22/18  Yes Raphael Menon NP   fluticasone-salmeterol (ADVAIR) 250-50 mcg/dose diskus inhaler Take 1 Puff by inhalation every twelve (12) hours. 2/21/18  Yes Sondra Ramirez MD   lisinopril (PRINIVIL, ZESTRIL) 20 mg tablet Take 1 Tab by mouth daily. Patient taking differently: Take 10 mg by mouth daily. 1/29/18  Yes Venecia Bryan NP   cyclobenzaprine (FLEXERIL) 10 mg tablet Take 0.5 Tabs by mouth two (2) times a day. Patient taking differently: Take 10 mg by mouth as needed. 10/12/17  Yes Everardo Dooley MD   amLODIPine (NORVASC) 5 mg tablet Take 1 Tab by mouth daily. 8/29/17  Yes Rehan Sotelo MD   fluticasone (FLONASE) 50 mcg/actuation nasal spray 2 Sprays by Both Nostrils route daily. Yes Historical Provider   TOPAMAX 25 mg tablet TAKE ONE TABLET BY MOUTH TWICE DAILY  Patient taking differently: as needed 12/12/16  Yes Gregory Solorzano MD   dexlansoprazole (DEXILANT) 30 mg capsule Take 1 Cap by mouth daily as needed. Indications: GASTROESOPHAGEAL REFLUX 11/2/16  Yes Raphael Menon NP   multivitamin-iron-FA, hematinic, (CENTRUM COMPLETE)  mg-mcg tab tablet Take 1 Tab by mouth daily. 11/2/16  Yes Raphael Menon NP   albuterol (PROVENTIL HFA, VENTOLIN HFA, PROAIR HFA) 90 mcg/actuation inhaler Take 2 Puffs by inhalation every four (4) hours as needed for Wheezing. 11/2/16  Yes Raphael Menon NP   Cetirizine (ZYRTEC) 10 mg cap Take 10 mg PE/day by mouth as needed. Yes Historical Provider          Review of Systems:      Risks colonoscopy described- colon injury, missed lesion, anesthesia problems, bleeding       Funmilayo Wells, SHERYL  June 4, 7457  19:02 AM

## 2018-07-30 DIAGNOSIS — I10 ESSENTIAL HYPERTENSION: ICD-10-CM

## 2018-07-30 DIAGNOSIS — J45.20 MILD INTERMITTENT ASTHMA WITHOUT COMPLICATION: ICD-10-CM

## 2018-07-30 NOTE — TELEPHONE ENCOUNTER
Requested Prescriptions     Pending Prescriptions Disp Refills    amLODIPine (NORVASC) 5 mg tablet 30 Tab 0     Sig: Take 1 Tab by mouth daily.     fluticasone-salmeterol (ADVAIR DISKUS) 250-50 mcg/dose diskus inhaler 1 Inhaler 0     Pt was told to establish care with another PCP

## 2018-07-31 NOTE — TELEPHONE ENCOUNTER
Last seen by you at [facility], [facility].

## 2018-08-01 RX ORDER — FLUTICASONE PROPIONATE AND SALMETEROL 250; 50 UG/1; UG/1
POWDER RESPIRATORY (INHALATION)
Qty: 1 INHALER | Refills: 0 | Status: SHIPPED | OUTPATIENT
Start: 2018-08-01 | End: 2018-10-24 | Stop reason: SDUPTHER

## 2018-08-01 RX ORDER — AMLODIPINE BESYLATE 5 MG/1
TABLET ORAL
Qty: 30 TAB | Refills: 0 | Status: SHIPPED | OUTPATIENT
Start: 2018-08-01 | End: 2018-08-20 | Stop reason: SDUPTHER

## 2018-08-01 RX ORDER — AMLODIPINE BESYLATE 5 MG/1
5 TABLET ORAL DAILY
Qty: 30 TAB | Refills: 0 | Status: SHIPPED | OUTPATIENT
Start: 2018-08-01 | End: 2018-08-20 | Stop reason: SDUPTHER

## 2018-08-03 DIAGNOSIS — J45.20 MILD INTERMITTENT ASTHMA WITHOUT COMPLICATION: ICD-10-CM

## 2018-08-10 RX ORDER — FLUTICASONE PROPIONATE AND SALMETEROL 50; 250 UG/1; UG/1
POWDER RESPIRATORY (INHALATION)
Qty: 1 INHALER | Refills: 0 | Status: SHIPPED | OUTPATIENT
Start: 2018-08-10 | End: 2018-10-24 | Stop reason: SDUPTHER

## 2018-08-14 ENCOUNTER — ANESTHESIA EVENT (OUTPATIENT)
Dept: ENDOSCOPY | Age: 58
End: 2018-08-14
Payer: COMMERCIAL

## 2018-08-15 ENCOUNTER — HOSPITAL ENCOUNTER (OUTPATIENT)
Age: 58
Setting detail: OUTPATIENT SURGERY
Discharge: HOME | End: 2018-08-15
Attending: COLON & RECTAL SURGERY | Admitting: COLON & RECTAL SURGERY
Payer: COMMERCIAL

## 2018-08-15 ENCOUNTER — ANESTHESIA (OUTPATIENT)
Dept: ENDOSCOPY | Age: 58
End: 2018-08-15
Payer: COMMERCIAL

## 2018-08-15 VITALS
TEMPERATURE: 98.2 F | WEIGHT: 196 LBS | RESPIRATION RATE: 20 BRPM | BODY MASS INDEX: 31.5 KG/M2 | DIASTOLIC BLOOD PRESSURE: 67 MMHG | HEART RATE: 78 BPM | OXYGEN SATURATION: 100 % | SYSTOLIC BLOOD PRESSURE: 104 MMHG | HEIGHT: 66 IN

## 2018-08-15 LAB — GLUCOSE BLD STRIP.AUTO-MCNC: 122 MG/DL (ref 70–110)

## 2018-08-15 PROCEDURE — 76060000032 HC ANESTHESIA 0.5 TO 1 HR: Performed by: COLON & RECTAL SURGERY

## 2018-08-15 PROCEDURE — 82962 GLUCOSE BLOOD TEST: CPT

## 2018-08-15 PROCEDURE — 74011250636 HC RX REV CODE- 250/636

## 2018-08-15 PROCEDURE — 74011250636 HC RX REV CODE- 250/636: Performed by: NURSE ANESTHETIST, CERTIFIED REGISTERED

## 2018-08-15 PROCEDURE — 76040000007: Performed by: COLON & RECTAL SURGERY

## 2018-08-15 RX ORDER — INSULIN LISPRO 100 [IU]/ML
INJECTION, SOLUTION INTRAVENOUS; SUBCUTANEOUS ONCE
Status: DISCONTINUED | OUTPATIENT
Start: 2018-08-15 | End: 2018-08-15 | Stop reason: HOSPADM

## 2018-08-15 RX ORDER — LIDOCAINE HYDROCHLORIDE 20 MG/ML
INJECTION, SOLUTION EPIDURAL; INFILTRATION; INTRACAUDAL; PERINEURAL AS NEEDED
Status: DISCONTINUED | OUTPATIENT
Start: 2018-08-15 | End: 2018-08-15 | Stop reason: HOSPADM

## 2018-08-15 RX ORDER — FAMOTIDINE 20 MG/1
20 TABLET, FILM COATED ORAL ONCE
Status: DISCONTINUED | OUTPATIENT
Start: 2018-08-15 | End: 2018-08-15 | Stop reason: HOSPADM

## 2018-08-15 RX ORDER — MAGNESIUM SULFATE 100 %
4 CRYSTALS MISCELLANEOUS AS NEEDED
Status: DISCONTINUED | OUTPATIENT
Start: 2018-08-15 | End: 2018-08-15 | Stop reason: HOSPADM

## 2018-08-15 RX ORDER — ASPIRIN 81 MG/1
81 TABLET ORAL DAILY
COMMUNITY
End: 2021-11-08

## 2018-08-15 RX ORDER — DEXTROSE 50 % IN WATER (D50W) INTRAVENOUS SYRINGE
25-50 AS NEEDED
Status: DISCONTINUED | OUTPATIENT
Start: 2018-08-15 | End: 2018-08-15 | Stop reason: HOSPADM

## 2018-08-15 RX ORDER — SODIUM CHLORIDE, SODIUM LACTATE, POTASSIUM CHLORIDE, CALCIUM CHLORIDE 600; 310; 30; 20 MG/100ML; MG/100ML; MG/100ML; MG/100ML
75 INJECTION, SOLUTION INTRAVENOUS CONTINUOUS
Status: DISCONTINUED | OUTPATIENT
Start: 2018-08-15 | End: 2018-08-15 | Stop reason: HOSPADM

## 2018-08-15 RX ORDER — PROPOFOL 10 MG/ML
INJECTION, EMULSION INTRAVENOUS AS NEEDED
Status: DISCONTINUED | OUTPATIENT
Start: 2018-08-15 | End: 2018-08-15 | Stop reason: HOSPADM

## 2018-08-15 RX ADMIN — PROPOFOL 100 MG: 10 INJECTION, EMULSION INTRAVENOUS at 10:51

## 2018-08-15 RX ADMIN — PROPOFOL 100 MG: 10 INJECTION, EMULSION INTRAVENOUS at 11:24

## 2018-08-15 RX ADMIN — PROPOFOL 50 MG: 10 INJECTION, EMULSION INTRAVENOUS at 11:10

## 2018-08-15 RX ADMIN — PROPOFOL 50 MG: 10 INJECTION, EMULSION INTRAVENOUS at 10:56

## 2018-08-15 RX ADMIN — SODIUM CHLORIDE, SODIUM LACTATE, POTASSIUM CHLORIDE, AND CALCIUM CHLORIDE 75 ML/HR: 600; 310; 30; 20 INJECTION, SOLUTION INTRAVENOUS at 10:29

## 2018-08-15 RX ADMIN — PROPOFOL 100 MG: 10 INJECTION, EMULSION INTRAVENOUS at 11:17

## 2018-08-15 RX ADMIN — PROPOFOL 100 MG: 10 INJECTION, EMULSION INTRAVENOUS at 11:01

## 2018-08-15 RX ADMIN — LIDOCAINE HYDROCHLORIDE 60 MG: 20 INJECTION, SOLUTION EPIDURAL; INFILTRATION; INTRACAUDAL; PERINEURAL at 10:49

## 2018-08-15 NOTE — ANESTHESIA PREPROCEDURE EVALUATION
Anesthetic History   No history of anesthetic complications            Review of Systems / Medical History  Patient summary reviewed, nursing notes reviewed and pertinent labs reviewed    Pulmonary    COPD: moderate        Asthma : well controlled       Neuro/Psych       CVA  Psychiatric history     Cardiovascular    Hypertension: well controlled                   GI/Hepatic/Renal  Within defined limits              Endo/Other    Diabetes: well controlled, type 2    Obesity and anemia     Other Findings            Physical Exam    Airway  Mallampati: III  TM Distance: 4 - 6 cm  Neck ROM: normal range of motion   Mouth opening: Normal     Cardiovascular  Regular rate and rhythm,  S1 and S2 normal,  no murmur, click, rub, or gallop  Rhythm: regular  Rate: normal         Dental    Dentition: Full upper dentures     Pulmonary  Breath sounds clear to auscultation               Abdominal  GI exam deferred       Other Findings            Anesthetic Plan    ASA: 3  Anesthesia type: MAC          Induction: Intravenous  Anesthetic plan and risks discussed with: Patient

## 2018-08-15 NOTE — IP AVS SNAPSHOT
[address] [address] [place] [address] [address] [address] [phone] Patient: Duane Pott MRN: [id number] ZWL:[id number]/8/[id number] About your hospitalization You were admitted on:  August 15, 2018 You last received care in the:  [facility] You were discharged on:  August 15, 2018 Why you were hospitalized Your primary diagnosis was:  Not on File Follow-up Information Follow up With Details Comments Contact Info Beatriz Serrano MD   Patient can only remember the practice name and not the physician Your Scheduled Appointments Monday August 20, 2018 11:00 AM EDT New Patient with Baljeet Bronson [address] ([facility]) [place] [address] [address] [facility]  
[phone] Discharge Orders None A check erwin indicates which time of day the medication should be taken. My Medications CHANGE how you take these medications Instructions Each Dose to Equal  
 Morning Noon Evening Bedtime  
 cyclobenzaprine 10 mg tablet Commonly known as:  FLEXERIL What changed:   
- how much to take - when to take this 
- reasons to take this Your last dose was: Your next dose is: Take 0.5 Tabs by mouth two (2) times a day. 5 mg  
    
   
   
   
  
 lisinopril 20 mg tablet Commonly known as:  Maurilio Shutters What changed:  how much to take Your last dose was: Your next dose is: Take 1 Tab by mouth daily. 20 mg  
    
   
   
   
  
 TOPAMAX 25 mg tablet Generic drug:  topiramate What changed:  See the new instructions. Your last dose was: Your next dose is: TAKE ONE TABLET BY MOUTH TWICE DAILY CONTINUE taking these medications Instructions Each Dose to Equal  
 Morning Noon Evening Bedtime  
 albuterol 90 mcg/actuation inhaler Commonly known as:  PROVENTIL HFA, VENTOLIN HFA, PROAIR HFA Your last dose was: Your next dose is: Take 2 Puffs by inhalation every four (4) hours as needed for Wheezing. 2 Puff * amLODIPine 5 mg tablet Commonly known as:  Laina River Your last dose was: Your next dose is: Take 1 Tab by mouth daily. 5 mg * amLODIPine 5 mg tablet Commonly known as:  Laina River Your last dose was: Your next dose is: TAKE ONE TABLET BY MOUTH DAILY  
     
   
   
   
  
 aspirin delayed-release 81 mg tablet Your last dose was: Your next dose is: Take 81 mg by mouth daily. 81 mg  
    
   
   
   
  
 dexlansoprazole 30 mg capsule Commonly known as:  DEXILANT Your last dose was: Your next dose is: Take 1 Cap by mouth daily as needed. Indications: GASTROESOPHAGEAL REFLUX  
 30 mg  
    
   
   
   
  
 FLONASE 50 mcg/actuation nasal spray Generic drug:  fluticasone Your last dose was: Your next dose is: 2 Sprays by Both Nostrils route daily. 2 Randolph * fluticasone-salmeterol 250-50 mcg/dose diskus inhaler Commonly known as:  ADVAIR DISKUS Your last dose was: Your next dose is:    
   
   
 INHALE ONE PUFF BY MOUTH EVERY 12 HOURS * ADVAIR DISKUS 250-50 mcg/dose diskus inhaler Generic drug:  fluticasone-salmeterol Your last dose was: Your next dose is:    
   
   
 INHALE ONE PUFF BY MOUTH EVERY 12 HOURS  
     
   
   
   
  
 hydroCHLOROthiazide 12.5 mg capsule Commonly known as:  Bill Scales Your last dose was: Your next dose is: TAKE ONE CAPSULE BY MOUTH ONCE DAILY  
     
   
   
   
  
 magnesium oxide 400 mg tablet Commonly known as:  MAG-OX Your last dose was: Your next dose is: TAKE ONE TABLET BY MOUTH ONCE DAILY  
     
   
   
   
  
 multivitamin-iron-FA (hematinic)  mg-mcg Tab tablet Commonly known as:  CENTRUM COMPLETE Your last dose was: Your next dose is: Take 1 Tab by mouth daily. 1 Tab SITagliptin-metFORMIN 50-1,000 mg per tablet Commonly known as:  Avery Felipe Your last dose was: Your next dose is: Take 1 Tab by mouth two (2) times daily (with meals). 1 Tab VITAMIN D2 50,000 unit capsule Generic drug:  ergocalciferol Your last dose was: Your next dose is: TAKE ONE CAPSULE BY MOUTH ONCE A WEEK Vytorin 10/10 10-10 mg per tablet Generic drug:  ezetimibe-simvastatin Your last dose was: Your next dose is: Take 1 Tab by mouth nightly. 1 Tab ZyrTEC 10 mg Cap Generic drug:  Cetirizine Your last dose was: Your next dose is: Take 10 mg PE/day by mouth as needed. 10 mg PE/day * Notice: This list has 4 medication(s) that are the same as other medications prescribed for you. Read the directions carefully, and ask your doctor or other care provider to review them with you. Discharge Instructions DISCHARGE SUMMARY from Nurse POST-PROCEDURE NOTE: 
 
Todays diagnostic procedure was tolerated well. A copy of the study results will be sent to your Ordering Physician. Medications: *Please give a list of your current medications to your Primary Care Provider. *Please update this list whenever your medications are discontinued, doses are 
changed, or new medications (including over-the-counter products) are added. *Please carry medication information at all times in case of emergency situations. These are general instructions for a healthy lifestyle: No smoking/ No tobacco products/ Avoid exposure to second hand smoke. ? Surgeon General's Warning:  Quitting smoking now greatly reduces serious risk to your health. Obesity, smoking, and a sedentary lifestyle greatly increase your risk for illness. ? A healthy diet, regular physical exercise & weight monitoring are important for maintaining a healthy lifestyle ? You may be retaining fluid if you have a history of heart failure or if you experience any of the following symptoms:  Weight gain of 3 pounds or more overnight or 5 pounds in a week, increased swelling in our hands or feet or shortness of breath while lying flat in bed. Please call your doctor as soon as you notice any of these symptoms; do not wait until your next office visit. Recognize signs and symptoms of STROKE: 
F  -  Face looks uneven A  -  Arms unable to move or move unevenly S  -  Speech slurred or non-existent T  -  Time to call 911 - as soon as signs and symptoms begin - DO NOT go back         to bed or wait to see If you get better - TIME IS BRAIN. Colorectal Screening ? Colorectal cancer almost always develops from precancerous polyps (abnormal growths) in the colon or rectum. Screening tests can find precancerous polyps, so that they can be removed before they turn into cancer. Screening tests can also find colorectal cancer early, when treatment works best. 
? Speak with your physician about when you should begin screening and how often you should be tested. authorGEN Activation Thank you for requesting access to authorGEN. Please follow the instructions below to securely access and download your online medical record. authorGEN allows you to send messages to your doctor, view your test results, renew your prescriptions, schedule appointments, and more. How Do I Sign Up? 1. In your internet browser, go to [email or web address]. 2. Click on the First Time User? Click Here link in the Sign In box. You will see the New Member Sign Up page. 3. Enter your Monitoring Division Access Code exactly as it appears below. You will not need to use this code after youve completed the sign-up process. If you do not sign up before the expiration date, you must request a new code. Monitoring Division Access Code: [email or web address] Expires: 2018 11:13 AM (This is the date your [employer] access code will ) 4. Enter the last four digits of your Social Security Number (xxxx) and Date of Birth (mm/dd/yyyy) as indicated and click Submit. You will be taken to the next sign-up page. 5. Create a [employer] ID. This will be your Monitoring Division login ID and cannot be changed, so think of one that is secure and easy to remember. 6. Create a Monitoring Division password. You can change your password at any time. 7. Enter your Password Reset Question and Answer. This can be used at a later time if you forget your password. 8. Enter your e-mail address. You will receive e-mail notification when new information is available in [address] 9. Click Sign Up. You can now view and download portions of your medical record. 10. Click the Download Summary menu link to download a portable copy of your medical information. Additional Information If you have questions, please call [phone]. Remember, Monitoring Division is NOT to be used for urgent needs. For medical emergencies, dial 911. Educational references and/or instructions provided during this visit included: 
 
colonoscopy APPOINTMENTS: 
 
Repeat colonoscopy in 10 years. Discharge information has been reviewed with the patient and . The patient and  verbalized understanding. Colonoscopy: What to Expect at [facility] Your Recovery After you have a colonoscopy, you will stay at the clinic for 1 to 2 hours until the medicines wear off. Then you can go home.  But you will need to arrange for a ride. Your doctor will tell you when you can eat and do your other usual activities. Your doctor will talk to you about when you will need your next colonoscopy. Your doctor can help you decide how often you need to be checked. This will depend on the results of your test and your risk for colorectal cancer. After the test, you may be bloated or have gas pains. You may need to pass gas. If a biopsy was done or a polyp was removed, you may have streaks of blood in your stool (feces) for a few days. Problems such as heavy rectal bleeding may not occur until several weeks after the test. This isn't common. But it can happen after polyps are removed. This care sheet gives you a general idea about how long it will take for you to recover. But each person recovers at a different pace. Follow the steps below to get better as quickly as possible. How can you care for yourself at home? Activity 
  · Rest when you feel tired.  
  · You can do your normal activities when it feels okay to do so. Diet 
  · Follow your doctor's directions for eating.  
  · Unless your doctor has told you not to, drink plenty of fluids. This helps to replace the fluids that were lost during the colon prep.  
  · Do not drink alcohol. Medicines 
  · Your doctor will tell you if and when you can restart your medicines. He or she will also give you instructions about taking any new medicines.  
  · If you take blood thinners, such as warfarin (Coumadin), clopidogrel (Plavix), or aspirin, be sure to talk to your doctor. He or she will tell you if and when to start taking those medicines again. Make sure that you understand exactly what your doctor wants you to do.  
  · If polyps were removed or a biopsy was done during the test, your doctor may tell you not to take aspirin or other anti-inflammatory medicines for a few days. These include ibuprofen (Advil, Motrin) and naproxen (Aleve). Other instructions   · For your safety, do not drive or operate machinery until the medicine wears off and you can think clearly. Your doctor may tell you not to drive or operate machinery until the day after your test.  
  · Do not sign legal documents or make major decisions until the medicine wears off and you can think clearly. The anesthesia can make it hard for you to fully understand what you are agreeing to. Follow-up care is a key part of your treatment and safety. Be sure to make and go to all appointments, and call your doctor if you are having problems. It's also a good idea to know your test results and keep a list of the medicines you take. When should you call for help? Call 911 anytime you think you may need emergency care. For example, call if: 
  · You passed out (lost consciousness).  
  · You pass maroon or bloody stools.  
  · You have trouble breathing.  
 Call your doctor now or seek immediate medical care if: 
  · You have pain that does not get better after you take pain medicine.  
  · You are sick to your stomach or cannot drink fluids.  
  · You have new or worse belly pain.  
  · You have blood in your stools.  
  · You have a fever.  
  · You cannot pass stools or gas.  
 Watch closely for changes in your health, and be sure to contact your doctor if you have any problems. Where can you learn more? Go to http://monique-digna.info/. Enter E264 in the search box to learn more about \"Colonoscopy: What to Expect at Home. \" Current as of: May 12, 2017 Content Version: 11.7 © 5073-9278 [employer]TV[employer]orporated. Care instructions adapted under license by Expert[employer] (which disclaims liability or warranty for this information). If you have questions about a medical condition or this instruction, always ask your healthcare professional. [place] [address] any warranty or liability for your use of this information. Introducing [facility] SERVICES! Tamera Pérez introduces [employer] patient portal. Now you can access parts of your medical record, email your doctor's office, and request medication refills online. 1. In your internet browser, go to [email or web address] 2. Click on the First Time User? Click Here link in the Sign In box. You will see the New Member Sign Up page. 3. Enter your [employer] Access Code exactly as it appears below. You will not need to use this code after youve completed the sign-up process. If you do not sign up before the expiration date, you must request a new code. · [employer] Access Code: [email or web address] Expires: 8/28/2018 11:13 AM 
 
4. Enter the last four digits of your Social Security Number (xxxx) and Date of Birth (mm/dd/yyyy) as indicated and click Submit. You will be taken to the next sign-up page. 5. Create a [employer] ID. This will be your [employer] login ID and cannot be changed, so think of one that is secure and easy to remember. 6. Create a [employer] password. You can change your password at any time. 7. Enter your Password Reset Question and Answer. This can be used at a later time if you forget your password. 8. Enter your e-mail address. You will receive e-mail notification when new information is available in [address] 9. Click Sign Up. You can now view and download portions of your medical record. 10. Click the Download Summary menu link to download a portable copy of your medical information. If you have questions, please visit the Frequently Asked Questions section of the [employer] website. Remember, [employer] is NOT to be used for urgent needs. For medical emergencies, dial 911. Now available from your iPhone and Android! Introducing Spike Cullen As a Tamera Pérez patient, I wanted to make you aware of our electronic visit tool called Spike Cullen. Tamera Pérez 24/7 allows you to connect within minutes with a medical provider 24 hours a day, seven days a week via a mobile device or tablet or logging into a secure website from your computer. You can access Capzles from anywhere in the [place]. A virtual visit might be right for you when you have a simple condition and feel like you just dont want to get out of bed, or cant get away from work for an appointment, when your regular [facility] provider is not available (evenings, weekends or holidays), or when youre out of town and need minor care. Electronic visits cost only $49 and if the [facility] 24/7 provider determines a prescription is needed to treat your condition, one can be electronically transmitted to a nearby pharmacy*. Please take a moment to enroll today if you have not already done so. The enrollment process is free and takes just a few minutes. To enroll, please download the Annelise Venkata 24/[employer] titi to your tablet or phone, or visit [email or web address] to enroll on your computer. And, as an [address] [place] [address] patient with a [employer] account, the results of your visits will be scanned into your electronic medical record and your primary care provider will be able to view the scanned results. We urge you to continue to see your regular [facility] provider for your ongoing medical care. And while your primary care provider may not be the one available when you seek a Spike Gillgeraldinefin virtual visit, the peace of mind you get from getting a real diagnosis real time can be priceless. For more information on Volt Athleticsgeraldinefin, view our Frequently Asked Questions (FAQs) at [email or web address]. Sincerely, 
 
Slick Guerra MD 
Chief Medical Officer [address] *:  certain medications cannot be prescribed via Volt Athleticsronaldo Providers Seen During Your Hospitalization Provider Specialty Primary office phone Shayy Ford MD Colon and Rectal Surgery [phone] Your Primary Care Physician (PCP) Primary Care Physician Office Phone Office Fax OTHER, PHYS ** None ** ** None ** You are allergic to the following Allergen Reactions Banana Hives Asthma attach, throat swelling, throat scratching, lip swelling Clonidine Other (comments) Memory loss Iodine Rash Keflex (Cephalexin) Hives Seafood Hives Watermelon Hives Throat closes Recent Documentation Height Weight Breastfeeding? BMI OB Status Smoking Status 1.676 m 88.9 kg No 31.64 kg/m2 Hysterectomy Never Smoker Emergency Contacts Name Discharge Info Relation Home Work Mobile Nereyda Calabrese DISCHARGE CAREGIVER [3] Other Relative [6] [phone] Patient Belongings The following personal items are in your possession at time of discharge: 
  Dental Appliances: Partials  Visual Aid: Glasses, Other (comment) (with Jeri Shine, significant other in waiting room) Please provide this summary of care documentation to your next provider. Signatures-by signing, you are acknowledging that this After Visit Summary has been reviewed with you and you have received a copy. Patient Signature:  ____________________________________________________________ Date:  ____________________________________________________________  
  
Dayana Keith Provider Signature:  ____________________________________________________________ Date:  ____________________________________________________________

## 2018-08-15 NOTE — PROCEDURES
[facility]  [facility], [address]   [place], [address]  [phone]                    Colonoscopy Procedure Note      Dennise Rider  1960  [id number]                Date of Procedure: 8/15/2018    Preoperative diagnosis: Z12.11,  Colon cancer Screening    Postoperative diagnosis: normal      :  Colletta Skelton, MD    Assistant(s): Endoscopy Technician-1: Harrison Balbuena  Endoscopy RN-1: Lencho Salazar  Endoscopy RN-Relief: Martha Irving RN    Sedation: MAC    Procedure Details:  Prior to the procedure, a history and physical were performed. The patients medications, allergies and sensitivities were reviewed and all questions were answered. After informed consent was obtained for the procedure, with all risks and benefits of procedure explained. The patient was taken to the endoscopy suite and placed in the left lateral decubitus position. Patient identification and proposed procedure were verified prior to the procedure by the nurse and I. Following sequential administration of sedation as per Anesthesia, a digital rectal exam was performed and was normal.  The Olympus video colonoscope was introduced through the anus and advanced to cecum, which was identified by the ileocecal valve and appendiceal orifice. The quality of preparation was excellent. The colonoscope was slowly withdrawn and the mucosa examined for any abnormalities. Cecal withdrawl time was greater than six minutes. The patient tolerated the procedure well. Findings/Interventions:   Polyps - none    EBL: none    Recommendations: -For colon cancer screening in this average-risk patient, colonoscopy may be repeated in 10 years. Resume normal medication(s).      Discharge Disposition:  Home  Colletta Skelton, MD  8/15/2018  11:37 AM

## 2018-08-15 NOTE — IP AVS SNAPSHOT
Summary of Care Report The Summary of Care report has been created to help improve care coordination. Users with access to [employer] or [employer] [address] (Web-based application) may access additional patient information including the Discharge Summary. If you are not currently a [address] user and need more information, please call the number listed below in the [place] [address] section and ask to be connected with Medical Records. Facility Information Name Address Phone [address] [facility] [address] [phone] Patient Information Patient Name Sex  Libby Roche ([id number]) Female 1960 Discharge Information Admitting Provider Service Area Unit Syeda Nieves MD / 1 [facility] / [phone] Discharge Provider Discharge Date/Time Discharge Disposition Destination (none) 8/15/2018 (Pending) AHR (none) Patient Language Language ENGLISH [13] Hospital Problems as of 8/15/2018  Reviewed: 8/15/2018 10:18 AM by Jose Mejias MD  
 None Non-Hospital Problems as of 8/15/2018  Reviewed: 8/15/2018 10:18 AM by Jose Mejias MD  
  
  
  
 Class Noted - Resolved Last Modified Active Problems Asthma  2010 - Present 2010 by Mikki Brown Entered by Mikki Brown Anxiety  2013 - Present 2013 by Wayne Nichols Entered by Wayne Nichols Mixed hyperlipidemia  2013 - Present 2018 by Romelia Mahmood MD  
  Entered by Wayne Nichols Overview Addendum 2018  9:03 AM by Romelia Mahmood MD  
    , RBB96;  OZZ98; 10/16 GYQ57;  Will Araujo; 4/15 [id number] Vitamin D deficiency  2013 - Present 2013 by Wayne Nichols Entered by Wayne Nichols Asthma with exacerbation  2014 - Present 2014 Entered by Sherice Anxiety and depression  7/15/2014 - Present 7/15/2014 by Marci Young, NP Entered by Marci Young NP Encounter for long-term (current) use of other medications  3/8/2015 - Present 3/13/2015 by Marci Young NP Entered by Marci Young NP Subarachnoid hemorrhage ([facility] [address]  2/20/2015 - Present 8/25/2015 by Baljeet Cheatham MD  
  Entered by Marci Young NP Abnormal glucose  2/27/2015 - Present 3/13/2015 by Marci Young NP Entered by Marci Young NP Essential hypertension  9/5/2007 - Present 2/26/2018 by Bessie Cameron MD  
  Entered by Marci Young NP Overview Addendum 2/26/2018  9:01 AM by Bessie Cameron MD  
   2/18; 12/17; 1/17 controlled Chronic diastolic heart failure ([facility] [address]  3/18/2015 - Present 2/26/2018 by Bessie Cameron MD  
  Entered by Bessie Cameron MD  
  Overview Addendum 2/26/2018  9:01 AM by Bessie Cameron MD  
   2/18; 1/17 NYHA2; SOB with smoke/dust ?sec to asthma; exposed to passive smoke Obesity (BMI 30.0-34. 9)  4/15/2015 - Present 2/26/2018 by Bessie Cameron MD  
  Entered by Bessie Cameron MD  
  Overview Addendum 2/26/2018  9:03 AM by Bessie Cameron MD  
   2/18 losing slowly; Weight loss has been strongly encouraged by following dietary restrictions and an exercise routine. New onset of headaches after age 46  5/28/2015 - Present 5/28/2015 by Iliana Thompson MD  
  Entered by Iliana Thompson MD  
  Drug therapy continued  3/8/2015 - Present 6/9/2015 by Marci Young NP Entered by Marci Young NP Iron deficiency anemia  6/30/2015 - Present 6/30/2015 by Marci Young NP Entered by Marci Young NP Gastroesophageal reflux disease without esophagitis  6/30/2015 - Present 6/30/2015 by Marci Young NP Entered by Marci Young NP Hypomagnesemia  6/30/2015 - Present 6/30/2015 by Marci Young NP   Entered by Marci Young NP  
 History of subarachnoid hemorrhage  1/27/2016 - Present 4/25/2016 by Eladia Mckenna MD  
  Entered by Casimir Schlatter, NP Overview Signed 4/25/2016  1:50 PM by Eladia Mckenna MD  
   2/15 BMI 33.0-33.9,adult  1/27/2016 - Present 1/4/2017 by Eladia Mckenna MD  
  Entered by Casimir Schlatter, NP Overview Signed 1/4/2017  2:44 PM by Eladia Mckenna MD  
   Weight loss has been strongly encouraged by following dietary restrictions and an exercise routine. Hypokalemia  1/27/2016 - Present 1/27/2016 by Casimir Schlatter, NP Entered by Casimir Schlatter, NP Post-menopausal  1/27/2016 - Present 1/27/2016 by Casimir Schlatter, NP Entered by Casimir Schlatter, NP Uncomplicated asthma  3/60/9031 - Present 7/26/2016 by Nubia Ambriz NP Entered by Nubia Ambriz NP You are allergic to the following Allergen Reactions Banana Hives Asthma attach, throat swelling, throat scratching, lip swelling Clonidine Other (comments) Memory loss Iodine Rash Keflex (Cephalexin) Hives Seafood Hives Watermelon Hives Throat closes Current Discharge Medication List  
  
CONTINUE these medications which have CHANGED Dose & Instructions Dispensing Information Comments  
 cyclobenzaprine 10 mg tablet Commonly known as:  FLEXERIL What changed:   
- how much to take - when to take this 
- reasons to take this Dose:  5 mg Take 0.5 Tabs by mouth two (2) times a day. Quantity:  30 Tab Refills:  3  
   
 lisinopril 20 mg tablet Commonly known as:  Katia Epps What changed:  how much to take Dose:  20 mg Take 1 Tab by mouth daily. Quantity:  30 Tab Refills:  3  
   
 TOPAMAX 25 mg tablet Generic drug:  topiramate What changed:  See the new instructions. TAKE ONE TABLET BY MOUTH TWICE DAILY Quantity:  60 Tab Refills:  0 CONTINUE these medications which have NOT CHANGED Dose & Instructions Dispensing Information Comments  
 albuterol 90 mcg/actuation inhaler Commonly known as:  PROVENTIL HFA, VENTOLIN HFA, PROAIR HFA Dose:  2 Puff Take 2 Puffs by inhalation every four (4) hours as needed for Wheezing. Quantity:  3 Inhaler Refills:  3  
   
 * amLODIPine 5 mg tablet Commonly known as:  Giovanni Guevara Dose:  5 mg Take 1 Tab by mouth daily. Quantity:  30 Tab Refills:  0 Needs follow up with new PCP  
  
 * amLODIPine 5 mg tablet Commonly known as:  Giovanni Guevara TAKE ONE TABLET BY MOUTH DAILY Quantity:  30 Tab Refills:  0  
   
 aspirin delayed-release 81 mg tablet Dose:  81 mg Take 81 mg by mouth daily. Refills:  0  
   
 dexlansoprazole 30 mg capsule Commonly known as:  DEXILANT Dose:  30 mg Take 1 Cap by mouth daily as needed. Indications: GASTROESOPHAGEAL REFLUX Quantity:  90 Cap Refills:  3 Ordered to replace the Nexium that is no longer available through [facility] FLONASE 50 mcg/actuation nasal spray Generic drug:  fluticasone Dose:  2 Spray 2 Sprays by Both Nostrils route daily. Refills:  0  
   
 * fluticasone-salmeterol 250-50 mcg/dose diskus inhaler Commonly known as:  ADVAIR DISKUS INHALE ONE PUFF BY MOUTH EVERY 12 HOURS Quantity:  1 Inhaler Refills:  0  
   
 * ADVAIR DISKUS 250-50 mcg/dose diskus inhaler Generic drug:  fluticasone-salmeterol INHALE ONE PUFF BY MOUTH EVERY 12 HOURS Quantity:  1 Inhaler Refills:  0  
   
 hydroCHLOROthiazide 12.5 mg capsule Commonly known as:  Haverford Wally TAKE ONE CAPSULE BY MOUTH ONCE DAILY Quantity:  30 Cap Refills:  6 Please consider 90 day supplies to promote better adherence  
  
 magnesium oxide 400 mg tablet Commonly known as:  MAG-OX  
 TAKE ONE TABLET BY MOUTH ONCE DAILY Quantity:  30 Tab Refills:  7 Please consider 90 day supplies to promote better adherence  
  
 multivitamin-iron-FA (hematinic)  mg-mcg Tab tablet Commonly known as:  CENTRUM COMPLETE Dose:  1 Tab Take 1 Tab by mouth daily. Quantity:  90 Tab Refills:  3 SITagliptin-metFORMIN 50-1,000 mg per tablet Commonly known as:  Jeffersona Amble Dose:  1 Tab Take 1 Tab by mouth two (2) times daily (with meals). Quantity:  60 Tab Refills:  3 VITAMIN D2 50,000 unit capsule Generic drug:  ergocalciferol TAKE ONE CAPSULE BY MOUTH ONCE A WEEK Quantity:  12 Cap Refills:  4 Please consider 90 day supplies to promote better adherence Vytorin 10/10 10-10 mg per tablet Generic drug:  ezetimibe-simvastatin Dose:  1 Tab Take 1 Tab by mouth nightly. Refills:  0 ZyrTEC 10 mg Cap Generic drug:  Cetirizine Dose:  10 mg PE/day Take 10 mg PE/day by mouth as needed. Refills:  0  
   
 * Notice: This list has 4 medication(s) that are the same as other medications prescribed for you. Read the directions carefully, and ask your doctor or other care provider to review them with you. Current Immunizations Name Date Influenza Vaccine 10/29/2014 Influenza Vaccine (Quad) PF 10/12/2017, 11/2/2016, 1/27/2016 Influenza Vaccine Split 12/7/2010 Surgery Information ID Date/Time Status Primary Surgeon All Procedures Location [id number] 8/15/2018 [address] [place] [address] [facility], [place] COLONOSCOPY HBV ENDOSCOPY Follow-up Information Follow up With Details Comments Contact Info Beatriz Serrano MD   Patient can only remember the practice name and not the physician Discharge Instructions DISCHARGE SUMMARY from Nurse POST-PROCEDURE NOTE: 
 
Todays diagnostic procedure was tolerated well. A copy of the study results will be sent to your Ordering Physician. Medications: *Please give a list of your current medications to your Primary Care Provider.  
 
*Please update this list whenever your medications are discontinued, doses are 
 changed, or new medications (including over-the-counter products) are added. *Please carry medication information at all times in case of emergency situations. These are general instructions for a healthy lifestyle: No smoking/ No tobacco products/ Avoid exposure to second hand smoke. ? Surgeon General's Warning:  Quitting smoking now greatly reduces serious risk to your health. Obesity, smoking, and a sedentary lifestyle greatly increase your risk for illness. ? A healthy diet, regular physical exercise & weight monitoring are important for maintaining a healthy lifestyle ? You may be retaining fluid if you have a history of heart failure or if you experience any of the following symptoms:  Weight gain of 3 pounds or more overnight or 5 pounds in a week, increased swelling in our hands or feet or shortness of breath while lying flat in bed. Please call your doctor as soon as you notice any of these symptoms; do not wait until your next office visit. Recognize signs and symptoms of STROKE: 
F  -  Face looks uneven A  -  Arms unable to move or move unevenly S  -  Speech slurred or non-existent T  -  Time to call 911 - as soon as signs and symptoms begin - DO NOT go back         to bed or wait to see If you get better - TIME IS BRAIN. Colorectal Screening ? Colorectal cancer almost always develops from precancerous polyps (abnormal growths) in the colon or rectum. Screening tests can find precancerous polyps, so that they can be removed before they turn into cancer. Screening tests can also find colorectal cancer early, when treatment works best. 
? Speak with your physician about when you should begin screening and how often you should be tested. "[employer] Activation Thank you for requesting access to "[employer] Please follow the instructions below to securely access and download your online medical record.  "[employer] allows you to send messages to your doctor, view your test results, renew your prescriptions, schedule appointments, and more. How Do I Sign Up? 1. In your internet browser, go to [email or web address]. 2. Click on the First Time User? Click Here link in the Sign In box. You will see the New Member Sign Up page. 3. Enter your [employer] Access Code exactly as it appears below. You will not need to use this code after youve completed the sign-up process. If you do not sign up before the expiration date, you must request a new code. [employer] Access Code: [email or web address] Expires: 2018 11:13 AM (This is the date your [employer] access code will ) 4. Enter the last four digits of your Social Security Number (xxxx) and Date of Birth (mm/dd/yyyy) as indicated and click Submit. You will be taken to the next sign-up page. 5. Create a [employer] ID. This will be your [employer] login ID and cannot be changed, so think of one that is secure and easy to remember. 6. Create a [employer] password. You can change your password at any time. 7. Enter your Password Reset Question and Answer. This can be used at a later time if you forget your password. 8. Enter your e-mail address. You will receive e-mail notification when new information is available in [address] 9. Click Sign Up. You can now view and download portions of your medical record. 10. Click the Download Summary menu link to download a portable copy of your medical information. Additional Information If you have questions, please call [phone]. Remember, [employer] is NOT to be used for urgent needs. For medical emergencies, dial 911. Educational references and/or instructions provided during this visit included: 
 
colonoscopy APPOINTMENTS: 
 
Repeat colonoscopy in 10 years. Discharge information has been reviewed with the patient and . The patient and  verbalized understanding. Colonoscopy: What to Expect at [facility] Your Recovery After you have a colonoscopy, you will stay at the clinic for 1 to 2 hours until the medicines wear off. Then you can go home. But you will need to arrange for a ride. Your doctor will tell you when you can eat and do your other usual activities. Your doctor will talk to you about when you will need your next colonoscopy. Your doctor can help you decide how often you need to be checked. This will depend on the results of your test and your risk for colorectal cancer. After the test, you may be bloated or have gas pains. You may need to pass gas. If a biopsy was done or a polyp was removed, you may have streaks of blood in your stool (feces) for a few days. Problems such as heavy rectal bleeding may not occur until several weeks after the test. This isn't common. But it can happen after polyps are removed. This care sheet gives you a general idea about how long it will take for you to recover. But each person recovers at a different pace. Follow the steps below to get better as quickly as possible. How can you care for yourself at home? Activity 
  · Rest when you feel tired.  
  · You can do your normal activities when it feels okay to do so. Diet 
  · Follow your doctor's directions for eating.  
  · Unless your doctor has told you not to, drink plenty of fluids. This helps to replace the fluids that were lost during the colon prep.  
  · Do not drink alcohol. Medicines 
  · Your doctor will tell you if and when you can restart your medicines. He or she will also give you instructions about taking any new medicines.  
  · If you take blood thinners, such as warfarin (Coumadin), clopidogrel (Plavix), or aspirin, be sure to talk to your doctor. He or she will tell you if and when to start taking those medicines again. Make sure that you understand exactly what your doctor wants you to do.   · If polyps were removed or a biopsy was done during the test, your doctor may tell you not to take aspirin or other anti-inflammatory medicines for a few days. These include ibuprofen (Advil, Motrin) and naproxen (Aleve). Other instructions 
  · For your safety, do not drive or operate machinery until the medicine wears off and you can think clearly. Your doctor may tell you not to drive or operate machinery until the day after your test.  
  · Do not sign legal documents or make major decisions until the medicine wears off and you can think clearly. The anesthesia can make it hard for you to fully understand what you are agreeing to. Follow-up care is a key part of your treatment and safety. Be sure to make and go to all appointments, and call your doctor if you are having problems. It's also a good idea to know your test results and keep a list of the medicines you take. When should you call for help? Call 911 anytime you think you may need emergency care. For example, call if: 
  · You passed out (lost consciousness).  
  · You pass maroon or bloody stools.  
  · You have trouble breathing.  
 Call your doctor now or seek immediate medical care if: 
  · You have pain that does not get better after you take pain medicine.  
  · You are sick to your stomach or cannot drink fluids.  
  · You have new or worse belly pain.  
  · You have blood in your stools.  
  · You have a fever.  
  · You cannot pass stools or gas.  
 Watch closely for changes in your health, and be sure to contact your doctor if you have any problems. Where can you learn more? Go to http://monique-digna.info/. Enter E264 in the search box to learn more about \"Colonoscopy: What to Expect at Home. \" Current as of: May 12, 2017 Content Version: 11.7 © 4507-7048 [employer].  Care instructions adapted under license by [employer] (which disclaims liability or warranty for this information). If you have questions about a medical condition or this instruction, always ask your healthcare professional. [place] [address] any warranty or liability for your use of this information. Chart Review Routing History Recipient Method Report Sent By Miguel Angel Joshua NP Phone: [phone] In Basket Note Review Sandy Colin MD [[id number]] 8/24/2015  4:54 PM 08/24/2015 Kathy Amaya MD  
Phone: [phone] In Basket Note Review Sandy Colin MD [[id number]] 8/24/2015  4:54 PM 08/24/2015 Cristóbal Joshua NP Phone: [phone] In Basket Notes/Transcriptions Sandy Colin MD [[id number]] 8/25/2015 12:46 PM 08/25/2015 Kathy Amaya MD  
Phone: [phone] In Basket Notes/Transcriptions Sandy Colin MD [[id number]] 8/25/2015 12:46 PM 08/25/2015

## 2018-08-15 NOTE — H&P
HPI: Christine Morse is a 59 y.o. female presenting with chief complain of need for crc screening. Past Medical History:   Diagnosis Date    Ankle swelling     Asthma 2/4/2010    Brain bleed ([facility] [address] 02/20/2015    Chronic diastolic heart failure ([facility] [address] 3/18/2015    COPD (chronic obstructive pulmonary disease) (HCC)     PFT confirmed 8/18/16    Diabetes mellitus ([facility] [address] 5/27/2014    Diabetic eye exam ([facility] [address] 01/26/2017    No retinopathy Best corrected vision 20/20 OU    HTN (hypertension) 2/4/2010    Hyperlipidemia LDL goal < 70 2014    Magnesium deficiency     Obesity, unspecified 4/15/2015    SAH (subarachnoid hemorrhage) ([facility] [address] 2/20/15    admitted in [facility], Dr. Jesus Pizarro.  Rielly Almendarez and ref to Dr. Ermias Fleming, Neurology    Stroke [facility]) 2015    mini stroke    Vitamin D deficiency 2000       Past Surgical History:   Procedure Laterality Date    HX HYSTERECTOMY  2004    abd approach with ovaries and cervix intact    HX HYSTERECTOMY N/A 2004    HX OTHER SURGICAL  8/25/15    SAH, cerebral arteriopathy, Dr. Ermias Fleming, Interventional Neurology [facility]       Family History   Problem Relation Age of Onset    Diabetes Mother     Hypertension Mother           Heart Attack Mother 62    Alcohol abuse Father      cirrhosis    Diabetes Sister     Heart Attack Sister 64       Social History     Social History    Marital status: SINGLE     Spouse name: N/A    Number of children: 1    Years of education: 13     Occupational History     [employer]     unemployed since Jan 2014, applied to [employer] for job July 2014     Social History Main Topics    Smoking status: Never Smoker    Smokeless tobacco: Never Used    Alcohol use 0.6 oz/week     1 Glasses of wine, 0 Standard drinks or equivalent per week      Comment: occasionally - social,    Drug use: No    Sexual activity: Yes     Partners: Male     Birth control/ protection: Condom     Other Topics Concern     Service No    Blood Transfusions Yes    Caffeine Concern Yes    Occupational Exposure No    Hobby Hazards No    Sleep Concern No    Stress Concern No    Weight Concern No    Special Diet No    Back Care No    Exercise No    Bike Helmet No    Seat Belt Yes    Self-Exams Yes     Social History Narrative       Review of Systems - negative    Outpatient Prescriptions Marked as Taking for the 8/15/[facility])   Medication Sig Dispense Refill    aspirin delayed-release 81 mg tablet Take 81 mg by mouth daily.  ADVAIR DISKUS 250-50 mcg/dose diskus inhaler INHALE ONE PUFF BY MOUTH EVERY 12 HOURS 1 Inhaler 0    amLODIPine (NORVASC) 5 mg tablet Take 1 Tab by mouth daily. 30 Tab 0    fluticasone-salmeterol (ADVAIR DISKUS) 250-50 mcg/dose diskus inhaler INHALE ONE PUFF BY MOUTH EVERY 12 HOURS 1 Inhaler 0    SITagliptin-metFORMIN (JANUMET) 50-1,000 mg per tablet Take 1 Tab by mouth two (2) times daily (with meals). 60 Tab 3    hydroCHLOROthiazide (MICROZIDE) 12.5 mg capsule TAKE ONE CAPSULE BY MOUTH ONCE DAILY 30 Cap 6    VITAMIN D2 50,000 unit capsule TAKE ONE CAPSULE BY MOUTH ONCE A WEEK 12 Cap 4    magnesium oxide (MAG-OX) 400 mg tablet TAKE ONE TABLET BY MOUTH ONCE DAILY 30 Tab 7    lisinopril (PRINIVIL, ZESTRIL) 20 mg tablet Take 1 Tab by mouth daily. (Patient taking differently: Take 10 mg by mouth daily.) 30 Tab 3    TOPAMAX 25 mg tablet TAKE ONE TABLET BY MOUTH TWICE DAILY (Patient taking differently: as needed) 60 Tab 0    multivitamin-iron-FA, hematinic, (CENTRUM COMPLETE)  mg-mcg tab tablet Take 1 Tab by mouth daily.  90 Tab 3       Allergies   Allergen Reactions    Banana Hives     Asthma attach, throat swelling, throat scratching, lip swelling    Clonidine Other (comments)     Memory loss    Iodine Rash    Keflex [Cephalexin] Hives    Seafood Hives    Watermelon Hives     Throat closes       Vitals:    08/15/18 1026   BP: 120/73   Pulse: 83   Resp: 16   Temp: 98.2 °F (36.8 °C)   SpO2: 100%   Weight: 88.9 kg (196 lb) Height: 5' 6\" (1.676 m)       Physical Exam   Constitutional: She appears well-developed and well-nourished. HENT:   Head: Normocephalic and atraumatic. Eyes: Conjunctivae and EOM are normal.   Abdominal: Soft. She exhibits no distension. There is no tenderness. Musculoskeletal: Normal range of motion. Lymphadenopathy:     She has no cervical adenopathy. Right: No inguinal adenopathy present. Left: No inguinal adenopathy present. Neurological: She exhibits normal muscle tone. Skin: Skin is warm and dry. No rash noted. Psychiatric: She has a normal mood and affect. Her speech is normal.       Assessment / Plan    colonoscopy    The diagnoses and plan were discussed with the patient. All questions answered. Plan of care agreed to by all concerned.

## 2018-08-15 NOTE — DISCHARGE INSTRUCTIONS
DISCHARGE SUMMARY from Nurse     POST-PROCEDURE NOTE:    March Ripple diagnostic procedure was tolerated well. A copy of the study results will be sent to your Ordering Physician. Medications:    *Please give a list of your current medications to your Primary Care Provider. *Please update this list whenever your medications are discontinued, doses are  changed, or new medications (including over-the-counter products) are added. *Please carry medication information at all times in case of emergency situations. These are general instructions for a healthy lifestyle:    No smoking/ No tobacco products/ Avoid exposure to second hand smoke.  Surgeon General's Warning:  Quitting smoking now greatly reduces serious risk to your health. Obesity, smoking, and a sedentary lifestyle greatly increase your risk for illness.  A healthy diet, regular physical exercise & weight monitoring are important for maintaining a healthy lifestyle   You may be retaining fluid if you have a history of heart failure or if you experience any of the following symptoms:  Weight gain of 3 pounds or more overnight or 5 pounds in a week, increased swelling in our hands or feet or shortness of breath while lying flat in bed. Please call your doctor as soon as you notice any of these symptoms; do not wait until your next office visit. Recognize signs and symptoms of STROKE:  F  -  Face looks uneven  A  -  Arms unable to move or move unevenly  S  -  Speech slurred or non-existent  T  -  Time to call 911 - as soon as signs and symptoms begin - DO NOT go back         to bed or wait to see If you get better - TIME IS BRAIN. Colorectal Screening   Colorectal cancer almost always develops from precancerous polyps (abnormal growths) in the colon or rectum. Screening tests can find precancerous polyps, so that they can be removed before they turn into cancer.  Screening tests can also find colorectal cancer early, when treatment works best.  Alexey Rivera Speak with your physician about when you should begin screening and how often you should be tested. MyChart Activation    Thank you for requesting access to Sossee. Please follow the instructions below to securely access and download your online medical record. Sossee allows you to send messages to your doctor, view your test results, renew your prescriptions, schedule appointments, and more. How Do I Sign Up? 1. In your internet browser, go to [email or web address] 2. Click on the First Time User? Click Here link in the Sign In box. You will see the New Member Sign Up page. 3. Enter your Sossee Access Code exactly as it appears below. You will not need to use this code after youve completed the sign-up process. If you do not sign up before the expiration date, you must request a new code. Sossee Access Code: [email or web address]  Expires: 2018 11:13 AM (This is the date your Sossee access code will )    4. Enter the last four digits of your Social Security Number (xxxx) and Date of Birth (mm/dd/yyyy) as indicated and click Submit. You will be taken to the next sign-up page. 5. Create a Sossee ID. This will be your Sossee login ID and cannot be changed, so think of one that is secure and easy to remember. 6. Create a Sossee password. You can change your password at any time. 7. Enter your Password Reset Question and Answer. This can be used at a later time if you forget your password. 8. Enter your e-mail address. You will receive e-mail notification when new information is available in [address] 9. Click Sign Up. You can now view and download portions of your medical record. 10. Click the Download Summary menu link to download a portable copy of your medical information. Additional Information    If you have questions, please call [phone]. Remember, Sossee is NOT to be used for urgent needs.  For medical emergencies, dial 911.    Educational references and/or instructions provided during this visit included:    colonoscopy      APPOINTMENTS:    Repeat colonoscopy in 10 years. Discharge information has been reviewed with the patient and . The patient and  verbalized understanding. Colonoscopy: What to Expect at [address] [place] [address]  After you have a colonoscopy, you will stay at the clinic for 1 to 2 hours until the medicines wear off. Then you can go home. But you will need to arrange for a ride. Your doctor will tell you when you can eat and do your other usual activities. Your doctor will talk to you about when you will need your next colonoscopy. Your doctor can help you decide how often you need to be checked. This will depend on the results of your test and your risk for colorectal cancer. After the test, you may be bloated or have gas pains. You may need to pass gas. If a biopsy was done or a polyp was removed, you may have streaks of blood in your stool (feces) for a few days. Problems such as heavy rectal bleeding may not occur until several weeks after the test. This isn't common. But it can happen after polyps are removed. This care sheet gives you a general idea about how long it will take for you to recover. But each person recovers at a different pace. Follow the steps below to get better as quickly as possible. How can you care for yourself at home? Activity    · Rest when you feel tired.     · You can do your normal activities when it feels okay to do so. Diet    · Follow your doctor's directions for eating.     · Unless your doctor has told you not to, drink plenty of fluids. This helps to replace the fluids that were lost during the colon prep.     · Do not drink alcohol. Medicines    · Your doctor will tell you if and when you can restart your medicines.  He or she will also give you instructions about taking any new medicines.     · If you take blood thinners, such as warfarin (Coumadin), clopidogrel (Plavix), or aspirin, be sure to talk to your doctor. He or she will tell you if and when to start taking those medicines again. Make sure that you understand exactly what your doctor wants you to do.     · If polyps were removed or a biopsy was done during the test, your doctor may tell you not to take aspirin or other anti-inflammatory medicines for a few days. These include ibuprofen (Advil, Motrin) and naproxen (Aleve). Other instructions    · For your safety, do not drive or operate machinery until the medicine wears off and you can think clearly. Your doctor may tell you not to drive or operate machinery until the day after your test.     · Do not sign legal documents or make major decisions until the medicine wears off and you can think clearly. The anesthesia can make it hard for you to fully understand what you are agreeing to. Follow-up care is a key part of your treatment and safety. Be sure to make and go to all appointments, and call your doctor if you are having problems. It's also a good idea to know your test results and keep a list of the medicines you take. When should you call for help? Call 911 anytime you think you may need emergency care. For example, call if:    · You passed out (lost consciousness).     · You pass maroon or bloody stools.     · You have trouble breathing.    Call your doctor now or seek immediate medical care if:    · You have pain that does not get better after you take pain medicine.     · You are sick to your stomach or cannot drink fluids.     · You have new or worse belly pain.     · You have blood in your stools.     · You have a fever.     · You cannot pass stools or gas.    Watch closely for changes in your health, and be sure to contact your doctor if you have any problems. Where can you learn more? Go to http://monique-digna.info/.   Enter E264 in the search box to learn more about \"Colonoscopy: What to Expect at Home.\"  Current as of: May 12, 2017  Content Version: 11.7  © 0127-1676 [employer], [facility]. Care instructions adapted under license by [employer] (which disclaims liability or warranty for this information). If you have questions about a medical condition or this instruction, always ask your healthcare professional. [address] any warranty or liability for your use of this information.

## 2018-08-15 NOTE — ANESTHESIA POSTPROCEDURE EVALUATION
Post-Anesthesia Evaluation and Assessment    Patient: Taras Gloria MRN: [id number]  SSN: [id number]    YOB: 1960  Age: 59 y.o. Sex: female       Cardiovascular Function/Vital Signs  Visit Vitals    /73    Pulse 83    Temp 36.8 °C (98.2 °F)    Resp 16    Ht 5' 6\" (1.676 m)    Wt 88.9 kg (196 lb)    SpO2 100%    Breastfeeding No    BMI 31.64 kg/m2       Patient is status post MAC anesthesia for Procedure(s):  COLONOSCOPY. Nausea/Vomiting: None    Postoperative hydration reviewed and adequate. Pain:  Pain Scale 1: Numeric (0 - 10) (08/15/18 1023)  Pain Intensity 1: 0 (08/15/18 1023)   Managed    Neurological Status: At baseline    Mental Status and Level of Consciousness: Arousable    Pulmonary Status:   O2 Device: Room air (08/15/18 1026)   Adequate oxygenation and airway patent    Complications related to anesthesia: None    Post-anesthesia assessment completed.  No concerns    Signed By: Joy Currie MD     August 15, 2018

## 2018-08-20 ENCOUNTER — TELEPHONE (OUTPATIENT)
Dept: FAMILY MEDICINE CLINIC | Age: 58
End: 2018-08-20

## 2018-08-20 ENCOUNTER — OFFICE VISIT (OUTPATIENT)
Dept: FAMILY MEDICINE CLINIC | Age: 58
End: 2018-08-20

## 2018-08-20 VITALS
HEART RATE: 79 BPM | DIASTOLIC BLOOD PRESSURE: 66 MMHG | BODY MASS INDEX: 30.82 KG/M2 | OXYGEN SATURATION: 98 % | SYSTOLIC BLOOD PRESSURE: 120 MMHG | RESPIRATION RATE: 12 BRPM | WEIGHT: 191.8 LBS | HEIGHT: 66 IN | TEMPERATURE: 98.6 F

## 2018-08-20 DIAGNOSIS — J45.40 MODERATE PERSISTENT ASTHMA WITHOUT COMPLICATION: ICD-10-CM

## 2018-08-20 DIAGNOSIS — R80.9 CONTROLLED TYPE 2 DIABETES MELLITUS WITH MICROALBUMINURIA, WITHOUT LONG-TERM CURRENT USE OF INSULIN (HCC): ICD-10-CM

## 2018-08-20 DIAGNOSIS — I50.32 CHRONIC DIASTOLIC HEART FAILURE (HCC): ICD-10-CM

## 2018-08-20 DIAGNOSIS — E11.29 CONTROLLED TYPE 2 DIABETES MELLITUS WITH MICROALBUMINURIA, WITHOUT LONG-TERM CURRENT USE OF INSULIN (HCC): ICD-10-CM

## 2018-08-20 DIAGNOSIS — E11.29 CONTROLLED TYPE 2 DIABETES MELLITUS WITH MICROALBUMINURIA, WITHOUT LONG-TERM CURRENT USE OF INSULIN (HCC): Primary | ICD-10-CM

## 2018-08-20 DIAGNOSIS — K21.9 GASTROESOPHAGEAL REFLUX DISEASE WITHOUT ESOPHAGITIS: ICD-10-CM

## 2018-08-20 DIAGNOSIS — R80.9 CONTROLLED TYPE 2 DIABETES MELLITUS WITH MICROALBUMINURIA, WITHOUT LONG-TERM CURRENT USE OF INSULIN (HCC): Primary | ICD-10-CM

## 2018-08-20 DIAGNOSIS — I10 ESSENTIAL HYPERTENSION: ICD-10-CM

## 2018-08-20 RX ORDER — AMLODIPINE BESYLATE 5 MG/1
5 TABLET ORAL DAILY
Qty: 90 TAB | Refills: 1 | Status: SHIPPED | OUTPATIENT
Start: 2018-08-20 | End: 2019-03-01 | Stop reason: SDUPTHER

## 2018-08-20 RX ORDER — HYDROCHLOROTHIAZIDE 12.5 MG/1
CAPSULE ORAL
Qty: 90 CAP | Refills: 1 | Status: SHIPPED | OUTPATIENT
Start: 2018-08-20 | End: 2019-02-11 | Stop reason: SDUPTHER

## 2018-08-20 RX ORDER — TOPIRAMATE 25 MG/1
TABLET, COATED ORAL
Qty: 180 TAB | Refills: 0 | Status: SHIPPED | OUTPATIENT
Start: 2018-08-20 | End: 2019-03-14

## 2018-08-20 RX ORDER — LISINOPRIL 10 MG/1
10 TABLET ORAL DAILY
Qty: 90 TAB | Refills: 1 | Status: SHIPPED | OUTPATIENT
Start: 2018-08-20 | End: 2018-11-26 | Stop reason: SDUPTHER

## 2018-08-20 RX ORDER — EZETIMIBE AND SIMVASTATIN 10; 10 MG/1; MG/1
1 TABLET ORAL
Qty: 90 TAB | Refills: 1 | Status: SHIPPED | OUTPATIENT
Start: 2018-08-20 | End: 2018-08-29 | Stop reason: CLARIF

## 2018-08-20 RX ORDER — DEXLANSOPRAZOLE 30 MG/1
30 CAPSULE, DELAYED RELEASE ORAL
Qty: 90 CAP | Refills: 1 | Status: SHIPPED | OUTPATIENT
Start: 2018-08-20 | End: 2018-08-29 | Stop reason: CLARIF

## 2018-08-20 NOTE — PROGRESS NOTES
Chief Complaint   Patient presents with    Establish Care    Hypertension    Diabetes    Cholesterol Problem    Asthma    Back Pain     over 1 year    GERD    COPD     Visit Vitals    /66 (BP 1 Location: Right arm, BP Patient Position: Sitting)    Pulse 79    Temp 98.6 °F (37 °C) (Oral)    Resp 12    Ht 5' 6\" (1.676 m)    Wt 191 lb 12.8 oz (87 kg)    SpO2 98%    BMI 30.96 kg/m2     Patient is not fasting. Patient in room # 6.     1. Have you been to the ER, urgent care clinic since your last visit? Hospitalized since your last visit? No    2. Have you seen or consulted any other health care providers outside of the [address] [place] [address] since your last visit? Include any pap smears or colon screening. No    HM Reviewed. PAP may be due. Eye exam due. Flowsheet, PHQ and Abuse completed.

## 2018-08-20 NOTE — MR AVS SNAPSHOT
[address] [facility] [address] 200 [facility] 
[phone] Patient: Maira Cheung MRN: [id number] ROMERO:[birth year] Visit Information Date & Time Provider Department Dept. Phone Encounter #  
 8/20/2018 11:00 AM Jacy Rodriguez, [address] [facility] [id number] Follow-up Instructions Return in about 3 months (around 11/20/2018) for well woman exam, fasting labs prior to next visit. Nette Palacios Your Appointments 2/11/2019 10:00 AM  
Office Visit with Romelia Mahmood MD  
[facility]) Appt Note: 1 yr  
 [address] [facility], [address] [facility] [address] 9908 [facility], [address] [place] [address] Upcoming Health Maintenance Date Due DTaP/Tdap/Td series (1 - Tdap) 8/3/1979 EYE EXAM RETINAL OR DILATED Q1 5/24/2018 ZOSTER VACCINE AGE 60> 6/3/2018 Influenza Age 5 to Adult 8/1/2018 FOOT EXAM Q1 10/12/2018 MICROALBUMIN Q1 10/12/2018 HEMOGLOBIN A1C Q6M 10/18/2018 LIPID PANEL Q1 5/30/2019 BREAST CANCER SCRN MAMMOGRAM 5/9/2020 COLONOSCOPY 8/15/2028 Allergies as of 8/20/2018  Review Complete On: 8/20/2018 By: SOILA Barlow Severity Noted Reaction Type Reactions Banana  04/02/2015    Hives Asthma attach, throat swelling, throat scratching, lip swelling Clonidine  08/24/2015    Other (comments) Memory loss Iodine  02/04/2010   Systemic Rash Keflex [Cephalexin]  09/26/2011    Hives Seafood  03/13/2015    Hives Watermelon  09/08/2015    Hives Throat closes Current Immunizations  Reviewed on 1/27/2016 Name Date Influenza Vaccine 10/29/2014 Influenza Vaccine (Quad) PF 10/12/2017, 11/2/2016, 1/27/2016 Influenza Vaccine Split 12/7/2010 Not reviewed this visit You Were Diagnosed With   
  
 Codes Comments Controlled type 2 diabetes mellitus without complication, without long-term current use of insulin ([facility].)    -  Primary ICD-10-CM: E11.9 ICD-9-CM: 250.00 Essential hypertension     ICD-10-CM: I10 
ICD-9-CM: 401.9 Gastroesophageal reflux disease without esophagitis     ICD-10-CM: K21.9 ICD-9-CM: 530.81 Type 2 diabetes mellitus without complication, without long-term current use of insulin ([facility])     ICD-10-CM: E11.9 ICD-9-CM: 250.00 Vitals BP Pulse Temp Resp Height(growth percentile) Weight(growth percentile) 120/66 (BP 1 Location: Right arm, BP Patient Position: Sitting) 79 98.6 °F (37 °C) (Oral) 12 5' 6\" (1.676 m) 191 lb 12.8 oz (87 kg) SpO2 BMI OB Status Smoking Status 98% 30.96 kg/m2 Hysterectomy Never Smoker Vitals History BMI and BSA Data Body Mass Index Body Surface Area 30.96 kg/m 2 2.01 m 2 Preferred Pharmacy Pharmacy Name Phone Teddy Rob[facility], [address] [facility] [address] [phone] Your Updated Medication List  
  
   
This list is accurate as of 8/20/18 12:16 PM.  Always use your most recent med list.  
  
  
  
  
 albuterol 90 mcg/actuation inhaler Commonly known as:  PROVENTIL HFA, VENTOLIN HFA, PROAIR HFA Take 2 Puffs by inhalation every four (4) hours as needed for Wheezing. amLODIPine 5 mg tablet Commonly known as:  Lennis Eagles Take 1 Tab by mouth daily. aspirin delayed-release 81 mg tablet Take 81 mg by mouth daily. cyclobenzaprine 10 mg tablet Commonly known as:  FLEXERIL Take 0.5 Tabs by mouth two (2) times a day. dexlansoprazole 30 mg capsule Commonly known as:  DEXILANT Take 1 Cap by mouth daily as needed. Indications: gastroesophageal reflux disease  
  
 ezetimibe-simvastatin 10-10 mg per tablet Commonly known as:  Vytorin 10/10 Take 1 Tab by mouth nightly. FLONASE 50 mcg/actuation nasal spray Generic drug:  fluticasone 2 Sprays by Both Nostrils route daily. * fluticasone-salmeterol 250-50 mcg/dose diskus inhaler Commonly known as:  ADVAIR DISKUS INHALE ONE PUFF BY MOUTH EVERY 12 HOURS * ADVAIR DISKUS 250-50 mcg/dose diskus inhaler Generic drug:  fluticasone-salmeterol INHALE ONE PUFF BY MOUTH EVERY 12 HOURS  
  
 hydroCHLOROthiazide 12.5 mg capsule Commonly known as:  Wolcarolina Drilling TAKE ONE CAPSULE BY MOUTH ONCE DAILY  
  
 lisinopril 10 mg tablet Commonly known as:  Normaon November Take 1 Tab by mouth daily. magnesium oxide 400 mg tablet Commonly known as:  MAG-OX  
TAKE ONE TABLET BY MOUTH ONCE DAILY  
  
 multivitamin-iron-FA (hematinic)  mg-mcg Tab tablet Commonly known as:  CENTRUM COMPLETE Take 1 Tab by mouth daily. SITagliptin-metFORMIN 50-1,000 mg per tablet Commonly known as:  Reilly Martha Take 1 Tab by mouth two (2) times daily (with meals). TOPAMAX 25 mg tablet Generic drug:  topiramate TAKE ONE TABLET BY MOUTH TWICE DAILY  
  
 VITAMIN D2 50,000 unit capsule Generic drug:  ergocalciferol TAKE ONE CAPSULE BY MOUTH ONCE A WEEK ZyrTEC 10 mg Cap Generic drug:  Cetirizine Take 10 mg PE/day by mouth as needed. * Notice: This list has 2 medication(s) that are the same as other medications prescribed for you. Read the directions carefully, and ask your doctor or other care provider to review them with you. Prescriptions Sent to Pharmacy Refills  
 dexlansoprazole (DEXILANT) 30 mg capsule 1 Sig: Take 1 Cap by mouth daily as needed. Indications: gastroesophageal reflux disease Class: Normal  
 Pharmacy: Melodie Rosen at [address] [place] [address] Ph #: [phone] Route: Oral  
 ezetimibe-simvastatin (VYTORIN 10/10) 10-10 mg per tablet 1 Sig: Take 1 Tab by mouth nightly.   
 Class: Normal  
 Pharmacy: Melodie Rosen at [address] [place] [address] [facility] Ph #: [phone] Route: Oral  
 hydroCHLOROthiazide (MICROZIDE) 12.5 mg capsule 1 Sig: TAKE ONE CAPSULE BY MOUTH ONCE DAILY Class: Normal  
 Pharmacy: Vicky Jean-Baptiste at [address] [place] [address] Ph #: [phone]  
 lisinopril (PRINIVIL, ZESTRIL) 10 mg tablet 1 Sig: Take 1 Tab by mouth daily. Class: Normal  
 Pharmacy: Vicky Jean-Baptiste at [address] [place] [address] Ph #: [phone] Route: Oral  
 amLODIPine (NORVASC) 5 mg tablet 1 Sig: Take 1 Tab by mouth daily. Class: Normal  
 Pharmacy: Vicky Jean-Baptiste at [address] [place] [address] Ph #: [phone] Route: Oral  
 TOPAMAX 25 mg tablet 0 Sig: TAKE ONE TABLET BY MOUTH TWICE DAILY Class: Normal  
 Pharmacy: Vicky Jean-Baptiste at [address] [place] [address] Ph #: [phone] SITagliptin-metFORMIN (JANUMET) 50-1,000 mg per tablet 1 Sig: Take 1 Tab by mouth two (2) times daily (with meals). Class: Normal  
 Pharmacy: Vicky Jean-Baptiste at [address] [place] [address], [address] [place] [address] Ph #: [phone] Route: Oral  
  
We Performed the Following METABOLIC PANEL, COMPREHENSIVE [47990 CPT(R)] Follow-up Instructions Return in about 3 months (around 11/20/2018) for well woman exam, fasting labs prior to next visit. Jeff Vieyra To-Do List   
 08/20/2018 Lab:  HEMOGLOBIN A1C W/O EAG Around 08/21/2018 Lab:  LIPID PANEL Patient Instructions A Healthy Lifestyle: Care Instructions Your Care Instructions A healthy lifestyle can help you feel good, stay at a healthy weight, and have plenty of energy for both work and play. A healthy lifestyle is something you can share with your whole family. A healthy lifestyle also can lower your risk for serious health problems, such as high blood pressure, heart disease, and diabetes. You can follow a few steps listed below to improve your health and the health of your family. Follow-up care is a key part of your treatment and safety. Be sure to make and go to all appointments, and call your doctor if you are having problems. It's also a good idea to know your test results and keep a list of the medicines you take. How can you care for yourself at home? · Do not eat too much sugar, fat, or fast foods. You can still have dessert and treats now and then. The goal is moderation. · Start small to improve your eating habits. Pay attention to portion sizes, drink less juice and soda pop, and eat more fruits and vegetables. ¨ Eat a healthy amount of food. A 3-ounce serving of meat, for example, is about the size of a deck of cards. Fill the rest of your plate with vegetables and whole grains. ¨ Limit the amount of soda and sports drinks you have every day. Drink more water when you are thirsty. ¨ Eat at least 5 servings of fruits and vegetables every day. It may seem like a lot, but it is not hard to reach this goal. A serving or helping is 1 piece of fruit, 1 cup of vegetables, or 2 cups of leafy, raw vegetables. Have an apple or some carrot sticks as an afternoon snack instead of a candy bar. Try to have fruits and/or vegetables at every meal. 
· Make exercise part of your daily routine. You may want to start with simple activities, such as walking, bicycling, or slow swimming. Try to be active 30 to 60 minutes every day. You do not need to do all 30 to 60 minutes all at once. For example, you can exercise 3 times a day for 10 or 20 minutes. Moderate exercise is safe for most people, but it is always a good idea to talk to your doctor before starting an exercise program. 
· Keep moving. Oniel Rodgers the lawn, work in the garden, or Emotient. Take the stairs instead of the elevator at work. · If you smoke, quit.  People who smoke have an increased risk for heart attack, stroke, cancer, and other lung illnesses. Quitting is hard, but there are ways to boost your chance of quitting tobacco for good. ¨ Use nicotine gum, patches, or lozenges. ¨ Ask your doctor about stop-smoking programs and medicines. ¨ Keep trying. In addition to reducing your risk of diseases in the future, you will notice some benefits soon after you stop using tobacco. If you have shortness of breath or asthma symptoms, they will likely get better within a few weeks after you quit. · Limit how much alcohol you drink. Moderate amounts of alcohol (up to 2 drinks a day for men, 1 drink a day for women) are okay. But drinking too much can lead to liver problems, high blood pressure, and other health problems. Family health If you have a family, there are many things you can do together to improve your health. · Eat meals together as a family as often as possible. · Eat healthy foods. This includes fruits, vegetables, lean meats and dairy, and whole grains. · Include your family in your fitness plan. Most people think of activities such as jogging or tennis as the way to fitness, but there are many ways you and your family can be more active. Anything that makes you breathe hard and gets your heart pumping is exercise. Here are some tips: 
¨ Walk to do errands or to take your child to school or the bus. ¨ Go for a family bike ride after dinner instead of watching TV. Where can you learn more? Go to http://monique-digna.info/. Enter V308 in the search box to learn more about \"A Healthy Lifestyle: Care Instructions. \" Current as of: December 7, 2017 Content Version: 11.7 © 1844-4388 [employer]. Care instructions adapted under license by [employer] (which disclaims liability or warranty for this information).  If you have questions about a medical condition or this instruction, always ask your healthcare professional. [employer] disclaims any warranty or liability for your use of this information. Introducing [address]! [facility] introduces PlayFilm patient portal. Now you can access parts of your medical record, email your doctor's office, and request medication refills online. 1. In your internet browser, go to [email or web address] [email or web address] 2. Click on the First Time User? Click Here link in the Sign In box. You will see the New Member Sign Up page. 3. Enter your PlayFilm Access Code exactly as it appears below. You will not need to use this code after youve completed the sign-up process. If you do not sign up before the expiration date, you must request a new code. · PlayFilm Access Code: [email or web address] Expires: 8/28/2018 11:13 AM 
 
4. Enter the last four digits of your Social Security Number (xxxx) and Date of Birth (mm/dd/yyyy) as indicated and click Submit. You will be taken to the next sign-up page. 5. Create a PlayFilm ID. This will be your PlayFilm login ID and cannot be changed, so think of one that is secure and easy to remember. 6. Create a PlayFilm password. You can change your password at any time. 7. Enter your Password Reset Question and Answer. This can be used at a later time if you forget your password. 8. Enter your e-mail address. You will receive e-mail notification when new information is available in [address] 9. Click Sign Up. You can now view and download portions of your medical record. 10. Click the Download Summary menu link to download a portable copy of your medical information. If you have questions, please visit the Frequently Asked Questions section of the PlayFilm website. Remember, PlayFilm is NOT to be used for urgent needs. For medical emergencies, dial 911. Now available from your iPhone and Android! Please provide this summary of care documentation to your next provider. Your primary care clinician is listed as [facility]. If you have any questions after today's visit, please call [phone].

## 2018-08-20 NOTE — PROGRESS NOTES
Patient: Anabel Malcolm MRN: [id number]  SSN: [id number]    YOB: 1960  Age: 59 y.o. Sex: female      Date of Service: 8/20/2018   Provider: SOILA Mendoza   Office Location:   [address], [facility]  Office Phone: [phone]  Office Fax: [phone]        REASON FOR VISIT:   Chief Complaint   Patient presents with   [address]    Hypertension    Diabetes    Cholesterol Problem    Asthma    Back Pain     over 1 year    GERD        VITALS:   Visit Vitals    /66 (BP 1 Location: Right arm, BP Patient Position: Sitting)  Comment: manual    Pulse 79    Temp 98.6 °F (37 °C) (Oral)    Resp 12    Ht 5' 6\" (1.676 m)    Wt 191 lb 12.8 oz (87 kg)    SpO2 98%    BMI 30.96 kg/m2       MEDICATIONS:   Current Outpatient Prescriptions   Medication Sig Dispense Refill    aspirin delayed-release 81 mg tablet Take 81 mg by mouth daily.  ADVAIR DISKUS 250-50 mcg/dose diskus inhaler INHALE ONE PUFF BY MOUTH EVERY 12 HOURS 1 Inhaler 0    amLODIPine (NORVASC) 5 mg tablet Take 1 Tab by mouth daily. 30 Tab 0    fluticasone-salmeterol (ADVAIR DISKUS) 250-50 mcg/dose diskus inhaler INHALE ONE PUFF BY MOUTH EVERY 12 HOURS 1 Inhaler 0    hydroCHLOROthiazide (MICROZIDE) 12.5 mg capsule TAKE ONE CAPSULE BY MOUTH ONCE DAILY 30 Cap 6    VITAMIN D2 50,000 unit capsule TAKE ONE CAPSULE BY MOUTH ONCE A WEEK 12 Cap 4    ezetimibe-simvastatin (VYTORIN 10/10) 10-10 mg per tablet Take 1 Tab by mouth nightly.  magnesium oxide (MAG-OX) 400 mg tablet TAKE ONE TABLET BY MOUTH ONCE DAILY 30 Tab 7    lisinopril (PRINIVIL, ZESTRIL) 20 mg tablet Take 1 Tab by mouth daily. (Patient taking differently: Take 10 mg by mouth daily.) 30 Tab 3    cyclobenzaprine (FLEXERIL) 10 mg tablet Take 0.5 Tabs by mouth two (2) times a day.  (Patient taking differently: Take 10 mg by mouth as needed.) 30 Tab 3    fluticasone (FLONASE) 50 mcg/actuation nasal spray 2 Sprays by Both Nostrils route daily.  TOPAMAX 25 mg tablet TAKE ONE TABLET BY MOUTH TWICE DAILY (Patient taking differently: as needed) 60 Tab 0    dexlansoprazole (DEXILANT) 30 mg capsule Take 1 Cap by mouth daily as needed. Indications: GASTROESOPHAGEAL REFLUX 90 Cap 3    multivitamin-iron-FA, hematinic, (CENTRUM COMPLETE)  mg-mcg tab tablet Take 1 Tab by mouth daily. 90 Tab 3    albuterol (PROVENTIL HFA, VENTOLIN HFA, PROAIR HFA) 90 mcg/actuation inhaler Take 2 Puffs by inhalation every four (4) hours as needed for Wheezing. 3 Inhaler 3    Cetirizine (ZYRTEC) 10 mg cap Take 10 mg PE/day by mouth as needed.  amLODIPine (NORVASC) 5 mg tablet TAKE ONE TABLET BY MOUTH DAILY 30 Tab 0    SITagliptin-metFORMIN (JANUMET) 50-1,000 mg per tablet Take 1 Tab by mouth two (2) times daily (with meals). 60 Tab 3        ALLERGIES:   Allergies   Allergen Reactions    Banana Hives     Asthma attach, throat swelling, throat scratching, lip swelling    Clonidine Other (comments)     Memory loss    Iodine Rash    Keflex [Cephalexin] Hives    Seafood Hives    Watermelon Hives     Throat closes        MEDICAL/SURGICAL HISTORY:  Past Medical History:   Diagnosis Date    Ankle swelling     Asthma 2/4/2010    Brain bleed (HCC) 02/20/2015    Chronic diastolic heart failure ([facility] 3/18/2015    COPD (chronic obstructive pulmonary disease) ([facility]     PFT confirmed 8/18/16    Diabetes mellitus ([address] 5/27/2014    Diabetic eye exam ([facility] 01/26/2017    No retinopathy Best corrected vision 20/20 OU    HTN (hypertension) 2/4/2010    Hyperlipidemia LDL goal < 70 2014    Magnesium deficiency     Obesity, unspecified 4/15/2015    SAH (subarachnoid hemorrhage) ([facility] 2/20/15    admitted in [facility], Dr. John Latif.  Amanda Puckett and ref to Dr. Jennifer Lance, Neurology    Stroke [facility]) 2015    mini stroke    Vitamin D deficiency 2000      Past Surgical History:   Procedure Laterality Date    COLONOSCOPY N/A 8/15/2018    COLONOSCOPY performed by Mane Brewer MD at [facility] HX HYSTERECTOMY  2004    abd approach with ovaries and cervix intact    HX HYSTERECTOMY N/A 2004    HX OTHER SURGICAL  8/25/15    SAH, cerebral arteriopathy, Dr. Linda Grijalva, Interventional Neurology [place]        FAMILY HISTORY:  Family History   Problem Relation Age of Onset    Diabetes Mother     Hypertension Mother           Heart Attack Mother 62    Alcohol abuse Father      cirrhosis    Diabetes Sister     Heart Attack Sister 64        SOCIAL HISTORY:  Social History   Substance Use Topics    Smoking status: Never Smoker    Smokeless tobacco: Never Used    Alcohol use 0.6 oz/week     1 Glasses of wine, 0 Standard drinks or equivalent per week      Comment: occasionally - social,            HISTORY OF PRESENT ILLNESS:   Elizbeth Dakins is a 59 y.o. female who presents to the office to establish care as a new patient. She is [facility] from [facility]. Diabetes -  Currently the patient's condition is well controlled. Complications include: microalbuminuria   Last A1c: 5.9% on 4/18/18  Reports compliance with the following regimen: Janumet 50-1,000 mg BID   Home glucose readings: AM fasting ranges 90s-110s     Last urine microalbumin: 10/12/17. Microalbuminuria noted. Patient is on ACE inhibitor therapy. Last lipid panel: 5/30/18, LDL 86 at that time. Patient is on statin therapy  Last foot exam: 10/12/17. Last eye exam: about a year ago. Hypertension -   Currently, the patient's condition is well controlled. Complications include: diastolic cardiac dysfunction, history of SAH in 2015 when BP was uncontrolled. Patient follows with cardiology (Dr. Pascale Varma)  Reports compliance with the following regimen: HCTZ 12.5 mg daily, amlodipine 5 mg daily, lisinopril 10 mg daily  Home BP readings: not checking  Lifestyle modifications: weight loss, low sodium diet.       Asthma -   Mild/moderate persistent, since childhood. Patient had PFTs in 2016 which showed an obstructive lung pattern. She was subsequently given a diagnosis of COPD though she has never been a smoker. She questions this. She's had no significant occupational exposures, and minimal secondhand smoke exposure. Regardless, her symptoms are well controlled on Advair with albuterol PRN. Frequency of rescue inhaler: Twice monthly   Frequency of nighttime awakenings: \"very very rarely\" per patient   Limitation of daily activity: NO  Triggers: dust at work, allergies     GERD -   Well controlled on Dexilant 30 mg daily. Migraines -   Ever since [facility] in 2015. Well controlled on Topamax 25 mg BID per patient. Rx was initiated by her neurologist (Dr. Daniela San) but recently has been managed by primary care. Health Maintenance -   Previous PCP - Dr. Deloris Arzate  Last PE with routine labs - Last routine visit was 4/18/18 with labs done 5/30/18   Last pap smear - N/A due to hysterectomy. .. though documentation states that she might still have her cervix? She is unsure. Last mammogram - 5/9/18, negative   Last DEXA scan - unknown  Last colonoscopy - patient had normal colonoscopy last week, recommended follow up in 10 years. Last flu shot - 10/12/17  Last pneumonia vaccine - 2/2/15  Last Tdap booster - unknown    REVIEW OF SYSTEMS:  ROS (see scanned H&P form for complete 12 point ROS)     PHYSICAL EXAMINATION:  Physical Exam   Constitutional: She is oriented to person, place, and time and well-developed, well-nourished, and in no distress. HENT:   Head: Normocephalic and atraumatic. Right Ear: External ear normal.   Left Ear: External ear normal.   Nose: Nose normal.   Mouth/Throat: Oropharynx is clear and moist.   Eyes: Conjunctivae are normal. Pupils are equal, round, and reactive to light. Right eye exhibits no discharge. Left eye exhibits no discharge. Neck: Neck supple. No thyromegaly present.    Cardiovascular: Normal rate, regular rhythm and normal heart sounds. Exam reveals no gallop and no friction rub. No murmur heard. Pulmonary/Chest: Effort normal and breath sounds normal. No stridor. She has no wheezes. She has no rales. Lymphadenopathy:     She has no cervical adenopathy. Neurological: She is alert and oriented to person, place, and time. Gait normal.   Skin: Skin is warm and dry. No rash noted. Psychiatric: Mood, memory and affect normal.        RESULTS:  No results found for this visit on 08/20/18. ASSESSMENT/PLAN:  Diagnoses and all orders for this visit:    1. Controlled type 2 diabetes mellitus with microalbuminuria, without long-term current use of insulin ([address]  - Well controlled. Patient continues to lose weight through diet and exercise  - Re-check labs prior to next visit and if A1c is still trending down, we may be able to consider backing off of diabetic meds  Orders:    -     HEMOGLOBIN A1C W/O EAG; Future  -     METABOLIC PANEL, COMPREHENSIVE  -     LIPID PANEL; Future    2. 3. Essential hypertension, Diastolic CHF  - Well controlled  - Cardiology records reviewed   - Med refilled  Orders:    -     amLODIPine (NORVASC) 5 mg tablet; Take 1 Tab by mouth daily. -     hydroCHLOROthiazide (MICROZIDE) 12.5 mg capsule; TAKE ONE CAPSULE BY MOUTH ONCE DAILY  -     lisinopril (PRINIVIL, ZESTRIL) 10 mg tablet; Take 1 Tab by mouth daily. 4. Gastroesophageal reflux disease without esophagitis  - Stable, will monitor   - Meds refilled  Orders:    -     dexlansoprazole (DEXILANT) 30 mg capsule; Take 1 Cap by mouth daily as needed. Indications: gastroesophageal reflux disease    5. Moderate persistent asthma without complication  - Stable on current regimen  - Will monitor       Follow-up Disposition:  Return in about 3 months (around 11/20/2018) for well woman exam, fasting labs prior to next visit. .   *Will do pelvic exam at next visit to determine whether or not patient has a cervix       PATIENT CARE TEAM: Patient Care Team:  Cheron Cowden, PA as PCP - General (Physician Assistant)  Anisha Mao MD as Consulting Provider (Cardiology)  Ravinder Brewer MD as Consulting Provider (Neurology)       Cheron Cowden, PA   August 20, 2018    1:20 PM

## 2018-08-20 NOTE — PATIENT INSTRUCTIONS

## 2018-08-20 NOTE — TELEPHONE ENCOUNTER
Ananda Maurer requesting a prior [place] for [employer]. Please go to key. [employer] and use the KEY: [address]. Ananda Maurer is requesting a prior auth for RX Topamax 25 mg tabs. Please go to [employer]  And use LEMUS [id number]. Ananda Maurer is requesting a prior auth for Dexilant 30 mg. Please go to [employer] and use KEY; [id number]. Sent fax request to nurse.

## 2018-08-21 DIAGNOSIS — R80.9 CONTROLLED TYPE 2 DIABETES MELLITUS WITH MICROALBUMINURIA, WITHOUT LONG-TERM CURRENT USE OF INSULIN (HCC): ICD-10-CM

## 2018-08-21 DIAGNOSIS — E11.29 CONTROLLED TYPE 2 DIABETES MELLITUS WITH MICROALBUMINURIA, WITHOUT LONG-TERM CURRENT USE OF INSULIN (HCC): ICD-10-CM

## 2018-08-21 NOTE — TELEPHONE ENCOUNTER
Prior auth completed in cover my meds for medication Topamax 25mg and Vytorin 10/10. Awaiting approval or denial.    Prior auth for Dexilant completed on 08/23/2018 in cover my meds.  Awaiting approval or denial.       Note addendem created to add prior auth for Dexilant

## 2018-08-26 ENCOUNTER — APPOINTMENT (OUTPATIENT)
Dept: GENERAL RADIOLOGY | Age: 58
End: 2018-08-26
Attending: PHYSICIAN ASSISTANT
Payer: COMMERCIAL

## 2018-08-26 ENCOUNTER — HOSPITAL ENCOUNTER (EMERGENCY)
Age: 58
Discharge: HOME | End: 2018-08-26
Attending: EMERGENCY MEDICINE
Payer: COMMERCIAL

## 2018-08-26 ENCOUNTER — APPOINTMENT (OUTPATIENT)
Dept: CT IMAGING | Age: 58
End: 2018-08-26
Attending: PHYSICIAN ASSISTANT
Payer: COMMERCIAL

## 2018-08-26 VITALS
DIASTOLIC BLOOD PRESSURE: 92 MMHG | SYSTOLIC BLOOD PRESSURE: 150 MMHG | HEART RATE: 98 BPM | RESPIRATION RATE: 20 BRPM | TEMPERATURE: 98.6 F | OXYGEN SATURATION: 100 %
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
## 2018-08-26 DIAGNOSIS — R07.89 CHEST WALL PAIN: ICD-10-CM

## 2018-08-26 DIAGNOSIS — M54.2 NECK PAIN: ICD-10-CM

## 2018-08-26 DIAGNOSIS — S40.012A CONTUSION OF LEFT SHOULDER, INITIAL ENCOUNTER: ICD-10-CM

## 2018-08-26 PROCEDURE — 74011250637 HC RX REV CODE- 250/637: Performed by: PHYSICIAN ASSISTANT

## 2018-08-26 PROCEDURE — 72125 CT NECK SPINE W/O DYE: CPT

## 2018-08-26 PROCEDURE — 70450 CT HEAD/BRAIN W/O DYE: CPT

## 2018-08-26 PROCEDURE — 73030 X-RAY EXAM OF SHOULDER: CPT

## 2018-08-26 PROCEDURE — 71046 X-RAY EXAM CHEST 2 VIEWS: CPT

## 2018-08-26 PROCEDURE — 99283 EMERGENCY DEPT VISIT LOW MDM: CPT

## 2018-08-26 RX ORDER — METHOCARBAMOL 500 MG/1
500 TABLET, FILM COATED ORAL 4 TIMES DAILY
Qty: 16 TAB | Refills: 0 | Status: SHIPPED | OUTPATIENT
Start: 2018-08-26 | End: 2018-10-16 | Stop reason: ALTCHOICE

## 2018-08-26 RX ORDER — TRAMADOL HYDROCHLORIDE 50 MG/1
50 TABLET ORAL
Qty: 14 TAB | Refills: 0 | Status: SHIPPED | OUTPATIENT
Start: 2018-08-26 | End: 2018-10-24 | Stop reason: SDUPTHER

## 2018-08-26 RX ORDER — OXYCODONE AND ACETAMINOPHEN 5; 325 MG/1; MG/1
1 TABLET ORAL
Status: COMPLETED | OUTPATIENT
Start: 2018-08-26 | End: 2018-08-26

## 2018-08-26 RX ADMIN — OXYCODONE HYDROCHLORIDE AND ACETAMINOPHEN 1 TABLET: 5; 325 TABLET ORAL at 17:03

## 2018-08-26 NOTE — DISCHARGE INSTRUCTIONS
[employer] Activation    Thank you for requesting access to [employer]. Please follow the instructions below to securely access and download your online medical record. [employer] allows you to send messages to your doctor, view your test results, renew your prescriptions, schedule appointments, and more. How Do I Sign Up? 1. In your internet browser, go to [email or web address]  2. Click on the First Time User? Click Here link in the Sign In box. You will be redirect to the New Member Sign Up page. 3. Enter your [employer] Access Code exactly as it appears below. You will not need to use this code after youve completed the sign-up process. If you do not sign up before the expiration date, you must request a new code. [employer] Access Code: [email or web address]  Expires: 2018 11:13 AM (This is the date your [employer] access code will )    4. Enter the last four digits of your Social Security Number (xxxx) and Date of Birth (mm/dd/yyyy) as indicated and click Submit. You will be taken to the next sign-up page. 5. Create a [employer] ID. This will be your [employer] login ID and cannot be changed, so think of one that is secure and easy to remember. 6. Create a [employer] password. You can change your password at any time. 7. Enter your Password Reset Question and Answer. This can be used at a later time if you forget your password. 8. Enter your e-mail address. You will receive e-mail notification when new information is available in [address]. 9. Click Sign Up. You can now view and download portions of your medical record. 10. Click the Download Summary menu link to download a portable copy of your medical information. Additional Information    If you have questions, please visit the Frequently Asked Questions section of the [employer] website at [email or web address] [email or web address]. [email or web address] Remember, [employer] is NOT to be used for urgent needs. For medical emergencies, dial 911.
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
## 2018-08-26 NOTE — LETTER
NOTIFICATION OF RETURN TO WORK / SCHOOL 
 
8/26/2018 Ms. Connie Lorenzo [facility] To Whom It May Concern: 
 
Connie Lorenzo was seen in the ED on 8/26/18 and may be excused from work for 3 days. Sincerely, Brandon Peterson
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
## 2018-08-27 ENCOUNTER — TELEPHONE (OUTPATIENT)
Dept: FAMILY MEDICINE CLINIC | Age: 58
End: 2018-08-27

## 2018-08-27 NOTE — TELEPHONE ENCOUNTER
Pt went to  her medications today and the pharmacy pancho her that thr [id number], Vytorin, and Topamax the insurance would not fill because they are too expensive, please advise.

## 2018-08-28 NOTE — TELEPHONE ENCOUNTER
Called home number. Verified name and . Spoke with patient. Notified of prior auth completed. Notified of insurance company stated they never received it. Notified of completing paper form for medication dexilant and vytorin. Notified patient to request Topamax in generic as her insurance stated it will be covered. Notified of about 3-5 days for response. Patient had no further questions or concerns.

## 2018-08-29 RX ORDER — SIMVASTATIN 10 MG/1
10 TABLET, FILM COATED ORAL
Qty: 90 TAB | Refills: 1 | Status: SHIPPED | OUTPATIENT
Start: 2018-08-29 | End: 2019-01-23

## 2018-08-29 RX ORDER — ESOMEPRAZOLE MAGNESIUM 40 MG/1
40 CAPSULE, DELAYED RELEASE ORAL DAILY
Qty: 90 CAP | Refills: 0 | Status: SHIPPED | OUTPATIENT
Start: 2018-08-29 | End: 2019-01-23

## 2018-08-29 RX ORDER — EZETIMIBE 10 MG/1
10 TABLET ORAL DAILY
Qty: 90 TAB | Refills: 1 | Status: SHIPPED | OUTPATIENT
Start: 2018-08-29 | End: 2019-01-23

## 2018-08-29 NOTE — TELEPHONE ENCOUNTER
Agree with nurse re: Topamax, this can be dispensed as generic topiramate. Given insufficient documentation in patient's record to be able to successfully complete prior auth, I'd recommend changing Vytorin to Zetia and simvastatin (same medications, but unfortunately will have to be 2 pills instead of one.)    In place of Dexilant, I will send Nexium. If patient finds that this does not work as well, then we will have good reason to ask her insurance company to pay for [employer].

## 2018-09-12 ENCOUNTER — TELEPHONE (OUTPATIENT)
Dept: FAMILY MEDICINE CLINIC | Age: 58
End: 2018-09-12

## 2018-09-12 NOTE — TELEPHONE ENCOUNTER
Called [facility]. Verified name and . Spoke with pharmacy. Notified of request for Lisinopril/HCTZ 10-12. 5. Notified that provider with keep order as sent on 2018 Lisinopril 10 mg and HCTZ 12.5, two separate medications. Pharmacy understood the reason for call and had no further questions or concerns.

## 2018-10-15 DIAGNOSIS — M79.10 MYALGIA: ICD-10-CM

## 2018-10-15 NOTE — TELEPHONE ENCOUNTER
Refill request sent to provider for approval or denial. Last refill on 10/17/2017. Last office visit on 2018. Next appointment on 10/24/2018. Called home number. Verified name and . Spoke with patient. Notified of concern with refill request. Per patient medication was refilled before. Per patient medication was given because she works at [employer] and does a lot of bending, pushing and has developed muscle spasms. Per patient used to use advil but had to stop because it was thought she was developing an ulcer due to long term use. Patient verified pharmacy [facility]. Patient had no further questions or concerns.

## 2018-10-15 NOTE — TELEPHONE ENCOUNTER
This pharmacy faxed over request for the following prescriptions to be filled:    Medication requested :   Requested Prescriptions     Pending Prescriptions Disp Refills    cyclobenzaprine (FLEXERIL) 10 mg tablet 30 Tab 3     Sig: Take 0.5 Tabs by mouth two (2) times a day.      PCP: [place]mer[place] or Print:  Tania Cespedes  Mail order or Local pharmacy [address]     Scheduled appointment if not seen by current providers in office: LOV 8/20/2018 f/u 10/24/2018

## 2018-10-16 RX ORDER — CYCLOBENZAPRINE HCL 10 MG
TABLET ORAL
Qty: 10 TAB | Refills: 0 | Status: SHIPPED | OUTPATIENT
Start: 2018-10-16 | End: 2018-10-24 | Stop reason: SDUPTHER

## 2018-10-16 NOTE — TELEPHONE ENCOUNTER
Refilled small supply (#10)   We can discuss her back problems further at her upcoming follow up appt

## 2018-10-16 NOTE — TELEPHONE ENCOUNTER
[employer]s completed.    Results reviewed with a Registered Nurse; understanding of results and   educational materials was verbalized.   Pt called back in reference to her blood pressure medication.  Please advise

## 2018-10-24 ENCOUNTER — HOSPITAL ENCOUNTER (OUTPATIENT)
Dept: LAB | Age: 58
Discharge: HOME | End: 2018-10-24

## 2018-10-24 ENCOUNTER — OFFICE VISIT (OUTPATIENT)
Dept: FAMILY MEDICINE CLINIC | Age: 58
End: 2018-10-24

## 2018-10-24 VITALS
DIASTOLIC BLOOD PRESSURE: 62 MMHG | TEMPERATURE: 98.5 F | RESPIRATION RATE: 12 BRPM | SYSTOLIC BLOOD PRESSURE: 132 MMHG | WEIGHT: 197 LBS | HEIGHT: 66 IN | HEART RATE: 76 BPM | BODY MASS INDEX: 31.66 KG/M2 | OXYGEN SATURATION: 98 %

## 2018-10-24 DIAGNOSIS — Z01.419 WELL WOMAN EXAM WITH ROUTINE GYNECOLOGICAL EXAM: Primary | ICD-10-CM

## 2018-10-24 DIAGNOSIS — E83.42 HYPOMAGNESEMIA: ICD-10-CM

## 2018-10-24 DIAGNOSIS — E66.9 OBESITY (BMI 30.0-34.9): ICD-10-CM

## 2018-10-24 DIAGNOSIS — R10.2 ADNEXAL TENDERNESS, LEFT: ICD-10-CM

## 2018-10-24 DIAGNOSIS — J45.30 MILD PERSISTENT ASTHMA WITHOUT COMPLICATION: ICD-10-CM

## 2018-10-24 DIAGNOSIS — R10.2 PELVIC PAIN: ICD-10-CM

## 2018-10-24 DIAGNOSIS — E11.9 CONTROLLED TYPE 2 DIABETES MELLITUS WITHOUT COMPLICATION, WITHOUT LONG-TERM CURRENT USE OF INSULIN (HCC): ICD-10-CM

## 2018-10-24 DIAGNOSIS — M79.10 MYALGIA: ICD-10-CM

## 2018-10-24 DIAGNOSIS — J01.00 ACUTE NON-RECURRENT MAXILLARY SINUSITIS: ICD-10-CM

## 2018-10-24 LAB
ALBUMIN UR QL STRIP: 10 MG/L
CREATININE, URINE POC: 200 MG/DL
MICROALBUMIN/CREAT RATIO POC: <30 MG/G
SENTARA SPECIMEN COL,SENBCF: NORMAL

## 2018-10-24 PROCEDURE — 99001 SPECIMEN HANDLING PT-LAB: CPT | Performed by: PHYSICIAN ASSISTANT

## 2018-10-24 RX ORDER — FLUTICASONE PROPIONATE AND SALMETEROL 250; 50 UG/1; UG/1
POWDER RESPIRATORY (INHALATION)
Qty: 1 INHALER | Refills: 2 | Status: SHIPPED | OUTPATIENT
Start: 2018-10-24 | End: 2019-01-23 | Stop reason: SDUPTHER

## 2018-10-24 RX ORDER — DOXYCYCLINE 100 MG/1
100 TABLET ORAL 2 TIMES DAILY
Qty: 20 TAB | Refills: 0 | Status: SHIPPED | OUTPATIENT
Start: 2018-10-24 | End: 2018-11-03

## 2018-10-24 RX ORDER — CYCLOBENZAPRINE HCL 10 MG
TABLET ORAL
Qty: 10 TAB | Refills: 0 | Status: SHIPPED | OUTPATIENT
Start: 2018-10-24 | End: 2018-12-14 | Stop reason: SDUPTHER

## 2018-10-24 RX ORDER — LANOLIN ALCOHOL/MO/W.PET/CERES
CREAM (GRAM) TOPICAL
Qty: 90 TAB | Refills: 0 | Status: SHIPPED | OUTPATIENT
Start: 2018-10-24 | End: 2022-03-21 | Stop reason: SDUPTHER

## 2018-10-24 NOTE — PATIENT INSTRUCTIONS
Vaccine Information Statement    Influenza (Flu) Vaccine (Inactivated or Recombinant): What you need to know    Many Vaccine Information Statements are available in Nepali and other languages. See www.immunize.org/vis  Hojas de Información Sobre Vacunas están disponibles en Español y en muchos otros idiomas. Visite www.immunize.org/vis    1. Why get vaccinated? Influenza (flu) is a contagious disease that spreads around the [place] every year, usually between October and May. Flu is caused by influenza viruses, and is spread mainly by coughing, sneezing, and close contact. Anyone can get flu. Flu strikes suddenly and can last several days. Symptoms vary by age, but can include:   fever/chills   sore throat   muscle aches   fatigue   cough   headache    runny or stuffy nose    Flu can also lead to pneumonia and blood infections, and cause diarrhea and seizures in children. If you have a medical condition, such as heart or lung disease, flu can make it worse. Flu is more dangerous for some people. Infants and young children, people 72years of age and older, pregnant women, and people with certain health conditions or a weakened immune system are at greatest risk. Each year thousands of people in the [employer] die from flu, and many more are hospitalized. Flu vaccine can:   keep you from getting flu,   make flu less severe if you do get it, and   keep you from spreading flu to your family and other people. 2. Inactivated and recombinant flu vaccines    A dose of flu vaccine is recommended every flu season. Children 6 months through 6years of age may need two doses during the same flu season. Everyone else needs only one dose each flu season.        Some inactivated flu vaccines contain a very small amount of a mercury-based preservative called thimerosal. Studies have not shown thimerosal in vaccines to be harmful, but flu vaccines that do not contain thimerosal are available. There is no live flu virus in flu shots. They cannot cause the flu. There are many flu viruses, and they are always changing. Each year a new flu vaccine is made to protect against three or four viruses that are likely to cause disease in the upcoming flu season. But even when the vaccine doesnt exactly match these viruses, it may still provide some protection    Flu vaccine cannot prevent:   flu that is caused by a virus not covered by the vaccine, or   illnesses that look like flu but are not. It takes about 2 weeks for protection to develop after vaccination, and protection lasts through the flu season. 3. Some people should not get this vaccine    Tell the person who is giving you the vaccine:     If you have any severe, life-threatening allergies. If you ever had a life-threatening allergic reaction after a dose of flu vaccine, or have a severe allergy to any part of this vaccine, you may be advised not to get vaccinated. Most, but not all, types of flu vaccine contain a small amount of egg protein.  If you ever had Guillain-Barré Syndrome (also called GBS). Some people with a history of GBS should not get this vaccine. This should be discussed with your doctor.  If you are not feeling well. It is usually okay to get flu vaccine when you have a mild illness, but you might be asked to come back when you feel better. 4. Risks of a vaccine reaction    With any medicine, including vaccines, there is a chance of reactions. These are usually mild and go away on their own, but serious reactions are also possible. Most people who get a flu shot do not have any problems with it.      Minor problems following a flu shot include:    soreness, redness, or swelling where the shot was given     hoarseness   sore, red or itchy eyes   cough   fever   aches   headache   itching   fatigue  If these problems occur, they usually begin soon after the shot and last 1 or 2 days. More serious problems following a flu shot can include the following:     There may be a small increased risk of Guillain-Barré Syndrome (GBS) after inactivated flu vaccine. This risk has been estimated at 1 or 2 additional cases per million people vaccinated. This is much lower than the risk of severe complications from flu, which can be prevented by flu vaccine.  Young children who get the flu shot along with pneumococcal vaccine (PCV13) and/or DTaP vaccine at the same time might be slightly more likely to have a seizure caused by fever. Ask your doctor for more information. Tell your doctor if a child who is getting flu vaccine has ever had a seizure. Problems that could happen after any injected vaccine:      People sometimes faint after a medical procedure, including vaccination. Sitting or lying down for about 15 minutes can help prevent fainting, and injuries caused by a fall. Tell your doctor if you feel dizzy, or have vision changes or ringing in the ears.  Some people get severe pain in the shoulder and have difficulty moving the arm where a shot was given. This happens very rarely.  Any medication can cause a severe allergic reaction. Such reactions from a vaccine are very rare, estimated at about 1 in a million doses, and would happen within a few minutes to a few hours after the vaccination. As with any medicine, there is a very remote chance of a vaccine causing a serious injury or death. The safety of vaccines is always being monitored. For more information, visit: www.cdc.gov/vaccinesafety/    5. What if there is a serious reaction? What should I look for?  Look for anything that concerns you, such as signs of a severe allergic reaction, very high fever, or unusual behavior.     Signs of a severe allergic reaction can include hives, swelling of the face and throat, difficulty breathing, a fast heartbeat, dizziness, and weakness - usually within a few minutes to a few hours after the vaccination. What should I do?  If you think it is a severe allergic reaction or other emergency that cant wait, call 9-1-1 and get the person to the nearest hospital. Otherwise, call your doctor.  Reactions should be reported to the Vaccine Adverse Event Reporting System (VAERS). Your doctor should file this report, or you can do it yourself through  the VAERS web site at www.vaers. [facility].gov, or by calling 7-388.457.9835. VAERS does not give medical advice. 6. The National Vaccine Injury Compensation Program    The [facility] Vaccine Injury Compensation Program (VICP) is a federal program that was created to compensate people who may have been injured by certain vaccines. Persons who believe they may have been injured by a vaccine can learn about the program and about filing a claim by calling 9-770.174.4229 or visiting the [employer] website at www.[facility].gov/vaccinecompensation. There is a time limit to file a claim for compensation. 7. How can I learn more?  Ask your healthcare provider. He or she can give you the vaccine package insert or suggest other sources of information.  Call your local or state health department.  Contact the Centers for Disease Control and Prevention (CDC):  - Call 6-654.287.5819 (1-800-CDC-INFO) or  - Visit CDCs website at www.cdc.gov/flu    Vaccine Information Statement   Inactivated Influenza Vaccine   8/7/2015  [facility] [id number]    Department of Health and Human Services  Centers for Disease Control and Prevention    Office Use Only       A Healthy Lifestyle: Care Instructions  Your Care Instructions    A healthy lifestyle can help you feel good, stay at a healthy weight, and have plenty of energy for both work and play. A healthy lifestyle is something you can share with your whole family.   A healthy lifestyle also can lower your risk for serious health problems, such as high blood pressure, heart disease, and diabetes. You can follow a few steps listed below to improve your health and the health of your family. Follow-up care is a key part of your treatment and safety. Be sure to make and go to all appointments, and call your doctor if you are having problems. It's also a good idea to know your test results and keep a list of the medicines you take. How can you care for yourself at home? · Do not eat too much sugar, fat, or fast foods. You can still have dessert and treats now and then. The goal is moderation. · Start small to improve your eating habits. Pay attention to portion sizes, drink less juice and soda pop, and eat more fruits and vegetables. ? Eat a healthy amount of food. A 3-ounce serving of meat, for example, is about the size of a deck of cards. Fill the rest of your plate with vegetables and whole grains. ? Limit the amount of soda and sports drinks you have every day. Drink more water when you are thirsty. ? Eat at least 5 servings of fruits and vegetables every day. It may seem like a lot, but it is not hard to reach this goal. A serving or helping is 1 piece of fruit, 1 cup of vegetables, or 2 cups of leafy, raw vegetables. Have an apple or some carrot sticks as an afternoon snack instead of a candy bar. Try to have fruits and/or vegetables at every meal.  · Make exercise part of your daily routine. You may want to start with simple activities, such as walking, bicycling, or slow swimming. Try to be active 30 to 60 minutes every day. You do not need to do all 30 to 60 minutes all at once. For example, you can exercise 3 times a day for 10 or 20 minutes. Moderate exercise is safe for most people, but it is always a good idea to talk to your doctor before starting an exercise program.  · Keep moving. Stephen Riddle the lawn, work in the garden, or [employer]. Take the stairs instead of the elevator at work. · If you smoke, quit.  People who smoke have an increased risk for heart attack, stroke, cancer, and other lung illnesses. Quitting is hard, but there are ways to boost your chance of quitting tobacco for good. ? Use nicotine gum, patches, or lozenges. ? Ask your doctor about stop-smoking programs and medicines. ? Keep trying. In addition to reducing your risk of diseases in the future, you will notice some benefits soon after you stop using tobacco. If you have shortness of breath or asthma symptoms, they will likely get better within a few weeks after you quit. · Limit how much alcohol you drink. Moderate amounts of alcohol (up to 2 drinks a day for men, 1 drink a day for women) are okay. But drinking too much can lead to liver problems, high blood pressure, and other health problems. Family health  If you have a family, there are many things you can do together to improve your health. · Eat meals together as a family as often as possible. · Eat healthy foods. This includes fruits, vegetables, lean meats and dairy, and whole grains. · Include your family in your fitness plan. Most people think of activities such as jogging or tennis as the way to fitness, but there are many ways you and your family can be more active. Anything that makes you breathe hard and gets your heart pumping is exercise. Here are some tips:  ? Walk to do errands or to take your child to school or the bus.  ? Go for a family bike ride after dinner instead of watching TV. Where can you learn more? Go to http://monique-digna.info/. Enter H146 in the search box to learn more about \"A Healthy Lifestyle: Care Instructions. \"  Current as of: December 7, 2017  Content Version: 11.8  © 0883-8131 [employer]. Care instructions adapted under license by [employer] (which disclaims liability or warranty for this information).  If you have questions about a medical condition or this instruction, always ask your healthcare professional. Daniela Canales disclaims any warranty or liability for your use of this information.
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
## 2018-10-25 LAB
A-G RATIO,AGRAT: 1.4 RATIO (ref 1.1–2.6)
ALBUMIN SERPL-MCNC: 4.8 G/DL (ref 3.5–5)
ALP SERPL-CCNC: 83 U/L (ref 40–120)
ALT SERPL-CCNC: 13 U/L (ref 5–40)
ANION GAP SERPL CALC-SCNC: 14 MMOL/L
AST SERPL W P-5'-P-CCNC: 15 U/L (ref 10–37)
AVG GLU, 10930: 120 MG/DL (ref 91–123)
BILIRUB SERPL-MCNC: 0.5 MG/DL (ref 0.2–1.2)
BUN SERPL-MCNC: 19 MG/DL (ref 6–22)
CALCIUM SERPL-MCNC: 9.8 MG/DL (ref 8.4–10.5)
CHLORIDE SERPL-SCNC: 102 MMOL/L (ref 98–110)
CHOLEST SERPL-MCNC: 189 MG/DL (ref 110–200)
CO2 SERPL-SCNC: 25 MMOL/L (ref 20–32)
CREAT SERPL-MCNC: 0.9 MG/DL (ref 0.8–1.4)
GFRAA, 66117: >60
GFRNA, 66118: 60
GLOBULIN,GLOB: 3.5 G/DL (ref 2–4)
GLUCOSE SERPL-MCNC: 91 MG/DL (ref 70–99)
HBA1C MFR BLD HPLC: 5.8 % (ref 4.8–5.9)
HDLC SERPL-MCNC: 3.4 MG/DL (ref 0–5)
HDLC SERPL-MCNC: 56 MG/DL (ref 40–59)
LDLC SERPL CALC-MCNC: 124 MG/DL (ref 50–99)
POTASSIUM SERPL-SCNC: 4.5 MMOL/L (ref 3.5–5.5)
PROT SERPL-MCNC: 8.3 G/DL (ref 6.2–8.1)
SODIUM SERPL-SCNC: 141 MMOL/L (ref 133–145)
TRIGL SERPL-MCNC: 47 MG/DL (ref 40–149)
VLDLC SERPL CALC-MCNC: 9 MG/DL (ref 8–30)

## 2018-10-25 NOTE — PROGRESS NOTES
Called home number. Verified name and . Spoke with patient. Patient notified of results below per Conrad CM. Patient understood the reason for call and had no further questions or concerns.

## 2018-10-25 NOTE — PROGRESS NOTES
Please notify patient that her labs and urine from yesterday looked good. Her A1c was 5.8% which is great! LDL (bad cholesterol) has trended up a bit. Work on following a healthy diet and getting regular exercise. We'll keep a close eye on this.

## 2018-11-24 DIAGNOSIS — I10 ESSENTIAL HYPERTENSION: ICD-10-CM

## 2018-11-26 RX ORDER — LISINOPRIL 10 MG/1
10 TABLET ORAL DAILY
Qty: 90 TAB | Refills: 1 | Status: SHIPPED | OUTPATIENT
Start: 2018-11-26 | End: 2019-03-01 | Stop reason: SDUPTHER

## 2018-11-26 RX ORDER — LISINOPRIL 20 MG/1
TABLET ORAL
Qty: 30 TAB | Refills: 2 | OUTPATIENT
Start: 2018-11-26

## 2018-12-14 DIAGNOSIS — M79.10 MYALGIA: ICD-10-CM

## 2018-12-14 RX ORDER — CYCLOBENZAPRINE HCL 10 MG
TABLET ORAL
Qty: 10 TAB | Refills: 0 | Status: SHIPPED | OUTPATIENT
Start: 2018-12-14 | End: 2019-01-23 | Stop reason: SDUPTHER

## 2018-12-14 NOTE — TELEPHONE ENCOUNTER
Refill request sent to provider for approval or denial. Last refill on Flexeril. Last office visit on 10/24/2018. Next appointment on 01/23/2019.

## 2018-12-14 NOTE — TELEPHONE ENCOUNTER
This patient contacted office for the following prescriptions to be filled:    Medication requested :   Requested Prescriptions     Pending Prescriptions Disp Refills    cyclobenzaprine (FLEXERIL) 10 mg tablet 10 Tab 0     Sig: Take 0.5 to 1 tab daily as needed for back spasms     [address] or Print: [phone]  Mail order or [address]    Scheduled appointment if not seen by current providers in office: lov 10/24/18 raghav 1/23/19

## 2019-01-23 ENCOUNTER — OFFICE VISIT (OUTPATIENT)
Dept: FAMILY MEDICINE CLINIC | Age: 59
End: 2019-01-23

## 2019-01-23 ENCOUNTER — TELEPHONE (OUTPATIENT)
Dept: FAMILY MEDICINE CLINIC | Age: 59
End: 2019-01-23

## 2019-01-23 ENCOUNTER — HOSPITAL ENCOUNTER (OUTPATIENT)
Dept: GENERAL RADIOLOGY | Age: 59
Discharge: HOME | End: 2019-01-23
Payer: COMMERCIAL

## 2019-01-23 VITALS
RESPIRATION RATE: 14 BRPM | DIASTOLIC BLOOD PRESSURE: 58 MMHG | WEIGHT: 195 LBS | TEMPERATURE: 99.2 F | HEART RATE: 86 BPM | BODY MASS INDEX: 31.34 KG/M2 | HEIGHT: 66 IN | SYSTOLIC BLOOD PRESSURE: 100 MMHG | OXYGEN SATURATION: 98 %

## 2019-01-23 DIAGNOSIS — G89.29 CHRONIC LEFT HIP PAIN: ICD-10-CM

## 2019-01-23 DIAGNOSIS — J45.30 MILD PERSISTENT ASTHMA WITHOUT COMPLICATION: ICD-10-CM

## 2019-01-23 DIAGNOSIS — I50.32 CHRONIC DIASTOLIC HEART FAILURE (HCC): ICD-10-CM

## 2019-01-23 DIAGNOSIS — K21.9 GASTROESOPHAGEAL REFLUX DISEASE WITHOUT ESOPHAGITIS: ICD-10-CM

## 2019-01-23 DIAGNOSIS — E11.29 CONTROLLED TYPE 2 DIABETES MELLITUS WITH MICROALBUMINURIA, WITHOUT LONG-TERM CURRENT USE OF INSULIN (HCC): Primary | ICD-10-CM

## 2019-01-23 DIAGNOSIS — E78.2 MIXED HYPERLIPIDEMIA: ICD-10-CM

## 2019-01-23 DIAGNOSIS — R80.9 CONTROLLED TYPE 2 DIABETES MELLITUS WITH MICROALBUMINURIA, WITHOUT LONG-TERM CURRENT USE OF INSULIN (HCC): Primary | ICD-10-CM

## 2019-01-23 DIAGNOSIS — I10 ESSENTIAL HYPERTENSION: ICD-10-CM

## 2019-01-23 DIAGNOSIS — M54.50 ACUTE LEFT-SIDED LOW BACK PAIN WITHOUT SCIATICA: ICD-10-CM

## 2019-01-23 DIAGNOSIS — M25.552 CHRONIC LEFT HIP PAIN: ICD-10-CM

## 2019-01-23 PROCEDURE — 73502 X-RAY EXAM HIP UNI 2-3 VIEWS: CPT

## 2019-01-23 RX ORDER — EZETIMIBE AND SIMVASTATIN 10; 10 MG/1; MG/1
1 TABLET ORAL
COMMUNITY
End: 2019-01-23

## 2019-01-23 RX ORDER — FLUTICASONE PROPIONATE AND SALMETEROL 250; 50 UG/1; UG/1
POWDER RESPIRATORY (INHALATION)
Qty: 1 INHALER | Refills: 2 | Status: SHIPPED | OUTPATIENT
Start: 2019-01-23 | End: 2019-08-30 | Stop reason: SDUPTHER

## 2019-01-23 RX ORDER — ROSUVASTATIN CALCIUM 10 MG/1
10 TABLET, COATED ORAL
Qty: 30 TAB | Refills: 2 | Status: SHIPPED | OUTPATIENT
Start: 2019-01-23 | End: 2019-02-11

## 2019-01-23 RX ORDER — CYCLOBENZAPRINE HCL 10 MG
TABLET ORAL
Qty: 10 TAB | Refills: 0 | Status: SHIPPED | OUTPATIENT
Start: 2019-01-23 | End: 2019-03-01 | Stop reason: SDUPTHER

## 2019-01-23 RX ORDER — DEXLANSOPRAZOLE 30 MG/1
30 CAPSULE, DELAYED RELEASE ORAL DAILY
COMMUNITY
End: 2019-02-11

## 2019-01-23 NOTE — PROGRESS NOTES
Called home number. Verified name and . Spoke with patient. Patient notified of results below per Brandy CM. Patient understood the reason for call and had no further questions or concerns.

## 2019-01-23 NOTE — PROGRESS NOTES
Patient: Anali Correa MRN: [id number]  SSN: [id number]    YOB: 1960  Age: 59 y.o. Sex: female      Date of Service: 1/23/2019   Provider: SOILA Charles   Office Location:   [address] Esteban muñoz, [address]  Office Phone: [phone]  Office Fax: [phone]      REASON FOR VISIT:   Chief Complaint   Patient presents with    Diabetes    Asthma        VITALS:   Visit Vitals  /58 (BP 1 Location: Left arm, BP Patient Position: Sitting)   Pulse 86   Temp 99.2 °F (37.3 °C) (Oral)   Resp 14   Ht 5' 6\" (1.676 m)   Wt 195 lb (88.5 kg)   SpO2 98%   BMI 31.47 kg/m²        MEDICATIONS:   Current Outpatient Medications on File Prior to Visit   Medication Sig Dispense Refill    dexlansoprazole (DEXILANT) 30 mg capsule Take 30 mg by mouth daily.  ezetimibe-simvastatin (VYTORIN 10/10) 10-10 mg per tablet Take 1 Tab by mouth nightly.  cyclobenzaprine (FLEXERIL) 10 mg tablet Take 0.5 to 1 tab daily as needed for back spasms 10 Tab 0    lisinopril (PRINIVIL, ZESTRIL) 10 mg tablet Take 1 Tab by mouth daily. 90 Tab 1    fluticasone-salmeterol (ADVAIR DISKUS) 250-50 mcg/dose diskus inhaler INHALE ONE PUFF BY MOUTH EVERY 12 HOURS 1 Inhaler 2    magnesium oxide (MAG-OX) 400 mg tablet TAKE ONE TABLET BY MOUTH ONCE DAILY 90 Tab 0    simvastatin (ZOCOR) 10 mg tablet Take 1 Tab by mouth nightly. 90 Tab 1    hydroCHLOROthiazide (MICROZIDE) 12.5 mg capsule TAKE ONE CAPSULE BY MOUTH ONCE DAILY 90 Cap 1    amLODIPine (NORVASC) 5 mg tablet Take 1 Tab by mouth daily. 90 Tab 1    SITagliptin-metFORMIN (JANUMET) 50-1,000 mg per tablet Take 1 Tab by mouth two (2) times daily (with meals). 180 Tab 1    aspirin delayed-release 81 mg tablet Take 81 mg by mouth daily.       VITAMIN D2 50,000 unit capsule TAKE ONE CAPSULE BY MOUTH ONCE A WEEK 12 Cap 4    fluticasone (FLONASE) 50 mcg/actuation nasal spray 2 Sprays by Both Nostrils route daily as needed.  multivitamin-iron-FA, hematinic, (CENTRUM COMPLETE)  mg-mcg tab tablet Take 1 Tab by mouth daily. 90 Tab 3    albuterol (PROVENTIL HFA, VENTOLIN HFA, PROAIR HFA) 90 mcg/actuation inhaler Take 2 Puffs by inhalation every four (4) hours as needed for Wheezing. 3 Inhaler 3    Cetirizine (ZYRTEC) 10 mg cap Take 10 mg PE/day by mouth as needed.  ezetimibe (ZETIA) 10 mg tablet Take 1 Tab by mouth daily. 90 Tab 1    esomeprazole (NEXIUM) 40 mg capsule Take 1 Cap by mouth daily. 90 Cap 0    TOPAMAX 25 mg tablet TAKE ONE TABLET BY MOUTH TWICE DAILY (Patient taking differently: as needed. TAKE ONE TABLET BY MOUTH TWICE DAILY) 180 Tab 0     No current facility-administered medications on file prior to visit. ALLERGIES:   Allergies   Allergen Reactions    Banana Hives     Asthma attach, throat swelling, throat scratching, lip swelling    Clonidine Other (comments)     Memory loss    Iodine Rash    Keflex [Cephalexin] Hives    Seafood Hives    Watermelon Hives     Throat closes        MEDICAL/SURGICAL HISTORY:  Past Medical History:   Diagnosis Date    Ankle swelling     Asthma 02/04/2010    PFTs 2016 showed obstructive lung pattern    Brain bleed ([address] 02/20/2015    Chronic diastolic heart failure ([address] 3/18/2015    Diabetes mellitus ([address] 5/27/2014    Diabetic eye exam ([facility] [address] 01/26/2017    No retinopathy Best corrected vision 20/20 OU    HTN (hypertension) 2/4/2010    Hyperlipidemia LDL goal < 70 2014    Magnesium deficiency     Obesity, unspecified 4/15/2015    SAH (subarachnoid hemorrhage) ([address] 2/20/15    admitted in [facility], Dr. Elly Ann.  Eliza Dominguez and ref to Dr. Ronnell Nation, Neurology    Stroke [facility]) 2015    mini stroke    Vitamin D deficiency 2000      Past Surgical History:   Procedure Laterality Date    COLONOSCOPY N/A 8/15/2018    COLONOSCOPY performed by Keiry Lassiter MD at [facility] HX HYSTERECTOMY  2004    abd approach with ovaries and cervix intact    HX HYSTERECTOMY N/A 2004    HX OTHER SURGICAL  8/25/15    SAH, cerebral arteriopathy, Dr. More Nick, Interventional Neurology [facility]        FAMILY HISTORY:  Family History   Problem Relation Age of Onset    Diabetes Mother     Hypertension Mother              Heart Attack Mother 62    Alcohol abuse Father         cirrhosis    Diabetes Sister     Heart Attack Sister 64        SOCIAL HISTORY:  Social History     Tobacco Use    Smoking status: Never Smoker    Smokeless tobacco: Never Used   Substance Use Topics    Alcohol use: Yes     Alcohol/week: 0.6 oz     Types: 1 Glasses of wine per week     Comment: occasionally - social,    Drug use: No             HISTORY OF PRESENT ILLNESS: Anali Correa is a 59 y.o. female who presents to the office for a routine follow up visit.        Diabetes -  Currently the patient's condition is well controlled. Complications include: microalbuminuria   Last A1c: 5.8% on 10/24/18  Reports compliance with the following regimen: Janumet 50-1,000 mg BID   Home glucose readings: AM fasting ranges 90s-110s      Last urine microalbumin: 10/24/18, normal screening. Patient is on ACE inhibitor therapy as she has had microalbuminuria in the past.   Last lipid panel: 5/30/18, LDL 86 at that time. Patient is on statin therapy  Last foot exam: 10/12/17, due today   Last eye exam: overdue per patient. States she sees Dr. JACKSON [facility] and has been meaning to call to schedule.      Hypertension -   Currently, the patient's condition is well controlled. Complications include: diastolic cardiac dysfunction, history of SAH in 2015 when BP was uncontrolled. Patient follows with cardiology (Dr. Dani Larson)  Reports compliance with the following regimen: HCTZ 12.5 mg daily, amlodipine 5 mg daily, lisinopril 10 mg daily  Home BP readings: not checking  Lifestyle modifications: weight loss, low sodium diet. Hyperlipidemia -   On Vytorin 10/10 mg daily.  Patient states her cholesterol seemed to be better controlled when she was on Crestor through [facility]. Last LDL was 124 on 10/24/18       Asthma -   Mild/moderate persistent, since childhood. Patient had PFTs in 2016 which showed an obstructive lung pattern. She was subsequently given a diagnosis of COPD though she has never been a smoker. She questions this. She's had no significant occupational exposures, and minimal secondhand smoke exposure. Regardless, her symptoms are well controlled on Advair with albuterol PRN. Frequency of rescue inhaler: Twice monthly   Frequency of nighttime awakenings: \"very very rarely\" per patient   Limitation of daily activity: NO  Triggers: dust at work, allergies      GERD -   Well controlled on Dexilant 30 mg daily.      Hip Pain -   Today, patient complains of pain in her L hip, which stars in her back, just above her buttocks and radiates into her groin. It is exacerbated by movement of her lumbar spine or her hip. States she has had this problem on and off for years. Last night she took an aspirin and a Flexeril, and that seemed to help. Requests refill of flexeril today. REVIEW OF SYSTEMS:  Review of Systems   Constitutional: Negative for chills, fever and malaise/fatigue. Respiratory: Negative for cough, shortness of breath and wheezing. Cardiovascular: Negative for chest pain, palpitations and leg swelling. Gastrointestinal: Negative for abdominal pain, constipation, diarrhea, nausea and vomiting. Musculoskeletal: Positive for back pain and joint pain. Neurological: Negative for tremors, sensory change and focal weakness. PHYSICAL EXAMINATION:  Physical Exam   Constitutional: She is oriented to person, place, and time and well-developed, well-nourished, and in no distress. Cardiovascular: Normal rate, regular rhythm and normal heart sounds. Exam reveals no gallop and no friction rub. No murmur heard.   Pulmonary/Chest: Effort normal and breath sounds normal. She has no wheezes. She has no rales. Musculoskeletal:        Lumbar back: She exhibits tenderness and pain. She exhibits no bony tenderness, no edema and no deformity. Back:    Neurological: She is alert and oriented to person, place, and time. Gait normal.   Skin: Skin is warm and dry. No rash noted. Psychiatric: Mood, memory and affect normal.   Diabetic Foot Exam: DP pulses 2+ symmetric, no open areas or skin breakdown noted, sensory intact to filament and positioning. Vibratory sensation not tested. Nails in good condition. RESULTS:  No results found for this visit on 01/23/19. ASSESSMENT/PLAN:  Diagnoses and all orders for this visit:    1. Controlled type 2 diabetes mellitus with microalbuminuria, without long-term current use of insulin ([facility] [address]  - Well controlled  - Continue efforts with diet and exercise  - Continue Janumet  - Schedule annual eye exam  - Foot exam done today   - Re-check labs prior to next visit  Orders:    -     METABOLIC PANEL, COMPREHENSIVE; Future  -     LIPID PANEL; Future  -     HEMOGLOBIN A1C W/O EAG; Future  -      DIABETES FOOT EXAM    2. Essential hypertension  - BP well controlled, continue current regimen     3. Mixed hyperlipidemia  - Will switch back to Crestor, stop Vytorin  - repeat labs in 3 mo  Orders:    -     rosuvastatin (CRESTOR) 10 mg tablet; Take 1 Tab by mouth nightly. 4. Chronic diastolic heart failure ([facility] [address]  - Follow up with cardiology     5. Mild persistent asthma without complication  - WEll controlled, Advair refilled  Orders:    -     fluticasone-salmeterol (ADVAIR DISKUS) 250-50 mcg/dose diskus inhaler; INHALE ONE PUFF BY MOUTH EVERY 12 HOURS    6. Gastroesophageal reflux disease without esophagitis  - Stable on Dexilant. Using magnesium supplement for chronic PPI use     7. Acute left-sided low back pain without sciatica  8.  Chronic left hip pain  - Patient was advised to have the pelvic ultrasound done that was ordered at her well woman visit a few months ago, when she was noted to have L adnexal tenderness  - Will also obtain hip films given radiation to groin  - However, I suspect her pain today is most likely due to lumbar muscle spasm, and agreed to refill small supply of flexeril. encouraged moist heat as well  Orders:    -     cyclobenzaprine (FLEXERIL) 10 mg tablet; Take 0.5 to 1 tab daily as needed for back spasms  -     XR HIP LT W OR WO PELV 2-3 VWS; Future      Follow-up Disposition:  Return in about 3 months (around 4/23/2019) for fasting labs prior. All questions answered. Patient expresses understanding and agrees with the plan as detailed above.     PATIENT CARE TEAM:   Patient Care Team:  SOILA Dinero as PCP - General (Physician Assistant)  Gayatri Gil MD as Consulting Provider (Cardiology)  Mundo Serrano MD as Consulting Provider (Neurology)       SOILA Ham   January 23, 2019   12:00 PM

## 2019-01-23 NOTE — PATIENT INSTRUCTIONS
A Healthy Lifestyle: Care Instructions  Your Care Instructions    A healthy lifestyle can help you feel good, stay at a healthy weight, and have plenty of energy for both work and play. A healthy lifestyle is something you can share with your whole family. A healthy lifestyle also can lower your risk for serious health problems, such as high blood pressure, heart disease, and diabetes. You can follow a few steps listed below to improve your health and the health of your family. Follow-up care is a key part of your treatment and safety. Be sure to make and go to all appointments, and call your doctor if you are having problems. It's also a good idea to know your test results and keep a list of the medicines you take. How can you care for yourself at home? · Do not eat too much sugar, fat, or fast foods. You can still have dessert and treats now and then. The goal is moderation. · Start small to improve your eating habits. Pay attention to portion sizes, drink less juice and soda pop, and eat more fruits and vegetables. ? Eat a healthy amount of food. A 3-ounce serving of meat, for example, is about the size of a deck of cards. Fill the rest of your plate with vegetables and whole grains. ? Limit the amount of soda and sports drinks you have every day. Drink more water when you are thirsty. ? Eat at least 5 servings of fruits and vegetables every day. It may seem like a lot, but it is not hard to reach this goal. A serving or helping is 1 piece of fruit, 1 cup of vegetables, or 2 cups of leafy, raw vegetables. Have an apple or some carrot sticks as an afternoon snack instead of a candy bar. Try to have fruits and/or vegetables at every meal.  · Make exercise part of your daily routine. You may want to start with simple activities, such as walking, bicycling, or slow swimming. Try to be active 30 to 60 minutes every day. You do not need to do all 30 to 60 minutes all at once.  For example, you can exercise 3 times a day for 10 or 20 minutes. Moderate exercise is safe for most people, but it is always a good idea to talk to your doctor before starting an exercise program.  · Keep moving. Moises Buddle the lawn, work in the garden, or [employer]. Take the stairs instead of the elevator at work. · If you smoke, quit. People who smoke have an increased risk for heart attack, stroke, cancer, and other lung illnesses. Quitting is hard, but there are ways to boost your chance of quitting tobacco for good. ? Use nicotine gum, patches, or lozenges. ? Ask your doctor about stop-smoking programs and medicines. ? Keep trying. In addition to reducing your risk of diseases in the future, you will notice some benefits soon after you stop using tobacco. If you have shortness of breath or asthma symptoms, they will likely get better within a few weeks after you quit. · Limit how much alcohol you drink. Moderate amounts of alcohol (up to 2 drinks a day for men, 1 drink a day for women) are okay. But drinking too much can lead to liver problems, high blood pressure, and other health problems. Family health  If you have a family, there are many things you can do together to improve your health. · Eat meals together as a family as often as possible. · Eat healthy foods. This includes fruits, vegetables, lean meats and dairy, and whole grains. · Include your family in your fitness plan. Most people think of activities such as jogging or tennis as the way to fitness, but there are many ways you and your family can be more active. Anything that makes you breathe hard and gets your heart pumping is exercise. Here are some tips:  ? Walk to do errands or to take your child to school or the bus.  ? Go for a family bike ride after dinner instead of watching TV. Where can you learn more? Go to http://monique-digna.info/. Enter N804 in the search box to learn more about \"A Healthy Lifestyle: Care Instructions. \"  Current as of: September 11, 2018  Content Version: 11.9  © 2999-7859 [employer]. Care instructions adapted under license by [employer] (which disclaims liability or warranty for this information). If you have questions about a medical condition or this instruction, always ask your healthcare professional. [address] any warranty or liability for your use of this information.

## 2019-01-23 NOTE — TELEPHONE ENCOUNTER
Called home number. Verified name and . Spoke with patient. Patient notified x-ray results. Patient had no further questions or concerns.

## 2019-01-23 NOTE — PROGRESS NOTES
Called home number. Left message on answering machine to return the call. This call was concerning message below per Shannon CM.

## 2019-01-23 NOTE — PROGRESS NOTES
Chief Complaint   Patient presents with    Diabetes    Asthma     Patient is no longer taking Nexium, Zetia, and Simvastatin. Patient is only taking Topamax PRN not BID. 1. Have you been to the ER, urgent care clinic since your last visit? Hospitalized since your last visit? no    2. Have you seen or consulted any other health care providers outside of the [facility] since your last visit? Include any pap smears or colon screening.  No

## 2019-02-08 RX ORDER — HYDROCHLOROTHIAZIDE 12.5 MG/1
CAPSULE ORAL
Qty: 30 CAP | Refills: 6 | Status: SHIPPED | OUTPATIENT
Start: 2019-02-08 | End: 2019-04-24 | Stop reason: SDUPTHER

## 2019-02-11 ENCOUNTER — OFFICE VISIT (OUTPATIENT)
Dept: CARDIOLOGY CLINIC | Age: 59
End: 2019-02-11

## 2019-02-11 VITALS
SYSTOLIC BLOOD PRESSURE: 134 MMHG | WEIGHT: 195 LBS | BODY MASS INDEX: 31.34 KG/M2 | DIASTOLIC BLOOD PRESSURE: 61 MMHG | HEIGHT: 66 IN | HEART RATE: 78 BPM

## 2019-02-11 DIAGNOSIS — E78.2 MIXED HYPERLIPIDEMIA: ICD-10-CM

## 2019-02-11 DIAGNOSIS — I10 ESSENTIAL HYPERTENSION: ICD-10-CM

## 2019-02-11 DIAGNOSIS — Z86.79 HISTORY OF SUBARACHNOID HEMORRHAGE: ICD-10-CM

## 2019-02-11 DIAGNOSIS — I50.32 CHRONIC DIASTOLIC HEART FAILURE (HCC): Primary | ICD-10-CM

## 2019-02-11 DIAGNOSIS — E66.9 OBESITY (BMI 30.0-34.9): ICD-10-CM

## 2019-02-11 RX ORDER — ATORVASTATIN CALCIUM 10 MG/1
10 TABLET, FILM COATED ORAL DAILY
COMMUNITY
End: 2019-03-18

## 2019-02-11 NOTE — LETTER
Slim Erwin 1960 2/11/2019 Dear SOILA Escamilla I had the pleasure of evaluating  Ms. Cristine Bernard in office today. Below are the relevant portions of my assessment and plan of care. ICD-10-CM ICD-9-CM 1. Chronic diastolic heart failure (HCC) I50.32 428.32   
2. Essential hypertension I10 401.9 AMB POC EKG ROUTINE W/ 12 LEADS, INTER & REP 3. Mixed hyperlipidemia E78.2 272.2 4. Obesity (BMI 30.0-34. 9) E66.9 278.00   
5. History of subarachnoid hemorrhage Z86.79 V12.59 Current Outpatient Medications Medication Sig Dispense Refill  glucosam/chond/hyalu/CF borate (MOVE FREE [employer]Helper PO) Take  by mouth.  atorvastatin (LIPITOR) 10 mg tablet Take  by mouth daily.  hydroCHLOROthiazide (MICROZIDE) 12.5 mg capsule TAKE 1 CAPSULE BY MOUTH ONCE DAILY 30 Cap 6  cyclobenzaprine (FLEXERIL) 10 mg tablet Take 0.5 to 1 tab daily as needed for back spasms 10 Tab 0  
 fluticasone-salmeterol (ADVAIR DISKUS) 250-50 mcg/dose diskus inhaler INHALE ONE PUFF BY MOUTH EVERY 12 HOURS 1 Inhaler 2  
 lisinopril (PRINIVIL, ZESTRIL) 10 mg tablet Take 1 Tab by mouth daily. 90 Tab 1  
 magnesium oxide (MAG-OX) 400 mg tablet TAKE ONE TABLET BY MOUTH ONCE DAILY 90 Tab 0  
 amLODIPine (NORVASC) 5 mg tablet Take 1 Tab by mouth daily. 90 Tab 1  
 TOPAMAX 25 mg tablet TAKE ONE TABLET BY MOUTH TWICE DAILY (Patient taking differently: as needed. TAKE ONE TABLET BY MOUTH TWICE DAILY) 180 Tab 0  
 SITagliptin-metFORMIN (JANUMET) 50-1,000 mg per tablet Take 1 Tab by mouth two (2) times daily (with meals). 180 Tab 1  
 aspirin delayed-release 81 mg tablet Take 81 mg by mouth daily.  VITAMIN D2 50,000 unit capsule TAKE ONE CAPSULE BY MOUTH ONCE A WEEK 12 Cap 4  
 fluticasone (FLONASE) 50 mcg/actuation nasal spray 2 Sprays by Both Nostrils route daily as needed.  multivitamin-iron-FA, hematinic, (CENTRUM COMPLETE)  mg-mcg tab tablet Take 1 Tab by mouth daily.  90 Tab 3  
  albuterol (PROVENTIL HFA, VENTOLIN HFA, PROAIR HFA) 90 mcg/actuation inhaler Take 2 Puffs by inhalation every four (4) hours as needed for Wheezing. 3 Inhaler 3  
 Cetirizine (ZYRTEC) 10 mg cap Take 10 mg PE/day by mouth as needed. Orders Placed This Encounter  AMB POC EKG ROUTINE W/ 12 LEADS, INTER & REP Order Specific Question:   Reason for Exam: Answer:   HTN  
 glucosam/chond/hyalu/CF borate (MOVE FREE JOINT HEALTH PO) Sig: Take  by mouth.  atorvastatin (LIPITOR) 10 mg tablet Sig: Take  by mouth daily. If you have questions, please do not hesitate to call me. I look forward to following Ms. Agudelo Guardian along with you. Sincerely, Chanell Diane MD

## 2019-02-11 NOTE — PATIENT INSTRUCTIONS
Medications Discontinued During This Encounter   Medication Reason    hydroCHLOROthiazide (MICROZIDE) 59.9 mg capsule Duplicate Order    rosuvastatin (CRESTOR) 10 mg tablet     dexlansoprazole (DEXILANT) 30 mg capsule           Body Mass Index: Care Instructions  Your Care Instructions    Body mass index (BMI) can help you see if your weight is raising your risk for health problems. It uses a formula to compare how much you weigh with how tall you are. · A BMI lower than 18.5 is considered underweight. · A BMI between 18.5 and 24.9 is considered healthy. · A BMI between 25 and 29.9 is considered overweight. A BMI of 30 or higher is considered obese. If your BMI is in the normal range, it means that you have a lower risk for weight-related health problems. If your BMI is in the overweight or obese range, you may be at increased risk for weight-related health problems, such as high blood pressure, heart disease, stroke, arthritis or joint pain, and diabetes. If your BMI is in the underweight range, you may be at increased risk for health problems such as fatigue, lower protection (immunity) against illness, muscle loss, bone loss, hair loss, and hormone problems. BMI is just one measure of your risk for weight-related health problems. You may be at higher risk for health problems if you are not active, you eat an unhealthy diet, or you drink too much alcohol or use tobacco products. Follow-up care is a key part of your treatment and safety. Be sure to make and go to all appointments, and call your doctor if you are having problems. It's also a good idea to know your test results and keep a list of the medicines you take. How can you care for yourself at home? · Practice healthy eating habits. This includes eating plenty of fruits, vegetables, whole grains, lean protein, and low-fat dairy. · If your doctor recommends it, get more exercise. Walking is a good choice.  Bit by bit, increase the amount you walk every day. Try for at least 30 minutes on most days of the week. · Do not smoke. Smoking can increase your risk for health problems. If you need help quitting, talk to your doctor about stop-smoking programs and medicines. These can increase your chances of quitting for good. · Limit alcohol to 2 drinks a day for men and 1 drink a day for women. Too much alcohol can cause health problems. If you have a BMI higher than 25  · Your doctor may do other tests to check your risk for weight-related health problems. This may include measuring the distance around your waist. A waist measurement of more than 40 inches in men or 35 inches in women can increase the risk of weight-related health problems. · Talk with your doctor about steps you can take to stay healthy or improve your health. You may need to make lifestyle changes to lose weight and stay healthy, such as changing your diet and getting regular exercise. If you have a BMI lower than 18.5  · Your doctor may do other tests to check your risk for health problems. · Talk with your doctor about steps you can take to stay healthy or improve your health. You may need to make lifestyle changes to gain or maintain weight and stay healthy, such as getting more healthy foods in your diet and doing exercises to build muscle. Where can you learn more? Go to http://monique-digna.info/. Enter S176 in the search box to learn more about \"Body Mass Index: Care Instructions. \"  Current as of: June 25, 2018  Content Version: 11.9  © 1279-4288 [employer]. Care instructions adapted under license by [employer] (which disclaims liability or warranty for this information). If you have questions about a medical condition or this instruction, always ask your healthcare professional. [place] [address] any warranty or liability for your use of this information.

## 2019-02-11 NOTE — PROGRESS NOTES
HISTORY OF PRESENT ILLNESS  Collette Hamburg is a 59 y.o. female. CHF   The history is provided by the medical records. This is a chronic problem. Associated symptoms include shortness of breath. Pertinent negatives include no chest pain and no headaches. Hypertension   The history is provided by the medical records and patient. This is a chronic problem. Associated symptoms include shortness of breath. Pertinent negatives include no chest pain and no headaches. Cholesterol Problem   The history is provided by the medical records. This is a chronic problem. Associated symptoms include shortness of breath. Pertinent negatives include no chest pain and no headaches. Shortness of Breath   The history is provided by the patient. This is a recurrent problem. The problem occurs frequently. The current episode started more than 1 week ago. The problem has not changed since onset. Associated symptoms include leg swelling. Pertinent negatives include no fever, no headaches, no cough, no wheezing, no PND, no orthopnea, no chest pain, no vomiting, no rash and no claudication. The problem's precipitants include exercise (walking thru parking lot; 9/15 more; 1/17 better except when exosed to smoke/dust). She has tried beta-agonist inhalers for the symptoms. The treatment provided significant relief. Leg Swelling   The history is provided by the patient. This is a recurrent problem. The current episode started more than 1 week ago. The problem occurs daily (R>L). The problem has been gradually improving. Associated symptoms include shortness of breath. Pertinent negatives include no chest pain and no headaches. The symptoms are aggravated by standing. The symptoms are relieved by rest.       Review of Systems   Constitutional: Negative for chills, fever, malaise/fatigue and weight loss. HENT: Negative for nosebleeds. Eyes: Negative for discharge. Respiratory: Positive for shortness of breath.  Negative for cough and wheezing. Cardiovascular: Positive for leg swelling. Negative for chest pain, palpitations, orthopnea, claudication and PND. Gastrointestinal: Negative for diarrhea, nausea and vomiting. Genitourinary: Negative for dysuria and hematuria. Musculoskeletal: Negative for joint pain. Skin: Negative for rash. Neurological: Negative for dizziness, seizures, loss of consciousness and headaches. Endo/Heme/Allergies: Negative for polydipsia. Does not bruise/bleed easily. Psychiatric/Behavioral: Negative for depression and substance abuse. The patient does not have insomnia. Allergies   Allergen Reactions    Banana Hives     Asthma attach, throat swelling, throat scratching, lip swelling    Clonidine Other (comments)     Memory loss    Iodine Rash    Keflex [Cephalexin] Hives    Seafood Hives    Watermelon Hives     Throat closes       Past Medical History:   Diagnosis Date    Ankle swelling     Asthma 02/04/2010    PFTs 2016 showed obstructive lung pattern    Brain bleed ([address] 02/20/2015    Chronic diastolic heart failure ([address] 3/18/2015    Diabetes mellitus ([address] 5/27/2014    Diabetic eye exam ([address] 01/26/2017    No retinopathy Best corrected vision 20/20 OU    HTN (hypertension) 2/4/2010    Hyperlipidemia LDL goal < 70 2014    Magnesium deficiency     Obesity, unspecified 4/15/2015    SAH (subarachnoid hemorrhage) ([address] 2/20/15    ad[facility], Dr. Stew Rogers. Yoselin Bauer and ref to Dr. Kristy Maria, Neurology    Stroke [facility]) 2015    mini stroke    Vitamin D deficiency 2000       Family History   Problem Relation Age of Onset    Diabetes Mother     Hypertension Mother              Heart Attack Mother 62    Alcohol abuse Father         cirrhosis    Diabetes Sister     Heart Attack Sister 64       Social History     Tobacco Use    Smoking status: Never Smoker    Smokeless tobacco: Never Used   Substance Use Topics    Alcohol use:  Yes     Alcohol/week: 0.0 oz     Frequency: Monthly or less Drinks per session: 1 or 2     Binge frequency: Never     Comment: social    Drug use: No        Current Outpatient Medications   Medication Sig    glucosam/chond/hyalu/CF borate (MOVE FREE JOINT HEALTH PO) Take  by mouth.  atorvastatin (LIPITOR) 10 mg tablet Take  by mouth daily.  hydroCHLOROthiazide (MICROZIDE) 12.5 mg capsule TAKE 1 CAPSULE BY MOUTH ONCE DAILY    cyclobenzaprine (FLEXERIL) 10 mg tablet Take 0.5 to 1 tab daily as needed for back spasms    fluticasone-salmeterol (ADVAIR DISKUS) 250-50 mcg/dose diskus inhaler INHALE ONE PUFF BY MOUTH EVERY 12 HOURS    lisinopril (PRINIVIL, ZESTRIL) 10 mg tablet Take 1 Tab by mouth daily.  magnesium oxide (MAG-OX) 400 mg tablet TAKE ONE TABLET BY MOUTH ONCE DAILY    amLODIPine (NORVASC) 5 mg tablet Take 1 Tab by mouth daily.  TOPAMAX 25 mg tablet TAKE ONE TABLET BY MOUTH TWICE DAILY (Patient taking differently: as needed. TAKE ONE TABLET BY MOUTH TWICE DAILY)    SITagliptin-metFORMIN (JANUMET) 50-1,000 mg per tablet Take 1 Tab by mouth two (2) times daily (with meals).  aspirin delayed-release 81 mg tablet Take 81 mg by mouth daily.  VITAMIN D2 50,000 unit capsule TAKE ONE CAPSULE BY MOUTH ONCE A WEEK    fluticasone (FLONASE) 50 mcg/actuation nasal spray 2 Sprays by Both Nostrils route daily as needed.  multivitamin-iron-FA, hematinic, (CENTRUM COMPLETE)  mg-mcg tab tablet Take 1 Tab by mouth daily.  albuterol (PROVENTIL HFA, VENTOLIN HFA, PROAIR HFA) 90 mcg/actuation inhaler Take 2 Puffs by inhalation every four (4) hours as needed for Wheezing.  Cetirizine (ZYRTEC) 10 mg cap Take 10 mg PE/day by mouth as needed. No current facility-administered medications for this visit.          Past Surgical History:   Procedure Laterality Date    COLONOSCOPY N/A 8/15/2018    COLONOSCOPY performed by Jess Mathias MD at [facility] HX HYSTERECTOMY  2004    abd approach with ovaries and cervix intact    HX HYSTERECTOMY N/A 2004    HX OTHER SURGICAL  8/25/15    SAH, cerebral arteriopathy, Dr. More Nick, Interventional Neurology [facility]       Diagnostic Studies:  CARDIOLOGY STUDIES 4/10/2015   Echocardiogram - Complete Result 55%EF, tr MR   Some recent data might be hidden   2/18 Nuc Stress  Conclusion:   1. Normal perfusion scan. 2. Normal wall motion and ejection fractions calculated to be 51%. 3. No evidence of significant fixed or reversible defect suggesting ischemia or myocardial infarction noted from this nuclear study. 4. Low risk scan. Visit Vitals  /61   Pulse 78   Ht 5' 6\" (1.676 m)   Wt 88.5 kg (195 lb)   BMI 31.47 kg/m²       Ms. Chaitanya Cardoso has a reminder for a \"due or due soon\" health maintenance. I have asked that she contact her primary care provider for follow-up on this health maintenance. Physical Exam   Constitutional: She is oriented to person, place, and time. She appears well-developed and well-nourished. No distress. HENT:   Head: Normocephalic and atraumatic. Mouth/Throat: Normal dentition. Eyes: Right eye exhibits no discharge. Left eye exhibits no discharge. No scleral icterus. Neck: Neck supple. No JVD present. Carotid bruit is not present. No thyromegaly present. Cardiovascular: Normal rate, regular rhythm, S1 normal, S2 normal, normal heart sounds and intact distal pulses. Exam reveals no gallop and no friction rub. No murmur heard. Pulmonary/Chest: Effort normal and breath sounds normal. She has no wheezes. She has no rales. Abdominal: Soft. She exhibits no mass. There is no tenderness. Musculoskeletal: She exhibits edema (puffy). Lymphadenopathy:        Right cervical: No superficial cervical adenopathy present. Left cervical: No superficial cervical adenopathy present. Neurological: She is alert and oriented to person, place, and time. Skin: Skin is warm and dry. No rash noted. Psychiatric: She has a normal mood and affect.  Her behavior is normal. ASSESSMENT and PLAN    Discussed the patient's above normal BMI with her. I have recommended the following interventions: dietary management education, guidance, and counseling . The BMI follow up plan is as follows: BMI is out of normal parameters and plan is as follows: I have counseled this patient on diet and exercise regimens       CHF: Diedra Skiff    Blood pressure is controlled. Continue present medications. Diagnoses and all orders for this visit:    1. Chronic diastolic heart failure ([address]    2. Essential hypertension  -     AMB POC EKG ROUTINE W/ 12 LEADS, INTER & REP    3. Mixed hyperlipidemia    4. Obesity (BMI 30.0-34.9)    5. History of subarachnoid hemorrhage        Pertinent laboratory and test data reviewed and discussed with patient. See patient instructions also for other medical advice given    Medications Discontinued During This Encounter   Medication Reason    hydroCHLOROthiazide (MICROZIDE) 13.1 mg capsule Duplicate Order    rosuvastatin (CRESTOR) 10 mg tablet     dexlansoprazole (DEXILANT) 30 mg capsule        Follow-up Disposition:  Return in about 1 year (around 2/11/2020), or if symptoms worsen or fail to improve, for with ekg.

## 2019-02-11 NOTE — PROGRESS NOTES
Patient didn't bring medications, verbally reviewed    1. Have you been to the ER, urgent care clinic since your last visit? Hospitalized since your last visit? No    2. Have you seen or consulted any other health care providers outside of the [address] [place] [address] since your last visit? Include any pap smears or colon screening. No     3. Since your last visit, have you had any of the following symptoms?      swelling in legs/arms. Explain: Bilateral swelling in legs and feet    4. Have you had any blood work, X-rays or cardiac testing? Yes When: 1/19 Where: [facility]  Reason for visit: Hip Xrays     Requested: NO     In [facility]: YES    5. Where do you normally have your labs drawn? [facility]    6. Do you need any refills today?    No

## 2019-03-01 DIAGNOSIS — I10 ESSENTIAL HYPERTENSION: ICD-10-CM

## 2019-03-01 DIAGNOSIS — M54.50 ACUTE LEFT-SIDED LOW BACK PAIN WITHOUT SCIATICA: ICD-10-CM

## 2019-03-01 RX ORDER — LISINOPRIL 10 MG/1
10 TABLET ORAL DAILY
Qty: 90 TAB | Refills: 0 | Status: SHIPPED | OUTPATIENT
Start: 2019-03-01 | End: 2019-04-24 | Stop reason: SDUPTHER

## 2019-03-01 RX ORDER — CYCLOBENZAPRINE HCL 10 MG
TABLET ORAL
Qty: 10 TAB | Refills: 0 | Status: SHIPPED | OUTPATIENT
Start: 2019-03-01 | End: 2019-04-24 | Stop reason: SDUPTHER

## 2019-03-01 RX ORDER — AMLODIPINE BESYLATE 5 MG/1
TABLET ORAL
Qty: 90 TAB | Refills: 0 | Status: SHIPPED | OUTPATIENT
Start: 2019-03-01 | End: 2019-05-07 | Stop reason: SDUPTHER

## 2019-03-01 NOTE — TELEPHONE ENCOUNTER
Refills approved. Please advise patient that she is due to follow up at the end of April. I am refilling her Flexeril too, but she should be reminded that this is to be used very sparingly, only as needed. There will be no further refills of this medication until she is seen.

## 2019-03-01 NOTE — TELEPHONE ENCOUNTER
This patient contacted office for the following prescriptions to be filled:    Medication requested :  Requested Prescriptions     Pending Prescriptions Disp Refills    amLODIPine (NORVASC) 5 mg tablet [Pharmacy Med Name: amLODIPine BESYLATE 5 MG TAB] 90 Tab 0     Sig: TAKE ONE TABLET BY MOUTH ONCE DAILY    cyclobenzaprine (FLEXERIL) 10 mg tablet [Pharmacy Med Name: CYCLOBENZAPRINE 10 MG TABLET] 10 Tab 0     Sig: TAKE ONE-HALF TO ONE TABLET BY MOUTH DAILY AS NEEDED FOR BACK SPASMS    lisinopril (PRINIVIL, ZESTRIL) 10 mg tablet 90 Tab 1     Sig: Take 1 Tab by mouth daily.      PCP: Daniel Ferrari or Print: ginger cunningham  Mail order or Local pharmacy [phone]    Scheduled appointment if not seen by current providers in office: lov 1/23/19 raghav  2/10/19

## 2019-03-01 NOTE — TELEPHONE ENCOUNTER
Contacted patient and verified identity using name and date of birth (2- identifiers)  Spoke with patient and she verbalized understanding of PRN flexeril and we scheduled follow-up for Wednesday, April 24, 2019 01:00 PM

## 2019-03-14 ENCOUNTER — APPOINTMENT (OUTPATIENT)
Dept: CT IMAGING | Age: 59
End: 2019-03-14
Attending: EMERGENCY MEDICINE
Payer: COMMERCIAL

## 2019-03-14 ENCOUNTER — APPOINTMENT (OUTPATIENT)
Dept: GENERAL RADIOLOGY | Age: 59
End: 2019-03-14
Attending: EMERGENCY MEDICINE
Payer: COMMERCIAL

## 2019-03-14 ENCOUNTER — HOSPITAL ENCOUNTER (EMERGENCY)
Age: 59
Discharge: HOME | End: 2019-03-14
Attending: EMERGENCY MEDICINE | Admitting: EMERGENCY MEDICINE
Payer: COMMERCIAL

## 2019-03-14 VITALS
WEIGHT: 196 LBS | HEIGHT: 72 IN | DIASTOLIC BLOOD PRESSURE: 78 MMHG | BODY MASS INDEX: 26.55 KG/M2 | SYSTOLIC BLOOD PRESSURE: 139 MMHG | HEART RATE: 105 BPM | OXYGEN SATURATION: 99 % | RESPIRATION RATE: 17 BRPM | TEMPERATURE: 98.6 F

## 2019-03-14 DIAGNOSIS — J45.20 MILD INTERMITTENT ASTHMA WITHOUT COMPLICATION: ICD-10-CM

## 2019-03-14 DIAGNOSIS — J20.9 ACUTE BRONCHITIS WITH BRONCHOSPASM: Primary | ICD-10-CM

## 2019-03-14 DIAGNOSIS — R07.89 ATYPICAL CHEST PAIN: ICD-10-CM

## 2019-03-14 DIAGNOSIS — J01.00 ACUTE MAXILLARY SINUSITIS, RECURRENCE NOT SPECIFIED: ICD-10-CM

## 2019-03-14 LAB
ALBUMIN SERPL-MCNC: 3.9 G/DL (ref 3.4–5)
ALBUMIN/GLOB SERPL: 0.9 {RATIO} (ref 0.8–1.7)
ALP SERPL-CCNC: 82 U/L (ref 45–117)
ALT SERPL-CCNC: 17 U/L (ref 13–56)
ANION GAP SERPL CALC-SCNC: 8 MMOL/L (ref 3–18)
AST SERPL-CCNC: 11 U/L (ref 15–37)
ATRIAL RATE: 103 BPM
BASOPHILS # BLD: 0.1 K/UL (ref 0–0.1)
BASOPHILS NFR BLD: 1 % (ref 0–2)
BILIRUB SERPL-MCNC: 0.5 MG/DL (ref 0.2–1)
BNP SERPL-MCNC: 30 PG/ML (ref 0–900)
BUN SERPL-MCNC: 19 MG/DL (ref 7–18)
BUN/CREAT SERPL: 17 (ref 12–20)
CALCIUM SERPL-MCNC: 8.9 MG/DL (ref 8.5–10.1)
CALCULATED P AXIS, ECG09: 61 DEGREES
CALCULATED R AXIS, ECG10: 1 DEGREES
CALCULATED T AXIS, ECG11: 21 DEGREES
CHLORIDE SERPL-SCNC: 107 MMOL/L (ref 100–108)
CK MB CFR SERPL CALC: NORMAL % (ref 0–4)
CK MB CFR SERPL CALC: NORMAL % (ref 0–4)
CK MB SERPL-MCNC: <1 NG/ML (ref 5–25)
CK MB SERPL-MCNC: <1 NG/ML (ref 5–25)
CK SERPL-CCNC: 80 U/L (ref 26–192)
CK SERPL-CCNC: 87 U/L (ref 26–192)
CO2 SERPL-SCNC: 27 MMOL/L (ref 21–32)
CREAT SERPL-MCNC: 1.1 MG/DL (ref 0.6–1.3)
D DIMER PPP FEU-MCNC: 0.45 UG/ML(FEU)
DIAGNOSIS, 93000: NORMAL
DIFFERENTIAL METHOD BLD: ABNORMAL
EOSINOPHIL # BLD: 0.9 K/UL (ref 0–0.4)
EOSINOPHIL NFR BLD: 14 % (ref 0–5)
ERYTHROCYTE [DISTWIDTH] IN BLOOD BY AUTOMATED COUNT: 13.1 % (ref 11.6–14.5)
GLOBULIN SER CALC-MCNC: 4.4 G/DL (ref 2–4)
GLUCOSE SERPL-MCNC: 103 MG/DL (ref 74–99)
HCT VFR BLD AUTO: 36 % (ref 35–45)
HGB BLD-MCNC: 11.6 G/DL (ref 12–16)
LYMPHOCYTES # BLD: 1.6 K/UL (ref 0.9–3.6)
LYMPHOCYTES NFR BLD: 25 % (ref 21–52)
MCH RBC QN AUTO: 28.4 PG (ref 24–34)
MCHC RBC AUTO-ENTMCNC: 32.2 G/DL (ref 31–37)
MCV RBC AUTO: 88 FL (ref 74–97)
MONOCYTES # BLD: 0.3 K/UL (ref 0.05–1.2)
MONOCYTES NFR BLD: 5 % (ref 3–10)
NEUTS SEG # BLD: 3.7 K/UL (ref 1.8–8)
NEUTS SEG NFR BLD: 55 % (ref 40–73)
P-R INTERVAL, ECG05: 172 MS
PLATELET # BLD AUTO: 352 K/UL (ref 135–420)
PMV BLD AUTO: 9 FL (ref 9.2–11.8)
POTASSIUM SERPL-SCNC: 3.8 MMOL/L (ref 3.5–5.5)
PROT SERPL-MCNC: 8.3 G/DL (ref 6.4–8.2)
Q-T INTERVAL, ECG07: 348 MS
QRS DURATION, ECG06: 76 MS
QTC CALCULATION (BEZET), ECG08: 455 MS
RBC # BLD AUTO: 4.09 M/UL (ref 4.2–5.3)
SODIUM SERPL-SCNC: 142 MMOL/L (ref 136–145)
TROPONIN I SERPL-MCNC: <0.02 NG/ML (ref 0–0.04)
TROPONIN I SERPL-MCNC: <0.02 NG/ML (ref 0–0.04)
VENTRICULAR RATE, ECG03: 103 BPM
WBC # BLD AUTO: 6.6 K/UL (ref 4.6–13.2)

## 2019-03-14 PROCEDURE — 80053 COMPREHEN METABOLIC PANEL: CPT

## 2019-03-14 PROCEDURE — 96374 THER/PROPH/DIAG INJ IV PUSH: CPT

## 2019-03-14 PROCEDURE — 74011250636 HC RX REV CODE- 250/636: Performed by: EMERGENCY MEDICINE

## 2019-03-14 PROCEDURE — 99285 EMERGENCY DEPT VISIT HI MDM: CPT

## 2019-03-14 PROCEDURE — 70450 CT HEAD/BRAIN W/O DYE: CPT

## 2019-03-14 PROCEDURE — 93005 ELECTROCARDIOGRAM TRACING: CPT

## 2019-03-14 PROCEDURE — 82550 ASSAY OF CK (CPK): CPT

## 2019-03-14 PROCEDURE — 83880 ASSAY OF NATRIURETIC PEPTIDE: CPT

## 2019-03-14 PROCEDURE — 74011000250 HC RX REV CODE- 250: Performed by: EMERGENCY MEDICINE

## 2019-03-14 PROCEDURE — 77030013140 HC MSK NEB VYRM -A

## 2019-03-14 PROCEDURE — 85379 FIBRIN DEGRADATION QUANT: CPT

## 2019-03-14 PROCEDURE — 85025 COMPLETE CBC W/AUTO DIFF WBC: CPT

## 2019-03-14 PROCEDURE — 94640 AIRWAY INHALATION TREATMENT: CPT

## 2019-03-14 PROCEDURE — 71046 X-RAY EXAM CHEST 2 VIEWS: CPT

## 2019-03-14 RX ORDER — IPRATROPIUM BROMIDE AND ALBUTEROL SULFATE 2.5; .5 MG/3ML; MG/3ML
3 SOLUTION RESPIRATORY (INHALATION)
Status: COMPLETED | OUTPATIENT
Start: 2019-03-14 | End: 2019-03-14

## 2019-03-14 RX ORDER — DOXYCYCLINE HYCLATE 100 MG
100 TABLET ORAL 2 TIMES DAILY
Qty: 20 TAB | Refills: 0 | Status: SHIPPED | OUTPATIENT
Start: 2019-03-14 | End: 2019-03-24

## 2019-03-14 RX ORDER — ALBUTEROL SULFATE 90 UG/1
2 AEROSOL, METERED RESPIRATORY (INHALATION)
Qty: 3 INHALER | Refills: 3 | Status: SHIPPED | OUTPATIENT
Start: 2019-03-14 | End: 2019-09-11 | Stop reason: SDUPTHER

## 2019-03-14 RX ORDER — PREDNISONE 10 MG/1
TABLET ORAL
Qty: 1 PACKAGE | Refills: 0 | Status: SHIPPED | OUTPATIENT
Start: 2019-03-14 | End: 2019-03-25

## 2019-03-14 RX ADMIN — IPRATROPIUM BROMIDE AND ALBUTEROL SULFATE 3 ML: .5; 3 SOLUTION RESPIRATORY (INHALATION) at 13:38

## 2019-03-14 RX ADMIN — IPRATROPIUM BROMIDE AND ALBUTEROL SULFATE 3 ML: .5; 3 SOLUTION RESPIRATORY (INHALATION) at 15:08

## 2019-03-14 RX ADMIN — IPRATROPIUM BROMIDE AND ALBUTEROL SULFATE 3 ML: .5; 3 SOLUTION RESPIRATORY (INHALATION) at 14:57

## 2019-03-14 RX ADMIN — METHYLPREDNISOLONE SODIUM SUCCINATE 125 MG: 125 INJECTION, POWDER, FOR SOLUTION INTRAMUSCULAR; INTRAVENOUS at 14:57

## 2019-03-14 NOTE — DISCHARGE INSTRUCTIONS
Patient Education        Bronchitis: Care Instructions  Your Care Instructions    Bronchitis is inflammation of the bronchial tubes, which carry air to the lungs. The tubes swell and produce mucus, or phlegm. The mucus and inflamed bronchial tubes make you cough. You may have trouble breathing. Most cases of bronchitis are caused by viruses like those that cause colds. Antibiotics usually do not help and they may be harmful. Bronchitis usually develops rapidly and lasts about 2 to 3 weeks in otherwise healthy people. Follow-up care is a key part of your treatment and safety. Be sure to make and go to all appointments, and call your doctor if you are having problems. It's also a good idea to know your test results and keep a list of the medicines you take. How can you care for yourself at home? · Take all medicines exactly as prescribed. Call your doctor if you think you are having a problem with your medicine. · Get some extra rest.  · Take an over-the-counter pain medicine, such as acetaminophen (Tylenol), ibuprofen (Advil, Motrin), or naproxen (Aleve) to reduce fever and relieve body aches. Read and follow all instructions on the label. · Do not take two or more pain medicines at the same time unless the doctor told you to. Many pain medicines have acetaminophen, which is Tylenol. Too much acetaminophen (Tylenol) can be harmful. · Take an over-the-counter cough medicine that contains dextromethorphan to help quiet a dry, hacking cough so that you can sleep. Avoid cough medicines that have more than one active ingredient. Read and follow all instructions on the label. · Breathe moist air from a humidifier, hot shower, or sink filled with hot water. The heat and moisture will thin mucus so you can cough it out. · Do not smoke. Smoking can make bronchitis worse. If you need help quitting, talk to your doctor about stop-smoking programs and medicines.  These can increase your chances of quitting for good.  When should you call for help? Call 911 anytime you think you may need emergency care. For example, call if:    · You have severe trouble breathing.    Call your doctor now or seek immediate medical care if:    · You have new or worse trouble breathing.     · You cough up dark brown or bloody mucus (sputum).     · You have a new or higher fever.     · You have a new rash.    Watch closely for changes in your health, and be sure to contact your doctor if:    · You cough more deeply or more often, especially if you notice more mucus or a change in the color of your mucus.     · You are not getting better as expected. Where can you learn more? Go to http://moniqueConnectEdudigna.info/. Enter H333 in the search box to learn more about \"Bronchitis: Care Instructions. \"  Current as of: September 5, 2018  Content Version: 11.9  © 3576-6571 [employer]. Care instructions adapted under license by [employer] (which disclaims liability or warranty for this information). If you have questions about a medical condition or this instruction, always ask your healthcare professional. [address] any warranty or liability for your use of this information. Patient Education        Chest Pain: Care Instructions  Your Care Instructions    There are many things that can cause chest pain. Some are not serious and will get better on their own in a few days. But some kinds of chest pain need more testing and treatment. Your doctor may have recommended a follow-up visit in the next 8 to 12 hours. If you are not getting better, you may need more tests or treatment. Even though your doctor has released you, you still need to watch for any problems. The doctor carefully checked you, but sometimes problems can develop later. If you have new symptoms or if your symptoms do not get better, get medical care right away.   If you have worse or different chest pain or pressure that lasts more than 5 minutes or you passed out (lost consciousness), call 911 or seek other emergency help right away. A medical visit is only one step in your treatment. Even if you feel better, you still need to do what your doctor recommends, such as going to all suggested follow-up appointments and taking medicines exactly as directed. This will help you recover and help prevent future problems. How can you care for yourself at home? · Rest until you feel better. · Take your medicine exactly as prescribed. Call your doctor if you think you are having a problem with your medicine. · Do not drive after taking a prescription pain medicine. When should you call for help? Call 911 if:    · You passed out (lost consciousness).     · You have severe difficulty breathing.     · You have symptoms of a heart attack. These may include:  ? Chest pain or pressure, or a strange feeling in your chest.  ? Sweating. ? Shortness of breath. ? Nausea or vomiting. ? Pain, pressure, or a strange feeling in your back, neck, jaw, or upper belly or in one or both shoulders or arms. ? Lightheadedness or sudden weakness. ? A fast or irregular heartbeat. After you call 911, the  may tell you to chew 1 adult-strength or 2 to 4 low-dose aspirin. Wait for an ambulance. Do not try to drive yourself.    Call your doctor today if:    · You have any trouble breathing.     · Your chest pain gets worse.     · You are dizzy or lightheaded, or you feel like you may faint.     · You are not getting better as expected.     · You are having new or different chest pain. Where can you learn more? Go to http://monique-digna.info/. Enter A120 in the search box to learn more about \"Chest Pain: Care Instructions. \"  Current as of: September 23, 2018  Content Version: 11.9  © 3996-8814 [employer].  Care instructions adapted under license by [employer] (which disclaims liability or warranty for this information). If you have questions about a medical condition or this instruction, always ask your healthcare professional. [address] any warranty or liability for your use of this information. Patient Education        Sinusitis: Care Instructions  Your Care Instructions    Sinusitis is an infection of the lining of the sinus cavities in your head. Sinusitis often follows a cold. It causes pain and pressure in your head and face. In most cases, sinusitis gets better on its own in 1 to 2 weeks. But some mild symptoms may last for several weeks. Sometimes antibiotics are needed. Follow-up care is a key part of your treatment and safety. Be sure to make and go to all appointments, and call your doctor if you are having problems. It's also a good idea to know your test results and keep a list of the medicines you take. How can you care for yourself at home? · Take an over-the-counter pain medicine, such as acetaminophen (Tylenol), ibuprofen (Advil, Motrin), or naproxen (Aleve). Read and follow all instructions on the label. · If the doctor prescribed antibiotics, take them as directed. Do not stop taking them just because you feel better. You need to take the full course of antibiotics. · Be careful when taking over-the-counter cold or flu medicines and Tylenol at the same time. Many of these medicines have acetaminophen, which is Tylenol. Read the labels to make sure that you are not taking more than the recommended dose. Too much acetaminophen (Tylenol) can be harmful. · Breathe warm, moist air from a steamy shower, a hot bath, or a sink filled with hot water. Avoid cold, dry air. Using a humidifier in your home may help. Follow the directions for cleaning the machine. · Use saline (saltwater) nasal washes to help keep your nasal passages open and wash out mucus and bacteria. You can buy saline nose drops at a grocery store or drugstore.  Or you can make your own at home by adding 1 teaspoon of salt and 1 teaspoon of baking soda to 2 cups of distilled water. If you make your own, fill a bulb syringe with the solution, insert the tip into your nostril, and squeeze gently. Danny Clonts your nose. · Put a hot, wet towel or a warm gel pack on your face 3 or 4 times a day for 5 to 10 minutes each time. · Try a decongestant nasal spray like oxymetazoline (Afrin). Do not use it for more than 3 days in a row. Using it for more than 3 days can make your congestion worse. When should you call for help? Call your doctor now or seek immediate medical care if:    · You have new or worse swelling or redness in your face or around your eyes.     · You have a new or higher fever.    Watch closely for changes in your health, and be sure to contact your doctor if:    · You have new or worse facial pain.     · The mucus from your nose becomes thicker (like pus) or has new blood in it.     · You are not getting better as expected. Where can you learn more? Go to http://monique-digna.info/. Enter Z677 in the search box to learn more about \"Sinusitis: Care Instructions. \"  Current as of: March 27, 2018  Content Version: 11.9  © 0301-8903 [employer]. Care instructions adapted under license by [employer] (which disclaims liability or warranty for this information). If you have questions about a medical condition or this instruction, always ask your healthcare professional. [place] [address] any warranty or liability for your use of this information.

## 2019-03-14 NOTE — ED PROVIDER NOTES
EMERGENCY DEPARTMENT HISTORY AND PHYSICAL EXAM    1:16 PM      Date: 3/14/2019  Patient Name: Johnathan Thorpe    History of Presenting Illness     Chief Complaint   Patient presents with    Chest Pain         History Provided By: Patient    Chief Complaint: Chest Pain  Duration: 7 Hours  Timing:  Intermittent  Location: Mid-chest  Quality: radiating to left side and back  Severity: N/A  Modifying Factors: relief with sitting and resting, occasionally exacerbated with exertion, aleve for pain with some relief  Associated Symptoms: tingling in left hand      Additional History (Context): Johnathan Thorpe is a 59 y.o. female with hypertension, asthma, diabetes, hyperlipidemia, subarachnoid hemorrhage, chronic diastolic heart failure,and stroke who presents with intermittent mid-chest pain for the last 7 hours. Pt states her pain radiates to the left side and into her back. She reports she has been having these pains ongoing for the past few weeks. 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 She states her pain is relieved when she sits and rests. She has also taken Aleve for her pain wit some relief. She reports associated tingling in left hand denies diaphoresis and sleep changes. She is followed by Dr. Michelle Parker (cardiology). Pt states she is taking aspirin and uses an inhaler. No other concerns or symptoms at this time. PCP: SOILA Levy    Current Outpatient Medications   Medication Sig Dispense Refill    albuterol (PROVENTIL HFA, VENTOLIN HFA, PROAIR HFA) 90 mcg/actuation inhaler Take 2 Puffs by inhalation every four (4) hours as needed for Wheezing. 3 Inhaler 3    predniSONE (STERAPRED DS) 10 mg dose pack 6 day dose pack per package instructions 1 Package 0    doxycycline (VIBRA-TABS) 100 mg tablet Take 1 Tab by mouth two (2) times a day for 10 days.  20 Tab 0    amLODIPine (NORVASC) 5 mg tablet TAKE ONE TABLET BY MOUTH ONCE DAILY 90 Tab 0    cyclobenzaprine (FLEXERIL) 10 mg tablet TAKE ONE-HALF TO ONE TABLET BY MOUTH DAILY AS NEEDED FOR BACK SPASMS 10 Tab 0    lisinopril (PRINIVIL, ZESTRIL) 10 mg tablet Take 1 Tab by mouth daily. 90 Tab 0    glucosam/chond/hyalu/CF borate (MOVE FREE JOINT HEALTH PO) Take  by mouth.  atorvastatin (LIPITOR) 10 mg tablet Take  by mouth daily.  hydroCHLOROthiazide (MICROZIDE) 12.5 mg capsule TAKE 1 CAPSULE BY MOUTH ONCE DAILY 30 Cap 6    fluticasone-salmeterol (ADVAIR DISKUS) 250-50 mcg/dose diskus inhaler INHALE ONE PUFF BY MOUTH EVERY 12 HOURS 1 Inhaler 2    magnesium oxide (MAG-OX) 400 mg tablet TAKE ONE TABLET BY MOUTH ONCE DAILY 90 Tab 0    SITagliptin-metFORMIN (JANUMET) 50-1,000 mg per tablet Take 1 Tab by mouth two (2) times daily (with meals). 180 Tab 1    aspirin delayed-release 81 mg tablet Take 81 mg by mouth daily.  VITAMIN D2 50,000 unit capsule TAKE ONE CAPSULE BY MOUTH ONCE A WEEK 12 Cap 4    fluticasone (FLONASE) 50 mcg/actuation nasal spray 2 Sprays by Both Nostrils route daily as needed.  multivitamin-iron-FA, hematinic, (CENTRUM COMPLETE)  mg-mcg tab tablet Take 1 Tab by mouth daily. 90 Tab 3    Cetirizine (ZYRTEC) 10 mg cap Take 10 mg PE/day by mouth as needed. Past History     Past Medical History:  Past Medical History:   Diagnosis Date    Ankle swelling     Asthma 02/04/2010    PFTs 2016 showed obstructive lung pattern    Brain bleed (HCC) 02/20/2015    Chronic diastolic heart failure ([facility] [address] 3/18/2015    Diabetes mellitus ([address] 5/27/2014    Diabetic eye exam ([facility] [address] 01/26/2017    No retinopathy Best corrected vision 20/20 OU    HTN (hypertension) 2/4/2010    Hyperlipidemia LDL goal < 70 2014    Magnesium deficiency     Obesity, unspecified 4/15/2015    SAH (subarachnoid hemorrhage) ([facility] [address] 2/20/15    admitted in [facility], Dr. Katharina Rider.  [facility] and ref to Dr. Radha Oliver, Neurology    Stroke [facility]) 2015    mini stroke    Vitamin D deficiency 2000       Past Surgical History:  Past Surgical History:   Procedure Laterality Date    COLONOSCOPY N/A 8/15/2018    COLONOSCOPY performed by Jess Mathias MD at [facility] HX HYSTERECTOMY  2004    abd approach with ovaries and cervix intact    HX HYSTERECTOMY N/A 2004    HX OTHER SURGICAL  8/25/15    SAH, cerebral arteriopathy, Dr. Nikko Giraldo, Interventional Neurology [place]       Family History:  Family History   Problem Relation Age of Onset    Diabetes Mother     Hypertension Mother              Heart Attack Mother 62    Alcohol abuse Father         cirrhosis    Diabetes Sister     Heart Attack Sister 64       Social History:  Social History     Tobacco Use    Smoking status: Never Smoker    Smokeless tobacco: Never Used   Substance Use Topics    Alcohol use: Yes     Alcohol/week: 0.0 oz     Frequency: Monthly or less     Drinks per session: 1 or 2     Binge frequency: Never     Comment: social    Drug use: No       Allergies: Allergies   Allergen Reactions    Banana Hives     Asthma attach, throat swelling, throat scratching, lip swelling    Clonidine Other (comments)     Memory loss    Iodine Rash    Keflex [Cephalexin] Hives    Seafood Hives    Watermelon Hives     Throat closes         Review of Systems       Review of Systems   Constitutional: Negative for activity change, diaphoresis, fatigue and fever. HENT: Negative for congestion and rhinorrhea. Eyes: Negative for visual disturbance. Respiratory: Negative for shortness of breath. Cardiovascular: Positive for chest pain. Negative for palpitations. Gastrointestinal: Negative for abdominal pain, diarrhea, nausea and vomiting. Genitourinary: Negative for dysuria and hematuria. Musculoskeletal: Negative for back pain. Skin: Negative for rash. Neurological: Positive for numbness (\"tingling\" in left hand). Negative for dizziness, weakness and light-headedness. All other systems reviewed and are negative.         Physical Exam Visit Vitals  /69   Pulse (!) 104   Temp 98.6 °F (37 °C)   Resp 15   Ht 6' (1.829 m)   Wt 88.9 kg (196 lb)   SpO2 98%   BMI 26.58 kg/m²         Physical Exam   Constitutional: She is oriented to person, place, and time. She appears well-developed and well-nourished. No distress. HENT:   Head: Normocephalic and atraumatic. Right Ear: External ear normal.   Left Ear: External ear normal.   Nose: Nose normal.   Mouth/Throat: Oropharynx is clear and moist.   Eyes: Conjunctivae and EOM are normal. Pupils are equal, round, and reactive to light. No scleral icterus. Neck: Normal range of motion. Neck supple. No JVD present. No tracheal deviation present. No thyromegaly present. Cardiovascular: Regular rhythm, normal heart sounds and intact distal pulses. Exam reveals no gallop and no friction rub. No murmur heard. Tachy   Pulmonary/Chest: Effort normal. She has wheezes. She exhibits no tenderness. Abdominal: Soft. Bowel sounds are normal. She exhibits no distension. There is no tenderness. There is no rebound and no guarding. Musculoskeletal: Normal range of motion. She exhibits no edema or tenderness. No calf pain    Lymphadenopathy:     She has no cervical adenopathy. Neurological: She is alert and oriented to person, place, and time. No cranial nerve deficit. Coordination normal.   No sensory loss, Gait normal, Motor 5/5, no arm or leg drift    Skin: Skin is warm and dry. Psychiatric: She has a normal mood and affect. Her behavior is normal. Judgment and thought content normal.   Nursing note and vitals reviewed.         Diagnostic Study Results     Labs -  Recent Results (from the past 12 hour(s))   EKG, 12 LEAD, INITIAL    Collection Time: 03/14/19 12:28 PM   Result Value Ref Range    Ventricular Rate 103 BPM    Atrial Rate 103 BPM    P-R Interval 172 ms    QRS Duration 76 ms    Q-T Interval 348 ms    QTC Calculation (Bezet) 455 ms    Calculated P Axis 61 degrees    Calculated R Axis 1 degrees    Calculated T Axis 21 degrees    Diagnosis       Sinus tachycardia  Otherwise normal ECG  When compared with ECG of 03-APR-2015 10:43,  Vent. rate has increased BY  38 BPM  ST no longer elevated in Lateral leads  Confirmed by Roseann Calabrese MD, ----- ([id number]) on 3/14/2019 2:05:00 PM     CBC WITH AUTOMATED DIFF    Collection Time: 03/14/19 12:45 PM   Result Value Ref Range    WBC 6.6 4.6 - 13.2 K/uL    RBC 4.09 (L) 4.20 - 5.30 M/uL    HGB 11.6 (L) 12.0 - 16.0 g/dL    HCT 36.0 35.0 - 45.0 %    MCV 88.0 74.0 - 97.0 FL    MCH 28.4 24.0 - 34.0 PG    MCHC 32.2 31.0 - 37.0 g/dL    RDW 13.1 11.6 - 14.5 %    PLATELET 758 438 - 717 K/uL    MPV 9.0 (L) 9.2 - 11.8 FL    NEUTROPHILS 55 40 - 73 %    LYMPHOCYTES 25 21 - 52 %    MONOCYTES 5 3 - 10 %    EOSINOPHILS 14 (H) 0 - 5 %    BASOPHILS 1 0 - 2 %    ABS. NEUTROPHILS 3.7 1.8 - 8.0 K/UL    ABS. LYMPHOCYTES 1.6 0.9 - 3.6 K/UL    ABS. MONOCYTES 0.3 0.05 - 1.2 K/UL    ABS. EOSINOPHILS 0.9 (H) 0.0 - 0.4 K/UL    ABS. BASOPHILS 0.1 0.0 - 0.1 K/UL    DF AUTOMATED     METABOLIC PANEL, COMPREHENSIVE    Collection Time: 03/14/19 12:45 PM   Result Value Ref Range    Sodium 142 136 - 145 mmol/L    Potassium 3.8 3.5 - 5.5 mmol/L    Chloride 107 100 - 108 mmol/L    CO2 27 21 - 32 mmol/L    Anion gap 8 3.0 - 18 mmol/L    Glucose 103 (H) 74 - 99 mg/dL    BUN 19 (H) 7.0 - 18 MG/DL    Creatinine 1.10 0.6 - 1.3 MG/DL    BUN/Creatinine ratio 17 12 - 20      GFR est AA >60 >60 ml/min/1.73m2    GFR est non-AA 51 (L) >60 ml/min/1.73m2    Calcium 8.9 8.5 - 10.1 MG/DL    Bilirubin, total 0.5 0.2 - 1.0 MG/DL    ALT (SGPT) 17 13 - 56 U/L    AST (SGOT) 11 (L) 15 - 37 U/L    Alk.  phosphatase 82 45 - 117 U/L    Protein, total 8.3 (H) 6.4 - 8.2 g/dL    Albumin 3.9 3.4 - 5.0 g/dL    Globulin 4.4 (H) 2.0 - 4.0 g/dL    A-G Ratio 0.9 0.8 - 1.7     CARDIAC PANEL,(CK, CKMB & TROPONIN)    Collection Time: 03/14/19 12:45 PM   Result Value Ref Range    CK 80 26 - 192 U/L    CK - MB <1.0 <3.6 ng/ml    CK-MB Index  0.0 - 4.0 %     CALCULATION NOT PERFORMED WHEN RESULT IS BELOW LINEAR LIMIT    Troponin-I, QT <0.02 0.0 - 0.045 NG/ML   D DIMER    Collection Time: 03/14/19 12:45 PM   Result Value Ref Range    D DIMER 0.45 <0.46 ug/ml(FEU)   NT-PRO BNP    Collection Time: 03/14/19 12:45 PM   Result Value Ref Range    NT pro-BNP 30 0 - 900 PG/ML   CARDIAC PANEL,(CK, CKMB & TROPONIN)    Collection Time: 03/14/19  4:15 PM   Result Value Ref Range    CK 87 26 - 192 U/L    CK - MB <1.0 <3.6 ng/ml    CK-MB Index  0.0 - 4.0 %     CALCULATION NOT PERFORMED WHEN RESULT IS BELOW LINEAR LIMIT    Troponin-I, QT <0.02 0.0 - 0.045 NG/ML       Radiologic Studies -   CT HEAD WO CONT   Final Result   IMPRESSION:       The brain looks normal for age. There is significant paranasal sinus disease with small interval improvement   particularly in the right frontal and left maxillary sinuses. Marked causing   thickening remains otherwise the right sphenoid sinus remains completely   opacified.         ===================   Note: [facility] maintains that all CT scans at their facilities   are performed by using dose optimization technique as appropriate to a performed   examination, to include automated exposure control, adjustment of the mAs and/or   kVp according to patient size (including appropriate matching for site specific   examinations) or use of iterative reconstruction technique. XR CHEST PA LAT   Final Result   IMPRESSION:      Minimal streaky opacities at the lung base more typical for atelectasis. Medical Decision Making   I am the first provider for this patient. I reviewed the vital signs, available nursing notes, past medical history, past surgical history, family history and social history. Vital Signs-Reviewed the patient's vital signs. Pulse Oximetry Analysis -  100% on room air , nml    Cardiac Monitor:  Rate: 101 BPM, mild tachycardic    EKG: Interpreted by the EP.    Time Interpreted: 12:28 PM   Rate: 103 BPM   Rhythm: Sinus Tachycardia    Interpretation: No STEMI    Records Reviewed: Nursing Notes and Old Medical Records (Time of Review: 1:16 PM)    ED Course: Progress Notes, Reevaluation, and Consults:  Patient has improved. Her labs are reassuring and she is improved with nebulizer treatments. Her chest x-ray suggestive of atelectasis and bronchitis and she has had 2- troponins. In addition her d-dimer is reassuring. CT of her head notes acute sinusitis. Will start her on antibiotics, steroids, and proceed with close outpatient care with her cardiologist to see if further testing is needed. I discussed the case with Dr. Leyla Lakhani and reviewed the workup including EKGs. He agrees with close outpatient care in the office within the week. Workup and recommendations were reviewed with the patient and all questions were answered. The patient understands the plan and will proceed with close outpatient care. I have encouraged the patient to return if at all worsened or concerned. Rowena Linares,     Provider Notes (Medical Decision Making):   MDM  Number of Diagnoses or Management Options  Diagnosis management comments: Patient is a 67-year-old female with a history of diabetes, hypertension, and subarachnoid hemorrhage, who presents with complaint of chest pressure that radiates to her back and arm that is intermittent for several months. Patient notes that the pain comes and goes and does increase with exertion at times. She follows closely with her cardiologist and has had stress testing in the past year was reassuring. Patient also complains of mild headache and neck pain and was concerned that she may have complications from her previous hemorrhage. However, she is nonfocal and do not suspect that she has an acute central event.   She has a history of asthma in the past and is wheezing suspect that she may have a upper respiratory infection will give her duo nebs and steroids. I also checked a d-dimer that was reassuring and her BNP does not suggest that she has had has any fluid overload. We will repeat her cardiac panel and EKG there is no change will consider close outpatient care with her cardiologist for further evaluation. Diagnosis     Clinical Impression:   1. Acute bronchitis with bronchospasm    2. Acute maxillary sinusitis, recurrence not specified    3. Atypical chest pain    4. Mild intermittent asthma without complication        Disposition: DC     Follow-up Information     Follow up With Specialties Details Why Contact Info    Nadine Hobson MD Cardiology Schedule an appointment as soon as possible for a visit in 1 week For Follow-up [address] [facility] [address] [address]  [address] [place] [address], [address] [place] [address] Physician Assistant Call in 1 day For [address] DEPT Emergency Medicine Go to If symptoms worsen, , As needed Susan Malone [address]               Medication List      START taking these medications    doxycycline 100 mg tablet  Commonly known as:  VIBRA-TABS  Take 1 Tab by mouth two (2) times a day for 10 days. predniSONE 10 mg dose pack  Commonly known as:  STERAPRED DS  6 day dose pack per package instructions        CONTINUE taking these medications    albuterol 90 mcg/actuation inhaler  Commonly known as:  PROVENTIL HFA, VENTOLIN HFA, PROAIR HFA  Take 2 Puffs by inhalation every four (4) hours as needed for Wheezing.      amLODIPine 5 mg tablet  Commonly known as:  NORVASC  TAKE ONE TABLET BY MOUTH ONCE DAILY     aspirin delayed-release 81 mg tablet     atorvastatin 10 mg tablet  Commonly known as:  LIPITOR     cyclobenzaprine 10 mg tablet  Commonly known as:  FLEXERIL  TAKE ONE-HALF TO ONE TABLET BY MOUTH DAILY AS NEEDED FOR BACK SPASMS     FLONASE 50 mcg/actuation nasal spray  Generic drug:  fluticasone propionate     fluticasone propion-salmeterol 250-50 mcg/dose diskus inhaler  Commonly known as:  ADVAIR DISKUS  INHALE ONE PUFF BY MOUTH EVERY 12 HOURS     hydroCHLOROthiazide 12.5 mg capsule  Commonly known as:  MICROZIDE  TAKE 1 CAPSULE BY MOUTH ONCE DAILY     lisinopril 10 mg tablet  Commonly known as:  PRINIVIL, ZESTRIL  Take 1 Tab by mouth daily. magnesium oxide 400 mg tablet  Commonly known as:  MAG-OX  TAKE ONE TABLET BY MOUTH ONCE DAILY     MOVE FREE JOINT HEALTH PO     multivitamin-iron-FA (hematinic)  mg-mcg Tab tablet  Commonly known as:  CENTRUM COMPLETE  Take 1 Tab by mouth daily. SITagliptin-metFORMIN 50-1,000 mg per tablet  Commonly known as:  JANUMET  Take 1 Tab by mouth two (2) times daily (with meals). VITAMIN D2 50,000 unit capsule  Generic drug:  ergocalciferol  TAKE ONE CAPSULE BY MOUTH ONCE A WEEK     ZyrTEC 10 mg Cap  Generic drug:  Cetirizine           Where to Get Your Medications      These medications were sent to Dejan Alvarez at [facility], [address] [place] [address]  [address] [address]    Phone:  [phone]   · albuterol 90 mcg/actuation inhaler  · doxycycline 100 mg tablet  · predniSONE 10 mg dose pack       _______________________________       Pito Carter acting as a scribe for and in the presence of Caitlin Whitaker DO      March 14, 2019 at [facility]       Provider Attestation:      I personally performed the services described in the documentation, reviewed the documentation, as recorded by the scribe in my presence, and it accurately and completely records my words and actions.  March 14, 2019 at 5:02 PM - Merle FOWLER DO    _______________________________

## 2019-03-14 NOTE — LETTER
[address] [place] [address] Dr [facility]ALLO EMERGENCY DEPT 
[address] [facility] [address] [phone] Work/School Note Date: 3/14/2019 To Whom It May concern: 
 
Slim Erwin was seen and treated today in the emergency room by the following provider(s): 
Attending Provider: Karen Miranda MD. Slim Erwin may return to work on 3/18/2019.  
 
Sincerely, 
 
 
 
 
Estephania Cassidy RN

## 2019-03-14 NOTE — ED TRIAGE NOTES
Patient c/o mid chest pain that goes around left chest under breast and pain around left jaw and neck. She states pain started this morning. Patient states she has had pain off and of for a couple of weeks but pain was more severe this morning.

## 2019-03-18 ENCOUNTER — OFFICE VISIT (OUTPATIENT)
Dept: FAMILY MEDICINE CLINIC | Age: 59
End: 2019-03-18

## 2019-03-18 VITALS
RESPIRATION RATE: 16 BRPM | OXYGEN SATURATION: 99 % | HEART RATE: 99 BPM | SYSTOLIC BLOOD PRESSURE: 138 MMHG | WEIGHT: 198 LBS | DIASTOLIC BLOOD PRESSURE: 60 MMHG | BODY MASS INDEX: 26.82 KG/M2 | TEMPERATURE: 98.2 F | HEIGHT: 72 IN

## 2019-03-18 DIAGNOSIS — E78.2 MIXED HYPERLIPIDEMIA: ICD-10-CM

## 2019-03-18 DIAGNOSIS — B96.89 ACUTE BACTERIAL SINUSITIS: Primary | ICD-10-CM

## 2019-03-18 DIAGNOSIS — R07.9 CHEST PAIN, UNSPECIFIED TYPE: ICD-10-CM

## 2019-03-18 DIAGNOSIS — J20.9 ACUTE BRONCHITIS, UNSPECIFIED ORGANISM: ICD-10-CM

## 2019-03-18 DIAGNOSIS — J01.90 ACUTE BACTERIAL SINUSITIS: Primary | ICD-10-CM

## 2019-03-18 RX ORDER — ROSUVASTATIN CALCIUM 10 MG/1
10 TABLET, COATED ORAL
Qty: 30 TAB | Refills: 0 | Status: SHIPPED | OUTPATIENT
Start: 2019-03-18 | End: 2020-10-27 | Stop reason: SDUPTHER

## 2019-03-18 NOTE — PROGRESS NOTES
Patient: Anali Correa MRN: [id number]  SSN: [id number]    YOB: 1960  Age: 59 y.o. Sex: female      Date of Service: 3/18/2019   Provider: SOILA Charles   Office Location:   [address] Esteban muñoz, [address]  Office Phone: [phone]  Office Fax: [phone]      REASON FOR VISIT:   Chief Complaint   Patient presents with   [facility] Follow Up     HBVED 3/14/19        VITALS:   Visit Vitals  /60 (BP 1 Location: Left arm, BP Patient Position: Sitting)   Pulse 99   Temp 98.2 °F (36.8 °C) (Oral)   Resp 16   Ht 6' (1.829 m)   Wt 198 lb (89.8 kg)   SpO2 99%   BMI 26.85 kg/m²        MEDICATIONS:   Current Outpatient Medications on File Prior to Visit   Medication Sig Dispense Refill    albuterol (PROVENTIL HFA, VENTOLIN HFA, PROAIR HFA) 90 mcg/actuation inhaler Take 2 Puffs by inhalation every four (4) hours as needed for Wheezing. 3 Inhaler 3    predniSONE (STERAPRED DS) 10 mg dose pack 6 day dose pack per package instructions 1 Package 0    doxycycline (VIBRA-TABS) 100 mg tablet Take 1 Tab by mouth two (2) times a day for 10 days. 20 Tab 0    amLODIPine (NORVASC) 5 mg tablet TAKE ONE TABLET BY MOUTH ONCE DAILY 90 Tab 0    cyclobenzaprine (FLEXERIL) 10 mg tablet TAKE ONE-HALF TO ONE TABLET BY MOUTH DAILY AS NEEDED FOR BACK SPASMS 10 Tab 0    lisinopril (PRINIVIL, ZESTRIL) 10 mg tablet Take 1 Tab by mouth daily. 90 Tab 0    glucosam/chond/hyalu/CF borate (MOVE FREE JOINT HEALTH PO) Take  by mouth.  hydroCHLOROthiazide (MICROZIDE) 12.5 mg capsule TAKE 1 CAPSULE BY MOUTH ONCE DAILY 30 Cap 6    fluticasone-salmeterol (ADVAIR DISKUS) 250-50 mcg/dose diskus inhaler INHALE ONE PUFF BY MOUTH EVERY 12 HOURS 1 Inhaler 2    magnesium oxide (MAG-OX) 400 mg tablet TAKE ONE TABLET BY MOUTH ONCE DAILY 90 Tab 0    SITagliptin-metFORMIN (JANUMET) 50-1,000 mg per tablet Take 1 Tab by mouth two (2) times daily (with meals).  180 Tab 1  aspirin delayed-release 81 mg tablet Take 81 mg by mouth daily.  VITAMIN D2 50,000 unit capsule TAKE ONE CAPSULE BY MOUTH ONCE A WEEK 12 Cap 4    fluticasone (FLONASE) 50 mcg/actuation nasal spray 2 Sprays by Both Nostrils route daily as needed.  multivitamin-iron-FA, hematinic, (CENTRUM COMPLETE)  mg-mcg tab tablet Take 1 Tab by mouth daily. 90 Tab 3    Cetirizine (ZYRTEC) 10 mg cap Take 10 mg PE/day by mouth as needed. No current facility-administered medications on file prior to visit. ALLERGIES:   Allergies   Allergen Reactions    Banana Hives     Asthma attach, throat swelling, throat scratching, lip swelling    Clonidine Other (comments)     Memory loss    Iodine Rash    Keflex [Cephalexin] Hives    Seafood Hives    Watermelon Hives     Throat closes        MEDICAL/SURGICAL HISTORY:  Past Medical History:   Diagnosis Date    Ankle swelling     Asthma 02/04/2010    PFTs 2016 showed obstructive lung pattern    [facility] bleed [address] [address] [address] 02/20/2015    Chronic diastolic heart failure ([address] 3/18/2015    Diabetes mellitus ([address] 5/27/2014    Diabetic eye exam ([address] 01/26/2017    No retinopathy Best corrected vision 20/20 OU    HTN (hypertension) 2/4/2010    Hyperlipidemia LDL goal < 70 2014    Magnesium deficiency     Obesity, unspecified 4/15/2015    SAH (subarachnoid hemorrhage) ([address] 2/20/15    admitted in [facility], Dr. Alis Forrest.  Amy Harris and ref to Dr. Francie Key, Neurology    Stroke [facility]) 2015    mini stroke    Vitamin D deficiency 2000      Past Surgical History:   Procedure Laterality Date    COLONOSCOPY N/A 8/15/2018    COLONOSCOPY performed by Bryan Rhoades MD at [facility] HX HYSTERECTOMY  2004    abd approach with ovaries and cervix intact    HX HYSTERECTOMY N/A 2004    HX OTHER SURGICAL  8/25/15    SAH, cerebral arteriopathy, Dr. Francie Key, Interventional [facility]        FAMILY HISTORY:  Family History   Problem Relation Age of Onset    Diabetes Mother  Hypertension Mother              Heart Attack Mother 62    Alcohol abuse Father         cirrhosis    Diabetes Sister     Heart Attack Sister 64        SOCIAL HISTORY:  Social History     Tobacco Use    Smoking status: Never Smoker    Smokeless tobacco: Never Used   Substance Use Topics    Alcohol use: Yes     Alcohol/week: 0.0 oz     Frequency: Monthly or less     Drinks per session: 1 or 2     Binge frequency: Never     Comment: social    Drug use: No           HISTORY OF PRESENT ILLNESS: Oseas Sherman is a 59 y.o. female who presents to the office for an ER follow up visit. Patient presented to the ED on 3/14/19 with complaints of chest pain and SOB which had been ongoing for a couple weeks at that point. Wheezes were noted on lung exam. Labs were essentially normal. CXR showed minimal streaky opacities at the lung bases, suggestive of atelectasis. EKG showed mild sinus tachycardia, but was otherwise unchanged from previous. ACS was ruled out. Patient ended up having a head CT due to her history of SAH, which was unremarkable with the exception of incidentally noted sinusitis. Patient was ultimately diagnosed with bronchitis and a sinus infection. She was treated with prednisone, doxycycline, and albuterol. She was advised to maintain close follow up with primary care and cardiology. She does have an appointment with her cardiologist next week. Today, patient reports she is feeling much, much better. Still with a mild headache and dry cough, but sinus and chest congestion have improved. She no longer has any chest pain or SOB. She does express some concern over her cholesterol medication. it seems there has been some confusion. I had changed her statin to Crestor at her last routine visit, but patient states the pharmacy was still dispensing Lipitor. She does not want to be on Lipitor as she had heard some negative things about the medication from a friend.  She wants to know if it is okay for her to take Crestor instead. REVIEW OF SYSTEMS:  Review of Systems   Constitutional: Negative for chills, fever and malaise/fatigue. HENT: Negative for congestion, ear pain and sore throat. Respiratory: Positive for cough. Negative for shortness of breath and wheezing. Cardiovascular: Negative for chest pain, palpitations and leg swelling. Gastrointestinal: Negative for diarrhea, nausea and vomiting. Neurological: Positive for headaches. Negative for dizziness. PHYSICAL EXAMINATION:  Physical Exam   Constitutional: She is oriented to person, place, and time and well-developed, well-nourished, and in no distress. HENT:   Head: Normocephalic and atraumatic. Nose: No mucosal edema. Right sinus exhibits no maxillary sinus tenderness and no frontal sinus tenderness. Left sinus exhibits no maxillary sinus tenderness and no frontal sinus tenderness. Mouth/Throat: Uvula is midline, oropharynx is clear and moist and mucous membranes are normal.   Cardiovascular: Normal rate, regular rhythm and normal heart sounds. Exam reveals no gallop and no friction rub. No murmur heard. Pulmonary/Chest: Effort normal and breath sounds normal. She has no wheezes. She has no rales. Neurological: She is alert and oriented to person, place, and time. Gait normal.   Skin: Skin is warm and dry. No rash noted. Psychiatric: Mood, memory and affect normal.        RESULTS:  No results found for this visit on 03/18/19. ASSESSMENT/PLAN:  Diagnoses and all orders for this visit:    1. Acute bacterial sinusitis  2. Acute bronchitis, unspecified organism  - Clinically improving  - ER records reviewed, agree with plan of care  - Patient to complete antibiotic and steroids as directed     3. Chest pain, unspecified type  - Improved, keep close cardiology follow up as scheduled     4. Mixed hyperlipidemia  - Clarified with patient that she should be on Crestor. Med list updated.  She will make sure Lipitor is cancelled at her pharmacy  Orders:    -     rosuvastatin (CRESTOR) 10 mg tablet; Take 1 Tab by mouth nightly. Follow-up Disposition:  Return for return as scheduled. All questions answered. Patient expresses understanding and agrees with the plan as detailed above.     PATIENT CARE TEAM:   Patient Care Team:  SOILA Guzman as PCP - General (Physician Assistant)  Hien Lovell MD as Consulting Provider (Cardiology)  Caroline Nava MD as Consulting Provider (Neurology)  Humberto Lopez, [address] (Optometry)       [place], [place]   March 18, 2019   11:19 AM

## 2019-03-18 NOTE — PROGRESS NOTES
1. Have you been to the ER, urgent care clinic since your last visit? Hospitalized since your last visit? HBVED 3/14/19    2. Have you seen or consulted any other health care providers outside of the [address] [place] [address] since your last visit? Include any pap smears or colon screening.  No

## 2019-03-25 ENCOUNTER — OFFICE VISIT (OUTPATIENT)
Dept: CARDIOLOGY CLINIC | Age: 59
End: 2019-03-25

## 2019-03-25 VITALS
HEART RATE: 83 BPM | BODY MASS INDEX: 27.17 KG/M2 | DIASTOLIC BLOOD PRESSURE: 69 MMHG | WEIGHT: 200.6 LBS | SYSTOLIC BLOOD PRESSURE: 118 MMHG | HEIGHT: 72 IN

## 2019-03-25 DIAGNOSIS — E78.2 MIXED HYPERLIPIDEMIA: ICD-10-CM

## 2019-03-25 DIAGNOSIS — R07.9 CHEST PAIN, UNSPECIFIED TYPE: Primary | ICD-10-CM

## 2019-03-25 DIAGNOSIS — I10 ESSENTIAL HYPERTENSION: ICD-10-CM

## 2019-03-25 DIAGNOSIS — I50.32 CHRONIC DIASTOLIC HEART FAILURE (HCC): ICD-10-CM

## 2019-03-25 NOTE — PROGRESS NOTES
HISTORY OF PRESENT ILLNESS  Coco York is a 59 y.o. female. CHF   The history is provided by the medical records. This is a chronic problem. Associated symptoms include chest pain and shortness of breath. Pertinent negatives include no headaches. Cholesterol Problem   The history is provided by the medical records. This is a chronic problem. Associated symptoms include chest pain and shortness of breath. Pertinent negatives include no headaches. Hypertension   The history is provided by the medical records and patient. This is a chronic problem. Associated symptoms include chest pain and shortness of breath. Pertinent negatives include no headaches. Shortness of Breath   The history is provided by the patient. This is a recurrent problem. The problem occurs frequently. The current episode started more than 1 week ago. The problem has been gradually improving (inhalers/prednisone). Associated symptoms include chest pain and leg swelling. Pertinent negatives include no fever, no headaches, no cough, no wheezing, no PND, no orthopnea, no vomiting, no rash and no claudication. The problem's precipitants include exercise (walking thru parking lot; 9/15 more; 1/17 better except when exosed to smoke/dust). She has tried beta-agonist inhalers and oral steroids for the symptoms. The treatment provided significant relief. Leg Swelling   The history is provided by the patient. This is a recurrent problem. The current episode started more than 1 week ago. The problem occurs daily (R>L). The problem has not changed since onset. Associated symptoms include chest pain and shortness of breath. Pertinent negatives include no headaches. The symptoms are aggravated by standing. The symptoms are relieved by rest.   Hospital Follow Up   The history is provided by the patient and medical records (CP, cough). Associated symptoms include chest pain and shortness of breath. Pertinent negatives include no headaches.    Chest Pain (Angina)    The history is provided by the patient. This is a new problem. The current episode started more than 1 week ago. The problem has been resolved. Duration of episode(s) is 1 hour. The pain is associated with normal activity. The pain is present in the substernal region. The quality of the pain is described as sharp. The pain radiates to the left arm. Associated symptoms include lower extremity edema and shortness of breath. Pertinent negatives include no claudication, no cough, no dizziness, no fever, no headaches, no malaise/fatigue, no nausea, no orthopnea, no palpitations, no PND and no vomiting. Review of Systems   Constitutional: Negative for chills, fever, malaise/fatigue and weight loss. HENT: Negative for nosebleeds. Eyes: Negative for discharge. Respiratory: Positive for shortness of breath. Negative for cough and wheezing. Cardiovascular: Positive for chest pain and leg swelling. Negative for palpitations, orthopnea, claudication and PND. Gastrointestinal: Negative for diarrhea, nausea and vomiting. Genitourinary: Negative for dysuria and hematuria. Musculoskeletal: Negative for joint pain. Skin: Negative for rash. Neurological: Negative for dizziness, seizures, loss of consciousness and headaches. Endo/Heme/Allergies: Negative for polydipsia. Does not bruise/bleed easily. Psychiatric/Behavioral: Negative for depression and substance abuse. The patient does not have insomnia.       Allergies   Allergen Reactions    Banana Hives     Asthma attach, throat swelling, throat scratching, lip swelling    Clonidine Other (comments)     Memory loss    Iodine Rash    Keflex [Cephalexin] Hives    Seafood Hives    Watermelon Hives     Throat closes       Past Medical History:   Diagnosis Date    Ankle swelling     Asthma 02/04/2010    PFTs 2016 showed obstructive lung pattern    Brain bleed ([address] 02/20/2015    Chronic diastolic heart failure ([facility] [address] 3/18/2015    Diabetes mellitus ([facility] [address] 5/27/2014    Diabetic eye exam ([facility] [address] 01/26/2017    No retinopathy Best corrected vision 20/20 OU    HTN (hypertension) 2/4/2010    Hyperlipidemia LDL goal < 70 2014    Magnesium deficiency     Obesity, unspecified 4/15/2015    [facility] 2/20/15    admitted in [facility], Dr. Cinthya Noble. Particia Hotter and ref to Dr. Katlin Altmairano, Neurology    Stroke [facility]) 2015    mini stroke    Vitamin D deficiency 2000       Family History   Problem Relation Age of Onset    Diabetes Mother     Hypertension Mother              Heart Attack Mother 62    Alcohol abuse Father         cirrhosis    Diabetes Sister     Heart Attack Sister 64       Social History     Tobacco Use    Smoking status: Never Smoker    Smokeless tobacco: Never Used   Substance Use Topics    Alcohol use: Yes     Alcohol/week: 0.0 oz     Frequency: Monthly or less     Drinks per session: 1 or 2     Binge frequency: Never     Comment: social    Drug use: No        Current Outpatient Medications   Medication Sig    rosuvastatin (CRESTOR) 10 mg tablet Take 1 Tab by mouth nightly.  albuterol (PROVENTIL HFA, VENTOLIN HFA, PROAIR HFA) 90 mcg/actuation inhaler Take 2 Puffs by inhalation every four (4) hours as needed for Wheezing.  amLODIPine (NORVASC) 5 mg tablet TAKE ONE TABLET BY MOUTH ONCE DAILY    cyclobenzaprine (FLEXERIL) 10 mg tablet TAKE ONE-HALF TO ONE TABLET BY MOUTH DAILY AS NEEDED FOR BACK SPASMS    lisinopril (PRINIVIL, ZESTRIL) 10 mg tablet Take 1 Tab by mouth daily.  glucosam/chond/hyalu/CF borate (MOVE FREE JOINT HEALTH PO) Take  by mouth.     hydroCHLOROthiazide (MICROZIDE) 12.5 mg capsule TAKE 1 CAPSULE BY MOUTH ONCE DAILY    fluticasone-salmeterol (ADVAIR DISKUS) 250-50 mcg/dose diskus inhaler INHALE ONE PUFF BY MOUTH EVERY 12 HOURS    magnesium oxide (MAG-OX) 400 mg tablet TAKE ONE TABLET BY MOUTH ONCE DAILY    SITagliptin-metFORMIN (JANUMET) 50-1,000 mg per tablet Take 1 Tab by mouth two (2) times daily (with meals).  aspirin delayed-release 81 mg tablet Take 81 mg by mouth daily.  VITAMIN D2 50,000 unit capsule TAKE ONE CAPSULE BY MOUTH ONCE A WEEK    fluticasone (FLONASE) 50 mcg/actuation nasal spray 2 Sprays by Both Nostrils route daily as needed.  multivitamin-iron-FA, hematinic, (CENTRUM COMPLETE)  mg-mcg tab tablet Take 1 Tab by mouth daily.  Cetirizine (ZYRTEC) 10 mg cap Take 10 mg PE/day by mouth as needed. No current facility-administered medications for this visit. Past Surgical History:   Procedure Laterality Date    COLONOSCOPY N/A 8/15/2018    COL[facility] Cindy Rhodes [facility] at [facility] HX HYSTERECTOMY  2004    abd approach with ovaries and cervix intact    HX HYSTERECTOMY N/A 2004    HX OTHER SURGICAL  8/25/15    SAH, cerebral arteriopathy, Dr. Mary Carmen Farooq, Interventional Neurology [facility]       Diagnostic Studies:  CARDIOLOGY STUDIES 4/10/2015   Echocardiogram - Complete Result 55%EF, tr MR   Some recent data might be hidden   2/18 Nuc Stress  Conclusion:   1. Normal perfusion scan. 2. Normal wall motion and ejection fractions calculated to be 51%. 3. No evidence of significant fixed or reversible defect suggesting ischemia or myocardial infarction noted from this nuclear study. 4. Low risk scan. Visit Vitals  /69   Pulse 83   Ht 6' (1.829 m)   Wt 91 kg (200 lb 9.6 oz)   BMI 27.21 kg/m²       Ms. Christine Ga has a reminder for a \"due or due soon\" health maintenance. I have asked that she contact her primary care provider for follow-up on this health maintenance. Physical Exam   Constitutional: She is oriented to person, place, and time. She appears well-developed and well-nourished. No distress. HENT:   Head: Normocephalic and atraumatic. Mouth/Throat: Normal dentition. Eyes: Right eye exhibits no discharge. Left eye exhibits no discharge. No scleral icterus. Neck: Neck supple. No JVD present. Carotid bruit is not present.  No thyromegaly present. Cardiovascular: Normal rate, regular rhythm, S1 normal, S2 normal, normal heart sounds and intact distal pulses. Exam reveals no gallop and no friction rub. No murmur heard. Pulmonary/Chest: Effort normal and breath sounds normal. She has no wheezes. She has no rales. Abdominal: Soft. She exhibits no mass. There is no tenderness. Musculoskeletal: She exhibits edema (trace). Lymphadenopathy:        Right cervical: No superficial cervical adenopathy present. Left cervical: No superficial cervical adenopathy present. Neurological: She is alert and oriented to person, place, and time. Skin: Skin is warm and dry. No rash noted. Psychiatric: She has a normal mood and affect. Her behavior is normal.       ASSESSMENT and PLAN    Discussed the patient's above normal BMI with her. I have recommended the following interventions: dietary management education, guidance, and counseling . The BMI follow up plan is as follows: BMI is out of normal parameters and plan is as follows: I have counseled this patient on diet and exercise regimens       CHF: Tomer Dejesus    Blood pressure is controlled. Continue present medications. Chest pain was atypical in the emergency room. MI ruled out EKG had no changes. It was likely secondary to asthma exacerbation and seems to be improving with inhalers and steroids. She also complains of persisting numbness in the left arm and I wonder if cervical spine issues need to be further evaluated. I advised her to see her PCP for that. No cardiac workup for chest pain at this time but will follow periodically. She will call if there is any significant recurrence or worsening. Diagnoses and all orders for this visit:    1. Chest pain, unspecified type    2. Chronic diastolic heart failure ([address]    3. Essential hypertension    4. Mixed hyperlipidemia        Pertinent laboratory and test data reviewed and discussed with patient.   See patient instructions also for other medical advice given    Medications Discontinued During This Encounter   Medication Reason    predniSONE (STERAPRED DS) 10 mg dose pack        Follow-up and Dispositions    · Return in about 6 months (around 9/25/2019), or if symptoms worsen or fail to improve, for with ekg.

## 2019-03-25 NOTE — PATIENT INSTRUCTIONS
Medications Discontinued During This Encounter   Medication Reason    predniSONE (STERAPRED DS) 10 mg dose pack           Chest Pain: Care Instructions  Your Care Instructions    There are many things that can cause chest pain. Some are not serious and will get better on their own in a few days. But some kinds of chest pain need more testing and treatment. Your doctor may have recommended a follow-up visit in the next 8 to 12 hours. If you are not getting better, you may need more tests or treatment. Even though your doctor has released you, you still need to watch for any problems. The doctor carefully checked you, but sometimes problems can develop later. If you have new symptoms or if your symptoms do not get better, get medical care right away. If you have worse or different chest pain or pressure that lasts more than 5 minutes or you passed out (lost consciousness), call 911 or seek other emergency help right away. A medical visit is only one step in your treatment. Even if you feel better, you still need to do what your doctor recommends, such as going to all suggested follow-up appointments and taking medicines exactly as directed. This will help you recover and help prevent future problems. How can you care for yourself at home? · Rest until you feel better. · Take your medicine exactly as prescribed. Call your doctor if you think you are having a problem with your medicine. · Do not drive after taking a prescription pain medicine. When should you call for help? Call 911 if:    · You passed out (lost consciousness).     · You have severe difficulty breathing.     · You have symptoms of a heart attack. These may include:  ? Chest pain or pressure, or a strange feeling in your chest.  ? Sweating. ? Shortness of breath. ? Nausea or vomiting. ? Pain, pressure, or a strange feeling in your back, neck, jaw, or upper belly or in one or both shoulders or arms.   ? Lightheadedness or sudden weakness. ? A fast or irregular heartbeat. After you call 911, the  may tell you to chew 1 adult-strength or 2 to 4 low-dose aspirin. Wait for an ambulance. Do not try to drive yourself.    Call your doctor today if:    · You have any trouble breathing.     · Your chest pain gets worse.     · You are dizzy or lightheaded, or you feel like you may faint.     · You are not getting better as expected.     · You are having new or different chest pain. Where can you learn more? Go to http://monique-digna.info/. Enter A120 in the search box to learn more about \"Chest Pain: Care Instructions. \"  Current as of: September 23, 2018  Content Version: 11.9  © 6542-3655 [employer]. Care instructions adapted under license by [employer] (which disclaims liability or warranty for this information). If you have questions about a medical condition or this instruction, always ask your healthcare professional. [place] [address] any warranty or liability for your use of this information.

## 2019-04-10 ENCOUNTER — HOSPITAL ENCOUNTER (OUTPATIENT)
Dept: LAB | Age: 59
Discharge: HOME | End: 2019-04-10

## 2019-04-10 LAB — XX-LABCORP SPECIMEN COL,LCBCF: NORMAL

## 2019-04-10 PROCEDURE — 99001 SPECIMEN HANDLING PT-LAB: CPT

## 2019-04-11 LAB
ALBUMIN SERPL-MCNC: 4 G/DL (ref 3.6–4.8)
ALBUMIN/GLOB SERPL: 1.5 {RATIO} (ref 1.2–2.2)
ALP SERPL-CCNC: 77 IU/L (ref 39–117)
ALT SERPL-CCNC: 14 IU/L (ref 0–32)
AST SERPL-CCNC: 14 IU/L (ref 0–40)
BILIRUB SERPL-MCNC: 0.4 MG/DL (ref 0–1.2)
BUN SERPL-MCNC: 19 MG/DL (ref 8–27)
BUN/CREAT SERPL: 19 (ref 12–28)
CALCIUM SERPL-MCNC: 9.2 MG/DL (ref 8.7–10.3)
CHLORIDE SERPL-SCNC: 107 MMOL/L (ref 96–106)
CHOLEST SERPL-MCNC: 145 MG/DL (ref 100–199)
CO2 SERPL-SCNC: 25 MMOL/L (ref 20–29)
CREAT SERPL-MCNC: 0.98 MG/DL (ref 0.57–1)
GLOBULIN SER CALC-MCNC: 2.7 G/DL (ref 1.5–4.5)
GLUCOSE SERPL-MCNC: 94 MG/DL (ref 65–99)
HBA1C MFR BLD: 6.1 % (ref 4.8–5.6)
HDLC SERPL-MCNC: 45 MG/DL
INTERPRETATION, 910389: NORMAL
LDLC SERPL CALC-MCNC: 89 MG/DL (ref 0–99)
POTASSIUM SERPL-SCNC: 4.7 MMOL/L (ref 3.5–5.2)
PROT SERPL-MCNC: 6.7 G/DL (ref 6–8.5)
SODIUM SERPL-SCNC: 145 MMOL/L (ref 134–144)
TRIGL SERPL-MCNC: 55 MG/DL (ref 0–149)
VLDLC SERPL CALC-MCNC: 11 MG/DL (ref 5–40)

## 2019-04-23 DIAGNOSIS — R80.9 CONTROLLED TYPE 2 DIABETES MELLITUS WITH MICROALBUMINURIA, WITHOUT LONG-TERM CURRENT USE OF INSULIN (HCC): ICD-10-CM

## 2019-04-23 DIAGNOSIS — E11.29 CONTROLLED TYPE 2 DIABETES MELLITUS WITH MICROALBUMINURIA, WITHOUT LONG-TERM CURRENT USE OF INSULIN (HCC): ICD-10-CM

## 2019-04-24 ENCOUNTER — OFFICE VISIT (OUTPATIENT)
Dept: FAMILY MEDICINE CLINIC | Age: 59
End: 2019-04-24

## 2019-04-24 VITALS
RESPIRATION RATE: 16 BRPM | TEMPERATURE: 98 F | DIASTOLIC BLOOD PRESSURE: 72 MMHG | HEIGHT: 66 IN | BODY MASS INDEX: 31.82 KG/M2 | WEIGHT: 198 LBS | OXYGEN SATURATION: 98 % | SYSTOLIC BLOOD PRESSURE: 128 MMHG | HEART RATE: 82 BPM

## 2019-04-24 DIAGNOSIS — E78.2 MIXED HYPERLIPIDEMIA: ICD-10-CM

## 2019-04-24 DIAGNOSIS — I10 ESSENTIAL HYPERTENSION: ICD-10-CM

## 2019-04-24 DIAGNOSIS — M54.50 ACUTE LEFT-SIDED LOW BACK PAIN WITHOUT SCIATICA: ICD-10-CM

## 2019-04-24 DIAGNOSIS — G89.29 CHRONIC LEFT HIP PAIN: ICD-10-CM

## 2019-04-24 DIAGNOSIS — K21.9 GASTROESOPHAGEAL REFLUX DISEASE WITHOUT ESOPHAGITIS: ICD-10-CM

## 2019-04-24 DIAGNOSIS — J45.30 MILD PERSISTENT ASTHMA WITHOUT COMPLICATION: ICD-10-CM

## 2019-04-24 DIAGNOSIS — I50.32 CHRONIC DIASTOLIC HEART FAILURE (HCC): ICD-10-CM

## 2019-04-24 DIAGNOSIS — L08.9 INFECTION OF TOE: ICD-10-CM

## 2019-04-24 DIAGNOSIS — M25.552 CHRONIC LEFT HIP PAIN: ICD-10-CM

## 2019-04-24 DIAGNOSIS — E11.9 CONTROLLED TYPE 2 DIABETES MELLITUS WITHOUT COMPLICATION, WITHOUT LONG-TERM CURRENT USE OF INSULIN (HCC): Primary | ICD-10-CM

## 2019-04-24 RX ORDER — CYCLOBENZAPRINE HCL 10 MG
TABLET ORAL
Qty: 10 TAB | Refills: 0 | Status: SHIPPED | OUTPATIENT
Start: 2019-04-24 | End: 2019-05-31 | Stop reason: SDUPTHER

## 2019-04-24 RX ORDER — LISINOPRIL 10 MG/1
10 TABLET ORAL DAILY
Qty: 90 TAB | Refills: 1 | Status: SHIPPED | OUTPATIENT
Start: 2019-04-24 | End: 2019-09-23 | Stop reason: SDUPTHER

## 2019-04-24 RX ORDER — HYDROCHLOROTHIAZIDE 12.5 MG/1
CAPSULE ORAL
Qty: 90 CAP | Refills: 1 | Status: SHIPPED | OUTPATIENT
Start: 2019-04-24 | End: 2019-12-04 | Stop reason: SDUPTHER

## 2019-04-24 RX ORDER — SULFAMETHOXAZOLE AND TRIMETHOPRIM 800; 160 MG/1; MG/1
1 TABLET ORAL 2 TIMES DAILY
Qty: 14 TAB | Refills: 0 | Status: SHIPPED | OUTPATIENT
Start: 2019-04-24 | End: 2019-05-01

## 2019-04-24 NOTE — PATIENT INSTRUCTIONS
A Healthy Lifestyle: Care Instructions  Your Care Instructions    A healthy lifestyle can help you feel good, stay at a healthy weight, and have plenty of energy for both work and play. A healthy lifestyle is something you can share with your whole family. A healthy lifestyle also can lower your risk for serious health problems, such as high blood pressure, heart disease, and diabetes. You can follow a few steps listed below to improve your health and the health of your family. Follow-up care is a key part of your treatment and safety. Be sure to make and go to all appointments, and call your doctor if you are having problems. It's also a good idea to know your test results and keep a list of the medicines you take. How can you care for yourself at home? · Do not eat too much sugar, fat, or fast foods. You can still have dessert and treats now and then. The goal is moderation. · Start small to improve your eating habits. Pay attention to portion sizes, drink less juice and soda pop, and eat more fruits and vegetables. ? Eat a healthy amount of food. A 3-ounce serving of meat, for example, is about the size of a deck of cards. Fill the rest of your plate with vegetables and whole grains. ? Limit the amount of soda and sports drinks you have every day. Drink more water when you are thirsty. ? Eat at least 5 servings of fruits and vegetables every day. It may seem like a lot, but it is not hard to reach this goal. A serving or helping is 1 piece of fruit, 1 cup of vegetables, or 2 cups of leafy, raw vegetables. Have an apple or some carrot sticks as an afternoon snack instead of a candy bar. Try to have fruits and/or vegetables at every meal.  · Make exercise part of your daily routine. You may want to start with simple activities, such as walking, bicycling, or slow swimming. Try to be active 30 to 60 minutes every day. You do not need to do all 30 to 60 minutes all at once.  For example, you can exercise 3 times a day for 10 or 20 minutes. Moderate exercise is safe for most people, but it is always a good idea to talk to your doctor before starting an exercise program.  · Keep moving. Delia Krausvels the lawn, work in the garden, or [employer]. Take the stairs instead of the elevator at work. · If you smoke, quit. People who smoke have an increased risk for heart attack, stroke, cancer, and other lung illnesses. Quitting is hard, but there are ways to boost your chance of quitting tobacco for good. ? Use nicotine gum, patches, or lozenges. ? Ask your doctor about stop-smoking programs and medicines. ? Keep trying. In addition to reducing your risk of diseases in the future, you will notice some benefits soon after you stop using tobacco. If you have shortness of breath or asthma symptoms, they will likely get better within a few weeks after you quit. · Limit how much alcohol you drink. Moderate amounts of alcohol (up to 2 drinks a day for men, 1 drink a day for women) are okay. But drinking too much can lead to liver problems, high blood pressure, and other health problems. Family health  If you have a family, there are many things you can do together to improve your health. · Eat meals together as a family as often as possible. · Eat healthy foods. This includes fruits, vegetables, lean meats and dairy, and whole grains. · Include your family in your fitness plan. Most people think of activities such as jogging or tennis as the way to fitness, but there are many ways you and your family can be more active. Anything that makes you breathe hard and gets your heart pumping is exercise. Here are some tips:  ? Walk to do errands or to take your child to school or the bus.  ? Go for a family bike ride after dinner instead of watching TV. Where can you learn more? Go to http://monique-digna.info/. Enter I876 in the search box to learn more about \"A Healthy Lifestyle: Care Instructions. \"  Current as of: September 11, 2018  Content Version: 11.9  © 9693-6768 [employer], Incorporated. Care instructions adapted under license by [employer] (which disclaims liability or warranty for this information). If you have questions about a medical condition or this instruction, always ask your healthcare professional. [address] any warranty or liability for your use of this information.     Follow up in 5 months for routine care, please scheduled diabetic eye exam and POC A1C in office

## 2019-04-24 NOTE — PROGRESS NOTES
Patient: Chayito Samuels MRN: [id number]  SSN: [id number]    YOB: 1960  Age: 59 y.o. Sex: female      Date of Service: 4/24/2019   Provider: SOILA Colunga   Office Location:   [address] [place] [address] Dr Esteban muñoz, [address]  Office Phone: [phone]  Office Fax: [phone]      REASON FOR VISIT:   Chief Complaint   Patient presents with    Asthma    Cholesterol Problem    Diabetes    Hypertension    Vitamin D Deficiency        VITALS:   Visit Vitals  /72 (BP 1 Location: Left arm, BP Patient Position: Sitting)   Pulse 82   Temp 98 °F (36.7 °C) (Oral)   Resp 16   Ht 5' 6\" (1.676 m)   Wt 198 lb (89.8 kg)   SpO2 98%   BMI 31.96 kg/m²        MEDICATIONS:   Current Outpatient Medications on File Prior to Visit   Medication Sig Dispense Refill    rosuvastatin (CRESTOR) 10 mg tablet Take 1 Tab by mouth nightly. 30 Tab 0    amLODIPine (NORVASC) 5 mg tablet TAKE ONE TABLET BY MOUTH ONCE DAILY 90 Tab 0    cyclobenzaprine (FLEXERIL) 10 mg tablet TAKE ONE-HALF TO ONE TABLET BY MOUTH DAILY AS NEEDED FOR BACK SPASMS 10 Tab 0    lisinopril (PRINIVIL, ZESTRIL) 10 mg tablet Take 1 Tab by mouth daily. 90 Tab 0    hydroCHLOROthiazide (MICROZIDE) 12.5 mg capsule TAKE 1 CAPSULE BY MOUTH ONCE DAILY 30 Cap 6    fluticasone-salmeterol (ADVAIR DISKUS) 250-50 mcg/dose diskus inhaler INHALE ONE PUFF BY MOUTH EVERY 12 HOURS 1 Inhaler 2    magnesium oxide (MAG-OX) 400 mg tablet TAKE ONE TABLET BY MOUTH ONCE DAILY 90 Tab 0    SITagliptin-metFORMIN (JANUMET) 50-1,000 mg per tablet Take 1 Tab by mouth two (2) times daily (with meals). 180 Tab 1    VITAMIN D2 50,000 unit capsule TAKE ONE CAPSULE BY MOUTH ONCE A WEEK 12 Cap 4    multivitamin-iron-FA, hematinic, (CENTRUM COMPLETE)  mg-mcg tab tablet Take 1 Tab by mouth daily.  90 Tab 3    albuterol (PROVENTIL HFA, VENTOLIN HFA, PROAIR HFA) 90 mcg/actuation inhaler Take 2 Puffs by inhalation every four (4) hours as needed for Wheezing. 3 Inhaler 3    glucosam/chond/hyalu/CF borate (MOVE FREE JOINT HEALTH PO) Take  by mouth.  aspirin delayed-release 81 mg tablet Take 81 mg by mouth daily.  fluticasone (FLONASE) 50 mcg/actuation nasal spray 2 Sprays by Both Nostrils route daily as needed.  Cetirizine (ZYRTEC) 10 mg cap Take 10 mg PE/day by mouth as needed. No current facility-administered medications on file prior to visit. ALLERGIES:   Allergies   Allergen Reactions    Banana Hives     Asthma attach, throat swelling, throat scratching, lip swelling    Iodine Rash    Keflex [Cephalexin] Hives    Seafood Hives    Watermelon Hives     Throat closes    Clonidine Other (comments)     Memory loss        MEDICAL/SURGICAL HISTORY:  Past Medical History:   Diagnosis Date    Ankle swelling     Asthma 02/04/2010    PFTs 2016 showed obstructive lung pattern    Brain bleed ([address] 02/20/2015    Chronic diastolic heart failure ([address] 3/18/2015    Diabetes mellitus ([address] 5/27/2014    Diabetic eye exam ([address] 01/26/2017    No retinopathy Best corrected vision 20/20 OU    HTN (hypertension) 2/4/2010    Hyperlipidemia LDL goal < 70 2014    Magnesium deficiency     Obesity, unspecified 4/15/2015    SAH (subarachnoid hemorrhage) ([address] 2/20/15    admitted in [facility], Dr. Rendall Curling.  John Pearce and ref to Dr. Irene Schumacher, Neurology    Stroke [facility]) 2015    mini stroke    Vitamin D deficiency 2000      Past Surgical History:   Procedure Laterality Date    COLONOSCOPY N/A 8/15/2018    COL[facility]landa [facility] HX HYSTERECTOMY  2004    abd approach with ovaries and cervix intact    HX HYSTERECTOMY N/A 2004    HX OTHER SURGICAL  8/25/15    SAH, cerebral arteriopathy, Dr. Irene Schumacher, Interventional Neurology [facility]        FAMILY HISTORY:  Family History   Problem Relation Age of Onset    Diabetes Mother     Hypertension Mother              Heart Attack Mother 62    Alcohol abuse Father         cirrhosis    Diabetes Sister     Heart Attack Sister 64        SOCIAL HISTORY:  Social History     Tobacco Use    Smoking status: Never Smoker    Smokeless tobacco: Never Used   Substance Use Topics    Alcohol use: Yes     Alcohol/week: 0.0 oz     Frequency: Monthly or less     Drinks per session: 1 or 2     Binge frequency: Never     Comment: social    Drug use: No             HISTORY OF PRESENT ILLNESS: Geetha Grossman is a 59 y.o. female who presents to the office for a routine follow up visit. Diabetes -  Currently the patient's condition is well controlled. Complications include: microalbuminuria   Last A1c: 6.1% on 4/10/19  Reports compliance with the following regimen: Janumet 50-1,000 mg BID   Home glucose readings: AM fasting ranges 90s-110s      Last urine microalbumin: 10/24/18, normal screening. Patient is on ACE inhibitor therapy as she has had microalbuminuria in the past.   Last lipid panel: 4/10/19, LDL 89 at that time. Patient is on statin therapy  Last foot exam: 1/23/19  Last eye exam: Overdue. States she sees Dr. Ban Kitchen, but still has not scheduled an appt.      Hypertension -   Currently, the patient's condition is well controlled. Complications include: diastolic cardiac dysfunction, history of SAH in 2015 when BP was uncontrolled. Patient follows with cardiology (Dr. Ricki Montero)  Reports compliance with the following regimen: HCTZ 12.5 mg daily, amlodipine 5 mg daily, lisinopril 10 mg daily  Home BP readings: not checking  Lifestyle modifications: weight loss, low sodium diet.       Hyperlipidemia -   Recently switched back to Crestor from Vytorin   Last lipid panel showed improvement with LDL of 89 on 4/10/19      Asthma -   Mild/moderate persistent, since childhood. Patient had PFTs in 2016 which showed an obstructive lung pattern. She was subsequently given a diagnosis of COPD though she has never been a smoker. She questions this.  She's had no significant occupational exposures, and minimal secondhand smoke exposure. Regardless, her symptoms are well controlled on Advair with albuterol PRN. Frequency of rescue inhaler: Very infrequently, though she did need it this morning because the pollen is bothering her   Frequency of nighttime awakenings: never  Limitation of daily activity: denies   Triggers: dust at work, allergies      GERD -   Well controlled on Dexilant 30 mg daily.      Hip Pain -   Today, patient complains of pain in her L hip, which stars in her back, just above her buttocks and radiates into her groin. It is exacerbated by movement of her lumbar spine or her hip. States she has had this problem on and off for years. X-ray done at last visit was normal. She never did have her pelvic US done. Requests refill of flexeril today. Toe Infection -  Patient reports she has been having some issues with athlete's foot on her L great toe. She has been using otc antifungal cream with good relief, however she admits that her toe was very itchy and she was scratching it and accidentally scratched the skin open. The skin surrounding her nail is now red and tender to touch. She has not noticed any drainage. She has full ROM of the toe. Denies fevers. REVIEW OF SYSTEMS:  Review of Systems   Constitutional: Negative for chills, fever and malaise/fatigue. HENT: Positive for congestion. Negative for sore throat. Respiratory: Negative for cough, shortness of breath and wheezing. Cardiovascular: Negative for chest pain, palpitations and leg swelling. Gastrointestinal: Negative for abdominal pain, nausea and vomiting. Skin: Negative for itching and rash. Endo/Heme/Allergies: Positive for environmental allergies. PHYSICAL EXAMINATION:  Physical Exam   Constitutional: She is oriented to person, place, and time and well-developed, well-nourished, and in no distress. Cardiovascular: Normal rate, regular rhythm and normal heart sounds.  Exam reveals no gallop and no friction rub. No murmur heard. Pulmonary/Chest: Effort normal and breath sounds normal. She has no wheezes. She has no rales. Neurological: She is alert and oriented to person, place, and time. Gait normal.   Skin: Skin is warm and dry. No rash noted. mild erythema and tenderness of skin surrounding nailbed L great toe. No fluctuance or drainable paronychia noted. Psychiatric: Mood, memory and affect normal.        RESULTS:  Lab Results   Component Value Date/Time    Hemoglobin A1c 6.1 (H) 04/10/2019 12:00 AM    Hemoglobin A1c (POC) 5.9 04/18/2018 01:31 PM      Lab Results   Component Value Date/Time    Sodium 145 (H) 04/10/2019 12:00 AM    Potassium 4.7 04/10/2019 12:00 AM    Chloride 107 (H) 04/10/2019 12:00 AM    CO2 25 04/10/2019 12:00 AM    Anion gap 8 03/14/2019 12:45 PM    Glucose 94 04/10/2019 12:00 AM    BUN 19 04/10/2019 12:00 AM    Creatinine 0.98 04/10/2019 12:00 AM    BUN/Creatinine ratio 19 04/10/2019 12:00 AM    GFR est AA 73 04/10/2019 12:00 AM    GFR est non-AA 63 04/10/2019 12:00 AM    Calcium 9.2 04/10/2019 12:00 AM    Bilirubin, total 0.4 04/10/2019 12:00 AM    AST (SGOT) 14 04/10/2019 12:00 AM    Alk. phosphatase 77 04/10/2019 12:00 AM    Protein, total 6.7 04/10/2019 12:00 AM    Albumin 4.0 04/10/2019 12:00 AM    Globulin 4.4 (H) 03/14/2019 12:45 PM    A-G Ratio 1.5 04/10/2019 12:00 AM    ALT (SGPT) 14 04/10/2019 12:00 AM      Lab Results   Component Value Date/Time    Cholesterol, total 145 04/10/2019 12:00 AM    HDL Cholesterol 45 04/10/2019 12:00 AM    LDL, calculated 89 04/10/2019 12:00 AM    VLDL, calculated 11 04/10/2019 12:00 AM    Triglyceride 55 04/10/2019 12:00 AM    CHOL/HDL Ratio 2.5 10/26/2016 09:04 AM        ASSESSMENT/PLAN:  Diagnoses and all orders for this visit:    1. Controlled type 2 diabetes mellitus without complication, without long-term current use of insulin (HCC)  - Stable, well controlled.   - Continue Janumet  - Reminded to schedule yearly eye exam     2. Essential hypertension  - BP at goal  - Continue current regimen  - Meds refilled  Orders:    -     lisinopril (PRINIVIL, ZESTRIL) 10 mg tablet; Take 1 Tab by mouth daily. -     hydroCHLOROthiazide (MICROZIDE) 12.5 mg capsule; TAKE 1 CAPSULE BY MOUTH ONCE DAILY    3. Mixed hyperlipidemia  - Under better control on Crestor  - Continue current regimen     4. Chronic diastolic heart failure (HCC)   - Stable, f/u with cardiology     5. Mild persistent asthma without complication  - Stable on current regimen   - Suggested daily antihistamine use during allergy season    6. Gastroesophageal reflux disease without esophagitis  - Stable on Dexilant     7. Acute left-sided low back pain without sciatica  8. Chronic left hip pain  - Flexeril refilled  - Encouraged patient to schedule pelvic US and gave number for central scheduling   Orders:    -     cyclobenzaprine (FLEXERIL) 10 mg tablet; TAKE ONE-HALF TO ONE TABLET BY MOUTH DAILY AS NEEDED FOR BACK SPASMS    9. Infection of toe  - Very mild, though we discussed that we do need to manage this aggressively as she is a diabetic  - Will place on antibiotics as below as a precaution (Bactrim chosen as pt is allergic to Keflex)  Orders:    -     trimethoprim-sulfamethoxazole (BACTRIM DS, SEPTRA DS) 160-800 mg per tablet; Take 1 Tab by mouth two (2) times a day for 7 days. Follow-up and Dispositions    · Return in about 5 months (around 9/24/2019) for routine care, please scheduled diabetic eye exam and POC A1C in office . All questions answered. Patient expresses understanding and agrees with the plan as detailed above.     PATIENT CARE TEAM:   Patient Care Team:  SOILA De La Garza as PCP - General (Physician Assistant)  Arvin Frazier MD as Consulting Provider (Cardiology)  Ginny Justin MD as Consulting Provider (Neurology)  Mina Odell OD (Optometry)       Yoseph Riggs   April 24, 2019   1:51 PM

## 2019-04-24 NOTE — PROGRESS NOTES
1. Have you been to the ER, urgent care clinic since your last visit? Hospitalized since your last visit? No    2. Have you seen or consulted any other health care providers outside of the [address] [place] [address] since your last visit? Include any pap smears or colon screening. No       Annual eye exam: about 2 years   Pneumococcal vaccine: 02-  Flu vaccine: 11-  Patient instructed to remove shoes:  Yes

## 2019-05-21 NOTE — LETTER
Detail Level: Detailed NOTIFICATION RETURN TO WORK / SCHOOL 
 
4/18/2018 1:32 PM 
 
Ms. Butch Clayton [address] [facility] To Whom It May Concern: 
 
Butch Clayton is currently under the care of Marc. She will return to work/school on 4-19-18 If there are questions or concerns please have the patient contact our office.  
 
 
 
Sincerely, 
 
 
Kortney Singh PA-C 
 
                                
 

## 2019-05-31 DIAGNOSIS — E55.9 VITAMIN D DEFICIENCY: ICD-10-CM

## 2019-05-31 DIAGNOSIS — M25.552 CHRONIC LEFT HIP PAIN: ICD-10-CM

## 2019-05-31 DIAGNOSIS — G89.29 CHRONIC LEFT HIP PAIN: ICD-10-CM

## 2019-05-31 RX ORDER — ERGOCALCIFEROL 1.25 MG/1
CAPSULE ORAL
Qty: 12 CAP | Refills: 1 | Status: SHIPPED | OUTPATIENT
Start: 2019-05-31 | End: 2020-01-27

## 2019-05-31 RX ORDER — CYCLOBENZAPRINE HCL 10 MG
TABLET ORAL
Qty: 10 TAB | Refills: 1 | Status: SHIPPED | OUTPATIENT
Start: 2019-05-31 | End: 2019-09-11 | Stop reason: SDUPTHER

## 2019-05-31 NOTE — TELEPHONE ENCOUNTER
This patient contacted office for the following prescriptions to be filled:    Medication requested :   Requested Prescriptions     Pending Prescriptions Disp Refills    ergocalciferol (VITAMIN D2) 50,000 unit capsule 12 Cap 4    cyclobenzaprine (FLEXERIL) 10 mg tablet 10 Tab 0     Sig: TAKE ONE-HALF TO ONE TABLET BY MOUTH DAILY AS NEEDED FOR BACK SPASMS     PCP: [place] or Print: Elsi Pozo  Mail order or Local pharmacy [address]     Scheduled appointment if not seen by current providers in office: LOV 4/24/2019 f/u 9/11/2019

## 2019-07-15 DIAGNOSIS — R80.9 CONTROLLED TYPE 2 DIABETES MELLITUS WITH MICROALBUMINURIA, WITHOUT LONG-TERM CURRENT USE OF INSULIN (HCC): ICD-10-CM

## 2019-07-15 DIAGNOSIS — E11.29 CONTROLLED TYPE 2 DIABETES MELLITUS WITH MICROALBUMINURIA, WITHOUT LONG-TERM CURRENT USE OF INSULIN (HCC): ICD-10-CM

## 2019-07-15 NOTE — TELEPHONE ENCOUNTER
This pharmacy faxed over request for the following prescriptions to be filled:    Medication requested :   Requested Prescriptions     Pending Prescriptions Disp Refills    SITagliptin-metFORMIN (JANUMET) 50-1,000 mg per tablet 180 Tab 1     Sig: Take 1 Tab by mouth two (2) times daily (with meals).      PCP: [id number] [id number] or Print: [phone]  Mail order or [address]    Scheduled appointment if not seen by current providers in office: lov 4/24/19 raghav 9/11/19

## 2019-08-28 DIAGNOSIS — J45.30 MILD PERSISTENT ASTHMA WITHOUT COMPLICATION: ICD-10-CM

## 2019-08-28 NOTE — TELEPHONE ENCOUNTER
Pt called and request Advair to be called into the [facility]. Please call pt at your earliest convenience.

## 2019-08-30 RX ORDER — FLUTICASONE PROPIONATE AND SALMETEROL 250; 50 UG/1; UG/1
POWDER RESPIRATORY (INHALATION)
Qty: 1 INHALER | Refills: 2 | Status: SHIPPED | OUTPATIENT
Start: 2019-08-30 | End: 2019-12-23 | Stop reason: SDUPTHER

## 2019-09-11 ENCOUNTER — OFFICE VISIT (OUTPATIENT)
Dept: FAMILY MEDICINE CLINIC | Age: 59
End: 2019-09-11

## 2019-09-11 VITALS
WEIGHT: 198 LBS | BODY MASS INDEX: 31.82 KG/M2 | HEART RATE: 78 BPM | TEMPERATURE: 99.8 F | OXYGEN SATURATION: 99 % | RESPIRATION RATE: 16 BRPM | DIASTOLIC BLOOD PRESSURE: 74 MMHG | HEIGHT: 66 IN | SYSTOLIC BLOOD PRESSURE: 126 MMHG

## 2019-09-11 DIAGNOSIS — E11.29 CONTROLLED TYPE 2 DIABETES MELLITUS WITH MICROALBUMINURIA, WITHOUT LONG-TERM CURRENT USE OF INSULIN (HCC): Primary | ICD-10-CM

## 2019-09-11 DIAGNOSIS — M25.552 CHRONIC LEFT HIP PAIN: ICD-10-CM

## 2019-09-11 DIAGNOSIS — J45.20 MILD INTERMITTENT ASTHMA WITHOUT COMPLICATION: ICD-10-CM

## 2019-09-11 DIAGNOSIS — I10 ESSENTIAL HYPERTENSION: ICD-10-CM

## 2019-09-11 DIAGNOSIS — K21.9 GASTROESOPHAGEAL REFLUX DISEASE WITHOUT ESOPHAGITIS: ICD-10-CM

## 2019-09-11 DIAGNOSIS — G89.29 CHRONIC LEFT HIP PAIN: ICD-10-CM

## 2019-09-11 DIAGNOSIS — E78.2 MIXED HYPERLIPIDEMIA: ICD-10-CM

## 2019-09-11 DIAGNOSIS — J01.90 ACUTE BACTERIAL SINUSITIS: ICD-10-CM

## 2019-09-11 DIAGNOSIS — R80.9 CONTROLLED TYPE 2 DIABETES MELLITUS WITH MICROALBUMINURIA, WITHOUT LONG-TERM CURRENT USE OF INSULIN (HCC): Primary | ICD-10-CM

## 2019-09-11 DIAGNOSIS — B96.89 ACUTE BACTERIAL SINUSITIS: ICD-10-CM

## 2019-09-11 LAB — HBA1C MFR BLD HPLC: 5.8 %

## 2019-09-11 RX ORDER — CYCLOBENZAPRINE HCL 10 MG
TABLET ORAL
Qty: 10 TAB | Refills: 2 | Status: SHIPPED | OUTPATIENT
Start: 2019-09-11 | End: 2020-03-11 | Stop reason: SDUPTHER

## 2019-09-11 RX ORDER — METHYLPREDNISOLONE 4 MG/1
TABLET ORAL
Qty: 1 DOSE PACK | Refills: 0 | Status: SHIPPED | OUTPATIENT
Start: 2019-09-11 | End: 2019-09-23

## 2019-09-11 RX ORDER — LORATADINE 10 MG/1
10 TABLET ORAL
Qty: 30 TAB | Refills: 5 | Status: SHIPPED | OUTPATIENT
Start: 2019-09-11 | End: 2021-09-23 | Stop reason: ALTCHOICE

## 2019-09-11 RX ORDER — DOXYCYCLINE 100 MG/1
100 TABLET ORAL 2 TIMES DAILY
Qty: 20 TAB | Refills: 0 | Status: SHIPPED | OUTPATIENT
Start: 2019-09-11 | End: 2019-09-21

## 2019-09-11 RX ORDER — ALBUTEROL SULFATE 90 UG/1
2 AEROSOL, METERED RESPIRATORY (INHALATION)
Qty: 3 INHALER | Refills: 3 | Status: SHIPPED | OUTPATIENT
Start: 2019-09-11 | End: 2020-08-10 | Stop reason: SDUPTHER

## 2019-09-11 NOTE — PATIENT INSTRUCTIONS
A Healthy Lifestyle: Care Instructions  Your Care Instructions    A healthy lifestyle can help you feel good, stay at a healthy weight, and have plenty of energy for both work and play. A healthy lifestyle is something you can share with your whole family. A healthy lifestyle also can lower your risk for serious health problems, such as high blood pressure, heart disease, and diabetes. You can follow a few steps listed below to improve your health and the health of your family. Follow-up care is a key part of your treatment and safety. Be sure to make and go to all appointments, and call your doctor if you are having problems. It's also a good idea to know your test results and keep a list of the medicines you take. How can you care for yourself at home? · Do not eat too much sugar, fat, or fast foods. You can still have dessert and treats now and then. The goal is moderation. · Start small to improve your eating habits. Pay attention to portion sizes, drink less juice and soda pop, and eat more fruits and vegetables. ? Eat a healthy amount of food. A 3-ounce serving of meat, for example, is about the size of a deck of cards. Fill the rest of your plate with vegetables and whole grains. ? Limit the amount of soda and sports drinks you have every day. Drink more water when you are thirsty. ? Eat at least 5 servings of fruits and vegetables every day. It may seem like a lot, but it is not hard to reach this goal. A serving or helping is 1 piece of fruit, 1 cup of vegetables, or 2 cups of leafy, raw vegetables. Have an apple or some carrot sticks as an afternoon snack instead of a candy bar. Try to have fruits and/or vegetables at every meal.  · Make exercise part of your daily routine. You may want to start with simple activities, such as walking, bicycling, or slow swimming. Try to be active 30 to 60 minutes every day. You do not need to do all 30 to 60 minutes all at once.  For example, you can exercise 3 times a day for 10 or 20 minutes. Moderate exercise is safe for most people, but it is always a good idea to talk to your doctor before starting an exercise program.  · Keep moving. Jitendra Greenwood the lawn, work in the garden, or [employer]. Take the stairs instead of the elevator at work. · If you smoke, quit. People who smoke have an increased risk for heart attack, stroke, cancer, and other lung illnesses. Quitting is hard, but there are ways to boost your chance of quitting tobacco for good. ? Use nicotine gum, patches, or lozenges. ? Ask your doctor about stop-smoking programs and medicines. ? Keep trying. In addition to reducing your risk of diseases in the future, you will notice some benefits soon after you stop using tobacco. If you have shortness of breath or asthma symptoms, they will likely get better within a few weeks after you quit. · Limit how much alcohol you drink. Moderate amounts of alcohol (up to 2 drinks a day for men, 1 drink a day for women) are okay. But drinking too much can lead to liver problems, high blood pressure, and other health problems. Family health  If you have a family, there are many things you can do together to improve your health. · Eat meals together as a family as often as possible. · Eat healthy foods. This includes fruits, vegetables, lean meats and dairy, and whole grains. · Include your family in your fitness plan. Most people think of activities such as jogging or tennis as the way to fitness, but there are many ways you and your family can be more active. Anything that makes you breathe hard and gets your heart pumping is exercise. Here are some tips:  ? Walk to do errands or to take your child to school or the bus.  ? Go for a family bike ride after dinner instead of watching TV. Where can you learn more? Go to http://monique-digna.info/. Enter I905 in the search box to learn more about \"A Healthy Lifestyle: Care Instructions. \"  Current as of: September 11, 2018  Content Version: 12.1  © 6222-6059 Healthwise, Incorporated. Care instructions adapted under license by [employer] (which disclaims liability or warranty for this information). If you have questions about a medical condition or this instruction, always ask your healthcare professional. [address] any warranty or liability for your use of this information.

## 2019-09-11 NOTE — PROGRESS NOTES
1. Have you been to the ER, urgent care clinic since your last visit? Hospitalized since your last visit? No    2. Have you seen or consulted any other health care providers outside of the [address] [place] [address] since your last visit? Include any pap smears or colon screening.  No

## 2019-09-11 NOTE — PROGRESS NOTES
Patient: May Sheridan MRN: [id number]  SSN: [id number]    YOB: 1960  Age: 62 y.o. Sex: female      Date of Service: 9/11/2019   Provider: SOILA Lemus   Office Location:   [address] [place] [address] [address], [facility].  Office Phone: [phone]  Office Fax: [phone]      REASON FOR VISIT:   Chief Complaint   Patient presents with    Asthma    Cholesterol Problem    Diabetes    Hypertension    Sinus Pain     along with post nasal drip ongoing past 1 week     Pressure Behind the Eyes        VITALS:   Visit Vitals  /74 (BP 1 Location: Left arm, BP Patient Position: Sitting)   Pulse 78   Temp 99.8 °F (37.7 °C) (Oral)   Resp 16   Ht 5' 6\" (1.676 m)   Wt 198 lb (89.8 kg)   SpO2 99%   BMI 31.96 kg/m²        MEDICATIONS:   Current Outpatient Medications on File Prior to Visit   Medication Sig Dispense Refill    fluticasone propion-salmeterol (ADVAIR DISKUS) 250-50 mcg/dose diskus inhaler INHALE ONE PUFF BY MOUTH EVERY 12 HOURS 1 Inhaler 2    JANUMET 50-1,000 mg per tablet TAKE ONE TABLET BY MOUTH TWICE A DAY WITH MEALS 60 Tab 1    ergocalciferol (VITAMIN D2) 50,000 unit capsule TAKE ONE CAPSULE BY MOUTH ONCE A WEEK 12 Cap 1    amLODIPine (NORVASC) 5 mg tablet TAKE ONE TABLET BY MOUTH ONCE DAILY 30 Tab 4    lisinopril (PRINIVIL, ZESTRIL) 10 mg tablet Take 1 Tab by mouth daily. 90 Tab 1    hydroCHLOROthiazide (MICROZIDE) 12.5 mg capsule TAKE 1 CAPSULE BY MOUTH ONCE DAILY 90 Cap 1    rosuvastatin (CRESTOR) 10 mg tablet Take 1 Tab by mouth nightly. 30 Tab 0    glucosam/chond/hyalu/CF borate (MOVE FREE JOINT HEALTH PO) Take  by mouth.  magnesium oxide (MAG-OX) 400 mg tablet TAKE ONE TABLET BY MOUTH ONCE DAILY 90 Tab 0    aspirin delayed-release 81 mg tablet Take 81 mg by mouth daily.  multivitamin-iron-FA, hematinic, (CENTRUM COMPLETE)  mg-mcg tab tablet Take 1 Tab by mouth daily.  90 Tab 3    cyclobenzaprine (FLEXERIL) 10 mg tablet TAKE ONE-HALF TO ONE TABLET BY MOUTH DAILY AS NEEDED FOR BACK SPASMS 10 Tab 1    albuterol (PROVENTIL HFA, VENTOLIN HFA, PROAIR HFA) 90 mcg/actuation inhaler Take 2 Puffs by inhalation every four (4) hours as needed for Wheezing. 3 Inhaler 3    fluticasone (FLONASE) 50 mcg/actuation nasal spray 2 Sprays by Both Nostrils route daily as needed.  Cetirizine (ZYRTEC) 10 mg cap Take 10 mg PE/day by mouth as needed. No current facility-administered medications on file prior to visit. ALLERGIES:   Allergies   Allergen Reactions    Banana Hives     Asthma attach, throat swelling, throat scratching, lip swelling    Iodine Rash    Keflex [Cephalexin] Hives    Seafood Hives    Watermelon Hives     Throat closes    Clonidine Other (comments)     Memory loss        MEDICAL/SURGICAL HISTORY:  Past Medical History:   Diagnosis Date    Ankle swelling     Asthma 02/04/2010    PFTs 2016 showed obstructive lung pattern    Brain bleed ([address] 02/20/2015    Chronic diastolic heart failure ([address] 3/18/2015    Diabetes mellitus ([address] 5/27/2014    Diabetic eye exam ([address] 01/26/2017    No retinopathy Best corrected vision 20/20 OU    HTN (hypertension) 2/4/2010    Hyperlipidemia LDL goal < 70 2014    Magnesium deficiency     Obesity, unspecified 4/15/2015    SAH (subarachnoid hemorrhage) ([address] 2/20/15    ad[facility] in [facility], Dr. Refugio Goode.  Yaritza Figueroa and ref to Dr. Treva Albarran, Neurology    Stroke [facility]) 2015    mini stroke    Vitamin D deficiency 2000      Past Surgical History:   Procedure Laterality Date    COLONOSCOPY N/A 8/15/2018    COL[facility] HX HYSTERECTOMY  2004    abd approach with ovaries and cervix intact    HX HYSTERECTOMY N/A 2004    HX OTHER SURGICAL  8/25/15    SAH, cerebral arteriopathy, Dr. Treva Albarran, Interventional [facility]        FAMILY HISTORY:  Family History   Problem Relation Age of Onset    Diabetes Mother    [employer] Hypertension Mother              Heart Attack Mother 62    Alcohol abuse Father         cirrhosis    Diabetes Sister     Heart Attack Sister 64        SOCIAL HISTORY:  Social History     Tobacco Use    Smoking status: Never Smoker    Smokeless tobacco: Never Used   Substance Use Topics    Alcohol use: Yes     Alcohol/week: 0.0 standard drinks     Frequency: Monthly or less     Drinks per session: 1 or 2     Binge frequency: Never     Comment: social    Drug use: No             HISTORY OF PRESENT ILLNESS: May Sheridan is a 62 y.o. female who presents to the office for a routine follow up visit. Diabetes -  Currently the patient's condition is well controlled. Complications include: microalbuminuria   Last A1c: 6.1% on 4/10/19  Today's A1c: 5.8%   Reports compliance with the following regimen: Janumet 50-1,000 mg BID   Home glucose readings: AM fasting in the low-mid 100s      Last urine microalbumin: 10/24/18, normal screening. Patient is on ACE inhibitor therapy as she has had microalbuminuria in the past.   Last lipid panel: 4/10/19, LDL 89 at that time. Patient is on statin therapy  Last foot exam: 1/23/19  Last eye exam: Overdue. sees Dr. Adalberto Carrel      Hypertension -   Currently, the patient's condition is well controlled. Complications include: diastolic cardiac dysfunction, history of SAH in 2015 when BP was uncontrolled. Patient follows with cardiology (Dr. Nigel Metzger)  Reports compliance with the following regimen: HCTZ 12.5 mg daily, amlodipine 5 mg daily, lisinopril 10 mg daily  Home BP readings: not checking  Lifestyle modifications: weight loss, low sodium diet.       Hyperlipidemia -   Recently switched back to Crestor from Vytorin   Last lipid panel showed improvement with LDL of 89 on 4/10/19      Asthma -   Mild/moderate persistent, since childhood. Patient had PFTs in 2016 which showed an obstructive lung pattern.  She was subsequently given a diagnosis of COPD though she has never been a smoker. She questions this. She's had no significant occupational exposures, and minimal secondhand smoke exposure. Regardless, her symptoms are well controlled on Advair with albuterol PRN. Frequency of rescue inhaler: a few times monthly   Frequency of nighttime awakenings: never  Limitation of daily activity: denies   Triggers: dust at work, allergies      GERD -   Well controlled on Dexilant 30 mg daily. Chronic Hip Pain -   Stable, unchanged. Uses Flexeril PRN. Still has not had her pelvic ultrasound     Sinusitis -   Acutely, patient complains of b/l sinus pain/pressure x about 2 weeks. She initially thought it was just allergies, so she started taking antihistamines without much improvement. Over the past few days she's had a low grade fever and malaise as well. States symptoms feel very similar to the sinus infection she had back in March, which was actually diagnosed by CT in the ER due to complaints of severe headache. Patient states she was given doxycycline and a steroid, which helped significantly. REVIEW OF SYSTEMS:  Review of Systems   Constitutional: Negative for chills and fever. HENT: Positive for congestion and sinus pain. Respiratory: Negative for cough, shortness of breath and wheezing. Cardiovascular: Negative for chest pain, palpitations and leg swelling. PHYSICAL EXAMINATION:  Physical Exam   Constitutional: She is oriented to person, place, and time and well-developed, well-nourished, and in no distress. HENT:   Head: Normocephalic and atraumatic. Right Ear: Tympanic membrane normal.   Left Ear: Tympanic membrane normal.   Nose: Mucosal edema present. Right sinus exhibits maxillary sinus tenderness. Left sinus exhibits maxillary sinus tenderness. Mouth/Throat: Uvula is midline, oropharynx is clear and moist and mucous membranes are normal.   Eyes: Pupils are equal, round, and reactive to light. Conjunctivae are normal. Right eye exhibits no discharge.  Left eye exhibits no discharge. Neck: Neck supple. Cardiovascular: Normal rate, regular rhythm and normal heart sounds. Exam reveals no gallop and no friction rub. No murmur heard. Pulmonary/Chest: Effort normal and breath sounds normal. She has no wheezes. She has no rales. Lymphadenopathy:     She has no cervical adenopathy. Neurological: She is alert and oriented to person, place, and time. Gait normal.   Skin: Skin is warm and dry. No rash noted. Psychiatric: Mood, memory and affect normal.        RESULTS:  Results for orders placed or performed in visit on 09/11/19   AMB POC HEMOGLOBIN A1C   Result Value Ref Range    Hemoglobin A1c (POC) 5.8 %        ASSESSMENT/PLAN:  Diagnoses and all orders for this visit:    1. Controlled type 2 diabetes mellitus with microalbuminuria, without long-term current use of insulin ([facility]  - Very well controlled  - Continue current regimen  - Repeat labs prior to next visit  Orders:    -     METABOLIC PANEL, COMPREHENSIVE; Future  -     LIPID PANEL; Future  -     HEMOGLOBIN A1C W/O EAG; Future  -     AMB POC HEMOGLOBIN A1C    2. Essential hypertension  - Well controlled  - Continue current regimen     3. Mild intermittent asthma without complication  - Stable  - Albuterol refilled  Orders:    -     albuterol (PROVENTIL HFA, VENTOLIN HFA, PROAIR HFA) 90 mcg/actuation inhaler; Take 2 Puffs by inhalation every four (4) hours as needed for Wheezing. 4. Mixed hyperlipidemia  - Stable  - Continue Crestor  - Repeat labs prior to next visit in 6 mo     5. Gastroesophageal reflux disease without esophagitis  - Stable on PPI     6. Chronic left hip pain  - Flexeril refilled for PRN use. Patient aware to use sparingly  Orders:    -     cyclobenzaprine (FLEXERIL) 10 mg tablet; TAKE ONE-HALF TO ONE TABLET BY MOUTH DAILY AS NEEDED FOR BACK SPASMS    7.  Acute bacterial sinusitis  - Will treat with doxycycline and medrol as below  - continue antihistamines  - supportive care with fluids, mucinex PRN   Orders:    -     doxycycline (ADOXA) 100 mg tablet; Take 1 Tab by mouth two (2) times a day for 10 days. -     methylPREDNISolone (MEDROL, JEANNE,) 4 mg tablet; Take as directed per package insert  -     loratadine (CLARITIN) 10 mg tablet; Take 1 Tab by mouth daily as needed for Allergies. Follow-up and Dispositions    · Return in about 6 months (around 3/11/2020) for routine care and fasting labs to be done 3-5 days prior . All questions answered. Patient expresses understanding and agrees with the plan as detailed above.     PATIENT CARE TEAM:   Patient Care Team:  SOILA Sauceda as PCP - General (Physician Assistant)  Shell Phillips MD as Consulting Provider (Cardiology)  Chayito Tang MD as Consulting Provider (Neurology)  Abel Rodriguez (Optometry)       [place]dy Lama[place], [place]   September 11, 2019   11:51 AM

## 2019-09-23 ENCOUNTER — OFFICE VISIT (OUTPATIENT)
Dept: CARDIOLOGY CLINIC | Age: 59
End: 2019-09-23

## 2019-09-23 VITALS
SYSTOLIC BLOOD PRESSURE: 114 MMHG | HEIGHT: 66 IN | WEIGHT: 198.6 LBS | BODY MASS INDEX: 31.92 KG/M2 | HEART RATE: 77 BPM | DIASTOLIC BLOOD PRESSURE: 72 MMHG

## 2019-09-23 DIAGNOSIS — E66.9 OBESITY (BMI 30.0-34.9): ICD-10-CM

## 2019-09-23 DIAGNOSIS — I10 ESSENTIAL HYPERTENSION: ICD-10-CM

## 2019-09-23 DIAGNOSIS — I50.32 CHRONIC DIASTOLIC HEART FAILURE (HCC): Primary | ICD-10-CM

## 2019-09-23 DIAGNOSIS — E78.2 MIXED HYPERLIPIDEMIA: ICD-10-CM

## 2019-09-23 RX ORDER — DOXYCYCLINE 100 MG/1
100 TABLET ORAL 2 TIMES DAILY
COMMUNITY
End: 2020-06-26

## 2019-09-23 RX ORDER — LISINOPRIL 10 MG/1
10 TABLET ORAL DAILY
Qty: 30 TAB | Refills: 6 | Status: SHIPPED | OUTPATIENT
Start: 2019-09-23 | End: 2020-03-11 | Stop reason: SDUPTHER

## 2019-09-23 NOTE — PROGRESS NOTES
HISTORY OF PRESENT ILLNESS  Matthew Davies is a 62 y.o. female. Hospital Follow Up   The history is provided by the patient and medical records (CP, cough). Pertinent negatives include no chest pain, no headaches and no shortness of breath. Chest Pain (Angina)    The history is provided by the patient. This is a new problem. The current episode started more than 1 week ago. The problem has been resolved. Duration of episode(s) is 1 hour. The pain is associated with normal activity. The pain is present in the substernal region. The quality of the pain is described as sharp. The pain radiates to the left arm. Associated symptoms include lower extremity edema. Pertinent negatives include no claudication, no cough, no dizziness, no fever, no headaches, no malaise/fatigue, no nausea, no orthopnea, no palpitations, no PND, no shortness of breath and no vomiting. CHF   The history is provided by the medical records. This is a chronic problem. Pertinent negatives include no chest pain, no headaches and no shortness of breath. Cholesterol Problem   The history is provided by the medical records. This is a chronic problem. Pertinent negatives include no chest pain, no headaches and no shortness of breath. Hypertension   The history is provided by the medical records and patient. This is a chronic problem. Pertinent negatives include no chest pain, no headaches and no shortness of breath. Shortness of Breath   The history is provided by the patient. This is a recurrent problem. The problem occurs frequently. The current episode started more than 1 week ago. The problem has been gradually improving (inhalers/prednisone). Associated symptoms include leg swelling. Pertinent negatives include no fever, no headaches, no cough, no wheezing, no PND, no orthopnea, no chest pain, no vomiting, no rash and no claudication.  The problem's precipitants include exercise (walking thru parking lot; 9/15 more; 1/17 better except when exosed to smoke/dust). She has tried beta-agonist inhalers and oral steroids for the symptoms. The treatment provided significant relief. Leg Swelling   The history is provided by the patient. This is a recurrent problem. The current episode started more than 1 week ago. The problem occurs daily (R>L). The problem has not changed since onset. Pertinent negatives include no chest pain, no headaches and no shortness of breath. The symptoms are aggravated by standing. The symptoms are relieved by rest.       Review of Systems   Constitutional: Negative for chills, fever, malaise/fatigue and weight loss. HENT: Negative for nosebleeds. Eyes: Negative for discharge. Respiratory: Negative for cough, shortness of breath and wheezing. Cardiovascular: Positive for leg swelling. Negative for chest pain, palpitations, orthopnea, claudication and PND. Gastrointestinal: Negative for diarrhea, nausea and vomiting. Genitourinary: Negative for dysuria and hematuria. Musculoskeletal: Negative for joint pain. Skin: Negative for rash. Neurological: Negative for dizziness, seizures, loss of consciousness and headaches. Endo/Heme/Allergies: Negative for polydipsia. Does not bruise/bleed easily. Psychiatric/Behavioral: Negative for depression and substance abuse. The patient does not have insomnia.       Allergies   Allergen Reactions    Banana Hives     Asthma attach, throat swelling, throat scratching, lip swelling    Iodine Rash    Keflex [Cephalexin] Hives    Seafood Hives    Watermelon Hives     Throat closes    Clonidine Other (comments)     Memory loss       Past Medical History:   Diagnosis Date    Ankle swelling     Asthma 02/04/2010    PFTs 2016 showed obstructive lung pattern    Brain bleed ([facility] 02/20/2015    Chronic diastolic heart failure ([facility] 3/18/2015    Diabetes mellitus ([facility] 5/27/2014    Diabetic eye exam ([facility] 01/26/2017    No retinopathy Best corrected vision 20/20 OU    HTN (hypertension) 2/4/2010    Hyperlipidemia LDL goal < 70 2014    Magnesium deficiency     Obesity, unspecified 4/15/2015    SAH (subarachnoid hemorrhage) ([address] 2/20/15    admitted in [facility], Dr. Megan Sanabria. Flakita Collazo and ref to Dr. Maira Ventura, Neurology    Stroke [facility]) 2015    mini stroke    Vitamin D deficiency 2000       Family History   Problem Relation Age of Onset    Diabetes Mother     Hypertension Mother              Heart Attack Mother 62    Alcohol abuse Father         cirrhosis    Diabetes Sister     Heart Attack Sister 64       Social History     Tobacco Use    Smoking status: Never Smoker    Smokeless tobacco: Never Used   Substance Use Topics    Alcohol use: Yes     Alcohol/week: 0.0 standard drinks     Frequency: Monthly or less     Drinks per session: 1 or 2     Binge frequency: Never     Comment: social    Drug use: No        Current Outpatient Medications   Medication Sig    doxycycline (ADOXA) 100 mg tablet Take 100 mg by mouth two (2) times a day.  lisinopril (PRINIVIL, ZESTRIL) 10 mg tablet Take 1 Tab by mouth daily.  albuterol (PROVENTIL HFA, VENTOLIN HFA, PROAIR HFA) 90 mcg/actuation inhaler Take 2 Puffs by inhalation every four (4) hours as needed for Wheezing.  cyclobenzaprine (FLEXERIL) 10 mg tablet TAKE ONE-HALF TO ONE TABLET BY MOUTH DAILY AS NEEDED FOR BACK SPASMS    loratadine (CLARITIN) 10 mg tablet Take 1 Tab by mouth daily as needed for Allergies.  fluticasone propion-salmeterol (ADVAIR DISKUS) 250-50 mcg/dose diskus inhaler INHALE ONE PUFF BY MOUTH EVERY 12 HOURS    JANUMET 50-1,000 mg per tablet TAKE ONE TABLET BY MOUTH TWICE A DAY WITH MEALS    ergocalciferol (VITAMIN D2) 50,000 unit capsule TAKE ONE CAPSULE BY MOUTH ONCE A WEEK    amLODIPine (NORVASC) 5 mg tablet TAKE ONE TABLET BY MOUTH ONCE DAILY    hydroCHLOROthiazide (MICROZIDE) 12.5 mg capsule TAKE 1 CAPSULE BY MOUTH ONCE DAILY    rosuvastatin (CRESTOR) 10 mg tablet Take 1 Tab by mouth nightly.     magnesium oxide (MAG-OX) 400 mg tablet TAKE ONE TABLET BY MOUTH ONCE DAILY    aspirin delayed-release 81 mg tablet Take 81 mg by mouth daily.  fluticasone (FLONASE) 50 mcg/actuation nasal spray 2 Sprays by Both Nostrils route daily as needed.  multivitamin-iron-FA, hematinic, (CENTRUM COMPLETE)  mg-mcg tab tablet Take 1 Tab by mouth daily. No current facility-administered medications for this visit. Past Surgical History:   Procedure Laterality Date    COLONOSCOPY N/A 8/15/2018    COLONOSCOPY performed by Renee [facility] MD at [facility] HX HYSTERECTOMY  2004    abd approach with ovaries and cervix intact    HX HYSTERECTOMY N/A 2004    HX OTHER SURGICAL  8/25/15    SAH, cerebral arteriopathy, Dr. Delmer Huff, Interventional [facility]       Diagnostic Studies:  CARDIOLOGY STUDIES 4/10/2015   Echocardiogram - Complete Result 55%EF, tr MR   Some recent data might be hidden   2/18 Nuc Stress  Conclusion:   1. Normal perfusion scan. 2. Normal wall motion and ejection fractions calculated to be 51%. 3. No evidence of significant fixed or reversible defect suggesting ischemia or myocardial infarction noted from this nuclear study. 4. Low risk scan. Visit Vitals  /72   Pulse 77   Ht 5' 6\" (1.676 m)   Wt 90.1 kg (198 lb 9.6 oz)   BMI 32.05 kg/m²       Ms. Guadalupe Antunez has a reminder for a \"due or due soon\" health maintenance. I have asked that she contact her primary care provider for follow-up on this health maintenance. Physical Exam   Constitutional: She is oriented to person, place, and time. She appears well-developed and well-nourished. No distress. HENT:   Head: Normocephalic and atraumatic. Mouth/Throat: Normal dentition. Eyes: Right eye exhibits no discharge. Left eye exhibits no discharge. No scleral icterus. Neck: Neck supple. No JVD present. Carotid bruit is not present. No thyromegaly present.    Cardiovascular: Normal rate, regular rhythm, S1 normal, S2 normal, normal heart sounds and intact distal pulses. Exam reveals no gallop and no friction rub. No murmur heard. Pulmonary/Chest: Effort normal and breath sounds normal. She has no wheezes. She has no rales. Abdominal: Soft. She exhibits no mass. There is no tenderness. Musculoskeletal: She exhibits no edema (trace). Lymphadenopathy:        Right cervical: No superficial cervical adenopathy present. Left cervical: No superficial cervical adenopathy present. Neurological: She is alert and oriented to person, place, and time. Skin: Skin is warm and dry. No rash noted. Psychiatric: She has a normal mood and affect. Her behavior is normal.       ASSESSMENT and PLAN    Discussed the patient's above normal BMI with her. I have recommended the following interventions: dietary management education, guidance, and counseling . The BMI follow up plan is as follows: BMI is out of normal parameters and plan is as follows: I have counseled this patient on diet and exercise regimens       CHF: Otilio Leigh        9/2019   Chronic diastolic CHF appears compensated. Blood pressure controlled on current medications. Low sodium diet. Continue walking for exercise. Diagnoses and all orders for this visit:    1. Chronic diastolic heart failure ([address]    2. Essential hypertension  -     lisinopril (PRINIVIL, ZESTRIL) 10 mg tablet; Take 1 Tab by mouth daily. 3. Mixed hyperlipidemia    4. Obesity (BMI 30.0-34. 9)      Pertinent laboratory and test data reviewed and discussed with patient. See patient instructions also for other medical advice given    Medications Discontinued During This Encounter   Medication Reason    methylPREDNISolone (MEDROL, JEANNE,) 4 mg tablet     glucosam/chond/hyalu/CF borate (MOVE FREE JOINT HEALTH PO)     lisinopril (PRINIVIL, ZESTRIL) 10 mg tablet Reorder       Follow-up and Dispositions    · Return in about 1 year (around 9/23/2020), or if symptoms worsen or fail to improve.        Tobi Wright NP-C  I have independently evaluated and examined the patient. CHF is well controlled and patient is asymptomatic now and NYHA class II. Blood pressure is controlled. Physical exam is unremarkable. Diet weight and exercise discussed. All relevant labs and testing data's are reviewed. Care plan discussed and updated after review.   Brendan Stewart MD

## 2019-09-23 NOTE — PATIENT INSTRUCTIONS

## 2019-12-04 DIAGNOSIS — I10 ESSENTIAL HYPERTENSION: ICD-10-CM

## 2019-12-06 RX ORDER — HYDROCHLOROTHIAZIDE 12.5 MG/1
CAPSULE ORAL
Qty: 30 CAP | Refills: 0 | Status: SHIPPED | OUTPATIENT
Start: 2019-12-06 | End: 2019-12-23 | Stop reason: SDUPTHER

## 2019-12-23 DIAGNOSIS — I10 ESSENTIAL HYPERTENSION: ICD-10-CM

## 2019-12-23 DIAGNOSIS — J45.30 MILD PERSISTENT ASTHMA WITHOUT COMPLICATION: ICD-10-CM

## 2019-12-23 RX ORDER — FLUTICASONE PROPIONATE AND SALMETEROL 250; 50 UG/1; UG/1
POWDER RESPIRATORY (INHALATION)
Qty: 1 INHALER | Refills: 0 | Status: SHIPPED | OUTPATIENT
Start: 2019-12-23 | End: 2020-01-20

## 2019-12-23 RX ORDER — HYDROCHLOROTHIAZIDE 12.5 MG/1
CAPSULE ORAL
Qty: 30 CAP | Refills: 0 | Status: SHIPPED | OUTPATIENT
Start: 2019-12-23 | End: 2020-01-03

## 2019-12-23 NOTE — TELEPHONE ENCOUNTER
This patient contacted office for the following prescriptions to be filled:    Medication requested :   Requested Prescriptions     Pending Prescriptions Disp Refills    hydroCHLOROthiazide (MICROZIDE) 12.5 mg capsule 30 Cap 0     Sig: TAKE ONE CAPSULE BY MOUTH DAILY    fluticasone propion-salmeterol (ADVAIR DISKUS) 250-50 mcg/dose diskus inhaler 1 Inhaler 2     Sig: INHALE ONE PUFF BY MOUTH EVERY 12 HOURS     PCP: Omero or Print: Imelda Dakin  Mail order or Local pharmacy [id number]      Scheduled appointment if not seen by current providers in office: LOV 9/11/2019 f/u 3/11/2019

## 2020-01-03 DIAGNOSIS — I10 ESSENTIAL HYPERTENSION: ICD-10-CM

## 2020-01-03 RX ORDER — HYDROCHLOROTHIAZIDE 12.5 MG/1
CAPSULE ORAL
Qty: 30 CAP | Refills: 0 | Status: SHIPPED | OUTPATIENT
Start: 2020-01-03 | End: 2020-02-24

## 2020-01-27 DIAGNOSIS — E55.9 VITAMIN D DEFICIENCY: ICD-10-CM

## 2020-01-27 RX ORDER — ERGOCALCIFEROL 1.25 MG/1
CAPSULE ORAL
Qty: 12 CAP | Refills: 0 | Status: SHIPPED | OUTPATIENT
Start: 2020-01-27 | End: 2020-04-19

## 2020-02-24 DIAGNOSIS — I10 ESSENTIAL HYPERTENSION: ICD-10-CM

## 2020-02-24 RX ORDER — HYDROCHLOROTHIAZIDE 12.5 MG/1
CAPSULE ORAL
Qty: 30 CAP | Refills: 0 | Status: SHIPPED | OUTPATIENT
Start: 2020-02-24 | End: 2020-03-11 | Stop reason: SDUPTHER

## 2020-03-01 DIAGNOSIS — R80.9 CONTROLLED TYPE 2 DIABETES MELLITUS WITH MICROALBUMINURIA, WITHOUT LONG-TERM CURRENT USE OF INSULIN (HCC): ICD-10-CM

## 2020-03-01 DIAGNOSIS — E11.29 CONTROLLED TYPE 2 DIABETES MELLITUS WITH MICROALBUMINURIA, WITHOUT LONG-TERM CURRENT USE OF INSULIN (HCC): ICD-10-CM

## 2020-03-04 ENCOUNTER — HOSPITAL ENCOUNTER (OUTPATIENT)
Dept: LAB | Age: 60
Discharge: HOME | End: 2020-03-04

## 2020-03-04 LAB — XX-LABCORP SPECIMEN COL,LCBCF: NORMAL

## 2020-03-04 PROCEDURE — 99001 SPECIMEN HANDLING PT-LAB: CPT

## 2020-03-05 LAB
ALBUMIN SERPL-MCNC: 4.4 G/DL (ref 3.8–4.8)
ALBUMIN/GLOB SERPL: 1.6 {RATIO} (ref 1.2–2.2)
ALP SERPL-CCNC: 78 IU/L (ref 39–117)
ALT SERPL-CCNC: 15 IU/L (ref 0–32)
AST SERPL-CCNC: 14 IU/L (ref 0–40)
BILIRUB SERPL-MCNC: 0.5 MG/DL (ref 0–1.2)
BUN SERPL-MCNC: 15 MG/DL (ref 8–27)
BUN/CREAT SERPL: 13 (ref 12–28)
CALCIUM SERPL-MCNC: 9.4 MG/DL (ref 8.7–10.3)
CHLORIDE SERPL-SCNC: 104 MMOL/L (ref 96–106)
CHOLEST SERPL-MCNC: 139 MG/DL (ref 100–199)
CO2 SERPL-SCNC: 21 MMOL/L (ref 20–29)
CREAT SERPL-MCNC: 1.13 MG/DL (ref 0.57–1)
GLOBULIN SER CALC-MCNC: 2.8 G/DL (ref 1.5–4.5)
GLUCOSE SERPL-MCNC: 111 MG/DL (ref 65–99)
HBA1C MFR BLD: 6.1 % (ref 4.8–5.6)
HDLC SERPL-MCNC: 45 MG/DL
INTERPRETATION, 910389: NORMAL
INTERPRETATION: NORMAL
LDLC SERPL CALC-MCNC: 80 MG/DL (ref 0–99)
Lab: NORMAL
PDF IMAGE, 910387: NORMAL
POTASSIUM SERPL-SCNC: 4.3 MMOL/L (ref 3.5–5.2)
PROT SERPL-MCNC: 7.2 G/DL (ref 6–8.5)
SODIUM SERPL-SCNC: 141 MMOL/L (ref 134–144)
SPECIMEN STATUS REPORT, ROLRST: NORMAL
TRIGL SERPL-MCNC: 68 MG/DL (ref 0–149)
VLDLC SERPL CALC-MCNC: 14 MG/DL (ref 5–40)

## 2020-03-11 ENCOUNTER — OFFICE VISIT (OUTPATIENT)
Dept: FAMILY MEDICINE CLINIC | Age: 60
End: 2020-03-11

## 2020-03-11 VITALS
TEMPERATURE: 98.2 F | HEART RATE: 76 BPM | WEIGHT: 202 LBS | SYSTOLIC BLOOD PRESSURE: 130 MMHG | DIASTOLIC BLOOD PRESSURE: 72 MMHG | RESPIRATION RATE: 16 BRPM | OXYGEN SATURATION: 97 % | HEIGHT: 66 IN | BODY MASS INDEX: 32.47 KG/M2

## 2020-03-11 DIAGNOSIS — K21.9 GASTROESOPHAGEAL REFLUX DISEASE WITHOUT ESOPHAGITIS: ICD-10-CM

## 2020-03-11 DIAGNOSIS — Z12.39 SCREENING FOR BREAST CANCER: ICD-10-CM

## 2020-03-11 DIAGNOSIS — R80.9 CONTROLLED TYPE 2 DIABETES MELLITUS WITH MICROALBUMINURIA, WITHOUT LONG-TERM CURRENT USE OF INSULIN (HCC): Primary | ICD-10-CM

## 2020-03-11 DIAGNOSIS — E11.29 CONTROLLED TYPE 2 DIABETES MELLITUS WITH MICROALBUMINURIA, WITHOUT LONG-TERM CURRENT USE OF INSULIN (HCC): Primary | ICD-10-CM

## 2020-03-11 DIAGNOSIS — G89.29 CHRONIC LEFT HIP PAIN: ICD-10-CM

## 2020-03-11 DIAGNOSIS — I10 ESSENTIAL HYPERTENSION: ICD-10-CM

## 2020-03-11 DIAGNOSIS — J45.31 MILD PERSISTENT ASTHMA WITH ACUTE EXACERBATION: ICD-10-CM

## 2020-03-11 DIAGNOSIS — E78.2 MIXED HYPERLIPIDEMIA: ICD-10-CM

## 2020-03-11 DIAGNOSIS — R10.13 EPIGASTRIC PAIN: ICD-10-CM

## 2020-03-11 DIAGNOSIS — R05.9 COUGH: ICD-10-CM

## 2020-03-11 DIAGNOSIS — M25.552 CHRONIC LEFT HIP PAIN: ICD-10-CM

## 2020-03-11 RX ORDER — AMLODIPINE BESYLATE 5 MG/1
TABLET ORAL
Qty: 90 TAB | Refills: 1 | Status: SHIPPED | OUTPATIENT
Start: 2020-03-11 | End: 2020-09-18 | Stop reason: SDUPTHER

## 2020-03-11 RX ORDER — METHYLPREDNISOLONE 4 MG/1
TABLET ORAL
Qty: 1 DOSE PACK | Refills: 0 | Status: SHIPPED | OUTPATIENT
Start: 2020-03-11 | End: 2020-06-26

## 2020-03-11 RX ORDER — ALBUTEROL SULFATE 0.83 MG/ML
2.5 SOLUTION RESPIRATORY (INHALATION)
Qty: 30 NEBULE | Refills: 2 | Status: SHIPPED | OUTPATIENT
Start: 2020-03-11 | End: 2021-10-08 | Stop reason: SDUPTHER

## 2020-03-11 RX ORDER — OMEPRAZOLE 20 MG/1
20 CAPSULE, DELAYED RELEASE ORAL DAILY
COMMUNITY
End: 2020-06-16 | Stop reason: SDUPTHER

## 2020-03-11 RX ORDER — CYCLOBENZAPRINE HCL 10 MG
TABLET ORAL
Qty: 10 TAB | Refills: 0 | Status: SHIPPED | OUTPATIENT
Start: 2020-03-11 | End: 2020-04-07 | Stop reason: SDUPTHER

## 2020-03-11 RX ORDER — LISINOPRIL 10 MG/1
10 TABLET ORAL DAILY
Qty: 90 TAB | Refills: 1 | Status: SHIPPED | OUTPATIENT
Start: 2020-03-11 | End: 2020-12-07

## 2020-03-11 RX ORDER — HYDROCHLOROTHIAZIDE 12.5 MG/1
CAPSULE ORAL
Qty: 90 CAP | Refills: 1 | Status: SHIPPED | OUTPATIENT
Start: 2020-03-11 | End: 2020-10-06 | Stop reason: SDUPTHER

## 2020-03-11 NOTE — PROGRESS NOTES
1. Have you been to the ER, urgent care clinic since your last visit? Hospitalized since your last visit? No    2. Have you seen or consulted any other health care providers outside of the [address] [place] [address] since your last visit? Include any pap smears or colon screening. No     Per Christopher Majano she is not clinincal depressed (please remove DX) - she states 2016 was not a good year for her.

## 2020-03-11 NOTE — PROGRESS NOTES
Patient: Ivone Payne MRN: [id number]  SSN: [id number]    YOB: 1960  Age: 62 y.o. Sex: female      Date of Service: 3/11/2020   Provider: SOILA Lepe   Office Location:   [address] [place] [address] Dr Esteban muñoz, [address]  Office Phone: [phone]  Office Fax: [phone]      REASON FOR VISIT:   Chief Complaint   Patient presents with    Asthma     c/o nasal congestion ongoing past 2-3 weeks     Cholesterol Problem    Depression    Diabetes    Headache    Hypertension        VITALS:   Visit Vitals  /72 (BP 1 Location: Left arm, BP Patient Position: Sitting)   Pulse 76   Temp 98.2 °F (36.8 °C) (Oral)   Resp 16   Ht 5' 6\" (1.676 m)   Wt 202 lb (91.6 kg)   SpO2 97%   BMI 32.60 kg/m²        MEDICATIONS:   Current Outpatient Medications on File Prior to Visit   Medication Sig Dispense Refill    hydroCHLOROthiazide (MICROZIDE) 12.5 mg capsule TAKE ONE CAPSULE BY MOUTH DAILY 30 Cap 0    ergocalciferol (VITAMIN D2) 1,250 mcg (50,000 unit) capsule TAKE ONE CAPSULE BY MOUTH ONCE WEEKLY 12 Cap 0    amLODIPine (NORVASC) 5 mg tablet TAKE ONE TABLET BY MOUTH DAILY 30 Tab 1    fluticasone propion-salmeteroL (ADVAIR/WIXELA) 250-50 mcg/dose diskus inhaler INHALE 1 PUFF BY MOUTH EVERY 12 HOURS 1 Each 2    doxycycline (ADOXA) 100 mg tablet Take 100 mg by mouth two (2) times a day.  lisinopril (PRINIVIL, ZESTRIL) 10 mg tablet Take 1 Tab by mouth daily. 30 Tab 6    albuterol (PROVENTIL HFA, VENTOLIN HFA, PROAIR HFA) 90 mcg/actuation inhaler Take 2 Puffs by inhalation every four (4) hours as needed for Wheezing. 3 Inhaler 3    loratadine (CLARITIN) 10 mg tablet Take 1 Tab by mouth daily as needed for Allergies. 30 Tab 5    JANUMET 50-1,000 mg per tablet TAKE ONE TABLET BY MOUTH TWICE A DAY WITH MEALS 60 Tab 1    rosuvastatin (CRESTOR) 10 mg tablet Take 1 Tab by mouth nightly.  30 Tab 0    magnesium oxide (MAG-OX) 400 mg tablet TAKE ONE TABLET BY MOUTH ONCE DAILY 90 Tab 0    aspirin delayed-release 81 mg tablet Take 81 mg by mouth daily.  multivitamin-iron-FA, hematinic, (CENTRUM COMPLETE)  mg-mcg tab tablet Take 1 Tab by mouth daily. 90 Tab 3    cyclobenzaprine (FLEXERIL) 10 mg tablet TAKE ONE-HALF TO ONE TABLET BY MOUTH DAILY AS NEEDED FOR BACK SPASMS 10 Tab 2    fluticasone (FLONASE) 50 mcg/actuation nasal spray 2 Sprays by Both Nostrils route daily as needed. No current facility-administered medications on file prior to visit. ALLERGIES:   Allergies   Allergen Reactions    Banana Hives     Asthma attach, throat swelling, throat scratching, lip swelling    Iodine Rash    Keflex [Cephalexin] Hives    Seafood Hives    Watermelon Hives     Throat closes    Clonidine Other (comments)     Memory loss        MEDICAL/SURGICAL HISTORY:  Past Medical History:   Diagnosis Date    Ankle swelling     Asthma 02/04/2010    PFTs 2016 showed obstructive lung pattern    Brain bleed ([facility] 02/20/2015    Chronic diastolic heart failure ([facility] 3/18/2015    Diabetes mellitus ([address] 5/27/2014    Diabetic eye exam ([facility] 01/26/2017    No retinopathy Best corrected vision 20/20 OU    HTN (hypertension) 2/4/2010    Hyperlipidemia LDL goal < 70 2014    Magnesium deficiency     Obesity, unspecified 4/15/2015    SAH (subarachnoid hemorrhage) ([facility] 2/20/15    ad[facility]  [facility].  Sruthi Darnell and ref to Dr. Xiomy Dobson, Neurology    Stroke [facility]) 2015    mini stroke    Vitamin D deficiency 2000      Past Surgical History:   Procedure Laterality Date    COLONOSCOPY N/A 8/15/2018    COLONOS[facility] performed by Tequila Cummings [facility] at [facility] HX HYSTERECTOMY  2004    abd approach with ovaries and cervix intact    HX HYSTERECTOMY N/A 2004    HX OTHER SURGICAL  8/25/15    SAH, cerebral arteriopathy, Dr. Xiomy Dobson, Interventional Neurology [facility]        FAMILY HISTORY:  Family History   Problem Relation Age of Onset    Diabetes Mother    Aundrlouisa Davenport Hypertension Mother              Heart Attack Mother 62    Alcohol abuse Father         cirrhosis    Diabetes Sister     Heart Attack Sister 64        SOCIAL HISTORY:  Social History     Tobacco Use    Smoking status: Never Smoker    Smokeless tobacco: Never Used   Substance Use Topics    Alcohol use: Yes     Alcohol/week: 0.0 standard drinks     Frequency: Monthly or less     Drinks per session: 1 or 2     Binge frequency: Never     Comment: social    Drug use: No             HISTORY OF PRESENT ILLNESS: Jayce Reddy is a 62 y.o. female who presents to the office for a routine follow up visit. Diabetes -  Currently the patient's condition is well controlled. Complications include: microalbuminuria   Last A1c: 6.1% on 3/4/20  Reports compliance with the following regimen: Janumet 50-1,000 mg BID   Home glucose readings: AM fasting in the low-mid 100s      Last urine microalbumin: 10/24/18, normal screening. Patient is on ACE inhibitor therapy as she has had microalbuminuria in the past. Creatinine mildly elevated on recent labs. Last lipid panel: 3/4/20, LDL 80 at that time. Patient is on statin therapy  Last foot exam: 1/23/19  Last eye exam: Overdue. sees Dr. Iman John      Hypertension -   Currently, the patient's condition is well controlled. Complications include: diastolic cardiac dysfunction, history of SAH in 2015 when BP was uncontrolled. Patient follows with cardiology (Dr. Judith Bergman)  Reports compliance with the following regimen: HCTZ 12.5 mg daily, amlodipine 5 mg daily, lisinopril 10 mg daily  Home BP readings: not checking  Lifestyle modifications: weight loss, low sodium diet.       Hyperlipidemia -   Stable on Crestor  Last lipid panel showed LDL of 80 3/4/20      Asthma -   Mild/moderate persistent.  Historically well controlled on Advair with albuterol PRN, though patient reports she's been dealing with nasal allergies for the past 2 weeks or so, and this seems to have triggered some wheezing. She needed her rescue inhaler twice overnight last night. She admits to non-productive cough, and mild SOB with prolonged exertion, but no dyspnea at rest. Denies fevers or chest pain.      GERD -   Poorly controlled as of late. She has been taking Prilosec OTC as her insurance would no longer cover Dexilant. She reports some epigastric pain as well, and while she thinks this may be due to forceful coughing, she would like to be checked to make sure she doesn't have a peptic ulcer.      Chronic Hip Pain -   Stable, unchanged. Normal x-ray last year. Uses Flexeril sparingly PRN. REVIEW OF SYSTEMS:  Review of Systems   Constitutional: Negative for chills, fever and malaise/fatigue. HENT: Positive for congestion. Negative for ear pain, sinus pain and sore throat. Respiratory: Positive for cough and wheezing. Negative for hemoptysis, sputum production and shortness of breath. Cardiovascular: Negative for chest pain, palpitations and leg swelling. Gastrointestinal: Positive for heartburn. Negative for blood in stool, constipation, diarrhea, nausea and vomiting. Neurological: Negative for dizziness and headaches. Endo/Heme/Allergies: Positive for environmental allergies. PHYSICAL EXAMINATION:  Physical Exam  HENT:      Nose: Mucosal edema present. Right Sinus: No maxillary sinus tenderness. Left Sinus: No maxillary sinus tenderness. Cardiovascular:      Rate and Rhythm: Normal rate and regular rhythm. Heart sounds: Normal heart sounds. No murmur. No friction rub. No gallop. Pulmonary:      Effort: Pulmonary effort is normal.      Breath sounds: Wheezing ( faint expiratory wheezes b/l) present. No rales. Skin:     General: Skin is warm and dry. Findings: No rash. Neurological:      Mental Status: She is alert and oriented to person, place, and time. Gait: Gait is intact.    Psychiatric:         Mood and Affect: Mood and affect normal.         Cognition and Memory: Memory normal.     Diabetic Foot Exam: DP pulses 2+ symmetric, no open areas or skin breakdown noted, sensory intact to filament and positioning. Vibratory sensation not tested. Nails in good condition. RESULTS:  Lab Results   Component Value Date/Time    Sodium 141 03/04/2020 12:00 AM    Potassium 4.3 03/04/2020 12:00 AM    Chloride 104 03/04/2020 12:00 AM    CO2 21 03/04/2020 12:00 AM    Anion gap 8 03/14/2019 12:45 PM    Glucose 111 (H) 03/04/2020 12:00 AM    BUN 15 03/04/2020 12:00 AM    Creatinine 1.13 (H) 03/04/2020 12:00 AM    BUN/Creatinine ratio 13 03/04/2020 12:00 AM    GFR est AA 61 03/04/2020 12:00 AM    GFR est non-AA 53 (L) 03/04/2020 12:00 AM    Calcium 9.4 03/04/2020 12:00 AM    Bilirubin, total 0.5 03/04/2020 12:00 AM    AST (SGOT) 14 03/04/2020 12:00 AM    Alk. phosphatase 78 03/04/2020 12:00 AM    Protein, total 7.2 03/04/2020 12:00 AM    Albumin 4.4 03/04/2020 12:00 AM    Globulin 4.4 (H) 03/14/2019 12:45 PM    A-G Ratio 1.6 03/04/2020 12:00 AM    ALT (SGPT) 15 03/04/2020 12:00 AM      Lab Results   Component Value Date/Time    Cholesterol, total 139 03/04/2020 12:00 AM    HDL Cholesterol 45 03/04/2020 12:00 AM    LDL, calculated 80 03/04/2020 12:00 AM    VLDL, calculated 14 03/04/2020 12:00 AM    Triglyceride 68 03/04/2020 12:00 AM    CHOL/HDL Ratio 2.5 10/26/2016 09:04 AM      Lab Results   Component Value Date/Time    Hemoglobin A1c 6.1 (H) 03/04/2020 12:00 AM    Hemoglobin A1c (POC) 5.8 09/11/2019 11:33 AM        ASSESSMENT/PLAN:  Diagnoses and all orders for this visit:    1. Controlled type 2 diabetes mellitus with microalbuminuria, without long-term current use of insulin ([address]  - Well controlled   - Continue current meds & diabetic diet   - will monitor A1c q 6 mo and update urine micro with next set of labs  - Foot exam done today  Orders:    -      DIABETES FOOT EXAM  -     METABOLIC PANEL, BASIC; Future  -     HEMOGLOBIN A1C W/O EAG;  Future  -     MICROALBUMIN, UR, RAND W/ MICROALB/CREAT RATIO; Future    2. Essential hypertension  - Well controlled  - Meds refilled  Orders:    -     amLODIPine (NORVASC) 5 mg tablet; TAKE ONE TABLET BY MOUTH DAILY  -     lisinopriL (PRINIVIL, ZESTRIL) 10 mg tablet; Take 1 Tab by mouth daily. -     hydroCHLOROthiazide (MICROZIDE) 12.5 mg capsule; Take 1 tab PO daily    3. Mixed hyperlipidemia  - Stable on Crestor     4. Mild persistent asthma with acute exacerbation  5. Cough  - Medrol for acute exacerbation. No evidence of bacterial infection at this time, but patient was encouraged to call the office if symptoms worsen, fail to improve, or fever develops  - Will also order patient a home nebulizer to use as needed for exacerbations   Orders:    -     methylPREDNISolone (Medrol, Heraclio,) 4 mg tablet; Take as directed per package insert  -     albuterol (PROVENTIL VENTOLIN) 2.5 mg /3 mL (0.083 %) nebu; 3 mL by Nebulization route every four (4) hours as needed for Wheezing. 6. Gastroesophageal reflux disease without esophagitis  - Poorly controlled  - Will refer to GI  Orders:    -     REFERRAL TO GASTROENTEROLOGY    7. Chronic left hip pain  - OK to continue to use Flexeril very sparingly as needed  Orders:    -     cyclobenzaprine (FLEXERIL) 10 mg tablet; TAKE ONE-HALF TO ONE TABLET BY MOUTH DAILY AS NEEDED FOR BACK OR HIP PAIN    8. Epigastric pain  Orders:    -     REFERRAL TO GASTROENTEROLOGY    9. Screening for breast cancer  - Mammo due in May  Orders:    -     NADEEM MAMMO BI SCREENING INCL CAD; Future        Follow-up and Dispositions    · Return in about 6 months (around 9/11/2020) for routine care and non-fasting labs to be done 3-5 days prior (Kami Gibson) . All questions answered. Patient expresses understanding and agrees with the plan as detailed above.     PATIENT CARE TEAM:   Patient Care Team:  SOILA Bryson as PCP - General (Physician Assistant)  SOILA Bryson as PCP - [facility] Provider  Lalo Valladares Bert Sepulveda MD as Consulting Provider (Cardiology)  Jesika Jimenez MD as Consulting Provider (Neurology)  Adelita Boxer, [address] (Optometry)       Meg Love, [address]   March 11, 2020   11:07 AM

## 2020-04-07 DIAGNOSIS — M25.552 CHRONIC LEFT HIP PAIN: ICD-10-CM

## 2020-04-07 DIAGNOSIS — G89.29 CHRONIC LEFT HIP PAIN: ICD-10-CM

## 2020-04-08 RX ORDER — CYCLOBENZAPRINE HCL 10 MG
TABLET ORAL
Qty: 10 TAB | Refills: 0 | Status: SHIPPED | OUTPATIENT
Start: 2020-04-08 | End: 2020-05-11 | Stop reason: SDUPTHER

## 2020-04-08 NOTE — TELEPHONE ENCOUNTER
Last OV: 3/11/2020  Last labs: 3/4/2020  Next OV and labs: 9/15/2020 with Concetta Andrews with labs due prior

## 2020-04-26 DIAGNOSIS — R80.9 CONTROLLED TYPE 2 DIABETES MELLITUS WITH MICROALBUMINURIA, WITHOUT LONG-TERM CURRENT USE OF INSULIN (HCC): ICD-10-CM

## 2020-04-26 DIAGNOSIS — E11.29 CONTROLLED TYPE 2 DIABETES MELLITUS WITH MICROALBUMINURIA, WITHOUT LONG-TERM CURRENT USE OF INSULIN (HCC): ICD-10-CM

## 2020-04-26 RX ORDER — SITAGLIPTIN AND METFORMIN HYDROCHLORIDE 50; 1000 MG/1; MG/1
TABLET, FILM COATED ORAL
Qty: 60 TAB | Refills: 1 | Status: CANCELLED | OUTPATIENT
Start: 2020-04-26

## 2020-05-11 DIAGNOSIS — M25.552 CHRONIC LEFT HIP PAIN: ICD-10-CM

## 2020-05-11 DIAGNOSIS — G89.29 CHRONIC LEFT HIP PAIN: ICD-10-CM

## 2020-05-12 RX ORDER — CYCLOBENZAPRINE HCL 10 MG
TABLET ORAL
Qty: 10 TAB | Refills: 0 | Status: SHIPPED | OUTPATIENT
Start: 2020-05-12 | End: 2020-08-10 | Stop reason: SDUPTHER

## 2020-05-20 ENCOUNTER — TELEPHONE (OUTPATIENT)
Dept: FAMILY MEDICINE CLINIC | Age: 60
End: 2020-05-20

## 2020-05-20 NOTE — TELEPHONE ENCOUNTER
Patient called stating she received a letter from our office stating she's due for colonoscopy. She says she had this 2 years ago at [facility]. Her eye exam was done Dec. 2019 with Dr. JACKSON [facility].  This patient has changed PCPs

## 2020-06-16 NOTE — TELEPHONE ENCOUNTER
This patient contacted office for the following prescriptions to be filled:    Medication requested :   Requested Prescriptions     Pending Prescriptions Disp Refills    omeprazole (PRILOSEC) 20 mg capsule       Sig: Take 1 Cap by mouth daily.      PCP: [address] [place] [address] or Print: ginger cunningham  Mail order or Local pharmacy [phone]    Scheduled appointment if not seen by current providers in office: lov 3/11/2020 uc[facility]hel 9/15/2020

## 2020-06-17 RX ORDER — OMEPRAZOLE 20 MG/1
20 CAPSULE, DELAYED RELEASE ORAL DAILY
Qty: 90 CAP | Refills: 0 | Status: SHIPPED | OUTPATIENT
Start: 2020-06-17 | End: 2020-09-18

## 2020-06-17 NOTE — TELEPHONE ENCOUNTER
Left voicemail for advising Ness  Xavier Aliyas that her mediation has been e-scribed to pharmacy on file

## 2020-06-26 ENCOUNTER — HOSPITAL ENCOUNTER (EMERGENCY)
Age: 60
Discharge: HOME | End: 2020-06-26
Attending: EMERGENCY MEDICINE
Payer: COMMERCIAL

## 2020-06-26 VITALS
RESPIRATION RATE: 18 BRPM | HEIGHT: 66 IN | WEIGHT: 198 LBS | DIASTOLIC BLOOD PRESSURE: 75 MMHG | SYSTOLIC BLOOD PRESSURE: 181 MMHG | BODY MASS INDEX: 31.82 KG/M2 | OXYGEN SATURATION: 99 % | TEMPERATURE: 98.2 F | HEART RATE: 107 BPM

## 2020-06-26 DIAGNOSIS — R51.9 NONINTRACTABLE HEADACHE, UNSPECIFIED CHRONICITY PATTERN, UNSPECIFIED HEADACHE TYPE: Primary | ICD-10-CM

## 2020-06-26 DIAGNOSIS — Z20.822 SUSPECTED COVID-19 VIRUS INFECTION: ICD-10-CM

## 2020-06-26 LAB
ANION GAP SERPL CALC-SCNC: 6 MMOL/L (ref 3–18)
BASOPHILS # BLD: 0.1 K/UL (ref 0–0.1)
BASOPHILS NFR BLD: 1 % (ref 0–2)
BUN SERPL-MCNC: 14 MG/DL (ref 7–18)
BUN/CREAT SERPL: 12 (ref 12–20)
CALCIUM SERPL-MCNC: 8.3 MG/DL (ref 8.5–10.1)
CHLORIDE SERPL-SCNC: 109 MMOL/L (ref 100–111)
CO2 SERPL-SCNC: 26 MMOL/L (ref 21–32)
CREAT SERPL-MCNC: 1.18 MG/DL (ref 0.6–1.3)
DIFFERENTIAL METHOD BLD: ABNORMAL
EOSINOPHIL # BLD: 0.6 K/UL (ref 0–0.4)
EOSINOPHIL NFR BLD: 11 % (ref 0–5)
ERYTHROCYTE [DISTWIDTH] IN BLOOD BY AUTOMATED COUNT: 13 % (ref 11.6–14.5)
GLUCOSE SERPL-MCNC: 86 MG/DL (ref 74–99)
HCT VFR BLD AUTO: 36.1 % (ref 35–45)
HGB BLD-MCNC: 11.8 G/DL (ref 12–16)
LYMPHOCYTES # BLD: 0.6 K/UL (ref 0.9–3.6)
LYMPHOCYTES NFR BLD: 11 % (ref 21–52)
MCH RBC QN AUTO: 28.8 PG (ref 24–34)
MCHC RBC AUTO-ENTMCNC: 32.7 G/DL (ref 31–37)
MCV RBC AUTO: 88 FL (ref 74–97)
MONOCYTES # BLD: 0.6 K/UL (ref 0.05–1.2)
MONOCYTES NFR BLD: 11 % (ref 3–10)
NEUTS SEG # BLD: 3.7 K/UL (ref 1.8–8)
NEUTS SEG NFR BLD: 66 % (ref 40–73)
PLATELET # BLD AUTO: 309 K/UL (ref 135–420)
PMV BLD AUTO: 9.4 FL (ref 9.2–11.8)
POTASSIUM SERPL-SCNC: 3.6 MMOL/L (ref 3.5–5.5)
RBC # BLD AUTO: 4.1 M/UL (ref 4.2–5.3)
SODIUM SERPL-SCNC: 141 MMOL/L (ref 136–145)
WBC # BLD AUTO: 5.6 K/UL (ref 4.6–13.2)

## 2020-06-26 PROCEDURE — 80048 BASIC METABOLIC PNL TOTAL CA: CPT

## 2020-06-26 PROCEDURE — 99282 EMERGENCY DEPT VISIT SF MDM: CPT

## 2020-06-26 PROCEDURE — 85025 COMPLETE CBC W/AUTO DIFF WBC: CPT

## 2020-06-26 PROCEDURE — 96374 THER/PROPH/DIAG INJ IV PUSH: CPT

## 2020-06-26 PROCEDURE — 74011250636 HC RX REV CODE- 250/636: Performed by: EMERGENCY MEDICINE

## 2020-06-26 PROCEDURE — 87635 SARS-COV-2 COVID-19 AMP PRB: CPT

## 2020-06-26 RX ORDER — KETOROLAC TROMETHAMINE 15 MG/ML
15 INJECTION, SOLUTION INTRAMUSCULAR; INTRAVENOUS
Status: COMPLETED | OUTPATIENT
Start: 2020-06-26 | End: 2020-06-26

## 2020-06-26 RX ORDER — NAPROXEN 500 MG/1
500 TABLET ORAL 2 TIMES DAILY WITH MEALS
Qty: 15 TAB | Refills: 0 | Status: SHIPPED | OUTPATIENT
Start: 2020-06-26 | End: 2020-07-04

## 2020-06-26 RX ADMIN — KETOROLAC TROMETHAMINE 15 MG: 15 INJECTION, SOLUTION INTRAMUSCULAR; INTRAVENOUS at 12:36

## 2020-06-26 RX ADMIN — SODIUM CHLORIDE 1000 ML: 900 INJECTION, SOLUTION INTRAVENOUS at 12:37

## 2020-06-26 NOTE — LETTER
[address] [place] [address] Dr [facility] 
[address] [facility] [id number] [phone] Work/School Note Date: 6/26/2020 To Whom It May concern: 
 
Raina Cruz was seen and treated today in the emergency room by the following provider(s): 
Attending Provider: Cecily Reynolds may return to work once she receives negative covid 19 test and is symptom free for 3 consecutive days.  
Sincerely, 
 
 
 
 
Taty Armijo RN

## 2020-06-26 NOTE — ED NOTES
Rankin Lefort is a 62 y.o. female that was discharged in stable condition. The patients diagnosis, condition and treatment were explained to  patient and aftercare instructions were given. The patient verbalized understanding. Patient armband removed and shredded.

## 2020-06-26 NOTE — ED TRIAGE NOTES
Pt states she was sent to ER by her employer [employer]) due to her temp being 99.6 when taken at work. Pt took ASA, ibuprofen before coming to ER. Also reporting \"head pressure, head hurts and I feel feverish. \" Denies nausea/vomiting. States \"I have some aches but I always do because I do a lot of lifting at work. \"

## 2020-06-26 NOTE — DISCHARGE INSTRUCTIONS

## 2020-06-26 NOTE — ED PROVIDER NOTES
EMERGENCY DEPARTMENT HISTORY AND PHYSICAL EXAM    12:23 PM      Date: 6/26/2020  Patient Name: Alonza Cushing    History of Presenting Illness     Chief Complaint   Patient presents with    Fever    Headache         History Provided By: Patient    Additional History (Context): Alonza Cushing is a 62 y.o. female with hyperlipidemia and Congestive heart failure who presents with elevated temperature and headache for 2 days. Patient states her temperatures been running 99. She has been taking aspirin. She is complaining of facial tenderness and frontal headache. She denies chest pain, nausea, vomiting or diarrhea. Patient did take Motrin this morning. Patient also complains of slight dry cough. Smoking or recreational drug use. She admits to social drinking. PCP: SOILA Marie      Current Outpatient Medications   Medication Sig Dispense Refill    naproxen (Naprosyn) 500 mg tablet Take 1 Tab by mouth two (2) times daily (with meals) for 8 days. 15 Tab 0    omeprazole (PRILOSEC) 20 mg capsule Take 1 Cap by mouth daily. 90 Cap 0    cyclobenzaprine (FLEXERIL) 10 mg tablet TAKE ONE-HALF TO ONE TABLET BY MOUTH DAILY AS NEEDED FOR BACK OR HIP PAIN 10 Tab 0    Janumet 50-1,000 mg per tablet TAKE ONE TABLET BY MOUTH TWICE A DAY WITH MEALS 90 Tab 1    ergocalciferol (ERGOCALCIFEROL) 1,250 mcg (50,000 unit) capsule TAKE ONE CAPSULE BY MOUTH ONCE WEEKLY 12 Cap 1    amLODIPine (NORVASC) 5 mg tablet TAKE ONE TABLET BY MOUTH DAILY 90 Tab 1    lisinopriL (PRINIVIL, ZESTRIL) 10 mg tablet Take 1 Tab by mouth daily. 90 Tab 1    hydroCHLOROthiazide (MICROZIDE) 12.5 mg capsule Take 1 tab PO daily 90 Cap 1    albuterol (PROVENTIL VENTOLIN) 2.5 mg /3 mL (0.083 %) nebu 3 mL by Nebulization route every four (4) hours as needed for Wheezing.  30 Nebule 2    fluticasone propion-salmeteroL (ADVAIR/WIXELA) 250-50 mcg/dose diskus inhaler INHALE 1 PUFF BY MOUTH EVERY 12 HOURS 1 Each 2    albuterol (PROVENTIL HFA, VENTOLIN HFA, PROAIR HFA) 90 mcg/actuation inhaler Take 2 Puffs by inhalation every four (4) hours as needed for Wheezing. 3 Inhaler 3    loratadine (CLARITIN) 10 mg tablet Take 1 Tab by mouth daily as needed for Allergies. 30 Tab 5    rosuvastatin (CRESTOR) 10 mg tablet Take 1 Tab by mouth nightly. 30 Tab 0    magnesium oxide (MAG-OX) 400 mg tablet TAKE ONE TABLET BY MOUTH ONCE DAILY 90 Tab 0    aspirin delayed-release 81 mg tablet Take 81 mg by mouth daily.  fluticasone (FLONASE) 50 mcg/actuation nasal spray 2 Sprays by Both Nostrils route daily as needed. Past History     Past Medical History:  Past Medical History:   Diagnosis Date    Ankle swelling     Asthma 02/04/2010    PFTs 2016 showed obstructive lung pattern    Brain bleed (HCC) 02/20/2015    Chronic diastolic heart failure ([facility] [address] 3/18/2015    Diabetes mellitus ([facility] [address] [facility] 5/27/2014    Diabetic eye exam ([facility] [address] 01/26/2017    No retinopathy Best corrected vision 20/20 OU    HTN (hypertension) 2/4/2010    Hyperlipidemia LDL goal < 70 2014    Magnesium deficiency     Obesity, unspecified 4/15/2015    SAH (subarachnoid hemorrhage) ([facility] [address] 2/20/15    admitted in [facility], Dr. Aaliyah Pitt.  [place] and ref to Dr. Khoi Adair, Neurology    Stroke [facility]) 2015    mini stroke    Vitamin D deficiency 2000       Past Surgical History:  Past Surgical History:   Procedure Laterality Date    COLONOSCOPY N/A 8/15/2018    COLONOSCOPY performed by Kristen Cruz [facility] at [facility] HX HYSTERECTOMY  2004    abd approach with ovaries and cervix intact    HX HYSTERECTOMY N/A 2004    HX OTHER SURGICAL  8/25/15    SAH, cerebral arteriopathy, Dr. Khoi Adair, Interventional Neurology [place]       Family History:  Family History   Problem Relation Age of Onset    Diabetes Mother     Hypertension Mother              Heart Attack Mother 62    Alcohol abuse Father         cirrhosis    Diabetes Sister     Heart Attack Sister 64       Social History:  Social History Tobacco Use    Smoking status: Never Smoker    Smokeless tobacco: Never Used   Substance Use Topics    Alcohol use: Yes     Alcohol/week: 0.0 standard drinks     Frequency: Monthly or less     Drinks per session: 1 or 2     Binge frequency: Never     Comment: social    Drug use: No       Allergies: Allergies   Allergen Reactions    Banana Hives     Asthma attach, throat swelling, throat scratching, lip swelling    Iodine Rash    Keflex [Cephalexin] Hives    Seafood Hives    Watermelon Hives     Throat closes    Clonidine Other (comments)     Memory loss         Review of Systems       Review of Systems   Constitutional: Negative. Negative for chills, diaphoresis and fever. HENT: Negative. Negative for congestion, rhinorrhea and sore throat. Eyes: Negative. Negative for pain, discharge and redness. Respiratory: Negative. Negative for cough, chest tightness, shortness of breath and wheezing. Cardiovascular: Negative. Negative for chest pain. Gastrointestinal: Negative. Negative for abdominal pain, constipation, diarrhea, nausea and vomiting. Genitourinary: Negative. Negative for dysuria, flank pain, frequency, hematuria and urgency. Musculoskeletal: Negative. Negative for back pain and neck pain. Skin: Negative. Negative for rash. Neurological: Negative. Negative for syncope, weakness, numbness and headaches. Psychiatric/Behavioral: Negative. All other systems reviewed and are negative. Physical Exam     Visit Vitals  /75 (BP 1 Location: Left arm, BP Patient Position: At rest)   Pulse (!) 107   Temp 98.2 °F (36.8 °C)   Resp 18   Ht 5' 6\" (1.676 m)   Wt 89.8 kg (198 lb)   SpO2 99%   BMI 31.96 kg/m²         Physical Exam  Vitals signs and nursing note reviewed. Constitutional:       General: She is not in acute distress. Appearance: Normal appearance. She is well-developed. She is not ill-appearing, toxic-appearing or diaphoretic.    HENT:      Head: Normocephalic and atraumatic. Mouth/Throat:      Pharynx: No oropharyngeal exudate. Eyes:      General: No scleral icterus. Conjunctiva/sclera: Conjunctivae normal.      Pupils: Pupils are equal, round, and reactive to light. Neck:      Musculoskeletal: Normal range of motion and neck supple. Thyroid: No thyromegaly. Vascular: No hepatojugular reflux or JVD. Trachea: No tracheal deviation. Cardiovascular:      Rate and Rhythm: Normal rate and regular rhythm. Pulses: Normal pulses. Radial pulses are 2+ on the right side and 2+ on the left side. Dorsalis pedis pulses are 2+ on the right side and 2+ on the left side. Heart sounds: Normal heart sounds, S1 normal and S2 normal. No murmur. No gallop. No S3 or S4 sounds. Pulmonary:      Effort: Pulmonary effort is normal. No respiratory distress. Breath sounds: Normal breath sounds. No decreased breath sounds, wheezing, rhonchi or rales. Abdominal:      General: Bowel sounds are normal. There is no distension. Palpations: Abdomen is soft. Abdomen is not rigid. There is no mass. Tenderness: There is no abdominal tenderness. There is no guarding or rebound. Negative signs include Blum's sign and McBurney's sign. Musculoskeletal: Normal range of motion. Comments: Strength 5 out of 5 throughout. Lymphadenopathy:      Head:      Right side of head: No submental, submandibular, preauricular or occipital adenopathy. Left side of head: No submental, submandibular, preauricular or occipital adenopathy. Cervical: No cervical adenopathy. Upper Body:      Right upper body: No supraclavicular adenopathy. Left upper body: No supraclavicular adenopathy. Skin:     General: Skin is warm and dry. Findings: No rash. Neurological:      Mental Status: She is alert. She is not disoriented. GCS: GCS eye subscore is 4. GCS verbal subscore is 5. GCS motor subscore is 6. Cranial Nerves: No cranial nerve deficit. Sensory: No sensory deficit. Coordination: Coordination normal.      Gait: Gait normal.      Deep Tendon Reflexes: Reflexes are normal and symmetric. Comments: Grossly intact    Psychiatric:         Speech: Speech normal.         Behavior: Behavior normal.         Thought Content: Thought content normal.         Judgment: Judgment normal.           Diagnostic Study Results     Labs -  Recent Results (from the past 12 hour(s))   CBC WITH AUTOMATED DIFF    Collection Time: 06/26/20 12:34 PM   Result Value Ref Range    WBC 5.6 4.6 - 13.2 K/uL    RBC 4.10 (L) 4.20 - 5.30 M/uL    HGB 11.8 (L) 12.0 - 16.0 g/dL    HCT 36.1 35.0 - 45.0 %    MCV 88.0 74.0 - 97.0 FL    MCH 28.8 24.0 - 34.0 PG    MCHC 32.7 31.0 - 37.0 g/dL    RDW 13.0 11.6 - 14.5 %    PLATELET 441 481 - 588 K/uL    MPV 9.4 9.2 - 11.8 FL    NEUTROPHILS 66 40 - 73 %    LYMPHOCYTES 11 (L) 21 - 52 %    MONOCYTES 11 (H) 3 - 10 %    EOSINOPHILS 11 (H) 0 - 5 %    BASOPHILS 1 0 - 2 %    ABS. NEUTROPHILS 3.7 1.8 - 8.0 K/UL    ABS. LYMPHOCYTES 0.6 (L) 0.9 - 3.6 K/UL    ABS. MONOCYTES 0.6 0.05 - 1.2 K/UL    ABS. EOSINOPHILS 0.6 (H) 0.0 - 0.4 K/UL    ABS.  BASOPHILS 0.1 0.0 - 0.1 K/UL    DF AUTOMATED     METABOLIC PANEL, BASIC    Collection Time: 06/26/20  1:01 PM   Result Value Ref Range    Sodium 141 136 - 145 mmol/L    Potassium 3.6 3.5 - 5.5 mmol/L    Chloride 109 100 - 111 mmol/L    CO2 26 21 - 32 mmol/L    Anion gap 6 3.0 - 18 mmol/L    Glucose 86 74 - 99 mg/dL    BUN 14 7.0 - 18 MG/DL    Creatinine 1.18 0.6 - 1.3 MG/DL    BUN/Creatinine ratio 12 12 - 20      GFR est AA 56 (L) >60 ml/min/1.73m2    GFR est non-AA 47 (L) >60 ml/min/1.73m2    Calcium 8.3 (L) 8.5 - 10.1 MG/DL       Radiologic Studies -   No orders to display         Medical Decision Making   Provider Notes (Medical Decision Making):  MDM  Number of Diagnoses or Management Options  Nonintractable headache, unspecified chronicity pattern, unspecified headache type:   Suspected COVID-19 virus infection:   Diagnosis management comments: Headache  Cough       I am the first provider for this patient. I reviewed the vital signs, available nursing notes, past medical history, past surgical history, family history and social history. Vital Signs-Reviewed the patient's vital signs. Records Reviewed: Nursing Notes (Time of Review: 12:23 PM)    ED Course: Progress Notes, Reevaluation, and Consults:    Labs essentially normal.  2:23 PM 6/26/2020    Patient requesting COVID test.  This patient at this time is a low probability. I will order test and results to be called to her in 2 to 5 days. Diagnosis       I have reassessed the patient. Patient is feeling well. Patient will be prescribed Naprosyn. Patient was discharged in stable condition. Patient is to return to emergency department if any new or worsening condition. Clinical Impression:   1. Nonintractable headache, unspecified chronicity pattern, unspecified headache type    2. Suspected COVID-19 virus infection        Disposition: Discharged home     Follow-up Information     Follow up With Specialties Details Why Contact Info    SOILA Regalado Physician Assistant In 2 days  [address] [facility] [address] [facility]  [phone]               Attestation        Provider Attestation:     I personally performed the services described in the documentation, reviewed the documentation and it accurately and completely records my words and actions utilizing the [address] [place] June 26, 2020 at 3:16 PM - [place], [address]. It is dictated using utilizing voice recognition software. Unfortunately this leads to occasional typographical errors. I apologize in advance if the situation occurs. If questions arise please do not hesitate to contact me or call our department.

## 2020-06-27 ENCOUNTER — PATIENT OUTREACH (OUTPATIENT)
Dept: CASE MANAGEMENT | Age: 60
End: 2020-06-27

## 2020-06-27 ENCOUNTER — PATIENT MESSAGE (OUTPATIENT)
Dept: FAMILY MEDICINE CLINIC | Age: 60
End: 2020-06-27

## 2020-06-27 LAB — SARS-COV-2, COV2NT: DETECTED

## 2020-06-27 NOTE — PROGRESS NOTES
Patient contacted regarding COVID-19  risk. Discussed COVID-19 related testing which was pending at this time. Test results were pending. Patient informed of results, if available? N/A     Contacted patient for follow up. No answer. Left message introducing self, role and reason for call. Requested return call. Contact information provided. Also left HIPPA compliant message on voicemail of emergency contact. Will attempt to contact at a later time.

## 2020-06-28 NOTE — PROGRESS NOTES
Patient contacted this morning. Results given. Covid positive. Patient continued to have aches throughout. Patient has been practicing self isolation. Told to return to the emergency room if worsening condition for admission.

## 2020-06-29 ENCOUNTER — PATIENT OUTREACH (OUTPATIENT)
Dept: CASE MANAGEMENT | Age: 60
End: 2020-06-29

## 2020-06-29 NOTE — PROGRESS NOTES
Patient contacted regarding COVID-19 diagnosis. Discussed COVID-19 related testing which was available at this time. Test results were positive. Patient informed of results, if available? yes     Care Transition Nurse/ Ambulatory Care Manager contacted the patient by telephone to perform post discharge assessment. Verified name and  with patient as identifiers. Provided introduction to self, and explanation of the CTN/ACM role, and reason for call due to risk factors for infection and/or exposure to COVID-19. Symptoms reviewed with patient who verbalized the following symptoms: no new symptoms and no worsening symptoms. Due to no new or worsening symptoms encounter was not routed to provider for escalation. Discussed follow-up appointments. If no appointment was previously scheduled, appointment scheduling offered: yes; pt declined  [facility] follow up appointment(s):   Future Appointments   Date Time Provider Ngozi Clay   9/15/2020  4:45 PM Ortega Muñoz MD [facility]   2020 11:15 AM Christo Singleton MD [facility] [phone] [phone] [address]akat follow up appointment(s): N/A      Patient has following risk factors of: heart failure and asthma. CTN/ACM reviewed discharge instructions, medical action plan and red flags such as increased shortness of breath, increasing fever and signs of decompensation with patient who verbalized understanding. Discussed exposure protocols and quarantine with CDC Guidelines What to do if you are sick with coronavirus disease .  Patient was given an opportunity for questions and concerns. The patient agrees to contact the [employer] exposure line [phone], [facility] department [address]  ([phone]) and PCP office for questions related to their healthcare. CTN/ACM provided contact information for future needs.     Reviewed and educated patient on any new and changed medications related to discharge diagnosis. Patient/family/caregiver given information for [employer] and agrees to enroll no      Plan for follow-up call in 5-7 days based on severity of symptoms and risk factors.

## 2020-06-30 NOTE — TELEPHONE ENCOUNTER
Patient identified with 2 identifiers (name and ). Patient aware of + COVID 19. Patient states she has been notified by Beth Clarke.

## 2020-07-08 ENCOUNTER — PATIENT OUTREACH (OUTPATIENT)
Dept: CASE MANAGEMENT | Age: 60
End: 2020-07-08

## 2020-07-08 NOTE — PROGRESS NOTES
Patient contacted regarding COVID-19 risk and screening. COVID-19 related testing which was available at this time. Test results were positive. Patient informed of results, if available? yes     Care Transition Nurse/ Ambulatory Care Manager contacted the patient by telephone to perform follow-up assessment. Verified name and  with patient as identifiers. Patient has following risk factors of: heart failure. Patient reported she has received a negative test and has been released to return to work. Today  is her first day at work,     Symptoms reviewed with patient who verbalized the following symptoms: no new symptoms and no worsening symptoms. Due to no new or worsening symptoms encounter was not routed to provider for escalation. Education provided regarding infection prevention, and signs and symptoms of COVID-19 and when to seek medical attention with patient who verbalized understanding. Discussed exposure protocols and quarantine from [address] you at higher risk for severe illness  and given an opportunity for questions and concerns. The patient agrees to contact the COVID-19 hotline [phone] or PCP office for questions related to their healthcare. CTN/ACM provided contact information for future reference. From CDC: Are you at higher risk for severe illness?  Wash your hands often.  Avoid close contact (6 feet, which is about two arm lengths) with people who are sick.  Put distance between yourself and other people if COVID-19 is spreading in your community.  Clean and disinfect frequently touched surfaces.  Avoid all cruise travel and non-essential air travel.  Call your healthcare professional if you have concerns about COVID-19 and your underlying condition or if you are sick.     For more information on steps you can take to protect yourself, see CDC's How to Diana for follow-up call in 7-14 days based on severity of symptoms and risk factors.

## 2020-07-09 NOTE — TELEPHONE ENCOUNTER
This pharmacy faxed over request for the following prescriptions to be filled:    Medication requested :   Requested Prescriptions     Pending Prescriptions Disp Refills    fluticasone propionate (Flonase) 50 mcg/actuation nasal spray 1 Bottle      Si Sprays by Both Nostrils route daily as needed.      PCP: [address] [place] [address] or Print: Erica Leyva   Mail order or Local pharmacy [address]     Scheduled appointment if not seen by current providers in office:  LOV 3/11/2020 f/u 9/15/2020 [place] Mon

## 2020-07-10 RX ORDER — FLUTICASONE PROPIONATE 50 MCG
2 SPRAY, SUSPENSION (ML) NASAL
Qty: 1 BOTTLE | Refills: 2 | Status: SHIPPED | OUTPATIENT
Start: 2020-07-10 | End: 2021-11-08

## 2020-07-23 ENCOUNTER — PATIENT OUTREACH (OUTPATIENT)
Dept: CASE MANAGEMENT | Age: 60
End: 2020-07-23

## 2020-09-02 DIAGNOSIS — E11.29 CONTROLLED TYPE 2 DIABETES MELLITUS WITH MICROALBUMINURIA, WITHOUT LONG-TERM CURRENT USE OF INSULIN (HCC): ICD-10-CM

## 2020-09-02 DIAGNOSIS — R80.9 CONTROLLED TYPE 2 DIABETES MELLITUS WITH MICROALBUMINURIA, WITHOUT LONG-TERM CURRENT USE OF INSULIN (HCC): ICD-10-CM

## 2020-09-09 DIAGNOSIS — J45.30 MILD PERSISTENT ASTHMA WITHOUT COMPLICATION: ICD-10-CM

## 2020-09-10 RX ORDER — FLUTICASONE PROPIONATE AND SALMETEROL 250; 50 UG/1; UG/1
POWDER RESPIRATORY (INHALATION)
Qty: 1 EACH | Refills: 0 | Status: SHIPPED | OUTPATIENT
Start: 2020-09-10 | End: 2020-09-15 | Stop reason: SDUPTHER

## 2020-09-15 ENCOUNTER — TELEPHONE (OUTPATIENT)
Dept: FAMILY MEDICINE CLINIC | Age: 60
End: 2020-09-15

## 2020-09-15 ENCOUNTER — OFFICE VISIT (OUTPATIENT)
Dept: FAMILY MEDICINE CLINIC | Age: 60
End: 2020-09-15

## 2020-09-15 VITALS
HEIGHT: 66 IN | TEMPERATURE: 98 F | HEART RATE: 88 BPM | DIASTOLIC BLOOD PRESSURE: 60 MMHG | RESPIRATION RATE: 16 BRPM | BODY MASS INDEX: 30.7 KG/M2 | SYSTOLIC BLOOD PRESSURE: 120 MMHG | WEIGHT: 191 LBS | OXYGEN SATURATION: 99 %

## 2020-09-15 DIAGNOSIS — E55.9 VITAMIN D DEFICIENCY: ICD-10-CM

## 2020-09-15 DIAGNOSIS — M62.830 BACK SPASM: ICD-10-CM

## 2020-09-15 DIAGNOSIS — Z12.31 BREAST CANCER SCREENING BY MAMMOGRAM: ICD-10-CM

## 2020-09-15 DIAGNOSIS — I50.32 CHRONIC DIASTOLIC HEART FAILURE (HCC): ICD-10-CM

## 2020-09-15 DIAGNOSIS — E11.9 CONTROLLED TYPE 2 DIABETES MELLITUS WITHOUT COMPLICATION, WITHOUT LONG-TERM CURRENT USE OF INSULIN (HCC): ICD-10-CM

## 2020-09-15 DIAGNOSIS — J45.30 MILD PERSISTENT ASTHMA WITHOUT COMPLICATION: ICD-10-CM

## 2020-09-15 DIAGNOSIS — I10 ESSENTIAL HYPERTENSION: Primary | ICD-10-CM

## 2020-09-15 DIAGNOSIS — Z23 ENCOUNTER FOR IMMUNIZATION: ICD-10-CM

## 2020-09-15 DIAGNOSIS — R80.9 CONTROLLED TYPE 2 DIABETES MELLITUS WITH MICROALBUMINURIA, WITHOUT LONG-TERM CURRENT USE OF INSULIN (HCC): ICD-10-CM

## 2020-09-15 DIAGNOSIS — J45.40 MODERATE PERSISTENT ASTHMA WITHOUT COMPLICATION: ICD-10-CM

## 2020-09-15 DIAGNOSIS — E66.9 OBESITY (BMI 30.0-34.9): ICD-10-CM

## 2020-09-15 DIAGNOSIS — Z86.79 HISTORY OF SUBARACHNOID HEMORRHAGE: ICD-10-CM

## 2020-09-15 DIAGNOSIS — E11.29 CONTROLLED TYPE 2 DIABETES MELLITUS WITH MICROALBUMINURIA, WITHOUT LONG-TERM CURRENT USE OF INSULIN (HCC): ICD-10-CM

## 2020-09-15 DIAGNOSIS — K21.9 GASTROESOPHAGEAL REFLUX DISEASE WITHOUT ESOPHAGITIS: ICD-10-CM

## 2020-09-15 RX ORDER — FLUTICASONE PROPIONATE AND SALMETEROL 250; 50 UG/1; UG/1
POWDER RESPIRATORY (INHALATION)
Qty: 3 EACH | Refills: 0 | Status: SHIPPED | OUTPATIENT
Start: 2020-09-15 | End: 2021-01-12

## 2020-09-15 RX ORDER — SITAGLIPTIN AND METFORMIN HYDROCHLORIDE 50; 1000 MG/1; MG/1
TABLET, FILM COATED ORAL
Qty: 90 TAB | Refills: 1 | Status: SHIPPED | OUTPATIENT
Start: 2020-09-15 | End: 2021-02-10 | Stop reason: SDUPTHER

## 2020-09-15 RX ORDER — ERGOCALCIFEROL 1.25 MG/1
50000 CAPSULE ORAL
Qty: 12 CAP | Refills: 0 | Status: SHIPPED | OUTPATIENT
Start: 2020-09-15 | End: 2021-03-30

## 2020-09-15 RX ORDER — CYCLOBENZAPRINE HCL 10 MG
TABLET ORAL
Qty: 20 TAB | Refills: 0 | Status: SHIPPED | OUTPATIENT
Start: 2020-09-15 | End: 2020-11-19 | Stop reason: SDUPTHER

## 2020-09-15 NOTE — PATIENT INSTRUCTIONS
Vaccine Information Statement    Influenza (Flu) Vaccine (Inactivated or Recombinant): What You Need to Know    Many Vaccine Information Statements are available in Romansh and other languages. See www.immunize.org/vis  Hojas de información sobre vacunas están disponibles en español y en muchos otros idiomas. Visite www.immunize.org/vis    1. Why get vaccinated? Influenza vaccine can prevent influenza (flu). Flu is a contagious disease that spreads around the [facility] every year, usually between October and May. Anyone can get the flu, but it is more dangerous for some people. Infants and young children, people 72years of age and older, pregnant women, and people with certain health conditions or a weakened immune system are at greatest risk of flu complications. Pneumonia, bronchitis, sinus infections and ear infections are examples of flu-related complications. If you have a medical condition, such as heart disease, cancer or diabetes, flu can make it worse. Flu can cause fever and chills, sore throat, muscle aches, fatigue, cough, headache, and runny or stuffy nose. Some people may have vomiting and diarrhea, though this is more common in children than adults. Each year thousands of people in the [facility] die from flu, and many more are hospitalized. Flu vaccine prevents millions of illnesses and flu-related visits to the doctor each year. 2. Influenza vaccines     CDC recommends everyone 10months of age and older get vaccinated every flu season. Children 6 months through 6years of age may need 2 doses during a single flu season. Everyone else needs only 1 dose each flu season. It takes about 2 weeks for protection to develop after vaccination. There are many flu viruses, and they are always changing. Each year a new flu vaccine is made to protect against three or four viruses that are likely to cause disease in the upcoming flu season.  Even when the vaccine doesnt exactly match these viruses, it may still provide some protection. Influenza vaccine does not cause flu. Influenza vaccine may be given at the same time as other vaccines. 3. Talk with your health care provider    Tell your vaccine provider if the person getting the vaccine:   Has had an allergic reaction after a previous dose of influenza vaccine, or has any severe, life-threatening allergies.  Has ever had Guillain-Barré Syndrome (also called GBS). In some cases, your health care provider may decide to postpone influenza vaccination to a future visit. People with minor illnesses, such as a cold, may be vaccinated. People who are moderately or severely ill should usually wait until they recover before getting influenza vaccine. Your health care provider can give you more information. 4. Risks of a reaction     Soreness, redness, and swelling where shot is given, fever, muscle aches, and headache can happen after influenza vaccine.  There may be a very small increased risk of Guillain-Barré Syndrome (GBS) after inactivated influenza vaccine (the flu shot). [employer] children who get the flu shot along with pneumococcal vaccine (PCV13), and/or DTaP vaccine at the same time might be slightly more likely to have a seizure caused by fever. Tell your health care provider if a child who is getting flu vaccine has ever had a seizure. People sometimes faint after medical procedures, including vaccination. Tell your provider if you feel dizzy or have vision changes or ringing in the ears. As with any medicine, there is a very remote chance of a vaccine causing a severe allergic reaction, other serious injury, or death. 5. What if there is a serious problem? An allergic reaction could occur after the vaccinated person leaves the clinic.  If you see signs of a severe allergic reaction (hives, swelling of the face and throat, difficulty breathing, a fast heartbeat, dizziness, or weakness), call 9-1-1 and get the person to the nearest hospital.    For other signs that concern you, call your health care provider. Adverse reactions should be reported to the Vaccine Adverse Event Reporting System (VAERS). Your health care provider will usually file this report, or you can do it yourself. Visit the VAERS website at www.vaers. [facility].gov or call 7-401.671.5996. VAERS is only for reporting reactions, and VAERS staff do not give medical advice. 6. The National Vaccine Injury Compensation Program    The [facility] Vaccine Injury Compensation Program (VICP) is a federal program that was created to compensate people who may have been injured by certain vaccines. Visit the VICP website at www.[facility].gov/vaccinecompensation or call 1-642.365.7311 to learn about the program and about filing a claim. There is a time limit to file a claim for compensation. 7. How can I learn more?  Ask your health care provider.  Call your local or state health department.  Contact the Centers for Disease Control and Prevention (CDC):  - Call 4-224.872.3424 (1-800-CDC-INFO) or  - Visit CDCs influenza website at www.cdc.gov/flu    Vaccine Information Statement (Interim)  Inactivated Influenza Vaccine   8/15/2019  [id number]   Department of Health and Human Services  Centers for Disease Control and Prevention    Office Use Only

## 2020-09-15 NOTE — PROGRESS NOTES
Kip Showers, 56 y.o.,  female    SUBJECTIVE  Establish care, prev SOILA montgomery pt    DM- reports -140's. Currently on janumet. Due for eye exam dr. Jessica Cade    HTN/diastolic CHF- w/ h/o [facility] 5623 no residual deficit. Currently on amlodipine, hctz, lisinopril. Following Dr. Dread Parisi, reviewed note NYHA class 2, pt denies any symptoms    HL- on crestor, reviewed labsd 3/2020 LDL80    GERD- responding well to prilosec    Asthma- no recent exacerbations, says using advair regularly, prn albuterol    Back spasm- on and off for few years now, responding to prn flexeril. She works at [employer], long days with lifting usually trigger. ROS:  See HPI, all others negative        Patient Active Problem List   Diagnosis Code    Asthma J45.909    Mixed hyperlipidemia E78.2    Vitamin D deficiency E55.9    Anxiety and depression F41.9, F32.9    Essential hypertension I10    Chronic diastolic heart failure (HCC) I50.32    Obesity (BMI 30.0-34. 9) E66.9    New onset of headaches after age 48 R46    Iron deficiency anemia D50.9    Gastroesophageal reflux disease without esophagitis K21.9    Hypomagnesemia E83.42    History of subarachnoid hemorrhage Z86.79    Hypokalemia E87.6    Post-menopausal Z78.0    Chest pain R07.9       Current Outpatient Medications   Medication Sig Dispense Refill    cyclobenzaprine (FLEXERIL) 10 mg tablet TAKE ONE-HALF TO ONE TABLET BY MOUTH DAILY AS NEEDED FOR BACK OR HIP PAIN 20 Tab 0    fluticasone propion-salmeteroL (ADVAIR/WIXELA) 250-50 mcg/dose diskus inhaler INHALE 1 PUFF BY MOUTH EVERY 12 HOURS 3 Each 0    SITagliptin-metFORMIN (Janumet) 50-1,000 mg per tablet TAKE ONE TABLET BY MOUTH TWICE A DAY WITH MEALS 90 Tab 1    ergocalciferol (ERGOCALCIFEROL) 1,250 mcg (50,000 unit) capsule Take 1 Cap by mouth every seven (7) days. 12 Cap 0    varicella-zoster recombinant, PF, (SHINGRIX) 50 mcg/0.5 mL susr injection 0.5 mL by IntraMUSCular route once for 1 dose.  Administer 2nd dose after 2 months. Please fax copy of admin record to [phone] 1 Each 1    albuterol (PROVENTIL HFA, VENTOLIN HFA, PROAIR HFA) 90 mcg/actuation inhaler Take 2 Puffs by inhalation every four (4) hours as needed for Wheezing. 3 Inhaler 0    fluticasone propionate (Flonase) 50 mcg/actuation nasal spray 2 Sprays by Both Nostrils route daily as needed for Rhinitis. 1 Bottle 2    omeprazole (PRILOSEC) 20 mg capsule Take 1 Cap by mouth daily. 90 Cap 0    amLODIPine (NORVASC) 5 mg tablet TAKE ONE TABLET BY MOUTH DAILY 90 Tab 1    lisinopriL (PRINIVIL, ZESTRIL) 10 mg tablet Take 1 Tab by mouth daily. 90 Tab 1    hydroCHLOROthiazide (MICROZIDE) 12.5 mg capsule Take 1 tab PO daily 90 Cap 1    albuterol (PROVENTIL VENTOLIN) 2.5 mg /3 mL (0.083 %) nebu 3 mL by Nebulization route every four (4) hours as needed for Wheezing. 30 Nebule 2    rosuvastatin (CRESTOR) 10 mg tablet Take 1 Tab by mouth nightly. 30 Tab 0    magnesium oxide (MAG-OX) 400 mg tablet TAKE ONE TABLET BY MOUTH ONCE DAILY 90 Tab 0    aspirin delayed-release 81 mg tablet Take 81 mg by mouth daily.  loratadine (CLARITIN) 10 mg tablet Take 1 Tab by mouth daily as needed for Allergies.  30 Tab 5       Allergies   Allergen Reactions    Banana Hives     Asthma attach, throat swelling, throat scratching, lip swelling    Iodine Rash    Keflex [Cephalexin] Hives    Seafood Hives    Watermelon Hives     Throat closes    Clonidine Other (comments)     Memory loss       Past Medical History:   Diagnosis Date    Ankle swelling     Asthma 02/04/2010    PFTs 2016 showed obstructive lung pattern    Brain bleed ([facility] 02/20/2015    Chronic diastolic heart failure ([facility] 3/18/2015    Diabetes mellitus ([address] 5/27/2014    Diabetic eye exam ([facility] 01/26/2017    No retinopathy Best corrected vision 20/20 OU    HTN (hypertension) 2/4/2010    Hyperlipidemia LDL goal < 70 2014    Magnesium deficiency     Obesity, unspecified 4/15/2015    SAH (subarachnoid hemorrhage) ([facility] 2/20/15    ad[facility] in [facility], Dr. Brit Smiley. Parviz Souza and ref to Dr. Monica Jacobs, Neurology    Stroke [facility]) 2015    mini stroke    Vitamin D deficiency 2000       Social History     Socioeconomic History    Marital status: SINGLE     Spouse name: Not on file    Number of children: 1    Years of education: 13    Highest education level: Not on file   Occupational History     Employer: [employer]     Comment: unemployed since Jan 2014, applied to [employer] for job July 2014   Social Needs    Financial resource strain: Not on file    Food insecurity     Worry: Not on file     Inability: Not on file   [employer] needs     Medical: Not on file     Non-medical: Not on file   Tobacco Use    Smoking status: Never Smoker    Smokeless tobacco: Never Used   Substance and Sexual Activity    Alcohol use:  Yes     Alcohol/week: 0.0 standard drinks     Frequency: Monthly or less     Drinks per session: 1 or 2     Binge frequency: Never     Comment: social    Drug use: No    Sexual activity: Yes     Partners: Male     Birth control/protection: Condom   Lifestyle    Physical activity     Days per week: Not on file     Minutes per session: Not on file    Stress: Not on file   Relationships    Social connections     Talks on phone: Not on file     Gets together: Not on file     Attends Mandaen service: Not on file     Active member of club or organization: Not on file     Attends meetings of clubs or organizations: Not on file     Relationship status: Not on file    Intimate partner violence     Fear of current or ex partner: Not on file     Emotionally abused: Not on file     Physically abused: Not on file     Forced sexual activity: Not on file   Other Topics Concern     Service No    Blood Transfusions Yes    Caffeine Concern Yes    Occupational Exposure No    Hobby Hazards No    Sleep Concern No    Stress Concern No    Weight Concern No    Special Diet No    Back Care No    Exercise No    Bike Helmet No    Seat Belt Yes    Self-Exams Yes   Social History Narrative    Not on file       Family History   Problem Relation Age of Onset    Diabetes Mother     Hypertension Mother              Heart Attack Mother 62    Alcohol abuse Father         cirrhosis    Diabetes Sister     Heart Attack Sister 64         OBJECTIVE    Physical Exam:     Visit Vitals  /60 (BP 1 Location: Left arm, BP Patient Position: Sitting)   Pulse 88   Temp 98 °F (36.7 °C) (Oral)   Resp 16   Ht 5' 6\" (1.676 m)   Wt 191 lb (86.6 kg)   SpO2 99%   BMI 30.83 kg/m²       General: alert, well-appearing, AA, in no apparent distress or pain  Head: atraumatic. Non-tender maxillary and frontal sinuses  Eyes: Lids with no discharge, no matting, conjunctivae clear and non injected, full EOMs, PERLLA  Ears: pinna non-tender, external auditory canal patent, TM intact  Neck: supple, no adenopathy palpated  CVS: normal rate, regular rhythm, distinct S1 and S2  Lungs:clear to ausculation bilaterally, no crackles, wheezing or rhonchi noted  Abdomen: normoactive bowel sounds, soft, non-tender  Extremities: no edema, no cyanosis, MSK grossly normal  Skin: warm, no lesions, rashes noted  Psych:  mood and affect normal    \  Results for orders placed or performed during the hospital encounter of 06/26/20   CBC WITH AUTOMATED DIFF   Result Value Ref Range    WBC 5.6 4.6 - 13.2 K/uL    RBC 4.10 (L) 4.20 - 5.30 M/uL    HGB 11.8 (L) 12.0 - 16.0 g/dL    HCT 36.1 35.0 - 45.0 %    MCV 88.0 74.0 - 97.0 FL    MCH 28.8 24.0 - 34.0 PG    MCHC 32.7 31.0 - 37.0 g/dL    RDW 13.0 11.6 - 14.5 %    PLATELET 009 295 - 650 K/uL    MPV 9.4 9.2 - 11.8 FL    NEUTROPHILS 66 40 - 73 %    LYMPHOCYTES 11 (L) 21 - 52 %    MONOCYTES 11 (H) 3 - 10 %    EOSINOPHILS 11 (H) 0 - 5 %    BASOPHILS 1 0 - 2 %    ABS. NEUTROPHILS 3.7 1.8 - 8.0 K/UL    ABS. LYMPHOCYTES 0.6 (L) 0.9 - 3.6 K/UL    ABS. MONOCYTES 0.6 0.05 - 1.2 K/UL    ABS. EOSINOPHILS 0.6 (H) 0.0 - 0.4 K/UL    ABS. BASOPHILS 0.1 0.0 - 0.1 K/UL    DF AUTOMATED     METABOLIC PANEL, BASIC   Result Value Ref Range    Sodium 141 136 - 145 mmol/L    Potassium 3.6 3.5 - 5.5 mmol/L    Chloride 109 100 - 111 mmol/L    CO2 26 21 - 32 mmol/L    Anion gap 6 3.0 - 18 mmol/L    Glucose 86 74 - 99 mg/dL    BUN 14 7.0 - 18 MG/DL    Creatinine 1.18 0.6 - 1.3 MG/DL    BUN/Creatinine ratio 12 12 - 20      GFR est AA 56 (L) >60 ml/min/1.73m2    GFR est non-AA 47 (L) >60 ml/min/1.73m2    Calcium 8.3 (L) 8.5 - 10.1 MG/DL   NOVEL CORONAVIRUS (COVID-19)   Result Value Ref Range    SARS-CoV-2 Detected (A) Not Detected             ASSESSMENT/PLAN  Diagnoses and all orders for this visit:    1. Essential hypertension  Controlled  Cont norvasc, hctz, lisinopril  DASH Diet, monitoring    2. Encounter for immunization  -     INFLUENZA VIRUS VAC QUAD,SPLIT,PRESV FREE SYRINGE IM  -     IN IMMUNIZ ADMIN,1 SINGLE/COMB VAC/TOXOID    3. Chronic diastolic heart failure (HCC)  Asymptomatic   on ace-I  Following Dr. Sparkle Perdue    4. Breast cancer screening by mammogram  -     [facility] MAMMO BI SCREENING INCL CAD; Future    5. Vitamin D deficiency  -     VITAMIN D, 25 HYDROXY; Future  -     ergocalciferol (ERGOCALCIFEROL) 1,250 mcg (50,000 unit) capsule; Take 1 Cap by mouth every seven (7) days. 6. Gastroesophageal reflux disease without esophagitis  Stable  Cont prilosec    7. Controlled type 2 diabetes mellitus without complication, without long-term current use of insulin ([facility]  Cont janumet  Reminded due for eye exam  -     HEMOGLOBIN A1C W/O EAG; Future  -     METABOLIC PANEL, COMPREHENSIVE; Future  -     MICROALBUMIN, UR, RAND W/ MICROALB/CREAT RATIO; Future    8. Back spasm  Mild, intermittent  Cont prn flexeril  -     cyclobenzaprine (FLEXERIL) 10 mg tablet; TAKE ONE-HALF TO ONE TABLET BY MOUTH DAILY AS NEEDED FOR BACK OR HIP PAIN    9.  Mild persistent asthma without complication  -     fluticasone propion-salmeteroL (ADVAIR/WIXELA) 250-50 mcg/dose diskus inhaler; INHALE 1 PUFF BY MOUTH EVERY 12 HOURS    10. Controlled type 2 diabetes mellitus with microalbuminuria, without long-term current use of insulin (HCC)  -     SITagliptin-metFORMIN (Janumet) 50-1,000 mg per tablet; TAKE ONE TABLET BY MOUTH TWICE A DAY WITH MEALS    11. Obesity (BMI 30.0-34. 9)  Encouraged weight loss    12. History of subarachnoid hemorrhage  2015, no residual deficit  Optimizing BP control    13. Moderate persistent asthma without complication  Controlled  Cont advair, prn albuteorl  Flu vaccine today    Other orders  -     varicella-zoster recombinant, PF, (SHINGRIX) 50 mcg/0.5 mL susr injection; 0.5 mL by IntraMUSCular route once for 1 dose. Administer 2nd dose after 2 months. Please fax copy of admin record to [phone]        Follow-up and Dispositions    · Return in about 3 months (around 12/15/2020), or if symptoms worsen or fail to improve, for non-fasting labs prior to your next visit, routine chronic illness care. Patient understands plan of care. Patient has provided input and agrees with goals.

## 2020-09-15 NOTE — TELEPHONE ENCOUNTER
Have you been diagnosed with, tested for, or told that you are suspected of having COVID-19 (coronovirus)? Tested in June 28 for sinus / pos and quarantined   Have you had a fever or taken medication to treat a fever in the past 72 hours? no  Have you had a cough, SOB, or flu-like symptoms within the past 3 days? Asthma   Have you had direct contact with someone who tested positive for COVID-19 within the past 14 days? no  Do you have a household member with flu-like symptoms, including fever, cough, or SOB? Al family has asthma   Do you currently have flu-like symptoms including fever, cough, or SOB?

## 2020-09-15 NOTE — PROGRESS NOTES
1. Have you been to the ER, urgent care clinic since your last visit? Hospitalized since your last visit? YES ER 06/26/2020  2. Have you seen or consulted any other health care providers outside of the [address] [place] [address] since your last visit? Include any pap smears or colon screening. No   Flulaval 0.5 ml given IM in right deltoid. Lot # [id number], exp date 06/30/2021. Patient tolerated injection well. No adverse reaction noted.

## 2020-09-18 DIAGNOSIS — I10 ESSENTIAL HYPERTENSION: ICD-10-CM

## 2020-09-18 RX ORDER — OMEPRAZOLE 20 MG/1
CAPSULE, DELAYED RELEASE ORAL
Qty: 90 CAP | Refills: 0 | Status: SHIPPED | OUTPATIENT
Start: 2020-09-18 | End: 2020-12-17

## 2020-09-18 RX ORDER — AMLODIPINE BESYLATE 5 MG/1
TABLET ORAL
Qty: 90 TAB | Refills: 1 | Status: SHIPPED | OUTPATIENT
Start: 2020-09-18 | End: 2020-12-14 | Stop reason: SDUPTHER

## 2020-09-18 NOTE — TELEPHONE ENCOUNTER
This pharmacy faxed over request for the following prescriptions to be filled:    Medication requested :   Requested Prescriptions     Pending Prescriptions Disp Refills    amLODIPine (NORVASC) 5 mg tablet 90 Tab 1     Sig: TAKE ONE TABLET BY MOUTH DAILY       PCP: [address] [place] [address] or Print: Zoë Ang   Mail order or Local pharmacy [address]     Scheduled appointment if not seen by current providers in office: LOV 9/15/2020  F/u 12/14/2020

## 2020-09-21 ENCOUNTER — OFFICE VISIT (OUTPATIENT)
Dept: CARDIOLOGY CLINIC | Age: 60
End: 2020-09-21

## 2020-09-21 VITALS
DIASTOLIC BLOOD PRESSURE: 63 MMHG | HEIGHT: 66 IN | BODY MASS INDEX: 30.79 KG/M2 | WEIGHT: 191.6 LBS | OXYGEN SATURATION: 98 % | HEART RATE: 96 BPM | SYSTOLIC BLOOD PRESSURE: 143 MMHG | TEMPERATURE: 97.1 F

## 2020-09-21 DIAGNOSIS — E78.2 MIXED HYPERLIPIDEMIA: ICD-10-CM

## 2020-09-21 DIAGNOSIS — I50.32 CHRONIC DIASTOLIC HEART FAILURE (HCC): Primary | ICD-10-CM

## 2020-09-21 DIAGNOSIS — E66.9 OBESITY (BMI 30.0-34.9): ICD-10-CM

## 2020-09-21 DIAGNOSIS — I10 ESSENTIAL HYPERTENSION: ICD-10-CM

## 2020-09-21 NOTE — PROGRESS NOTES
HISTORY OF PRESENT ILLNESS  Guilherme Blanco is a 56 y.o. female. CHF   The history is provided by the medical records. This is a chronic problem. Associated symptoms include shortness of breath. Pertinent negatives include no chest pain and no headaches. Hypertension   The history is provided by the medical records and patient. This is a chronic problem. Associated symptoms include shortness of breath. Pertinent negatives include no chest pain and no headaches. Cholesterol Problem   The history is provided by the medical records. This is a chronic problem. Associated symptoms include shortness of breath. Pertinent negatives include no chest pain and no headaches. Shortness of Breath   The history is provided by the patient. This is a recurrent problem. The problem occurs frequently. The current episode started more than 1 week ago. The problem has been gradually improving (inhalers/prednisone). Associated symptoms include leg swelling. Pertinent negatives include no fever, no headaches, no cough, no wheezing, no PND, no orthopnea, no chest pain, no vomiting, no rash and no claudication. The problem's precipitants include exercise (walking thru parking lot; 9/15 more; 1/17 better except when exosed to smoke/dust). She has tried beta-agonist inhalers and oral steroids for the symptoms. The treatment provided significant relief. Leg Swelling   The history is provided by the patient. This is a recurrent problem. The current episode started more than 1 week ago. The problem occurs daily (R>L). The problem has been gradually improving. Associated symptoms include shortness of breath. Pertinent negatives include no chest pain and no headaches. The symptoms are aggravated by standing. The symptoms are relieved by rest.       Review of Systems   Constitutional: Negative for chills, fever, malaise/fatigue and weight loss. HENT: Negative for nosebleeds. Eyes: Negative for discharge.    Respiratory: Positive for shortness of breath. Negative for cough and wheezing. Cardiovascular: Positive for leg swelling. Negative for chest pain, palpitations, orthopnea, claudication and PND. Gastrointestinal: Negative for diarrhea, nausea and vomiting. Genitourinary: Negative for dysuria and hematuria. Musculoskeletal: Negative for joint pain. Skin: Negative for rash. Neurological: Negative for dizziness, seizures, loss of consciousness and headaches. Endo/Heme/Allergies: Negative for polydipsia. Does not bruise/bleed easily. Psychiatric/Behavioral: Negative for depression and substance abuse. The patient does not have insomnia. Allergies   Allergen Reactions    Banana Hives     Asthma attach, throat swelling, throat scratching, lip swelling    Iodine Rash    Keflex [Cephalexin] Hives    Seafood Hives    Watermelon Hives     Throat closes    Clonidine Other (comments)     Memory loss       Past Medical History:   Diagnosis Date    Ankle swelling     Asthma 02/04/2010    PFTs 2016 showed obstructive lung pattern    Brain bleed ([address] 02/20/2015    Chronic diastolic heart failure ([address] 3/18/2015    Diabetes mellitus ([address] 5/27/2014    Diabetic eye exam ([address] 01/26/2017    No retinopathy Best corrected vision 20/20 OU    HTN (hypertension) 2/4/2010    Hyperlipidemia LDL goal < 70 2014    Magnesium deficiency     Obesity, unspecified 4/15/2015    SAH (subarachnoid hemorrhage) ([address] 2/20/15    ad[facility], Dr. Awa Brown. Judeth Fitting and ref to Dr. Samia García, Neurology    Stroke [facility]) 2015    mini stroke    Vitamin D deficiency 2000       Family History   Problem Relation Age of Onset    Diabetes Mother     Hypertension Mother              Heart Attack Mother 62    Alcohol abuse Father         cirrhosis    Diabetes Sister     Heart Attack Sister 64       Social History     Tobacco Use    Smoking status: Never Smoker    Smokeless tobacco: Never Used   Substance Use Topics    Alcohol use:  Yes Alcohol/week: 0.0 standard drinks     Frequency: Monthly or less     Drinks per session: 1 or 2     Binge frequency: Never     Comment: social    Drug use: No        Current Outpatient Medications   Medication Sig    omeprazole (PRILOSEC) 20 mg capsule TAKE ONE CAPSULE BY MOUTH DAILY    amLODIPine (NORVASC) 5 mg tablet TAKE ONE TABLET BY MOUTH DAILY    cyclobenzaprine (FLEXERIL) 10 mg tablet TAKE ONE-HALF TO ONE TABLET BY MOUTH DAILY AS NEEDED FOR BACK OR HIP PAIN    fluticasone propion-salmeteroL (ADVAIR/WIXELA) 250-50 mcg/dose diskus inhaler INHALE 1 PUFF BY MOUTH EVERY 12 HOURS    SITagliptin-metFORMIN (Janumet) 50-1,000 mg per tablet TAKE ONE TABLET BY MOUTH TWICE A DAY WITH MEALS    ergocalciferol (ERGOCALCIFEROL) 1,250 mcg (50,000 unit) capsule Take 1 Cap by mouth every seven (7) days.  albuterol (PROVENTIL HFA, VENTOLIN HFA, PROAIR HFA) 90 mcg/actuation inhaler Take 2 Puffs by inhalation every four (4) hours as needed for Wheezing.  fluticasone propionate (Flonase) 50 mcg/actuation nasal spray 2 Sprays by Both Nostrils route daily as needed for Rhinitis.  lisinopriL (PRINIVIL, ZESTRIL) 10 mg tablet Take 1 Tab by mouth daily.  hydroCHLOROthiazide (MICROZIDE) 12.5 mg capsule Take 1 tab PO daily    albuterol (PROVENTIL VENTOLIN) 2.5 mg /3 mL (0.083 %) nebu 3 mL by Nebulization route every four (4) hours as needed for Wheezing.  loratadine (CLARITIN) 10 mg tablet Take 1 Tab by mouth daily as needed for Allergies.  rosuvastatin (CRESTOR) 10 mg tablet Take 1 Tab by mouth nightly.  magnesium oxide (MAG-OX) 400 mg tablet TAKE ONE TABLET BY MOUTH ONCE DAILY    aspirin delayed-release 81 mg tablet Take 81 mg by mouth daily. No current facility-administered medications for this visit.          Past Surgical History:   Procedure Laterality Date    COLONOSCOPY N/A 8/15/2018    COL[facility]o [facility] HX HYSTERECTOMY  2004    abd approach with ovaries and cervix intact    HX HYSTERECTOMY N/A 2004    HX OTHER SURGICAL  8/25/15    SAH, cerebral arteriopathy, Dr. Ana Laura Simmons, Interventional Neurology [facility]       Diagnostic Studies:  CARDIOLOGY STUDIES 4/10/2015   Echocardiogram - Complete Result 55%EF, tr MR   Some recent data might be hidden   2/18 Nuc Stress  Conclusion:   1. Normal perfusion scan. 2. Normal wall motion and ejection fractions calculated to be 51%. 3. No evidence of significant fixed or reversible defect suggesting ischemia or myocardial infarction noted from this nuclear study. 4. Low risk scan. Visit Vitals  BP (!) 143/63 (BP 1 Location: Left arm, BP Patient Position: Sitting)   Pulse 96   Temp 97.1 °F (36.2 °C) (Temporal)   Ht 5' 6\" (1.676 m)   Wt 86.9 kg (191 lb 9.6 oz)   SpO2 98%   BMI 30.93 kg/m²       Ms. Ayala Clancy has a reminder for a \"due or due soon\" health maintenance. I have asked that she contact her primary care provider for follow-up on this health maintenance. Physical Exam   Constitutional: She is oriented to person, place, and time. She appears well-developed and well-nourished. No distress. HENT:   Head: Normocephalic and atraumatic. Mouth/Throat: Normal dentition. Eyes: Right eye exhibits no discharge. Left eye exhibits no discharge. No scleral icterus. Neck: Neck supple. No JVD present. Carotid bruit is not present. No thyromegaly present. Cardiovascular: Normal rate, regular rhythm, S1 normal, S2 normal, normal heart sounds and intact distal pulses. Exam reveals no gallop and no friction rub. No murmur heard. Pulmonary/Chest: Effort normal and breath sounds normal. She has no wheezes. She has no rales. Abdominal: Soft. She exhibits no mass. There is no abdominal tenderness. Musculoskeletal:         General: No edema (trace). Lymphadenopathy:        Right cervical: No superficial cervical adenopathy present. Left cervical: No superficial cervical adenopathy present.    Neurological: She is alert and oriented to person, place, and time. Skin: Skin is warm and dry. No rash noted. Psychiatric: She has a normal mood and affect. Her behavior is normal.       ASSESSMENT and PLAN    Discussed the patient's above normal BMI with her. I have recommended the following interventions: dietary management education, guidance, and counseling . The BMI follow up plan is as follows: BMI is out of normal parameters and plan is as follows: I have counseled this patient on diet and exercise regimens     HLD : Results for Isa Hunt (MRN [id number]) as of 9/21/2020 11:32   Ref. Range 3/4/2020 00:00   Triglyceride Latest Ref Range: 0 - 149 mg/dL 68   Cholesterol, total Latest Ref Range: 100 - 199 mg/dL 139   HDL Cholesterol Latest Ref Range: >39 mg/dL 45   VLDL, calculated Latest Ref Range: 5 - 40 mg/dL 14   LDL, calculated Latest Ref Range: 0 - 99 mg/dL 80     CHF: NYHA2        0/51 Chronic diastolic CHF appears compensated. Blood pressure mildly elevated in the office but was excellent last week in PCPs office. Follow home BP chart before adjusting any medications. Low sodium diet. Continue walking for exercise. Diet and weight discussed in detail and she plans to lose more weight. She was congratulated that she is losing some weight already. Diagnoses and all orders for this visit:    1. Chronic diastolic heart failure ([address]    2. Essential hypertension    3. Mixed hyperlipidemia    4. Obesity (BMI 30.0-34. 9)        Pertinent laboratory and test data reviewed and discussed with patient. See patient instructions also for other medical advice given    There are no discontinued medications. Follow-up and Dispositions    · Return in about 6 months (around 3/21/2021), or if symptoms worsen or fail to improve.

## 2020-09-21 NOTE — PROGRESS NOTES
1. Have you been to the ER, urgent care clinic since your last visit? Hospitalized since your last visit? Yes When: 06/2020 Where: [facility] Reason for visit: COVID    2. Have you seen or consulted any other health care providers outside of the [address] [place] [address] since your last visit? Include any pap smears or colon screening. No     3. Since your last visit, have you had any of the following symptoms? shortness of breath and swelling in legs/arms. 4.  Have you had any blood work, X-rays or cardiac testing? Yes Where: [facility] Reason for visit: Lab/CXR     Requested: NO     [facility]    5. Where do you normally have your labs drawn? [facility]    6. Do you need any refills today?    No

## 2020-09-21 NOTE — PATIENT INSTRUCTIONS
There are no discontinued medications. After the recommended changes have been made in blood pressure medicines, patient advised to keep BP/HR(pulse rate) chart twice daily and bring us results in next 1 week or so. Patient may send the results via \"My Chart\" if desired. Please rest for 5-10 minutes before checking blood pressure. Sit on a comfortable chair without crossing the legs and put your arm on a table. We recommend that you use an upper arm cuff. Check the blood pressure 3 times weekly and take the lowest reading. If you check the blood pressure in both arms, use the higher reading. Avoiding Triggers With Heart Failure: Care Instructions  Your Care Instructions     Triggers are anything that make your heart failure flare up. A flare-up is also called \"sudden heart failure\" or \"acute heart failure. \" When you have a flare-up, fluid builds up in your lungs, and you have problems breathing. You might need to go to the hospital. By watching for changes in your condition and avoiding triggers, you can prevent heart failure flare-ups. Follow-up care is a key part of your treatment and safety. Be sure to make and go to all appointments, and call your doctor if you are having problems. It's also a good idea to know your test results and keep a list of the medicines you take. How can you care for yourself at home? Watch for changes in your weight and condition  · Weigh yourself without clothing at the same time each day. Record your weight. Call your doctor if you have sudden weight gain, such as more than 2 to 3 pounds in a day or 5 pounds in a week. (Your doctor may suggest a different range of weight gain.) A sudden weight gain may mean that your heart failure is getting worse. · Keep a daily record of your symptoms. Write down any changes in how you feel, such as new shortness of breath, cough, or problems eating.  Also record if your ankles are more swollen than usual and if you feel more tired than usual. Note anything that you ate or did that could have triggered these changes. Limit sodium  Sodium causes your body to hold on to extra water. This may cause your heart failure symptoms to get worse. People get most of their sodium from processed foods. Fast food and restaurant meals also tend to be very high in sodium. · Your doctor may suggest that you limit sodium. Your doctor can tell you how much sodium is right for you. This includes limiting sodium in cooked and packaged foods. · Read food labels on cans and food packages. They tell you how much sodium you get in one serving. Check the serving size. If you eat more than one serving, you are getting more sodium. · Be aware that sodium can come in forms other than salt, including monosodium glutamate (MSG), sodium citrate, and sodium bicarbonate (baking soda). MSG is often added to Asian food. You can sometimes ask for food without MSG or salt. · Slowly reducing salt will help you adjust to the taste. Take the salt shaker off the table. · Flavor your food with garlic, lemon juice, onion, vinegar, herbs, and spices instead of salt. Do not use soy sauce, steak sauce, onion salt, garlic salt, mustard, or ketchup on your food, unless it is labeled \"low-sodium\" or \"low-salt. \"  · Make your own salad dressings, sauces, and ketchup without adding salt. · Use fresh or frozen ingredients, instead of canned ones, whenever you can. Choose low-sodium canned goods. · Eat less processed food and food from restaurants, including fast food. Exercise as directed  Moderate, regular exercise is very good for your heart. It improves your blood flow and helps control your weight. But too much exercise can stress your heart and cause a heart failure flare-up. · Check with your doctor before you start an exercise program.  · Walking is an easy way to get exercise. Start out slowly. Gradually increase the length and pace of your walk.  Swimming, riding a bike, and using a treadmill are also good forms of exercise. · When you exercise, watch for signs that your heart is working too hard. You are pushing yourself too hard if you cannot talk while you are exercising. If you become short of breath or dizzy or have chest pain, stop, sit down, and rest.  · Do not exercise when you do not feel well. Take medicines correctly  · Take your medicines exactly as prescribed. Call your doctor if you think you are having a problem with your medicine. · Make a list of all the medicines you take. Include those prescribed to you by other doctors and any over-the-counter medicines, vitamins, or supplements you take. Take this list with you when you go to any doctor. · Take your medicines at the same time every day. It may help you to post a list of all the medicines you take every day and what time of day you take them. · Make taking your medicine as simple as you can. Plan times to take your medicines when you are doing other things, such as eating a meal or getting ready for bed. This will make it easier to remember to take your medicines. · Get organized. Use helpful tools, such as daily or weekly pill containers. When should you call for help? Call 911 if you have symptoms of sudden heart failure such as:    · You have severe trouble breathing.     · You cough up pink, foamy mucus.     · You have a new irregular or rapid heartbeat. Call your doctor now or seek immediate medical care if:    · You have new or increased shortness of breath.     · You are dizzy or lightheaded, or you feel like you may faint.     · You have sudden weight gain, such as more than 2 to 3 pounds in a day or 5 pounds in a week. (Your doctor may suggest a different range of weight gain.)     · You have increased swelling in your legs, ankles, or feet.     · You are suddenly so tired or weak that you cannot do your usual activities.    Watch closely for changes in your health, and be sure to contact your doctor if you develop new symptoms. Where can you learn more? Go to http://www.gray.com/  Enter V089 in the search box to learn more about \"Avoiding Triggers With Heart Failure: Care Instructions. \"  Current as of: December 16, 2019               Content Version: 12.6  © 2812-5069 [employer]. Care instructions adapted under license by [employer] (which disclaims liability or warranty for this information). If you have questions about a medical condition or this instruction, always ask your healthcare professional. [address] any warranty or liability for your use of this information.

## 2020-10-05 ENCOUNTER — TRANSCRIBE ORDER (OUTPATIENT)
Dept: SCHEDULING | Age: 60
End: 2020-10-05

## 2020-10-05 DIAGNOSIS — Z12.31 VISIT FOR SCREENING MAMMOGRAM: Primary | ICD-10-CM

## 2020-10-06 DIAGNOSIS — I10 ESSENTIAL HYPERTENSION: ICD-10-CM

## 2020-10-06 NOTE — TELEPHONE ENCOUNTER
This pharmacy faxed over request for the following prescriptions to be filled:    Medication requested :   Requested Prescriptions     Pending Prescriptions Disp Refills    hydroCHLOROthiazide (MICROZIDE) 12.5 mg capsule 90 Cap 1     Sig: Take 1 tab PO daily     PCP: [address] or Print:  Antoine Holbrook   Mail order or Local pharmacy [address]     Scheduled appointment if not seen by current providers in office:  LOV 9/15/2020 f/u 12/14/2020

## 2020-10-08 RX ORDER — HYDROCHLOROTHIAZIDE 12.5 MG/1
CAPSULE ORAL
Qty: 90 CAP | Refills: 0 | Status: SHIPPED | OUTPATIENT
Start: 2020-10-08 | End: 2021-01-04 | Stop reason: SDUPTHER

## 2020-10-27 DIAGNOSIS — E78.2 MIXED HYPERLIPIDEMIA: ICD-10-CM

## 2020-10-28 DIAGNOSIS — E78.2 MIXED HYPERLIPIDEMIA: ICD-10-CM

## 2020-10-28 RX ORDER — ROSUVASTATIN CALCIUM 10 MG/1
10 TABLET, COATED ORAL
Qty: 30 TAB | Refills: 0 | Status: CANCELLED | OUTPATIENT
Start: 2020-10-28

## 2020-10-28 NOTE — TELEPHONE ENCOUNTER
Requested Prescriptions     Pending Prescriptions Disp Refills    rosuvastatin (CRESTOR) 10 mg tablet 30 Tab 0     Sig: Take 1 Tab by mouth nightly.      PCP: [address] [facility] or Print:  Quirino Gannon   Mail order or Local pharmacy [address]      Scheduled appointment if not seen by current providers in office:  LOV 9/15/2020 f/u 12/14/2020

## 2020-10-29 RX ORDER — ROSUVASTATIN CALCIUM 10 MG/1
10 TABLET, COATED ORAL
Qty: 90 TAB | Refills: 0 | Status: SHIPPED | OUTPATIENT
Start: 2020-10-29 | End: 2021-01-25

## 2020-11-08 DIAGNOSIS — J45.20 MILD INTERMITTENT ASTHMA WITHOUT COMPLICATION: ICD-10-CM

## 2020-11-09 ENCOUNTER — TELEPHONE (OUTPATIENT)
Dept: FAMILY MEDICINE CLINIC | Age: 60
End: 2020-11-09

## 2020-11-09 RX ORDER — ALBUTEROL SULFATE 90 UG/1
AEROSOL, METERED RESPIRATORY (INHALATION)
Qty: 17 G | Refills: 0 | Status: SHIPPED | OUTPATIENT
Start: 2020-11-09 | End: 2021-10-11 | Stop reason: SDUPTHER

## 2020-11-09 NOTE — TELEPHONE ENCOUNTER
Fax received from [address] [place] [address] meds stating prior auth is required for the Albutersol Sulfate (2.5 MG/3ML)0.083% Nebulizer Solution. Submit a PA request  1. Go to key. [employer] and click \"Enter a Key\"  2. Patient last name: Agus Treviño       : 1960      Key: [id number]  3.  Click \"start a PA\", complete the form, and \"send to plan\"

## 2020-11-19 DIAGNOSIS — M62.830 BACK SPASM: ICD-10-CM

## 2020-11-20 RX ORDER — CYCLOBENZAPRINE HCL 10 MG
TABLET ORAL
Qty: 20 TAB | Refills: 0 | Status: SHIPPED | OUTPATIENT
Start: 2020-11-20 | End: 2021-01-25 | Stop reason: SDUPTHER

## 2020-12-05 DIAGNOSIS — I10 ESSENTIAL HYPERTENSION: ICD-10-CM

## 2020-12-07 ENCOUNTER — HOSPITAL ENCOUNTER (OUTPATIENT)
Dept: MAMMOGRAPHY | Age: 60
Discharge: HOME | End: 2020-12-07
Attending: FAMILY MEDICINE
Payer: COMMERCIAL

## 2020-12-07 ENCOUNTER — HOSPITAL ENCOUNTER (OUTPATIENT)
Dept: LAB | Age: 60
Discharge: HOME | End: 2020-12-07

## 2020-12-07 DIAGNOSIS — Z12.31 VISIT FOR SCREENING MAMMOGRAM: ICD-10-CM

## 2020-12-07 LAB — XX-LABCORP SPECIMEN COL,LCBCF: NORMAL

## 2020-12-07 PROCEDURE — 77063 BREAST TOMOSYNTHESIS BI: CPT

## 2020-12-07 PROCEDURE — 99001 SPECIMEN HANDLING PT-LAB: CPT

## 2020-12-07 RX ORDER — LISINOPRIL 10 MG/1
TABLET ORAL
Qty: 90 TAB | Refills: 0 | Status: SHIPPED | OUTPATIENT
Start: 2020-12-07 | End: 2021-03-09 | Stop reason: SDUPTHER

## 2020-12-08 LAB
25(OH)D3+25(OH)D2 SERPL-MCNC: 79 NG/ML (ref 30–100)
ALBUMIN SERPL-MCNC: 4.3 G/DL (ref 3.8–4.8)
ALBUMIN/CREAT UR: <3 MG/G CREAT (ref 0–29)
ALBUMIN/GLOB SERPL: 1.4 {RATIO} (ref 1.2–2.2)
ALP SERPL-CCNC: 85 IU/L (ref 39–117)
ALT SERPL-CCNC: 10 IU/L (ref 0–32)
AST SERPL-CCNC: 11 IU/L (ref 0–40)
BILIRUB SERPL-MCNC: 0.4 MG/DL (ref 0–1.2)
BUN SERPL-MCNC: 17 MG/DL (ref 8–27)
BUN/CREAT SERPL: 15 (ref 12–28)
CALCIUM SERPL-MCNC: 9.7 MG/DL (ref 8.7–10.3)
CHLORIDE SERPL-SCNC: 103 MMOL/L (ref 96–106)
CO2 SERPL-SCNC: 25 MMOL/L (ref 20–29)
CREAT SERPL-MCNC: 1.12 MG/DL (ref 0.57–1)
CREAT UR-MCNC: 96.6 MG/DL
GLOBULIN SER CALC-MCNC: 3.1 G/DL (ref 1.5–4.5)
GLUCOSE SERPL-MCNC: 93 MG/DL (ref 65–99)
HBA1C MFR BLD: 5.8 % (ref 4.8–5.6)
INTERPRETATION: NORMAL
Lab: NORMAL
MICROALBUMIN UR-MCNC: <3 UG/ML
POTASSIUM SERPL-SCNC: 4.3 MMOL/L (ref 3.5–5.2)
PROT SERPL-MCNC: 7.4 G/DL (ref 6–8.5)
SODIUM SERPL-SCNC: 143 MMOL/L (ref 134–144)

## 2020-12-14 ENCOUNTER — VIRTUAL VISIT (OUTPATIENT)
Dept: FAMILY MEDICINE CLINIC | Age: 60
End: 2020-12-14
Payer: COMMERCIAL

## 2020-12-14 DIAGNOSIS — E11.29 CONTROLLED TYPE 2 DIABETES MELLITUS WITH MICROALBUMINURIA, WITHOUT LONG-TERM CURRENT USE OF INSULIN (HCC): ICD-10-CM

## 2020-12-14 DIAGNOSIS — R80.9 CONTROLLED TYPE 2 DIABETES MELLITUS WITH MICROALBUMINURIA, WITHOUT LONG-TERM CURRENT USE OF INSULIN (HCC): ICD-10-CM

## 2020-12-14 DIAGNOSIS — I10 ESSENTIAL HYPERTENSION: Primary | ICD-10-CM

## 2020-12-14 DIAGNOSIS — J45.41 MODERATE PERSISTENT ASTHMA WITH ACUTE EXACERBATION: ICD-10-CM

## 2020-12-14 DIAGNOSIS — E78.2 MIXED HYPERLIPIDEMIA: ICD-10-CM

## 2020-12-14 DIAGNOSIS — I50.32 CHRONIC DIASTOLIC HEART FAILURE (HCC): ICD-10-CM

## 2020-12-14 DIAGNOSIS — E66.9 OBESITY (BMI 30.0-34.9): ICD-10-CM

## 2020-12-14 DIAGNOSIS — K21.9 GASTROESOPHAGEAL REFLUX DISEASE WITHOUT ESOPHAGITIS: ICD-10-CM

## 2020-12-14 PROCEDURE — 99214 OFFICE O/P EST MOD 30 MIN: CPT | Performed by: FAMILY MEDICINE

## 2020-12-14 RX ORDER — AMLODIPINE BESYLATE 5 MG/1
TABLET ORAL
Qty: 90 TAB | Refills: 1 | Status: SHIPPED | OUTPATIENT
Start: 2020-12-14 | End: 2021-06-17

## 2020-12-14 NOTE — PATIENT INSTRUCTIONS

## 2020-12-14 NOTE — PROGRESS NOTES
Addi Meredith is a 56 y.o. female who was seen by synchronous (real-time) audio-video technology on 12/14/2020. Consent: Addi Meredith, who was seen by synchronous (real-time) audio-video technology, and/or her healthcare decision maker, is aware that this patient-initiated, Telehealth encounter on 12/14/2020 is a billable service, with coverage as determined by her insurance carrier. She is aware that she may receive a bill and has provided verbal consent to proceed: Yes. 712  Subjective:   Addi Meredith is a 56 y.o. female who was seen for Asthma; CHF; Diabetes; GERD; Hypertension; and Vitamin D Deficiency  ff-up    No new concenrs    DM- reports 's. Currently on janumet. Following with dr. Gail Galeazzi for eye exams. Reviewed labs a1c 5.8     HTN/diastolic CHF- w/ h/o SAH 9371 no residual deficit. Currently on amlodipine, hctz, lisinopril. Following Dr. Ranjith Gan, reviewed note NYHA class 2, pt denies any symptoms     HL- on crestor, reviewed labsd 3/2020 LDL80     GERD- responding well to prilosec     Asthma- no recent exacerbations, says using advair regularly, prn albuterol     Back spasm- on and off for few years now, responding to prn flexeril. She works at [employer], long days with lifting usually trigger.      Prior to Admission medications    Medication Sig Start Date End Date Taking?  Authorizing Provider   amLODIPine (NORVASC) 5 mg tablet TAKE ONE TABLET BY MOUTH DAILY 12/14/20  Yes James Ortiz MD   lisinopriL (PRINIVIL, ZESTRIL) 10 mg tablet TAKE ONE TABLET BY MOUTH DAILY 12/7/20  Yes James Ortiz MD   cyclobenzaprine (FLEXERIL) 10 mg tablet TAKE ONE-HALF TO ONE TABLET BY MOUTH DAILY AS NEEDED FOR BACK OR HIP PAIN 11/20/20  Yes Jaime Felix MD   albuterol (PROVENTIL HFA, VENTOLIN HFA, PROAIR HFA) 90 mcg/actuation inhaler INHALE TWO PUFFS BY MOUTH EVERY 4 HOURS AS NEEDED FOR WHEEZING 11/9/20  Yes James Ortiz MD   rosuvastatin (CRESTOR) 10 mg tablet Take 1 Tab by mouth nightly. 10/29/20  Yes Hersey Shone, MD   hydroCHLOROthiazide (MICROZIDE) 12.5 mg capsule Take 1 tab PO daily 10/8/20  Yes Hersey Shone, MD   omeprazole (PRILOSEC) 20 mg capsule TAKE ONE CAPSULE BY MOUTH DAILY 9/18/20  Yes Hersey Shone, MD   fluticasone propion-salmeteroL (ADVAIR/WIXELA) 250-50 mcg/dose diskus inhaler INHALE 1 PUFF BY MOUTH EVERY 12 HOURS 9/15/20  Yes Lore Felix MD   SITagliptin-metFORMIN (Janumet) 50-1,000 mg per tablet TAKE ONE TABLET BY MOUTH TWICE A DAY WITH MEALS 9/15/20  Yes Hersey Shone, MD   ergocalciferol (ERGOCALCIFEROL) 1,250 mcg (50,000 unit) capsule Take 1 Cap by mouth every seven (7) days. 9/15/20  Yes Hersey Shone, MD   fluticasone propionate (Flonase) 50 mcg/actuation nasal spray 2 Sprays by Both Nostrils route daily as needed for Rhinitis. 7/10/20  Yes Hersey Shone, MD   albuterol (PROVENTIL VENTOLIN) 2.5 mg /3 mL (0.083 %) nebu 3 mL by Nebulization route every four (4) hours as needed for Wheezing. 3/11/20  Yes Cynthia Leon PA   loratadine (CLARITIN) 10 mg tablet Take 1 Tab by mouth daily as needed for Allergies. 9/11/19  Yes Cynthia Leon PA   magnesium oxide (MAG-OX) 400 mg tablet TAKE ONE TABLET BY MOUTH ONCE DAILY 10/24/18  Yes Cynthia Leon PA   amLODIPine (NORVASC) 5 mg tablet TAKE ONE TABLET BY MOUTH DAILY 9/18/20 12/14/20  Hersey Shone, MD   aspirin delayed-release 81 mg tablet Take 81 mg by mouth daily.     Provider, Historical     Allergies   Allergen Reactions    Banana Hives     Asthma attach, throat swelling, throat scratching, lip swelling    Iodine Rash    Keflex [Cephalexin] Hives    Seafood Hives    Watermelon Hives     Throat closes    Clonidine Other (comments)     Memory loss       Patient Active Problem List    Diagnosis Date Noted    Chest pain 03/25/2019    History of subarachnoid hemorrhage 01/27/2016    Hypokalemia 01/27/2016    Post-menopausal 01/27/2016    Iron deficiency anemia 06/30/2015    Gastroesophageal reflux disease without esophagitis 06/30/2015    Hypomagnesemia 06/30/2015    New onset of headaches after age 48 05/28/2015    Obesity (BMI 30.0-34.9) 04/15/2015    Chronic diastolic heart failure (HCC) 03/18/2015    Anxiety and depression 07/15/2014    Mixed hyperlipidemia 05/18/2013    Vitamin D deficiency 05/18/2013    Asthma 02/04/2010    Essential hypertension 09/05/2007     Current Outpatient Medications   Medication Sig Dispense Refill    amLODIPine (NORVASC) 5 mg tablet TAKE ONE TABLET BY MOUTH DAILY 90 Tab 1    lisinopriL (PRINIVIL, ZESTRIL) 10 mg tablet TAKE ONE TABLET BY MOUTH DAILY 90 Tab 0    cyclobenzaprine (FLEXERIL) 10 mg tablet TAKE ONE-HALF TO ONE TABLET BY MOUTH DAILY AS NEEDED FOR BACK OR HIP PAIN 20 Tab 0    albuterol (PROVENTIL HFA, VENTOLIN HFA, PROAIR HFA) 90 mcg/actuation inhaler INHALE TWO PUFFS BY MOUTH EVERY 4 HOURS AS NEEDED FOR WHEEZING 17 g 0    rosuvastatin (CRESTOR) 10 mg tablet Take 1 Tab by mouth nightly. 90 Tab 0    hydroCHLOROthiazide (MICROZIDE) 12.5 mg capsule Take 1 tab PO daily 90 Cap 0    omeprazole (PRILOSEC) 20 mg capsule TAKE ONE CAPSULE BY MOUTH DAILY 90 Cap 0    fluticasone propion-salmeteroL (ADVAIR/WIXELA) 250-50 mcg/dose diskus inhaler INHALE 1 PUFF BY MOUTH EVERY 12 HOURS 3 Each 0    SITagliptin-metFORMIN (Janumet) 50-1,000 mg per tablet TAKE ONE TABLET BY MOUTH TWICE A DAY WITH MEALS 90 Tab 1    ergocalciferol (ERGOCALCIFEROL) 1,250 mcg (50,000 unit) capsule Take 1 Cap by mouth every seven (7) days. 12 Cap 0    fluticasone propionate (Flonase) 50 mcg/actuation nasal spray 2 Sprays by Both Nostrils route daily as needed for Rhinitis. 1 Bottle 2    albuterol (PROVENTIL VENTOLIN) 2.5 mg /3 mL (0.083 %) nebu 3 mL by Nebulization route every four (4) hours as needed for Wheezing. 30 Nebule 2    loratadine (CLARITIN) 10 mg tablet Take 1 Tab by mouth daily as needed for Allergies.  30 Tab 5    magnesium oxide (MAG-OX) 400 mg tablet TAKE ONE TABLET BY MOUTH ONCE DAILY 90 Tab 0    aspirin delayed-release 81 mg tablet Take 81 mg by mouth daily. Allergies   Allergen Reactions    Banana Hives     Asthma attach, throat swelling, throat scratching, lip swelling    Iodine Rash    Keflex [Cephalexin] Hives    Seafood Hives    Watermelon Hives     Throat closes    Clonidine Other (comments)     Memory loss     Past Medical History:   Diagnosis Date    Ankle swelling     Asthma 02/04/2010    PFTs 2016 showed obstructive lung pattern    Brain bleed ([facility] [address] 02/20/2015    Chronic diastolic heart failure ([facility] [address] 3/18/2015    Diabetes mellitus ([address] 5/27/2014    Diabetic eye exam ([facility] [address] 01/26/2017    No retinopathy Best corrected vision 20/20 OU    HTN (hypertension) 2/4/2010    Hyperlipidemia LDL goal < 70 2014    Magnesium deficiency     Obesity, unspecified 4/15/2015    SAH (subarachnoid hemorrhage) ([facility] [address] 2/20/15    admitted in [facility], Dr. Wen Longoria. Shanti Tomlin and ref to Dr. Lord Bowden, Neurology    Stroke [facility]) 2015    mini stroke    Vitamin D deficiency 2000     Past Surgical History:   Procedure Laterality Date    COLONOSCOPY N/A 8/15/2018    COLONOS[facility] Sabino Durand [facility] at [facility] HX HYSTERECTOMY  2004    abd approach with ovaries and cervix intact    HX HYSTERECTOMY N/A 2004    HX OTHER SURGICAL  8/25/15    SAH, cerebral arteriopathy, Dr. Lord Bowden, Interventional Neurology [facility]     Family History   Problem Relation Age of Onset    Diabetes Mother     Hypertension Mother              Heart Attack Mother 62    Alcohol abuse Father         cirrhosis    Diabetes Sister     Heart Attack Sister 64     Social History     Tobacco Use    Smoking status: Never Smoker    Smokeless tobacco: Never Used   Substance Use Topics    Alcohol use:  Yes     Alcohol/week: 0.0 standard drinks     Frequency: Monthly or less     Drinks per session: 1 or 2     Binge frequency: Never     Comment: social ROS      Objective: There were no vitals taken for this visit. General: alert, cooperative, no distress   Mental  status: normal mood, behavior, speech, dress, motor activity, and thought processes, able to follow commands   HENT: NCAT   Neck: no visualized mass   Resp: no respiratory distress   Neuro: no gross deficits   Skin: no discoloration or lesions of concern on visible areas   Psychiatric: normal affect, consistent with stated mood, no evidence of hallucinations     Additional exam findings: We discussed the expected course, resolution and complications of the diagnosis(es) in detail. Medication risks, benefits, costs, interactions, and alternatives were discussed as indicated. I advised her to contact the office if her condition worsens, changes or fails to improve as anticipated. She expressed understanding with the diagnosis(es) and plan. Deirdre Mills is a 56 y.o. female who was evaluated by a video visit encounter for concerns as above. Patient identification was verified prior to start of the visit. A caregiver was present when appropriate. Due to this being a TeleHealth encounter ([facility]), evaluation of the following organ systems was limited: Vitals/Constitutional/EENT/Resp/CV/GI//MS/Neuro/Skin/Heme-Lymph-Imm. Pursuant to the emergency declaration under the [facility]5 waiver authority and the [employer] Act, this Virtual  Visit was conducted, with patient's (and/or legal guardian's) consent, to reduce the patient's risk of exposure to COVID-19 and provide necessary medical care. Services were provided through a video synchronous discussion virtually to substitute for in-person clinic visit. Patient and provider were located at their individual homes.     Results for orders placed or performed during the hospital encounter of 12/07/20   [facility] SPECIMEN COL   Result Value Ref Range    [facility] SPECIMEN COLLN. Specimens collected/sent to [facility]. Please direct inquiries to ([phone]). Assessment & Plan:    Essential hypertension  Controlled  Cont norvasc, hctz, lisinopril  DASH Diet, monitoring    Chronic diastolic heart failure (HCC)  Asymptomatic   on ace-I  Following Dr. Tori Nicole       Gastroesophageal reflux disease without esophagitis  Stable  Cont prilosec     Back spasm  Mild, intermittent  Cont prn flexeril  -     cyclobenzaprine (FLEXERIL) 10 mg tablet; TAKE ONE-HALF TO ONE TABLET BY MOUTH DAILY AS NEEDED FOR BACK OR HIP PAIN     Moderate persistent asthma without complication  -     fluticasone propion-salmeteroL (ADVAIR/WIXELA) 250-50 mcg/dose diskus inhaler; INHALE 1 PUFF BY MOUTH EVERY 12 HOURS     Controlled type 2 diabetes mellitus with microalbuminuria, without long-term current use of insulin (HCC)  Controlled  Cont janumet  Cont lisinopril  Check cmp, lipid, a1c in 3 months prior to next visit      Obesity (BMI 30.0-34. 9)  Encouraged weight loss     History of subarachnoid hemorrhage  2015, no residual deficit  Optimizing BP control      Moderate persistent asthma without complication  Controlled  Cont advair, prn albuteorl       Ff-up in 3 months, fasting labs prior    Kylee Quispe MD

## 2020-12-14 NOTE — PROGRESS NOTES
1. Have you been to the ER, urgent care clinic since your last visit? Hospitalized since your last visit? No    2. Have you seen or consulted any other health care providers outside of the [address] [place] [address] since your last visit? Include any pap smears or colon screening.  No     Health Maintenance Due   Topic Date Due    Eye Exam Retinal or Dilated  08/03/1968    Shingrix Vaccine Age 50> (1 of 2) 08/03/2008

## 2021-01-04 DIAGNOSIS — I10 ESSENTIAL HYPERTENSION: ICD-10-CM

## 2021-01-05 RX ORDER — HYDROCHLOROTHIAZIDE 12.5 MG/1
CAPSULE ORAL
Qty: 90 CAP | Refills: 0 | Status: SHIPPED | OUTPATIENT
Start: 2021-01-05 | End: 2021-01-07

## 2021-01-24 DIAGNOSIS — E78.2 MIXED HYPERLIPIDEMIA: ICD-10-CM

## 2021-01-25 DIAGNOSIS — M62.830 BACK SPASM: ICD-10-CM

## 2021-01-25 RX ORDER — ROSUVASTATIN CALCIUM 10 MG/1
TABLET, COATED ORAL
Qty: 90 TAB | Refills: 0 | Status: SHIPPED | OUTPATIENT
Start: 2021-01-25 | End: 2021-04-23

## 2021-01-26 RX ORDER — CYCLOBENZAPRINE HCL 10 MG
TABLET ORAL
Qty: 20 TAB | Refills: 0 | Status: SHIPPED | OUTPATIENT
Start: 2021-01-26 | End: 2021-03-09 | Stop reason: SDUPTHER

## 2021-03-09 DIAGNOSIS — M62.830 BACK SPASM: ICD-10-CM

## 2021-03-09 DIAGNOSIS — I10 ESSENTIAL HYPERTENSION: ICD-10-CM

## 2021-03-09 RX ORDER — LISINOPRIL 10 MG/1
10 TABLET ORAL DAILY
Qty: 90 TAB | Refills: 0 | Status: SHIPPED | OUTPATIENT
Start: 2021-03-09 | End: 2021-03-11

## 2021-03-09 RX ORDER — CYCLOBENZAPRINE HCL 10 MG
TABLET ORAL
Qty: 20 TAB | Refills: 0 | Status: SHIPPED | OUTPATIENT
Start: 2021-03-09 | End: 2021-06-17 | Stop reason: SDUPTHER

## 2021-03-11 DIAGNOSIS — I10 ESSENTIAL HYPERTENSION: ICD-10-CM

## 2021-03-11 RX ORDER — LISINOPRIL 10 MG/1
TABLET ORAL
Qty: 90 TAB | Refills: 0 | Status: SHIPPED | OUTPATIENT
Start: 2021-03-11 | End: 2021-08-31

## 2021-03-22 ENCOUNTER — HOSPITAL ENCOUNTER (OUTPATIENT)
Dept: LAB | Age: 61
Discharge: HOME | End: 2021-03-22

## 2021-03-22 LAB — XX-LABCORP SPECIMEN COL,LCBCF: NORMAL

## 2021-03-22 PROCEDURE — 99001 SPECIMEN HANDLING PT-LAB: CPT

## 2021-03-22 RX ORDER — OMEPRAZOLE 20 MG/1
20 CAPSULE, DELAYED RELEASE ORAL DAILY
Qty: 90 CAP | Refills: 0 | Status: SHIPPED | OUTPATIENT
Start: 2021-03-22 | End: 2021-06-17 | Stop reason: SDUPTHER

## 2021-03-23 LAB
ALBUMIN SERPL-MCNC: 4.1 G/DL (ref 3.8–4.8)
ALBUMIN/GLOB SERPL: 1.4 {RATIO} (ref 1.2–2.2)
ALP SERPL-CCNC: 81 IU/L (ref 39–117)
ALT SERPL-CCNC: 10 IU/L (ref 0–32)
AST SERPL-CCNC: 13 IU/L (ref 0–40)
BILIRUB SERPL-MCNC: 0.4 MG/DL (ref 0–1.2)
BUN SERPL-MCNC: 12 MG/DL (ref 8–27)
BUN/CREAT SERPL: 11 (ref 12–28)
CALCIUM SERPL-MCNC: 9.5 MG/DL (ref 8.7–10.3)
CHLORIDE SERPL-SCNC: 102 MMOL/L (ref 96–106)
CHOLEST SERPL-MCNC: 196 MG/DL (ref 100–199)
CO2 SERPL-SCNC: 24 MMOL/L (ref 20–29)
CREAT SERPL-MCNC: 1.13 MG/DL (ref 0.57–1)
EST. AVERAGE GLUCOSE BLD GHB EST-MCNC: 126 MG/DL
GLOBULIN SER CALC-MCNC: 2.9 G/DL (ref 1.5–4.5)
GLUCOSE SERPL-MCNC: 101 MG/DL (ref 65–99)
HBA1C MFR BLD: 6 % (ref 4.8–5.6)
HDLC SERPL-MCNC: 53 MG/DL
IMP & REVIEW OF LAB RESULTS: NORMAL
INTERPRETATION: NORMAL
LDLC SERPL CALC-MCNC: 130 MG/DL (ref 0–99)
PDF IMAGE, 910387: NORMAL
POTASSIUM SERPL-SCNC: 4.5 MMOL/L (ref 3.5–5.2)
PROT SERPL-MCNC: 7 G/DL (ref 6–8.5)
SODIUM SERPL-SCNC: 143 MMOL/L (ref 134–144)
TRIGL SERPL-MCNC: 72 MG/DL (ref 0–149)
VLDLC SERPL CALC-MCNC: 13 MG/DL (ref 5–40)

## 2021-03-25 ENCOUNTER — OFFICE VISIT (OUTPATIENT)
Dept: CARDIOLOGY CLINIC | Age: 61
End: 2021-03-25
Payer: COMMERCIAL

## 2021-03-25 VITALS
HEIGHT: 66 IN | DIASTOLIC BLOOD PRESSURE: 58 MMHG | OXYGEN SATURATION: 100 % | WEIGHT: 196 LBS | BODY MASS INDEX: 31.5 KG/M2 | HEART RATE: 94 BPM | SYSTOLIC BLOOD PRESSURE: 124 MMHG | TEMPERATURE: 97.7 F

## 2021-03-25 DIAGNOSIS — I50.32 CHRONIC DIASTOLIC HEART FAILURE (HCC): ICD-10-CM

## 2021-03-25 DIAGNOSIS — E78.2 MIXED HYPERLIPIDEMIA: ICD-10-CM

## 2021-03-25 DIAGNOSIS — E66.9 OBESITY (BMI 30.0-34.9): ICD-10-CM

## 2021-03-25 DIAGNOSIS — I10 ESSENTIAL HYPERTENSION: Primary | ICD-10-CM

## 2021-03-25 PROCEDURE — 93000 ELECTROCARDIOGRAM COMPLETE: CPT | Performed by: INTERNAL MEDICINE

## 2021-03-25 PROCEDURE — 99214 OFFICE O/P EST MOD 30 MIN: CPT | Performed by: INTERNAL MEDICINE

## 2021-03-25 RX ORDER — HYDROCHLOROTHIAZIDE 12.5 MG/1
12.5 CAPSULE ORAL EVERY OTHER DAY
Qty: 90 CAP | Refills: 1 | Status: SHIPPED | OUTPATIENT
Start: 2021-03-25 | End: 2021-06-01 | Stop reason: SDUPTHER

## 2021-03-25 NOTE — PATIENT INSTRUCTIONS
Learning About the [facility] Diet  What is the Mediterranean diet? The Mediterranean diet is a style of eating rather than a diet plan. It features foods eaten in [place], [place], [place] and [place], and other countries along the [facility]. It emphasizes eating foods like fish, fruits, vegetables, beans, high-fiber breads and whole grains, nuts, and olive oil. This style of eating includes limited red meat, cheese, and sweets. Why choose the Mediterranean diet? A Mediterranean-style diet may improve heart health. It contains more fat than other heart-healthy diets. But the fats are mainly from nuts, unsaturated oils (such as fish oils and olive oil), and certain nut or seed oils (such as canola, soybean, or flaxseed oil). These fats may help protect the heart and blood vessels. How can you get started on the Mediterranean diet? Here are some things you can do to switch to a more Mediterranean way of eating. What to eat  · Eat a variety of fruits and vegetables each day, such as grapes, blueberries, tomatoes, broccoli, peppers, figs, olives, spinach, eggplant, beans, lentils, and chickpeas. · Eat a variety of whole-grain foods each day, such as oats, brown rice, and whole wheat bread, pasta, and couscous. · Eat fish at least 2 times a week. Try tuna, salmon, mackerel, lake trout, herring, or sardines. · Eat moderate amounts of low-fat dairy products, such as milk, cheese, or yogurt. · Eat moderate amounts of poultry and eggs. · Choose healthy (unsaturated) fats, such as nuts, olive oil, and certain nut or seed oils like canola, soybean, and flaxseed. · Limit unhealthy (saturated) fats, such as butter, palm oil, and coconut oil. And limit fats found in animal products, such as meat and dairy products made with whole milk. Try to eat red meat only a few times a month in very small amounts. · Limit sweets and desserts to only a few times a week.  This includes sugar-sweetened drinks like soda. The Mediterranean diet may also include red wine with your meal--1 glass each day for women and up to 2 glasses a day for men. Tips for eating at home  · Use herbs, spices, garlic, lemon zest, and citrus juice instead of salt to add flavor to foods. · Add avocado slices to your sandwich instead of rodriguez. · Have fish for lunch or dinner instead of red meat. Brush the fish with olive oil, and broil or grill it. · Sprinkle your salad with seeds or nuts instead of cheese. · Cook with olive or canola oil instead of butter or oils that are high in saturated fat. · Switch from 2% milk or whole milk to 1% or fat-free milk. · Dip raw vegetables in a vinaigrette dressing or hummus instead of dips made from mayonnaise or sour cream.  · Have a piece of fruit for dessert instead of a piece of cake. Try baked apples, or have some dried fruit. Tips for eating out  · Try broiled, grilled, baked, or poached fish instead of having it fried or breaded. · Ask your  to have your meals prepared with olive oil instead of butter. · Order dishes made with marinara sauce or sauces made from olive oil. Avoid sauces made from cream or mayonnaise. · Choose whole-grain breads, whole wheat pasta and pizza crust, brown rice, beans, and lentils. · Cut back on butter or margarine on bread. Instead, you can dip your bread in a small amount of olive oil. · Ask for a side salad or grilled vegetables instead of french fries or chips. Where can you learn more? Go to http://www.randolph.com/  Enter O407 in the search box to learn more about \"Learning About the Mediterranean Diet. \"  Current as of: August 22, 2019               Content Version: 12.6  © 4770-7764 [employer]. Care instructions adapted under license by [employer] (which disclaims liability or warranty for this information).  If you have questions about a medical condition or this instruction, always ask your healthcare professional. [address] any warranty or liability for your use of this information.

## 2021-03-25 NOTE — PROGRESS NOTES
Patient didn't bring medications, verbally reviewed. 1. Have you been to the ER, urgent care clinic since your last visit? Hospitalized since your last visit? Yes     When: 03/06/21 [facility] Reason for visit: Back Injury    2. Have you seen or consulted any other health care providers outside of the [address] [place] [address] since your last visit? Include any pap smears or colon screening. No     3. Since your last visit, have you had any of the following symptoms? No       4. Have you had any blood work, X-rays or cardiac testing? Yes Where: [facility] Reason for visit: Labs     Requested: NO     In [facility]: YES    5. Where do you normally have your labs drawn? [facility]    6. Do you need any refills today?    No

## 2021-03-25 NOTE — PROGRESS NOTES
HISTORY OF PRESENT ILLNESS  Urvashi Hernandez is a 56 y.o. female. Hypertension  The history is provided by the medical records and patient. This is a chronic problem. Pertinent negatives include no chest pain, no headaches and no shortness of breath. CHF  The history is provided by the medical records. This is a chronic problem. Pertinent negatives include no chest pain, no headaches and no shortness of breath. Cholesterol Problem  The history is provided by the medical records. This is a chronic problem. Pertinent negatives include no chest pain, no headaches and no shortness of breath. Shortness of Breath  The history is provided by the patient. This is a recurrent problem. The problem occurs frequently. The current episode started more than 1 week ago. The problem has been resolved (inhalers/prednisone). Associated symptoms include leg swelling. Pertinent negatives include no fever, no headaches, no cough, no wheezing, no PND, no orthopnea, no chest pain, no vomiting, no rash and no claudication. The problem's precipitants include exercise (walking thru parking lot; 9/15 more;  better except when exosed to smoke/dust). She has tried beta-agonist inhalers and oral steroids for the symptoms. The treatment provided significant relief. Leg Swelling  The history is provided by the patient. This is a recurrent problem. The current episode started more than 1 week ago. The problem occurs daily (R>L). The problem has been gradually improving. Pertinent negatives include no chest pain, no headaches and no shortness of breath. The symptoms are aggravated by standing. The symptoms are relieved by rest.       Review of Systems   Constitutional: Negative for chills, fever, malaise/fatigue and weight loss. HENT: Negative for nosebleeds. Eyes: Negative for discharge. Respiratory: Negative for cough, shortness of breath and wheezing. Cardiovascular: Positive for leg swelling.  Negative for chest pain, palpitations, orthopnea, claudication and PND. Gastrointestinal: Negative for diarrhea, nausea and vomiting. Genitourinary: Negative for dysuria and hematuria. Musculoskeletal: Negative for joint pain. Skin: Negative for rash. Neurological: Negative for dizziness, seizures, loss of consciousness and headaches. Endo/Heme/Allergies: Negative for polydipsia. Does not bruise/bleed easily. Psychiatric/Behavioral: Negative for depression and substance abuse. The patient does not have insomnia. Allergies   Allergen Reactions    Banana Hives     Asthma attach, throat swelling, throat scratching, lip swelling    Iodine Rash    Keflex [Cephalexin] Hives    Seafood Hives    Watermelon Hives     Throat closes    Clonidine Other (comments)     Memory loss       Past Medical History:   Diagnosis Date    Ankle swelling     Asthma 02/04/2010    PFTs 2016 showed obstructive lung pattern    Brain bleed ([address] 02/20/2015    Chronic diastolic heart failure ([address] 3/18/2015    Diabetes mellitus ([address] 5/27/2014    Diabetic eye exam ([address] 01/26/2017    No retinopathy Best corrected vision 20/20 OU    HTN (hypertension) 2/4/2010    Hyperlipidemia LDL goal < 70 2014    Magnesium deficiency     Obesity, unspecified 4/15/2015    SAH (subarachnoid hemorrhage) ([address] 2/20/15    ad[facility], Dr. Violeta Escamlila. [facility] and ref to Dr. Sunny Katz, Neurology    Stroke [facility]) 2015    mini stroke    Vitamin D deficiency 2000       Family History   Problem Relation Age of Onset    Diabetes Mother     Hypertension Mother              Heart Attack Mother 62    Alcohol abuse Father         cirrhosis    Diabetes Sister     Heart Attack Sister 64       Social History     Tobacco Use    Smoking status: Never Smoker    Smokeless tobacco: Never Used   Substance Use Topics    Alcohol use:  Yes     Alcohol/week: 0.0 standard drinks     Frequency: Monthly or less     Drinks per session: 1 or 2     Binge frequency: Never     Comment: social    Drug use: No        Current Outpatient Medications   Medication Sig    omeprazole (PRILOSEC) 20 mg capsule Take 1 Cap by mouth daily.  lisinopriL (PRINIVIL, ZESTRIL) 10 mg tablet TAKE ONE TABLET BY MOUTH DAILY    cyclobenzaprine (FLEXERIL) 10 mg tablet TAKE ONE-HALF TO ONE TABLET BY MOUTH DAILY AS NEEDED FOR BACK OR HIP PAIN    SITagliptin-metFORMIN (Janumet) 50-1,000 mg per tablet TAKE ONE TABLET BY MOUTH TWICE A DAY WITH MEALS    rosuvastatin (CRESTOR) 10 mg tablet TAKE ONE TABLET BY MOUTH ONCE NIGHTLY    fluticasone propion-salmeteroL (ADVAIR/WIXELA) 250-50 mcg/dose diskus inhaler INHALE ONE PUFF BY MOUTH EVERY 12 HOURS    hydroCHLOROthiazide (MICROZIDE) 12.5 mg capsule TAKE ONE CAPSULE BY MOUTH DAILY    amLODIPine (NORVASC) 5 mg tablet TAKE ONE TABLET BY MOUTH DAILY    albuterol (PROVENTIL HFA, VENTOLIN HFA, PROAIR HFA) 90 mcg/actuation inhaler INHALE TWO PUFFS BY MOUTH EVERY 4 HOURS AS NEEDED FOR WHEEZING    ergocalciferol (ERGOCALCIFEROL) 1,250 mcg (50,000 unit) capsule Take 1 Cap by mouth every seven (7) days.  fluticasone propionate (Flonase) 50 mcg/actuation nasal spray 2 Sprays by Both Nostrils route daily as needed for Rhinitis.  albuterol (PROVENTIL VENTOLIN) 2.5 mg /3 mL (0.083 %) nebu 3 mL by Nebulization route every four (4) hours as needed for Wheezing.  loratadine (CLARITIN) 10 mg tablet Take 1 Tab by mouth daily as needed for Allergies.  magnesium oxide (MAG-OX) 400 mg tablet TAKE ONE TABLET BY MOUTH ONCE DAILY    aspirin delayed-release 81 mg tablet Take 81 mg by mouth daily. No current facility-administered medications for this visit.          Past Surgical History:   Procedure Laterality Date    COLONOSCOPY N/A 8/15/2018    COLONOS[facility] performed [facility] Natasha Mack MD at [facility] HX HYSTERECTOMY  2004    abd approach with ovaries and cervix intact    HX HYSTERECTOMY N/A 2004    HX OTHER SURGICAL  8/25/15    SAH, cerebral arteriopathy, Dr. Jahaira Mercado. [facility]       Diagnostic Studies:  No flowsheet data found. 2/18 Nuc Stress  Conclusion:   1. Normal perfusion scan. 2. Normal wall motion and ejection fractions calculated to be 51%. 3. No evidence of significant fixed or reversible defect suggesting ischemia or myocardial infarction noted from this nuclear study. 4. Low risk scan. Visit Vitals  BP (!) 124/58 (BP 1 Location: Left arm, BP Patient Position: Sitting, BP Cuff Size: Adult)   Pulse 94   Temp 97.7 °F (36.5 °C) (Temporal)   Ht 5' 6\" (1.676 m)   Wt 88.9 kg (196 lb)   SpO2 100%   BMI 31.64 kg/m²       [facility] has a reminder for a \"due or due soon\" health maintenance. I have asked that she contact her primary care provider for follow-up on this health maintenance. Physical Exam   Constitutional: She is oriented to person, place, and time. She appears well-developed and well-nourished. No distress. HENT:   Head: Normocephalic and atraumatic. Mouth/Throat: Normal dentition. Eyes: Right eye exhibits no discharge. Left eye exhibits no discharge. No scleral icterus. Neck: Neck supple. No JVD present. Carotid bruit is not present. No thyromegaly present. Cardiovascular: Normal rate, regular rhythm, S1 normal, S2 normal, normal heart sounds and intact distal pulses. Exam reveals no gallop and no friction rub. No murmur heard. Pulmonary/Chest: Effort normal and breath sounds normal. She has no wheezes. She has no rales. Abdominal: Soft. She exhibits no mass. There is no abdominal tenderness. Musculoskeletal:         General: No edema (trace). Lymphadenopathy:        Right cervical: No superficial cervical adenopathy present. Left cervical: No superficial cervical adenopathy present. Neurological: She is alert and oriented to person, place, and time. Skin: Skin is warm and dry. No rash noted. Psychiatric: She has a normal mood and affect.  Her behavior is normal.       ASSESSMENT and PLAN    Discussed the patient's above normal BMI with her. I have recommended the following interventions: dietary management education, guidance, and counseling . The BMI follow up plan is as follows: BMI is out of normal parameters and plan is as follows: I have counseled this patient on diet and exercise regimens     HLD : Results for Elver Rincon (MRN [id number]) as of 3/25/2021 12:12   Ref. Range 3/22/2021 00:00   Triglyceride Latest Ref Range: 0 - 149 mg/dL 72   Cholesterol, total Latest Ref Range: 100 - 199 mg/dL 196   HDL Cholesterol Latest Ref Range: >39 mg/dL 53   VLDL, calculated Latest Ref Range: 5 - 40 mg/dL 13   LDL, calculated Latest Ref Range: 0 - 99 mg/dL 130 (H)     CHF: NYHA2        6/83 Chronic diastolic CHF appears compensated. Blood pressure mildly elevated in the office but was excellent last week in PCPs office. Follow home BP chart before adjusting any medications. Low sodium diet. Continue walking for exercise. Diet and weight discussed in detail and she plans to lose more weight. She was congratulated that she is losing some weight already. Diagnoses and all orders for this visit:    1. Essential hypertension  -     AMB POC EKG ROUTINE W/ 12 LEADS, INTER & REP  -     hydroCHLOROthiazide (MICROZIDE) 12.5 mg capsule; Take 1 Cap by mouth every other day. TAKE ONE CAPSULE BY MOUTH DAILY    2. Chronic diastolic heart failure ([address]    3. Mixed hyperlipidemia  -     LIPID PANEL; Future    4. Obesity (BMI 30.0-34. 9)        Pertinent laboratory and test data reviewed and discussed with patient. See patient instructions also for other medical advice given    Medications Discontinued During This Encounter   Medication Reason    hydroCHLOROthiazide (MICROZIDE) 12.5 mg capsule        Follow-up and Dispositions    · Return in about 6 months (around 9/25/2021), or if symptoms worsen or fail to improve. 3/25/2021 blood pressure is well controlled. Reduce HCTZ to every other day. Patient told to watch her blood pressure at home closely and call if there is any rise. She was also told to take it daily if edema recurs. CHF is compensated NYHA class II. Lipids are not controlled. She admitted to noncompliance with Crestor. She will take it regularly and we can repeat the labs in 3 months. Diet weight and exercise discussed. Mediterranean diet guidelines printed.

## 2021-03-26 ENCOUNTER — TELEPHONE (OUTPATIENT)
Dept: FAMILY MEDICINE CLINIC | Age: 61
End: 2021-03-26

## 2021-03-29 ENCOUNTER — OFFICE VISIT (OUTPATIENT)
Dept: FAMILY MEDICINE CLINIC | Age: 61
End: 2021-03-29
Payer: COMMERCIAL

## 2021-03-29 VITALS
RESPIRATION RATE: 16 BRPM | DIASTOLIC BLOOD PRESSURE: 60 MMHG | WEIGHT: 194.8 LBS | OXYGEN SATURATION: 99 % | BODY MASS INDEX: 31.31 KG/M2 | SYSTOLIC BLOOD PRESSURE: 118 MMHG | HEIGHT: 66 IN | TEMPERATURE: 97.7 F | HEART RATE: 74 BPM

## 2021-03-29 DIAGNOSIS — E11.29 CONTROLLED TYPE 2 DIABETES MELLITUS WITH MICROALBUMINURIA, WITHOUT LONG-TERM CURRENT USE OF INSULIN (HCC): Primary | ICD-10-CM

## 2021-03-29 DIAGNOSIS — I10 ESSENTIAL HYPERTENSION: ICD-10-CM

## 2021-03-29 DIAGNOSIS — R80.9 CONTROLLED TYPE 2 DIABETES MELLITUS WITH MICROALBUMINURIA, WITHOUT LONG-TERM CURRENT USE OF INSULIN (HCC): Primary | ICD-10-CM

## 2021-03-29 DIAGNOSIS — H92.03 OTALGIA OF BOTH EARS: ICD-10-CM

## 2021-03-29 DIAGNOSIS — R80.9 CONTROLLED TYPE 2 DIABETES MELLITUS WITH MICROALBUMINURIA, WITHOUT LONG-TERM CURRENT USE OF INSULIN (HCC): ICD-10-CM

## 2021-03-29 DIAGNOSIS — E11.29 CONTROLLED TYPE 2 DIABETES MELLITUS WITH MICROALBUMINURIA, WITHOUT LONG-TERM CURRENT USE OF INSULIN (HCC): ICD-10-CM

## 2021-03-29 DIAGNOSIS — I50.32 CHRONIC DIASTOLIC HEART FAILURE (HCC): ICD-10-CM

## 2021-03-29 DIAGNOSIS — J45.41 MODERATE PERSISTENT ASTHMA WITH ACUTE EXACERBATION: ICD-10-CM

## 2021-03-29 DIAGNOSIS — K21.9 GASTROESOPHAGEAL REFLUX DISEASE WITHOUT ESOPHAGITIS: ICD-10-CM

## 2021-03-29 DIAGNOSIS — E78.2 MIXED HYPERLIPIDEMIA: ICD-10-CM

## 2021-03-29 PROCEDURE — 99214 OFFICE O/P EST MOD 30 MIN: CPT | Performed by: FAMILY MEDICINE

## 2021-03-29 RX ORDER — MOMETASONE FUROATE 50 UG/1
2 SPRAY, METERED NASAL DAILY
Qty: 90 CONTAINER | Refills: 0 | Status: SHIPPED | OUTPATIENT
Start: 2021-03-29 | End: 2021-09-02 | Stop reason: SDUPTHER

## 2021-03-29 RX ORDER — LEVOCETIRIZINE DIHYDROCHLORIDE 5 MG/1
5 TABLET, FILM COATED ORAL DAILY
Qty: 90 TAB | Refills: 0 | Status: SHIPPED | OUTPATIENT
Start: 2021-03-29 | End: 2021-06-24

## 2021-03-29 RX ORDER — MONTELUKAST SODIUM 10 MG/1
10 TABLET ORAL DAILY
Qty: 90 TAB | Refills: 0 | Status: SHIPPED | OUTPATIENT
Start: 2021-03-29 | End: 2021-06-24

## 2021-03-29 NOTE — PATIENT INSTRUCTIONS
DASH Diet: Care Instructions  Your Care Instructions     The DASH diet is an eating plan that can help lower your blood pressure. DASH stands for Dietary Approaches to Stop Hypertension. Hypertension is high blood pressure. The DASH diet focuses on eating foods that are high in calcium, potassium, and magnesium. These nutrients can lower blood pressure. The foods that are highest in these nutrients are fruits, vegetables, low-fat dairy products, nuts, seeds, and legumes. But taking calcium, potassium, and magnesium supplements instead of eating foods that are high in those nutrients does not have the same effect. The DASH diet also includes whole grains, fish, and poultry. The DASH diet is one of several lifestyle changes your doctor may recommend to lower your high blood pressure. Your doctor may also want you to decrease the amount of sodium in your diet. Lowering sodium while following the DASH diet can lower blood pressure even further than just the DASH diet alone. Follow-up care is a key part of your treatment and safety. Be sure to make and go to all appointments, and call your doctor if you are having problems. It's also a good idea to know your test results and keep a list of the medicines you take. How can you care for yourself at home? Following the DASH diet  · Eat 4 to 5 servings of fruit each day. A serving is 1 medium-sized piece of fruit, ½ cup chopped or canned fruit, 1/4 cup dried fruit, or 4 ounces (½ cup) of fruit juice. Choose fruit more often than fruit juice. · Eat 4 to 5 servings of vegetables each day. A serving is 1 cup of lettuce or raw leafy vegetables, ½ cup of chopped or cooked vegetables, or 4 ounces (½ cup) of vegetable juice. Choose vegetables more often than vegetable juice. · Get 2 to 3 servings of low-fat and fat-free dairy each day. A serving is 8 ounces of milk, 1 cup of yogurt, or 1 ½ ounces of cheese. · Eat 6 to 8 servings of grains each day.  A serving is 1 slice of bread, 1 ounce of dry cereal, or ½ cup of cooked rice, pasta, or cooked cereal. Try to choose whole-grain products as much as possible. · Limit lean meat, poultry, and fish to 2 servings each day. A serving is 3 ounces, about the size of a deck of cards. · Eat 4 to 5 servings of nuts, seeds, and legumes (cooked dried beans, lentils, and split peas) each week. A serving is 1/3 cup of nuts, 2 tablespoons of seeds, or ½ cup of cooked beans or peas. · Limit fats and oils to 2 to 3 servings each day. A serving is 1 teaspoon of vegetable oil or 2 tablespoons of salad dressing. · Limit sweets and added sugars to 5 servings or less a week. A serving is 1 tablespoon jelly or jam, ½ cup sorbet, or 1 cup of lemonade. · Eat less than 2,300 milligrams (mg) of sodium a day. If you limit your sodium to 1,500 mg a day, you can lower your blood pressure even more. Tips for success  · Start small. Do not try to make dramatic changes to your diet all at once. You might feel that you are missing out on your favorite foods and then be more likely to not follow the plan. Make small changes, and stick with them. Once those changes become habit, add a few more changes. · Try some of the following:  ? Make it a goal to eat a fruit or vegetable at every meal and at snacks. This will make it easy to get the recommended amount of fruits and vegetables each day. ? Try yogurt topped with fruit and nuts for a snack or healthy dessert. ? Add lettuce, tomato, cucumber, and onion to sandwiches. ? Combine a ready-made pizza crust with low-fat mozzarella cheese and lots of vegetable toppings. Try using tomatoes, squash, spinach, broccoli, carrots, cauliflower, and onions. ? Have a variety of cut-up vegetables with a low-fat dip as an appetizer instead of chips and dip. ? Sprinkle sunflower seeds or chopped almonds over salads. Or try adding chopped walnuts or almonds to cooked vegetables.   ? Try some vegetarian meals using beans and peas. Add garbanzo or kidney beans to salads. Make burritos and tacos with mashed blood beans or black beans. Where can you learn more? Go to http://www.randolph.com/  Enter H967 in the search box to learn more about \"DASH Diet: Care Instructions. \"  Current as of: December 16, 2019               Content Version: 12.6  © 4496-7655 [employer]. Care instructions adapted under license by [employer] (which disclaims liability or warranty for this information). If you have questions about a medical condition or this instruction, always ask your healthcare professional. [address] any warranty or liability for your use of this information.

## 2021-03-29 NOTE — PROGRESS NOTES
1. Have you been to the ER, urgent care clinic since your last visit? Hospitalized since your last visit? [facility] [address] 3/03/21 and every Friday after that for workman comp - for sciatic nerve pain. 2. Have you seen or consulted any other health care providers outside of the [address] [place] [address] since your last visit? Include any pap smears or colon screening.  No    Chief Complaint   Patient presents with    Hypertension    CHF    GERD    Asthma    Diabetes    Ear Pain     bilat ear pain - right ear is worse x 3 days

## 2021-03-29 NOTE — PROGRESS NOTES
Sheree Tolbert, 56 y.o.,  female    SUBJECTIVE  Ff-up    DM- reports 's. Currently on janumet. Following with dr. Penny Oakley for eye exams. Reviewed labs a1c 6     HTN/diastolic CHF- w/ h/o [address] no residual deficit. Currently on amlodipine, hctz, lisinopril. Following Dr. Elissa Moon, reviewed note NYHA class 2, pt denies any symptoms. He reduced HCTZ to every other day.      HL- on crestor, reviewed labs , she admits to missing pills at times.      GERD- responding well to prilosec     Asthma- no recent exacerbations, says using advair regularly, prn albuterol     Back spasm- on and off for few years now, responding to prn flexeril. She works at [employer], long days with lifting usually trigger. Bilateral ear fullness past week, also with increased sinus pressure. Reports springtime is worse for her allergic rhinitis, currently on claritin/flonase prn.no discharge, no fever. ROS:  See HPI, all others negative        Patient Active Problem List   Diagnosis Code    Asthma J45.909    Mixed hyperlipidemia E78.2    Vitamin D deficiency E55.9    Anxiety and depression F41.9, F32.9    Essential hypertension I10    Chronic diastolic heart failure (HCC) I50.32    Obesity (BMI 30.0-34. 9) E66.9    New onset of headaches after age 51 R50.7    Iron deficiency anemia D50.9    Gastroesophageal reflux disease without esophagitis K21.9    Hypomagnesemia E83.42    History of subarachnoid hemorrhage Z86.79    Hypokalemia E87.6    Post-menopausal Z78.0    Chest pain R07.9       Current Outpatient Medications   Medication Sig Dispense Refill    mometasone (NASONEX) 50 mcg/actuation nasal spray 2 Sprays by Both Nostrils route daily. 90 Container 0    levocetirizine (XYZAL) 5 mg tablet Take 1 Tab by mouth daily. 90 Tab 0    montelukast (SINGULAIR) 10 mg tablet Take 1 Tab by mouth daily. 90 Tab 0    hydroCHLOROthiazide (MICROZIDE) 12.5 mg capsule Take 1 Cap by mouth every other day.  TAKE ONE CAPSULE BY MOUTH DAILY 90 Cap 1    omeprazole (PRILOSEC) 20 mg capsule Take 1 Cap by mouth daily. 90 Cap 0    lisinopriL (PRINIVIL, ZESTRIL) 10 mg tablet TAKE ONE TABLET BY MOUTH DAILY 90 Tab 0    cyclobenzaprine (FLEXERIL) 10 mg tablet TAKE ONE-HALF TO ONE TABLET BY MOUTH DAILY AS NEEDED FOR BACK OR HIP PAIN 20 Tab 0    SITagliptin-metFORMIN (Janumet) 50-1,000 mg per tablet TAKE ONE TABLET BY MOUTH TWICE A DAY WITH MEALS 90 Tab 0    rosuvastatin (CRESTOR) 10 mg tablet TAKE ONE TABLET BY MOUTH ONCE NIGHTLY 90 Tab 0    fluticasone propion-salmeteroL (ADVAIR/WIXELA) 250-50 mcg/dose diskus inhaler INHALE ONE PUFF BY MOUTH EVERY 12 HOURS 3 Each 1    amLODIPine (NORVASC) 5 mg tablet TAKE ONE TABLET BY MOUTH DAILY 90 Tab 1    albuterol (PROVENTIL HFA, VENTOLIN HFA, PROAIR HFA) 90 mcg/actuation inhaler INHALE TWO PUFFS BY MOUTH EVERY 4 HOURS AS NEEDED FOR WHEEZING 17 g 0    ergocalciferol (ERGOCALCIFEROL) 1,250 mcg (50,000 unit) capsule Take 1 Cap by mouth every seven (7) days. 12 Cap 0    fluticasone propionate (Flonase) 50 mcg/actuation nasal spray 2 Sprays by Both Nostrils route daily as needed for Rhinitis. 1 Bottle 2    albuterol (PROVENTIL VENTOLIN) 2.5 mg /3 mL (0.083 %) nebu 3 mL by Nebulization route every four (4) hours as needed for Wheezing. 30 Nebule 2    loratadine (CLARITIN) 10 mg tablet Take 1 Tab by mouth daily as needed for Allergies. 30 Tab 5    magnesium oxide (MAG-OX) 400 mg tablet TAKE ONE TABLET BY MOUTH ONCE DAILY 90 Tab 0    aspirin delayed-release 81 mg tablet Take 81 mg by mouth daily.          Allergies   Allergen Reactions    Banana Hives     Asthma attach, throat swelling, throat scratching, lip swelling    Iodine Rash    Keflex [Cephalexin] Hives    Seafood Hives    Watermelon Hives     Throat closes    Clonidine Other (comments)     Memory loss       Past Medical History:   Diagnosis Date    Ankle swelling     Asthma 02/04/2010    PFTs 2016 showed obstructive lung pattern    Brain bleed ([facility] 02/20/2015    Chronic diastolic heart failure ([facility] 3/18/2015    Diabetes mellitus ([facility] 5/27/2014    Diabetic eye exam ([facility] 01/26/2017    No retinopathy Best corrected vision 20/20 OU    HTN (hypertension) 2/4/2010    Hyperlipidemia LDL goal < 70 2014    Magnesium deficiency     Obesity, unspecified 4/15/2015    SAH (subarachnoid hemorrhage) ([facility] 2/20/15    [facility], Dr. Colin Hunt and ref to Dr. Milagros Portillo, Neurology    Stroke [facility]) 2015    mini stroke    Vitamin D deficiency 2000       Social History     Socioeconomic History    Marital status: SINGLE     Spouse name: Not on file    Number of children: 1    Years of education: 13    Highest education level: Not on file   Occupational History     Employer: [employer]     Comment: unemployed since Jan 2014, applied to [employer] for job July 2014   Social Needs    Financial resource strain: Not on file    Food insecurity     Worry: Not on file     Inability: Not on file   [employer] needs     Medical: Not on file     Non-medical: Not on file   Tobacco Use    Smoking status: Never Smoker    Smokeless tobacco: Never Used   Substance and Sexual Activity    Alcohol use:  Yes     Alcohol/week: 0.0 standard drinks     Frequency: Monthly or less     Drinks per session: 1 or 2     Binge frequency: Never     Comment: social    Drug use: No    Sexual activity: Yes     Partners: Male     Birth control/protection: Condom   Lifestyle    Physical activity     Days per week: Not on file     Minutes per session: Not on file    Stress: Not on file   Relationships    Social connections     Talks on phone: Not on file     Gets together: Not on file     Attends [employer] service: Not on file     Active member of club or organization: Not on file     Attends meetings of clubs or organizations: Not on file     Relationship status: Not on file    Intimate partner violence     Fear of current or ex partner: Not on file     Emotionally abused: Not on file     Physically abused: Not on file     Forced sexual activity: Not on file   Other Topics Concern     Service No    Blood Transfusions Yes    Caffeine Concern Yes    Occupational Exposure No    Hobby Hazards No    Sleep Concern No    Stress Concern No    Weight Concern No    Special Diet No    Back Care No    Exercise No    Bike Helmet No    Seat Belt Yes    Self-Exams Yes   Social History Narrative    Not on file       Family History   Problem Relation Age of Onset    Diabetes Mother     Hypertension Mother              Heart Attack Mother 62    Alcohol abuse Father         cirrhosis    Diabetes Sister     Heart Attack Sister 64         OBJECTIVE    Physical Exam:     Visit Vitals  /60 (BP 1 Location: Left upper arm, BP Patient Position: Sitting, BP Cuff Size: Adult)   Pulse 74   Temp 97.7 °F (36.5 °C) (Temporal)   Resp 16   Ht 5' 6\" (1.676 m)   Wt 194 lb 12.8 oz (88.4 kg)   SpO2 99%   BMI 31.44 kg/m²       General: alert, well-appearing,AA, in no apparent distress or pain  Head: atraumatic. Non-tender maxillary and frontal sinuses  Eyes: Lids with no discharge, no matting, conjunctivae clear and non injected, full EOMs, PERLLA  Ears: pinna non-tender, external auditory canal patent, TM intact  Mouth/throat:tonsils non enlarged, pharynx non erythematous and no lesion, nasal mucosa normal  Neck: supple, no adenopathy palpated  CVS: normal rate, regular rhythm, distinct S1 and S2  Lungs:clear to ausculation bilaterally, no crackles, wheezing or rhonchi noted  Abdomen: normoactive bowel sounds, soft, non-tender  Extremities: no edema, no cyanosis, MSK grossly normal  Skin: warm, no lesions, rashes noted  Psych:  mood and affect normal    Results for orders placed or performed during the hospital encounter of 03/22/21   [facility] SPECIMEN COL   Result Value Ref Range    [facility] SPE[facility]. Specimens collected/sent to [facility]. Please direct inquiries to ([phone]). ASSESSMENT/PLAN  Diagnoses and all orders for this visit:    1. Controlled type 2 diabetes mellitus with microalbuminuria, without long-term current use of insulin (HCC)  Controlled  Cont janumet  Cont lisinopril  Cont crestor  Labs prior to next visit  -     HEMOGLOBIN A1C WITH EAG; Future  -     METABOLIC PANEL, COMPREHENSIVE; Future  -     MICROALBUMIN, UR, RAND W/ MICROALB/CREAT RATIO; Future    2. Otalgia of both ears  Suspect ETD  Start:  -     mometasone (NASONEX) 50 mcg/actuation nasal spray; 2 Sprays by Both Nostrils route daily. -     levocetirizine (XYZAL) 5 mg tablet; Take 1 Tab by mouth daily. -     montelukast (SINGULAIR) 10 mg tablet; Take 1 Tab by mouth daily. monitoring    3. Gastroesophageal reflux disease without esophagitis  Stable  Cont prilosec    4. Mixed hyperlipidemia  LDL Goal <100  Discussed strategies for better compliance, cont crestor  Recheck levels prior to next visit, dr Yenni Ferrari placed standing order    5. Essential hypertension  Controlled  Cont norvasc,  lisinopril, hctz every other day,   6. Chronic diastolic heart failure (HCC)  Stable, euvolemic  On ace  Following dr. Yenni Ferrari    7. Moderate persistent asthma with acute exacerbation  Stable  Cont advair, prn albuterol    8. BMI 31.0-31.9,adult    Other orders      Follow-up and Dispositions    · Return in about 3 months (around 6/29/2021), or if symptoms worsen or fail to improve, for fasting labs a week prior to your next visit, routine chronic illness care. Patient understands plan of care. Patient has provided input and agrees with goals.

## 2021-04-20 DIAGNOSIS — E11.29 CONTROLLED TYPE 2 DIABETES MELLITUS WITH MICROALBUMINURIA, WITHOUT LONG-TERM CURRENT USE OF INSULIN (HCC): ICD-10-CM

## 2021-04-20 DIAGNOSIS — R80.9 CONTROLLED TYPE 2 DIABETES MELLITUS WITH MICROALBUMINURIA, WITHOUT LONG-TERM CURRENT USE OF INSULIN (HCC): ICD-10-CM

## 2021-04-22 RX ORDER — SITAGLIPTIN AND METFORMIN HYDROCHLORIDE 50; 1000 MG/1; MG/1
TABLET, FILM COATED ORAL
Qty: 90 TAB | Refills: 0 | Status: SHIPPED | OUTPATIENT
Start: 2021-04-22 | End: 2021-06-30

## 2021-04-23 DIAGNOSIS — E78.2 MIXED HYPERLIPIDEMIA: ICD-10-CM

## 2021-04-23 RX ORDER — ROSUVASTATIN CALCIUM 10 MG/1
TABLET, COATED ORAL
Qty: 90 TAB | Refills: 0 | Status: SHIPPED | OUTPATIENT
Start: 2021-04-23 | End: 2021-07-20

## 2021-06-01 DIAGNOSIS — I10 ESSENTIAL HYPERTENSION: ICD-10-CM

## 2021-06-01 RX ORDER — HYDROCHLOROTHIAZIDE 12.5 MG/1
12.5 CAPSULE ORAL EVERY OTHER DAY
Qty: 90 CAPSULE | Refills: 1 | Status: SHIPPED | OUTPATIENT
Start: 2021-06-01 | End: 2022-04-13

## 2021-06-03 DIAGNOSIS — E78.2 MIXED HYPERLIPIDEMIA: ICD-10-CM

## 2021-06-16 DIAGNOSIS — I10 ESSENTIAL HYPERTENSION: ICD-10-CM

## 2021-06-17 DIAGNOSIS — M62.830 BACK SPASM: ICD-10-CM

## 2021-06-17 RX ORDER — AMLODIPINE BESYLATE 5 MG/1
TABLET ORAL
Qty: 90 TABLET | Refills: 0 | Status: SHIPPED | OUTPATIENT
Start: 2021-06-17 | End: 2021-12-01

## 2021-06-17 RX ORDER — OMEPRAZOLE 20 MG/1
20 CAPSULE, DELAYED RELEASE ORAL DAILY
Qty: 90 CAPSULE | Refills: 0 | Status: SHIPPED | OUTPATIENT
Start: 2021-06-17 | End: 2021-09-13 | Stop reason: SDUPTHER

## 2021-06-17 RX ORDER — CYCLOBENZAPRINE HCL 10 MG
TABLET ORAL
Qty: 20 TABLET | Refills: 0 | Status: SHIPPED | OUTPATIENT
Start: 2021-06-17 | End: 2021-08-02 | Stop reason: SDUPTHER

## 2021-06-24 RX ORDER — MONTELUKAST SODIUM 10 MG/1
TABLET ORAL
Qty: 90 TABLET | Refills: 0 | Status: SHIPPED | OUTPATIENT
Start: 2021-06-24 | End: 2021-09-27

## 2021-06-24 RX ORDER — LEVOCETIRIZINE DIHYDROCHLORIDE 5 MG/1
TABLET, FILM COATED ORAL
Qty: 90 TABLET | Refills: 0 | Status: SHIPPED | OUTPATIENT
Start: 2021-06-24 | End: 2021-09-27

## 2021-06-29 DIAGNOSIS — R80.9 CONTROLLED TYPE 2 DIABETES MELLITUS WITH MICROALBUMINURIA, WITHOUT LONG-TERM CURRENT USE OF INSULIN (HCC): ICD-10-CM

## 2021-06-29 DIAGNOSIS — E11.29 CONTROLLED TYPE 2 DIABETES MELLITUS WITH MICROALBUMINURIA, WITHOUT LONG-TERM CURRENT USE OF INSULIN (HCC): ICD-10-CM

## 2021-06-30 RX ORDER — SITAGLIPTIN AND METFORMIN HYDROCHLORIDE 50; 1000 MG/1; MG/1
TABLET, FILM COATED ORAL
Qty: 90 TABLET | Refills: 0 | Status: SHIPPED | OUTPATIENT
Start: 2021-06-30 | End: 2021-09-13 | Stop reason: SDUPTHER

## 2021-07-20 DIAGNOSIS — E78.2 MIXED HYPERLIPIDEMIA: ICD-10-CM

## 2021-07-20 RX ORDER — ROSUVASTATIN CALCIUM 10 MG/1
TABLET, COATED ORAL
Qty: 90 TABLET | Refills: 0 | Status: SHIPPED | OUTPATIENT
Start: 2021-07-20 | End: 2021-10-18

## 2021-07-26 ENCOUNTER — HOSPITAL ENCOUNTER (OUTPATIENT)
Dept: LAB | Age: 61
Discharge: HOME | End: 2021-07-26

## 2021-07-26 LAB — XX-LABCORP SPECIMEN COL,LCBCF: NORMAL

## 2021-07-26 PROCEDURE — 99001 SPECIMEN HANDLING PT-LAB: CPT

## 2021-07-27 LAB
ALBUMIN SERPL-MCNC: 4.4 G/DL (ref 3.8–4.8)
ALBUMIN/CREAT UR: 3 MG/G CREAT (ref 0–29)
ALBUMIN/GLOB SERPL: 1.6 {RATIO} (ref 1.2–2.2)
ALP SERPL-CCNC: 81 IU/L (ref 48–121)
ALT SERPL-CCNC: 17 IU/L (ref 0–32)
AST SERPL-CCNC: 19 IU/L (ref 0–40)
BILIRUB SERPL-MCNC: 0.5 MG/DL (ref 0–1.2)
BUN SERPL-MCNC: 18 MG/DL (ref 8–27)
BUN/CREAT SERPL: 15 (ref 12–28)
CALCIUM SERPL-MCNC: 9.2 MG/DL (ref 8.7–10.3)
CHLORIDE SERPL-SCNC: 104 MMOL/L (ref 96–106)
CO2 SERPL-SCNC: 26 MMOL/L (ref 20–29)
CREAT SERPL-MCNC: 1.2 MG/DL (ref 0.57–1)
CREAT UR-MCNC: 151.6 MG/DL
EST. AVERAGE GLUCOSE BLD GHB EST-MCNC: 126 MG/DL
GLOBULIN SER CALC-MCNC: 2.7 G/DL (ref 1.5–4.5)
GLUCOSE SERPL-MCNC: 105 MG/DL (ref 65–99)
HBA1C MFR BLD: 6 % (ref 4.8–5.6)
INTERPRETATION: NORMAL
MICROALBUMIN UR-MCNC: 4.2 UG/ML
POTASSIUM SERPL-SCNC: 4.4 MMOL/L (ref 3.5–5.2)
PROT SERPL-MCNC: 7.1 G/DL (ref 6–8.5)
SODIUM SERPL-SCNC: 142 MMOL/L (ref 134–144)
SPECIMEN STATUS REPORT, ROLRST: NORMAL

## 2021-08-02 ENCOUNTER — OFFICE VISIT (OUTPATIENT)
Dept: FAMILY MEDICINE CLINIC | Age: 61
End: 2021-08-02
Payer: COMMERCIAL

## 2021-08-02 VITALS
OXYGEN SATURATION: 97 % | BODY MASS INDEX: 31.18 KG/M2 | HEART RATE: 84 BPM | RESPIRATION RATE: 16 BRPM | DIASTOLIC BLOOD PRESSURE: 68 MMHG | SYSTOLIC BLOOD PRESSURE: 128 MMHG | TEMPERATURE: 97.3 F | HEIGHT: 66 IN | WEIGHT: 194 LBS

## 2021-08-02 DIAGNOSIS — N18.30 TYPE 2 DIABETES MELLITUS WITH STAGE 3 CHRONIC KIDNEY DISEASE, WITHOUT LONG-TERM CURRENT USE OF INSULIN, UNSPECIFIED WHETHER STAGE 3A OR 3B CKD (HCC): ICD-10-CM

## 2021-08-02 DIAGNOSIS — K21.9 GASTROESOPHAGEAL REFLUX DISEASE WITHOUT ESOPHAGITIS: ICD-10-CM

## 2021-08-02 DIAGNOSIS — I50.32 CHRONIC DIASTOLIC HEART FAILURE (HCC): ICD-10-CM

## 2021-08-02 DIAGNOSIS — E11.22 TYPE 2 DIABETES MELLITUS WITH STAGE 3 CHRONIC KIDNEY DISEASE, WITHOUT LONG-TERM CURRENT USE OF INSULIN, UNSPECIFIED WHETHER STAGE 3A OR 3B CKD (HCC): ICD-10-CM

## 2021-08-02 DIAGNOSIS — I10 ESSENTIAL HYPERTENSION: Primary | ICD-10-CM

## 2021-08-02 DIAGNOSIS — J45.41 MODERATE PERSISTENT ASTHMA WITH ACUTE EXACERBATION: ICD-10-CM

## 2021-08-02 DIAGNOSIS — E11.9 CONTROLLED TYPE 2 DIABETES MELLITUS WITHOUT COMPLICATION, WITHOUT LONG-TERM CURRENT USE OF INSULIN (HCC): ICD-10-CM

## 2021-08-02 DIAGNOSIS — M62.830 BACK SPASM: ICD-10-CM

## 2021-08-02 DIAGNOSIS — E78.2 MIXED HYPERLIPIDEMIA: ICD-10-CM

## 2021-08-02 PROCEDURE — 99214 OFFICE O/P EST MOD 30 MIN: CPT | Performed by: FAMILY MEDICINE

## 2021-08-02 RX ORDER — CYCLOBENZAPRINE HCL 10 MG
TABLET ORAL
Qty: 30 TABLET | Refills: 0 | Status: SHIPPED | OUTPATIENT
Start: 2021-08-02 | End: 2021-10-28 | Stop reason: SDUPTHER

## 2021-08-02 RX ORDER — DICLOFENAC SODIUM 10 MG/G
4 GEL TOPICAL EVERY 6 HOURS
Qty: 1 EACH | Refills: 0 | Status: SHIPPED | OUTPATIENT
Start: 2021-08-02 | End: 2021-08-04

## 2021-08-02 NOTE — PROGRESS NOTES
1. Have you been to the ER, urgent care clinic since your last visit? Hospitalized since your last visit? No    2. Have you seen or consulted any other health care providers outside of the [address] [place] [address] since your last visit? Include any pap smears or colon screening.  No

## 2021-08-02 NOTE — PROGRESS NOTES
Patt Hatchet, 56 y.o.,  female    SUBJECTIVE  Ff-up    DM w/ CKD- reports [facility] 1teen's. Currently on janumet. Following with dr. Juliana Councilman for eye exams (overdue, pt reminded to schedule). Reviewed labs a1c 6. Creatinine 1.2, says has been taking more nsaids than usual for back spasm.      HTN/diastolic CHF- w/ h/o [facility] 9513 no residual deficit. Currently on amlodipine, hctz, lisinopril. Following Dr. Neyda Hensley, reviewed note NYHA class 2, pt denies any symptoms. Has  reduced HCTZ to every other day.      HL- on crestor, reviewed labs  march 2021, she admits to missing pills then, has improved compliance.      GERD- responding well to prilosec     Asthma- no recent exacerbations, says using advair regularly, prn albuterol     Back spasm- on and off for few years now, responding to prn flexeril/nsaids. She works at [employer], long days with lifting usually trigger. ROS:  See HPI, all others negative        Patient Active Problem List   Diagnosis Code    Asthma J45.909    Mixed hyperlipidemia E78.2    Vitamin D deficiency E55.9    Anxiety and depression F41.9, F32.9    Type 2 diabetes mellitus with chronic kidney disease ([facility] E11.22    Essential hypertension I10    Chronic diastolic heart failure (HCC) I50.32    Obesity (BMI 30.0-34. 9) E66.9    New onset of headaches after age 51 R50.7    Iron deficiency anemia D50.9    Gastroesophageal reflux disease without esophagitis K21.9    Hypomagnesemia E83.42    History of subarachnoid hemorrhage Z86.79    Hypokalemia E87.6    Post-menopausal Z78.0    Chest pain R07.9       Current Outpatient Medications   Medication Sig Dispense Refill    cyclobenzaprine (FLEXERIL) 10 mg tablet TAKE ONE-HALF TO ONE TABLET BY MOUTH DAILY AS NEEDED FOR BACK OR HIP PAIN 30 Tablet 0    diclofenac (VOLTAREN) 1 % gel Apply 4 g to affected area every six (6) hours.  1 Each 0    fluticasone propion-salmeteroL (ADVAIR/WIXELA) 250-50 mcg/dose diskus inhaler INHALE ONE PUFF BY MOUTH EVERY 12 HOURS 3 Each 0    rosuvastatin (CRESTOR) 10 mg tablet TAKE ONE TABLET BY MOUTH ONCE NIGHTLY 90 Tablet 0    SITagliptin-metFORMIN (Janumet) 50-1,000 mg per tablet TAKE ONE TABLET BY MOUTH TWICE A DAY WITH A MEAL 90 Tablet 0    levocetirizine (XYZAL) 5 mg tablet TAKE ONE TABLET BY MOUTH DAILY 90 Tablet 0    montelukast (SINGULAIR) 10 mg tablet TAKE ONE TABLET BY MOUTH DAILY 90 Tablet 0    amLODIPine (NORVASC) 5 mg tablet TAKE ONE TABLET BY MOUTH DAILY 90 Tablet 0    omeprazole (PRILOSEC) 20 mg capsule Take 1 Capsule by mouth daily. 90 Capsule 0    hydroCHLOROthiazide (MICROZIDE) 12.5 mg capsule Take 1 Capsule by mouth every other day. TAKE ONE CAPSULE BY MOUTH DAILY 90 Capsule 1    ergocalciferol (ERGOCALCIFEROL) 1,250 mcg (50,000 unit) capsule TAKE ONE CAPSULE BY MOUTH ONCE WEEKLY 12 Cap 1    mometasone (NASONEX) 50 mcg/actuation nasal spray 2 Sprays by Both Nostrils route daily. 90 Container 0    lisinopriL (PRINIVIL, ZESTRIL) 10 mg tablet TAKE ONE TABLET BY MOUTH DAILY 90 Tab 0    albuterol (PROVENTIL HFA, VENTOLIN HFA, PROAIR HFA) 90 mcg/actuation inhaler INHALE TWO PUFFS BY MOUTH EVERY 4 HOURS AS NEEDED FOR WHEEZING 17 g 0    fluticasone propionate (Flonase) 50 mcg/actuation nasal spray 2 Sprays by Both Nostrils route daily as needed for Rhinitis. 1 Bottle 2    albuterol (PROVENTIL VENTOLIN) 2.5 mg /3 mL (0.083 %) nebu 3 mL by Nebulization route every four (4) hours as needed for Wheezing. 30 Nebule 2    magnesium oxide (MAG-OX) 400 mg tablet TAKE ONE TABLET BY MOUTH ONCE DAILY 90 Tab 0    aspirin delayed-release 81 mg tablet Take 81 mg by mouth daily.  loratadine (CLARITIN) 10 mg tablet Take 1 Tab by mouth daily as needed for Allergies.  (Patient not taking: Reported on 8/2/2021) 30 Tab 5       Allergies   Allergen Reactions    Banana Hives     Asthma attach, throat swelling, throat scratching, lip swelling    Iodine Rash    Keflex [Cephalexin] Hives    Seafood Hives    Watermelon Hives     Throat closes    Clonidine Other (comments)     Memory loss       Past Medical History:   Diagnosis Date    Ankle swelling     Asthma 02/04/2010    PFTs 2016 showed obstructive lung pattern    Brain bleed (HCC) 02/20/2015    Chronic diastolic heart failure ([facility] 3/18/2015    Diabetes mellitus ([facility] 5/27/2014    Di[facility] exam ([facility] 01/26/2017    No retinopathy Best corrected vision 20/20 OU    HTN (hypertension) 2/4/2010    Hyperlipidemia LDL goal < 70 2014    Magnesium deficiency     Obesity, unspecified 4/15/2015    SAH (subarachnoid hemorrhage) ([facility] 2/20/15    admitted in [facility], Dr. Dorie Portillo. Trav Almazan and ref to Dr. Justice Natarajan, Neurology    Stroke [facility]) 2015    mini stroke    Vitamin D deficiency 2000       Social History     Socioeconomic History    Marital status: SINGLE     Spouse name: Not on file    Number of children: 1    Years of education: 13    Highest education level: Not on file   Occupational History     Employer: [email or web address]     Comment: unemployed since Jan 2014, applied to [employer] for job July 2014   Tobacco Use    Smoking status: Never Smoker    Smokeless tobacco: Never Used   Substance and Sexual Activity    Alcohol use:  Yes     Alcohol/week: 0.0 standard drinks     Comment: social    Drug use: No    Sexual activity: Yes     Partners: Male     Birth control/protection: Condom   Other Topics Concern     Service No    Blood Transfusions Yes    Caffeine Concern Yes    Occupational Exposure No    Hobby Hazards No    Sleep Concern No    Stress Concern No    Weight Concern No    Special Diet No    Back Care No    Exercise No    Bike Helmet No    Seat Belt Yes    Self-Exams Yes   Social History Narrative    Not on file     Social Determinants of Health     Financial Resource Strain:     Difficulty of Paying Living Expenses:    Food Insecurity:     Worried About Running Out of Food in the Last Year:     [address] in the Last Year: Transportation Needs:     Lack of Transportation (Medical):  Lack of Transportation (Non-Medical):    Physical Activity:     Days of Exercise per Week:     Minutes of Exercise per Session:    Stress:     Feeling of Stress :    Social Connections:     Frequency of Communication with Friends and Family:     Frequency of Social Gatherings with Friends and Family:     Attends [employer] Services:     Active Member of Clubs or Organizations:     Attends Club or Organization Meetings:     Marital Status:    Intimate Partner Violence:     Fear of Current or Ex-Partner:     Emotionally Abused:     Physically Abused:     Sexually Abused:        Family History   Problem Relation Age of Onset    Diabetes Mother     Hypertension Mother              Heart Attack Mother 62    Alcohol abuse Father         cirrhosis    Diabetes Sister     Heart Attack Sister 64         OBJECTIVE    Physical Exam:     Visit Vitals  /68 (BP 1 Location: Left upper arm, BP Patient Position: Sitting, BP Cuff Size: Adult)   Pulse 84   Temp 97.3 °F (36.3 °C) (Oral)   Resp 16   Ht 5' 6\" (1.676 m)   Wt 194 lb (88 kg)   SpO2 97%   BMI 31.31 kg/m²       General: alert, well-appearing,AA, in no apparent distress or pain  Neck: supple, no adenopathy palpated  CVS: normal rate, regular rhythm, distinct S1 and S2  Lungs:clear to ausculation bilaterally, no crackles, wheezing or rhonchi noted  Abdomen: normoactive bowel sounds, soft, non-tender  Extremities: feet: no lesions, normal monofilament  Skin: warm, no lesions, rashes noted  Psych:  mood and affect normal    Results for orders placed or performed during the hospital encounter of 07/26/21   [facility] SPECIMEN COL   Result Value Ref Range    [facility] SPEC[facility]. Specimens collected/sent to [facility]. Please direct inquiries to ([phone]). ASSESSMENT/PLAN  Diagnoses and all orders for this visit:    1.  Controlled type 2 diabetes mellitus with microalbuminuria, without long-term current use of insulin (HCC)  a1c 6  Controlled  Cont janumet  Cont lisinopril  Cont crestor- commended on better compliance  Due for eye exam dr Jyoti Jones- pt reminded  Foot exam 8/2021- normal  Labs prior to next visit  -     HEMOGLOBIN A1C WITH EAG; Future  -     METABOLIC PANEL, COMPREHENSIVE; Future  -     LIPID PANEL    2. Gastroesophageal reflux disease without esophagitis  Stable  Cont prilosec    3. Mixed hyperlipidemia  LDL Goal <100  Discussed strategies for better compliance, cont crestor    4. Essential hypertension  Controlled  Cont norvasc,  lisinopril, hctz every other day,     5. Chronic diastolic heart failure (HCC)  Stable, euvolemic  On ace  Following dr. Kacey Short    6. Moderate persistent asthma with acute exacerbation  Stable  Cont advair, prn albuterol  Received covid vaccine    7. BMI 31.0-31.9,adult    8. Back spasm  Intermittent  Switch po nsaids to topical voltaren with creatinine trending up  Cont prn flexeril  Back stretching/strengthening      Follow-up and Dispositions    · Return in about 3 months (around 11/2/2021), or if symptoms worsen or fail to improve, for routine chronic illness care, fasting labs a week prior to your next visit. Patient understands plan of care. Patient has provided input and agrees with goals.

## 2021-08-02 NOTE — PATIENT INSTRUCTIONS
Learning About Carbohydrate (Carb) Counting and Eating Out When You Have Diabetes  Why plan your meals? Meal planning can be a key part of managing diabetes. Planning meals and snacks with the right balance of carbohydrate, protein, and fat can help you keep your blood sugar at the target level you set with your doctor. You don't have to eat special foods. You can eat what your family eats, including sweets once in a while. But you do have to pay attention to how often you eat and how much you eat of certain foods. You may want to work with a dietitian or a certified diabetes educator. He or she can give you tips and meal ideas and can answer your questions about meal planning. This health professional can also help you reach a healthy weight if that is one of your goals. What should you know about eating carbs? Managing the amount of carbohydrate (carbs) you eat is an important part of healthy meals when you have diabetes. Carbohydrate is found in many foods. · Learn which foods have carbs. And learn the amounts of carbs in different foods. ? Bread, cereal, pasta, and rice have about 15 grams of carbs in a serving. A serving is 1 slice of bread (1 ounce), ½ cup of cooked cereal, or 1/3 cup of cooked pasta or rice. ? Fruits have 15 grams of carbs in a serving. A serving is 1 small fresh fruit, such as an apple or orange; ½ of a banana; ½ cup of cooked or canned fruit; ½ cup of fruit juice; 1 cup of melon or raspberries; or 2 tablespoons of dried fruit. ? Milk and no-sugar-added yogurt have 15 grams of carbs in a serving. A serving is 1 cup of milk or 2/3 cup of no-sugar-added yogurt. ? Starchy vegetables have 15 grams of carbs in a serving. A serving is ½ cup of mashed potatoes or sweet potato; 1 cup winter squash; ½ of a small baked potato; ½ cup of cooked beans; or ½ cup cooked corn or green peas.   · Learn how much carbs to eat each day and at each meal. A dietitian or CDE can teach you how to keep track of the amount of carbs you eat. This is called carbohydrate counting. · If you are not sure how to count carbohydrate grams, use the Plate Method to plan meals. It is a good, quick way to make sure that you have a balanced meal. It also helps you spread carbs throughout the day. ? Divide your plate by types of foods. Put non-starchy vegetables on half the plate, meat or other protein food on one-quarter of the plate, and a grain or starchy vegetable in the final quarter of the plate. To this you can add a small piece of fruit and 1 cup of milk or yogurt, depending on how many carbs you are supposed to eat at a meal.  · Try to eat about the same amount of carbs at each meal. Do not \"save up\" your daily allowance of carbs to eat at one meal.  · Proteins have very little or no carbs per serving. Examples of proteins are beef, chicken, turkey, fish, eggs, tofu, cheese, cottage cheese, and peanut butter. A serving size of meat is 3 ounces, which is about the size of a deck of cards. Examples of meat substitute serving sizes (equal to 1 ounce of meat) are 1/4 cup of cottage cheese, 1 egg, 1 tablespoon of peanut butter, and ½ cup of tofu. How can you eat out and still eat healthy? · Learn to estimate the serving sizes of foods that have carbohydrate. If you measure food at home, it will be easier to estimate the amount in a serving of restaurant food. · If the meal you order has too much carbohydrate (such as potatoes, corn, or baked beans), ask to have a low-carbohydrate food instead. Ask for a salad or green vegetables. · If you use insulin, check your blood sugar before and after eating out to help you plan how much to eat in the future. · If you eat more carbohydrate at a meal than you had planned, take a walk or do other exercise. This will help lower your blood sugar. What are some tips for eating healthy? · Limit saturated fat, such as the fat from meat and dairy products.  This is a healthy choice because people who have diabetes are at higher risk of heart disease. So choose lean cuts of meat and nonfat or low-fat dairy products. Use olive or canola oil instead of butter or shortening when cooking. · Don't skip meals. Your blood sugar may drop too low if you skip meals and take insulin or certain medicines for diabetes. · Check with your doctor before you drink alcohol. Alcohol can cause your blood sugar to drop too low. Alcohol can also cause a bad reaction if you take certain diabetes medicines. Follow-up care is a key part of your treatment and safety. Be sure to make and go to all appointments, and call your doctor if you are having problems. It's also a good idea to know your test results and keep a list of the medicines you take. Where can you learn more? Go to http://www.randolph.com/  Enter I147 in the search box to learn more about \"Learning About Carbohydrate (Carb) Counting and Eating Out When You Have Diabetes. \"  Current as of: August 31, 2020               Content Version: 12.8  © 2006-2021 Healthwise, Incorporated. Care instructions adapted under license by [employer] (which disclaims liability or warranty for this information). If you have questions about a medical condition or this instruction, always ask your healthcare professional. [address] any warranty or liability for your use of this information.

## 2021-08-04 RX ORDER — DICLOFENAC SODIUM 10 MG/G
GEL TOPICAL
Qty: 1 EACH | Refills: 0 | Status: SHIPPED | OUTPATIENT
Start: 2021-08-04 | End: 2021-10-13

## 2021-09-10 DIAGNOSIS — E55.9 VITAMIN D DEFICIENCY: ICD-10-CM

## 2021-09-13 DIAGNOSIS — E11.29 CONTROLLED TYPE 2 DIABETES MELLITUS WITH MICROALBUMINURIA, WITHOUT LONG-TERM CURRENT USE OF INSULIN (HCC): ICD-10-CM

## 2021-09-13 DIAGNOSIS — R80.9 CONTROLLED TYPE 2 DIABETES MELLITUS WITH MICROALBUMINURIA, WITHOUT LONG-TERM CURRENT USE OF INSULIN (HCC): ICD-10-CM

## 2021-09-13 DIAGNOSIS — E55.9 VITAMIN D DEFICIENCY: Primary | ICD-10-CM

## 2021-09-13 RX ORDER — SITAGLIPTIN AND METFORMIN HYDROCHLORIDE 50; 1000 MG/1; MG/1
TABLET, FILM COATED ORAL
Qty: 90 TABLET | Refills: 0 | Status: SHIPPED | OUTPATIENT
Start: 2021-09-13 | End: 2021-10-30

## 2021-09-13 RX ORDER — OMEPRAZOLE 20 MG/1
20 CAPSULE, DELAYED RELEASE ORAL DAILY
Qty: 90 CAPSULE | Refills: 0 | Status: SHIPPED | OUTPATIENT
Start: 2021-09-13 | End: 2022-03-21

## 2021-09-13 RX ORDER — ERGOCALCIFEROL 1.25 MG/1
CAPSULE ORAL
Qty: 12 CAPSULE | Refills: 0 | Status: SHIPPED | OUTPATIENT
Start: 2021-09-13 | End: 2021-12-09

## 2021-09-13 NOTE — TELEPHONE ENCOUNTER
Adding vitamin D level, to be included on her next set of labs prior to nov visit with me. pls notify pt.

## 2021-09-23 ENCOUNTER — OFFICE VISIT (OUTPATIENT)
Dept: CARDIOLOGY CLINIC | Age: 61
End: 2021-09-23
Payer: COMMERCIAL

## 2021-09-23 VITALS
BODY MASS INDEX: 31.5 KG/M2 | HEIGHT: 66 IN | WEIGHT: 196 LBS | DIASTOLIC BLOOD PRESSURE: 67 MMHG | OXYGEN SATURATION: 97 % | SYSTOLIC BLOOD PRESSURE: 144 MMHG | HEART RATE: 88 BPM

## 2021-09-23 DIAGNOSIS — I10 ESSENTIAL HYPERTENSION: ICD-10-CM

## 2021-09-23 DIAGNOSIS — E78.2 MIXED HYPERLIPIDEMIA: ICD-10-CM

## 2021-09-23 DIAGNOSIS — I50.32 CHRONIC DIASTOLIC HEART FAILURE (HCC): Primary | ICD-10-CM

## 2021-09-23 DIAGNOSIS — E66.9 OBESITY (BMI 30.0-34.9): ICD-10-CM

## 2021-09-23 PROCEDURE — 99214 OFFICE O/P EST MOD 30 MIN: CPT | Performed by: INTERNAL MEDICINE

## 2021-09-23 RX ORDER — LISINOPRIL 20 MG/1
20 TABLET ORAL DAILY
Qty: 90 TABLET | Refills: 1 | Status: SHIPPED | OUTPATIENT
Start: 2021-09-23 | End: 2022-03-08 | Stop reason: SDUPTHER

## 2021-09-23 NOTE — PROGRESS NOTES
HISTORY OF PRESENT ILLNESS  Melodie Shi is a 59 y.o. female. Follow-up of CHF, hyperlipidemia, hypertension and obesity    Leg Swelling  The history is provided by the patient. This is a recurrent problem. The current episode started more than 1 week ago. The problem occurs daily (R>L). The problem has been gradually improving. Pertinent negatives include no chest pain, no headaches and no shortness of breath. The symptoms are aggravated by standing. The symptoms are relieved by rest.   Follow-up  Pertinent negatives include no chest pain, no headaches and no shortness of breath. Review of Systems   Constitutional: Negative for chills, fever, malaise/fatigue and weight loss. HENT: Negative for nosebleeds. Eyes: Negative for discharge. Respiratory: Negative for cough, shortness of breath and wheezing. Cardiovascular: Positive for leg swelling. Negative for chest pain, palpitations, orthopnea, claudication and PND. Gastrointestinal: Negative for diarrhea, nausea and vomiting. Genitourinary: Negative for dysuria and hematuria. Musculoskeletal: Negative for joint pain. Skin: Negative for rash. Neurological: Negative for dizziness, seizures, loss of consciousness and headaches. Endo/Heme/Allergies: Negative for polydipsia. Does not bruise/bleed easily. Psychiatric/Behavioral: Negative for depression and substance abuse. The patient does not have insomnia.       Allergies   Allergen Reactions    Banana Hives     Asthma attach, throat swelling, throat scratching, lip swelling    Iodine Rash    Keflex [Cephalexin] Hives    Seafood Hives    Watermelon Hives     Throat closes    Clonidine Other (comments)     Memory loss       Past Medical History:   Diagnosis Date    Ankle swelling     Asthma 02/04/2010    PFTs 2016 showed obstructive lung pattern    [facility] bleed ([facility] 02/20/2015    Chronic diastolic heart failure ([facility] [address] 3/18/2015    Diabetes mellitus ([facility] [address] 5/27/2014    Diabetic eye exam ([facility] 01/26/2017    No retinopathy Best corrected vision 20/20 OU    HTN (hypertension) 2/4/2010    Hyperlipidemia LDL goal < 70 2014    Magnesium deficiency     Obesity, unspecified 4/15/2015    SAH (subarachnoid hemorrhage) ([facility] 2/20/15    admitted in [facility], Dr. Marquise Becerra. Desmond Cotto and ref to Dr. Papo Moise, Neurology    Stroke [facility]) 2015    mini stroke    Vitamin D deficiency 2000       Family History   Problem Relation Age of Onset    Diabetes Mother     Hypertension Mother              Heart Attack Mother 62    Alcohol abuse Father         cirrhosis    Diabetes Sister     Heart Attack Sister 64       Social History     Tobacco Use    Smoking status: Never Smoker    Smokeless tobacco: Never Used   Substance Use Topics    Alcohol use: Yes     Alcohol/week: 0.0 standard drinks     Comment: social    Drug use: No        Current Outpatient Medications   Medication Sig    ergocalciferol (ERGOCALCIFEROL) 1,250 mcg (50,000 unit) capsule TAKE ONE CAPSULE BY MOUTH ONCE WEEKLY    omeprazole (PRILOSEC) 20 mg capsule Take 1 Capsule by mouth daily.  SITagliptin-metFORMIN (Janumet) 50-1,000 mg per tablet TAKE ONE TABLET BY MOUTH TWICE A DAY WITH A MEAL    mometasone (NASONEX) 50 mcg/actuation nasal spray 2 Sprays by Both Nostrils route daily.     lisinopriL (PRINIVIL, ZESTRIL) 10 mg tablet TAKE ONE TABLET BY MOUTH DAILY    diclofenac (VOLTAREN) 1 % gel APPLY 4 GRAMS TO AFFECTED AREA EVERY SIX HOURS    cyclobenzaprine (FLEXERIL) 10 mg tablet TAKE ONE-HALF TO ONE TABLET BY MOUTH DAILY AS NEEDED FOR BACK OR HIP PAIN    fluticasone propion-salmeteroL (ADVAIR/WIXELA) 250-50 mcg/dose diskus inhaler INHALE ONE PUFF BY MOUTH EVERY 12 HOURS    rosuvastatin (CRESTOR) 10 mg tablet TAKE ONE TABLET BY MOUTH ONCE NIGHTLY    levocetirizine (XYZAL) 5 mg tablet TAKE ONE TABLET BY MOUTH DAILY    montelukast (SINGULAIR) 10 mg tablet TAKE ONE TABLET BY MOUTH DAILY    amLODIPine (NORVASC) 5 mg tablet TAKE ONE TABLET BY MOUTH DAILY    hydroCHLOROthiazide (MICROZIDE) 12.5 mg capsule Take 1 Capsule by mouth every other day. TAKE ONE CAPSULE BY MOUTH DAILY    albuterol (PROVENTIL HFA, VENTOLIN HFA, PROAIR HFA) 90 mcg/actuation inhaler INHALE TWO PUFFS BY MOUTH EVERY 4 HOURS AS NEEDED FOR WHEEZING    fluticasone propionate (Flonase) 50 mcg/actuation nasal spray 2 Sprays by Both Nostrils route daily as needed for Rhinitis.  albuterol (PROVENTIL VENTOLIN) 2.5 mg /3 mL (0.083 %) nebu 3 mL by Nebulization route every four (4) hours as needed for Wheezing.  magnesium oxide (MAG-OX) 400 mg tablet TAKE ONE TABLET BY MOUTH ONCE DAILY    aspirin delayed-release 81 mg tablet Take 81 mg by mouth daily. No current facility-administered medications for this visit. Past Surgical History:   Procedure Laterality Date    COLONOSCOPY N/A 8/15/2018    COLONOS[facility] performed by Regulo Martins MD at [facility] HX HYSTERECTOMY  2004    abd approach with ovaries and cervix intact    HX HYSTERECTOMY N/A 2004    HX OTHER SURGICAL  8/25/15    SAH, cerebral arteriopathy, Dr. Bianca Zamora, Interventional Neurology [facility]       Diagnostic Studies:  No flowsheet data found. 2/18 Nuc Stress  Conclusion:   1. Normal perfusion scan. 2. Normal wall motion and ejection fractions calculated to be 51%. 3. No evidence of significant fixed or reversible defect suggesting ischemia or myocardial infarction noted from this nuclear study. 4. Low risk scan. Visit Vitals  BP (!) 144/67 (BP 1 Location: Left upper arm, BP Patient Position: Sitting, BP Cuff Size: Adult)   Pulse 88   Ht 5' 6\" (1.676 m)   Wt 88.9 kg (196 lb)   SpO2 97%   BMI 31.64 kg/m²       Ms. Becerril Stands has a reminder for a \"due or due soon\" health maintenance. I have asked that she contact her primary care provider for follow-up on this health maintenance. Physical Exam  Constitutional:       General: She is not in acute distress. Appearance: She is well-developed.  She is obese.   HENT:      Head: Normocephalic and atraumatic. Mouth/Throat:      Dentition: Normal dentition. Eyes:      General: No scleral icterus. Right eye: No discharge. Left eye: No discharge. Neck:      Thyroid: No thyromegaly. Vascular: No carotid bruit or JVD. Cardiovascular:      Rate and Rhythm: Normal rate and regular rhythm. Pulses: Intact distal pulses. Heart sounds: Normal heart sounds, S1 normal and S2 normal. No murmur heard. No friction rub. No gallop. Pulmonary:      Effort: Pulmonary effort is normal.      Breath sounds: Normal breath sounds. No wheezing or rales. Abdominal:      Palpations: Abdomen is soft. There is no mass. Tenderness: There is no abdominal tenderness. Musculoskeletal:      Cervical back: Neck supple. Right lower leg: Edema (Mostly nonpitting edema but trace pitting) present. Left lower leg: Edema (Mostly nonpitting with only trace pitting) present. Lymphadenopathy:      Cervical:      Right cervical: No superficial cervical adenopathy. Left cervical: No superficial cervical adenopathy. Skin:     General: Skin is warm and dry. Findings: No rash. Neurological:      Mental Status: She is alert and oriented to person, place, and time. Psychiatric:         Behavior: Behavior normal.         ASSESSMENT and PLAN    Discussed the patient's above normal BMI with her. I have recommended the following interventions: dietary management education, guidance, and counseling . The BMI follow up plan is as follows: BMI is out of normal parameters and plan is as follows: I have counseled this patient on diet and exercise regimens     HLD : Results for Juan F Espinoza (MRN [id number]) as of 3/25/2021 12:12   Ref.  Range 3/22/2021 00:00   Triglyceride Latest Ref Range: 0 - 149 mg/dL 72   Cholesterol, total Latest Ref Range: 100 - 199 mg/dL 196   HDL Cholesterol Latest Ref Range: >39 mg/dL 53   VLDL, calculated Latest Ref Range: 5 - 40 mg/dL 13   LDL, calculated Latest Ref Range: 0 - 99 mg/dL 130 (H)     CHF: NYHA2        6/65 Chronic diastolic CHF appears compensated. Blood pressure mildly elevated in the office but was excellent last week in PCPs office. Follow home BP chart before adjusting any medications. Low sodium diet. Continue walking for exercise. Diet and weight discussed in detail and she plans to lose more weight. She was congratulated that she is losing some weight already. 3/25/2021 blood pressure is well controlled. Reduce HCTZ to every other day. Patient told to watch her blood pressure at home closely and call if there is any rise. She was also told to take it daily if edema recurs. CHF is compensated NYHA class II. Lipids are not controlled. She admitted to noncompliance with Crestor. She will take it regularly and we can repeat the labs in 3 months. Diet weight and exercise discussed. Mediterranean diet guidelines printed. Diagnoses and all orders for this visit:    1. Chronic diastolic heart failure ([address]    2. Essential hypertension  -     lisinopriL (PRINIVIL, ZESTRIL) 20 mg tablet; Take 1 Tablet by mouth daily.  -     METABOLIC PANEL, BASIC; Future    3. Mixed hyperlipidemia  -     LIPID PANEL; Future    4. Obesity (BMI 30.0-34. 9)        Pertinent laboratory and test data reviewed and discussed with patient. See patient instructions also for other medical advice given    Medications Discontinued During This Encounter   Medication Reason    loratadine (CLARITIN) 10 mg tablet DISCONTINUED BY ANOTHER CLINICIAN    lisinopriL (PRINIVIL, ZESTRIL) 10 mg tablet        Follow-up and Dispositions    · Return in about 6 months (around 3/23/2022), or if symptoms worsen or fail to improve, for post test, BP log x 4-5 days post med changes. 9/23/2021 CHF is compensated well. She has gained some weight back. Diet and exercise discussed. Mediterranean diet guidelines printed.   Lipids need to be followed since she is taking Crestor regularly now. Blood pressure is elevated. Increase lisinopril and follow home chart. Labs as ordered.

## 2021-09-23 NOTE — PATIENT INSTRUCTIONS
Learning About the [facility] Diet  What is the Mediterranean diet? The Mediterranean diet is a style of eating rather than a diet plan. It features foods eaten in [place], [place], [place] and [place], and other countries along the [facility]. It emphasizes eating foods like fish, fruits, vegetables, beans, high-fiber breads and whole grains, nuts, and olive oil. This style of eating includes limited red meat, cheese, and sweets. Why choose the Mediterranean diet? A Mediterranean-style diet may improve heart health. It contains more fat than other heart-healthy diets. But the fats are mainly from nuts, unsaturated oils (such as fish oils and olive oil), and certain nut or seed oils (such as canola, soybean, or flaxseed oil). These fats may help protect the heart and blood vessels. How can you get started on the Mediterranean diet? Here are some things you can do to switch to a more Mediterranean way of eating. What to eat  · Eat a variety of fruits and vegetables each day, such as grapes, blueberries, tomatoes, broccoli, peppers, figs, olives, spinach, eggplant, beans, lentils, and chickpeas. · Eat a variety of whole-grain foods each day, such as oats, brown rice, and whole wheat bread, pasta, and couscous. · Eat fish at least 2 times a week. Try tuna, salmon, mackerel, lake trout, herring, or sardines. · Eat moderate amounts of low-fat dairy products, such as milk, cheese, or yogurt. · Eat moderate amounts of poultry and eggs. · Choose healthy (unsaturated) fats, such as nuts, olive oil, and certain nut or seed oils like canola, soybean, and flaxseed. · Limit unhealthy (saturated) fats, such as butter, palm oil, and coconut oil. And limit fats found in animal products, such as meat and dairy products made with whole milk. Try to eat red meat only a few times a month in very small amounts. · Limit sweets and desserts to only a few times a week.  This includes sugar-sweetened drinks like soda. The Mediterranean diet may also include red wine with your meal--1 glass each day for women and up to 2 glasses a day for men. Tips for eating at home  · Use herbs, spices, garlic, lemon zest, and citrus juice instead of salt to add flavor to foods. · Add avocado slices to your sandwich instead of rodriguez. · Have fish for lunch or dinner instead of red meat. Brush the fish with olive oil, and broil or grill it. · Sprinkle your salad with seeds or nuts instead of cheese. · Cook with olive or canola oil instead of butter or oils that are high in saturated fat. · Switch from 2% milk or whole milk to 1% or fat-free milk. · Dip raw vegetables in a vinaigrette dressing or hummus instead of dips made from mayonnaise or sour cream.  · Have a piece of fruit for dessert instead of a piece of cake. Try baked apples, or have some dried fruit. Tips for eating out  · Try broiled, grilled, baked, or poached fish instead of having it fried or breaded. · Ask your  to have your meals prepared with olive oil instead of butter. · Order dishes made with marinara sauce or sauces made from olive oil. Avoid sauces made from cream or mayonnaise. · Choose whole-grain breads, whole wheat pasta and pizza crust, brown rice, beans, and lentils. · Cut back on butter or margarine on bread. Instead, you can dip your bread in a small amount of olive oil. · Ask for a side salad or grilled vegetables instead of french fries or chips. Where can you learn more? Go to http://www.randolph.com/  Enter O407 in the search box to learn more about \"Learning About the Mediterranean Diet. \"  Current as of: December 17, 2020               Content Version: 13.0  © 5666-4630 Healthwise, Incorporated. Care instructions adapted under license by [employer] (which disclaims liability or warranty for this information).  If you have questions about a medical condition or this instruction, always ask your healthcare professional. [address] any warranty or liability for your use of this information.

## 2021-09-23 NOTE — PROGRESS NOTES
1. Have you been to the ER, urgent care clinic since your last visit? Hospitalized since your last visit? No     2. Have you seen or consulted any other health care providers outside of the [address] [place] [address] since your last visit? Include any pap smears or colon screening. No     3. Since your last visit, have you had any of the following symptoms? shortness of breath and swelling in legs/arms. 4.  Have you had any blood work, X-rays or cardiac testing? Yes Where: [facility]     Requested: YES     In [facility]: YES    5. Where do you normally have your labs drawn? PCP    6. Do you need any refills today?    No

## 2021-09-24 NOTE — TELEPHONE ENCOUNTER
This pharmacy faxed over request for the following prescriptions to be filled:    Medication requested :   Requested Prescriptions     Pending Prescriptions Disp Refills    montelukast (SINGULAIR) 10 mg tablet 90 Tablet 0     Sig: Take 1 Tablet by mouth daily.  levocetirizine (XYZAL) 5 mg tablet 90 Tablet 0     Sig: Take 1 Tablet by mouth daily.          PCP: [address] [place] [address] or Print: Kanu Cox   Mail order or Local pharmacy [facility]     Scheduled appointment if not seen by current providers in office: LOV 8/2/2021 f/u 11/8/2021

## 2021-09-27 RX ORDER — LEVOCETIRIZINE DIHYDROCHLORIDE 5 MG/1
TABLET, FILM COATED ORAL
Qty: 90 TABLET | Refills: 0 | Status: SHIPPED
Start: 2021-09-27 | End: 2021-11-08 | Stop reason: SDUPTHER

## 2021-09-27 RX ORDER — MONTELUKAST SODIUM 10 MG/1
TABLET ORAL
Qty: 90 TABLET | Refills: 0 | Status: SHIPPED
Start: 2021-09-27 | End: 2021-11-08 | Stop reason: SDUPTHER

## 2021-09-28 RX ORDER — MONTELUKAST SODIUM 10 MG/1
10 TABLET ORAL DAILY
Qty: 90 TABLET | Refills: 3 | Status: SHIPPED | OUTPATIENT
Start: 2021-09-28 | End: 2022-04-25

## 2021-09-28 RX ORDER — LEVOCETIRIZINE DIHYDROCHLORIDE 5 MG/1
5 TABLET, FILM COATED ORAL DAILY
Qty: 90 TABLET | Refills: 3 | Status: SHIPPED | OUTPATIENT
Start: 2021-09-28 | End: 2022-06-24 | Stop reason: SDUPTHER

## 2021-10-08 DIAGNOSIS — J45.31 MILD PERSISTENT ASTHMA WITH ACUTE EXACERBATION: ICD-10-CM

## 2021-10-11 DIAGNOSIS — J45.20 MILD INTERMITTENT ASTHMA WITHOUT COMPLICATION: ICD-10-CM

## 2021-10-11 RX ORDER — ALBUTEROL SULFATE 90 UG/1
2 AEROSOL, METERED RESPIRATORY (INHALATION)
Qty: 17 G | Refills: 5 | Status: SHIPPED | OUTPATIENT
Start: 2021-10-11 | End: 2022-10-05 | Stop reason: SDUPTHER

## 2021-10-11 RX ORDER — ALBUTEROL SULFATE 0.83 MG/ML
2.5 SOLUTION RESPIRATORY (INHALATION)
Qty: 30 NEBULE | Refills: 2 | Status: SHIPPED | OUTPATIENT
Start: 2021-10-11 | End: 2022-10-05 | Stop reason: SDUPTHER

## 2021-10-13 RX ORDER — DICLOFENAC SODIUM 10 MG/G
GEL TOPICAL
Qty: 1 EACH | Refills: 0 | Status: SHIPPED
Start: 2021-10-13 | End: 2022-09-26

## 2021-10-16 DIAGNOSIS — E78.2 MIXED HYPERLIPIDEMIA: ICD-10-CM

## 2021-10-18 RX ORDER — ROSUVASTATIN CALCIUM 10 MG/1
TABLET, COATED ORAL
Qty: 90 TABLET | Refills: 0 | Status: SHIPPED | OUTPATIENT
Start: 2021-10-18 | End: 2022-01-15

## 2021-10-22 NOTE — TELEPHONE ENCOUNTER
Called home number. Verified name and . Spoke with patient. Patient notified of message below. Patient had no further questions or concerns. Patient is calling to schedule. Thank you.

## 2021-10-28 DIAGNOSIS — M62.830 BACK SPASM: ICD-10-CM

## 2021-10-29 RX ORDER — CYCLOBENZAPRINE HCL 10 MG
TABLET ORAL
Qty: 30 TABLET | Refills: 0 | Status: SHIPPED | OUTPATIENT
Start: 2021-10-29 | End: 2021-12-21 | Stop reason: SDUPTHER

## 2021-10-30 DIAGNOSIS — E11.29 CONTROLLED TYPE 2 DIABETES MELLITUS WITH MICROALBUMINURIA, WITHOUT LONG-TERM CURRENT USE OF INSULIN (HCC): ICD-10-CM

## 2021-10-30 DIAGNOSIS — R80.9 CONTROLLED TYPE 2 DIABETES MELLITUS WITH MICROALBUMINURIA, WITHOUT LONG-TERM CURRENT USE OF INSULIN (HCC): ICD-10-CM

## 2021-10-30 RX ORDER — SITAGLIPTIN AND METFORMIN HYDROCHLORIDE 50; 1000 MG/1; MG/1
TABLET, FILM COATED ORAL
Qty: 90 TABLET | Refills: 0 | Status: SHIPPED | OUTPATIENT
Start: 2021-10-30 | End: 2021-12-21

## 2021-11-01 ENCOUNTER — HOSPITAL ENCOUNTER (OUTPATIENT)
Dept: LAB | Age: 61
Discharge: HOME | End: 2021-11-01

## 2021-11-01 LAB — XX-LABCORP SPECIMEN COL,LCBCF: NORMAL

## 2021-11-01 PROCEDURE — 99001 SPECIMEN HANDLING PT-LAB: CPT

## 2021-11-02 LAB
BUN SERPL-MCNC: 14 MG/DL (ref 8–27)
BUN/CREAT SERPL: 11 (ref 12–28)
CALCIUM SERPL-MCNC: 9.4 MG/DL (ref 8.7–10.3)
CHLORIDE SERPL-SCNC: 103 MMOL/L (ref 96–106)
CHOLEST SERPL-MCNC: 142 MG/DL (ref 100–199)
CO2 SERPL-SCNC: 25 MMOL/L (ref 20–29)
CREAT SERPL-MCNC: 1.22 MG/DL (ref 0.57–1)
GLUCOSE SERPL-MCNC: 111 MG/DL (ref 65–99)
HDLC SERPL-MCNC: 47 MG/DL
LDLC SERPL CALC-MCNC: 81 MG/DL (ref 0–99)
POTASSIUM SERPL-SCNC: 4.2 MMOL/L (ref 3.5–5.2)
SODIUM SERPL-SCNC: 142 MMOL/L (ref 134–144)
TRIGL SERPL-MCNC: 71 MG/DL (ref 0–149)
VLDLC SERPL CALC-MCNC: 14 MG/DL (ref 5–40)

## 2021-11-08 ENCOUNTER — VIRTUAL VISIT (OUTPATIENT)
Dept: FAMILY MEDICINE CLINIC | Age: 61
End: 2021-11-08
Payer: COMMERCIAL

## 2021-11-08 DIAGNOSIS — K21.9 GASTROESOPHAGEAL REFLUX DISEASE WITHOUT ESOPHAGITIS: ICD-10-CM

## 2021-11-08 DIAGNOSIS — E78.2 MIXED HYPERLIPIDEMIA: ICD-10-CM

## 2021-11-08 DIAGNOSIS — I50.32 CHRONIC DIASTOLIC HEART FAILURE (HCC): ICD-10-CM

## 2021-11-08 DIAGNOSIS — E55.9 VITAMIN D DEFICIENCY: ICD-10-CM

## 2021-11-08 DIAGNOSIS — N18.30 TYPE 2 DIABETES MELLITUS WITH STAGE 3 CHRONIC KIDNEY DISEASE, WITHOUT LONG-TERM CURRENT USE OF INSULIN, UNSPECIFIED WHETHER STAGE 3A OR 3B CKD (HCC): Primary | ICD-10-CM

## 2021-11-08 DIAGNOSIS — I10 ESSENTIAL HYPERTENSION: ICD-10-CM

## 2021-11-08 DIAGNOSIS — J45.41 MODERATE PERSISTENT ASTHMA WITH ACUTE EXACERBATION: ICD-10-CM

## 2021-11-08 DIAGNOSIS — E11.22 TYPE 2 DIABETES MELLITUS WITH STAGE 3 CHRONIC KIDNEY DISEASE, WITHOUT LONG-TERM CURRENT USE OF INSULIN, UNSPECIFIED WHETHER STAGE 3A OR 3B CKD (HCC): Primary | ICD-10-CM

## 2021-11-08 DIAGNOSIS — M62.830 BACK SPASM: ICD-10-CM

## 2021-11-08 DIAGNOSIS — E83.42 HYPOMAGNESEMIA: ICD-10-CM

## 2021-11-08 PROCEDURE — 99214 OFFICE O/P EST MOD 30 MIN: CPT | Performed by: FAMILY MEDICINE

## 2021-11-08 NOTE — PROGRESS NOTES
1. \"Have you been to the ER, urgent care clinic since your last visit? Hospitalized since your last visit? \" No    2. \"Have you seen or consulted any other health care providers outside of the [address] [place] [address] since your last visit? \" No     3. For patients aged 39-70: Has the patient had a colonoscopy? Yes, HM satisfied with [facility] 8/15/2018 f/u in 10 years. If the patient is female:    4. For patients aged 41-77: Has the patient had a mammogram within the past 2 years? Yes, HM satisfied with blue [id number]/07/2020    5. For patients aged 21-65: Has the patient had a pap smear?  No

## 2021-11-12 PROBLEM — M62.830 BACK SPASM: Status: ACTIVE | Noted: 2021-11-12

## 2021-11-12 NOTE — PATIENT INSTRUCTIONS
Learning About Carbohydrate (Carb) Counting and Eating Out When You Have Diabetes  Why plan your meals? Meal planning can be a key part of managing diabetes. Planning meals and snacks with the right balance of carbohydrate, protein, and fat can help you keep your blood sugar at the target level you set with your doctor. You don't have to eat special foods. You can eat what your family eats, including sweets once in a while. But you do have to pay attention to how often you eat and how much you eat of certain foods. You may want to work with a dietitian or a certified diabetes educator. He or she can give you tips and meal ideas and can answer your questions about meal planning. This health professional can also help you reach a healthy weight if that is one of your goals. What should you know about eating carbs? Managing the amount of carbohydrate (carbs) you eat is an important part of healthy meals when you have diabetes. Carbohydrate is found in many foods. · Learn which foods have carbs. And learn the amounts of carbs in different foods. ? Bread, cereal, pasta, and rice have about 15 grams of carbs in a serving. A serving is 1 slice of bread (1 ounce), ½ cup of cooked cereal, or 1/3 cup of cooked pasta or rice. ? Fruits have 15 grams of carbs in a serving. A serving is 1 small fresh fruit, such as an apple or orange; ½ of a banana; ½ cup of cooked or canned fruit; ½ cup of fruit juice; 1 cup of melon or raspberries; or 2 tablespoons of dried fruit. ? Milk and no-sugar-added yogurt have 15 grams of carbs in a serving. A serving is 1 cup of milk or 2/3 cup of no-sugar-added yogurt. ? Starchy vegetables have 15 grams of carbs in a serving. A serving is ½ cup of mashed potatoes or sweet potato; 1 cup winter squash; ½ of a small baked potato; ½ cup of cooked beans; or ½ cup cooked corn or green peas.   · Learn how much carbs to eat each day and at each meal. A dietitian or CDE can teach you how to keep track of the amount of carbs you eat. This is called carbohydrate counting. · If you are not sure how to count carbohydrate grams, use the Plate Method to plan meals. It is a good, quick way to make sure that you have a balanced meal. It also helps you spread carbs throughout the day. ? Divide your plate by types of foods. Put non-starchy vegetables on half the plate, meat or other protein food on one-quarter of the plate, and a grain or starchy vegetable in the final quarter of the plate. To this you can add a small piece of fruit and 1 cup of milk or yogurt, depending on how many carbs you are supposed to eat at a meal.  · Try to eat about the same amount of carbs at each meal. Do not \"save up\" your daily allowance of carbs to eat at one meal.  · Proteins have very little or no carbs per serving. Examples of proteins are beef, chicken, turkey, fish, eggs, tofu, cheese, cottage cheese, and peanut butter. A serving size of meat is 3 ounces, which is about the size of a deck of cards. Examples of meat substitute serving sizes (equal to 1 ounce of meat) are 1/4 cup of cottage cheese, 1 egg, 1 tablespoon of peanut butter, and ½ cup of tofu. How can you eat out and still eat healthy? · Learn to estimate the serving sizes of foods that have carbohydrate. If you measure food at home, it will be easier to estimate the amount in a serving of restaurant food. · If the meal you order has too much carbohydrate (such as potatoes, corn, or baked beans), ask to have a low-carbohydrate food instead. Ask for a salad or green vegetables. · If you use insulin, check your blood sugar before and after eating out to help you plan how much to eat in the future. · If you eat more carbohydrate at a meal than you had planned, take a walk or do other exercise. This will help lower your blood sugar. What are some tips for eating healthy? · Limit saturated fat, such as the fat from meat and dairy products.  This is a healthy choice because people who have diabetes are at higher risk of heart disease. So choose lean cuts of meat and nonfat or low-fat dairy products. Use olive or canola oil instead of butter or shortening when cooking. · Don't skip meals. Your blood sugar may drop too low if you skip meals and take insulin or certain medicines for diabetes. · Check with your doctor before you drink alcohol. Alcohol can cause your blood sugar to drop too low. Alcohol can also cause a bad reaction if you take certain diabetes medicines. Follow-up care is a key part of your treatment and safety. Be sure to make and go to all appointments, and call your doctor if you are having problems. It's also a good idea to know your test results and keep a list of the medicines you take. Where can you learn more? Go to http://www.randolph.com/  Enter I147 in the search box to learn more about \"Learning About Carbohydrate (Carb) Counting and Eating Out When You Have Diabetes. \"  Current as of: December 17, 2020               Content Version: 13.0  © 2006-2021 [employer]. Care instructions adapted under license by [employer] (which disclaims liability or warranty for this information). If you have questions about a medical condition or this instruction, always ask your healthcare professional. [address] any warranty or liability for your use of this information.

## 2021-11-12 NOTE — PROGRESS NOTES
Chaparro Michel is a 59 y.o. female who was seen by synchronous (real-time) audio-video technology on 11/8/2021. Consent: Chaparro Michel, who was seen by synchronous (real-time) audio-video technology, and/or her healthcare decision maker, is aware that this patient-initiated, Telehealth encounter on 11/8/2021 is a billable service, with coverage as determined by her insurance carrier. She is aware that she may receive a bill and has provided verbal consent to proceed: Yes. 712  Subjective:   Chaparro Michel is a 59 y.o. female who was seen for Hypertension, Diabetes, Cholesterol Problem, GERD, and Spasms    Ff-up     No new concrns    DM w/ CKD- reports [facility] 1teen's. Currently on janumet. Following with dr. Michelle Stephens for eye exams Reviewed labs a1c 6. Creatinine 1.2, says has been taking more nsaids than usual for back spasm.      HTN/diastolic CHF- w/ h/o SAH 6888 no residual deficit. Currently on amlodipine, hctz, lisinopril. Following Dr. Sharon Plasencia, reviewed note NYHA class 2, pt denies any symptoms. Has  reduced HCTZ to every other day.      HL- on crestor, reviewed labs improved LDL, better compliance     GERD- responding well to prilosec     Asthma- no recent exacerbations, says using advair regularly, prn albuterol     Back spasm- on and off for few years now, responding to prn flexeril/nsaids. She works at [employer], long days with lifting usually trigger.   Prior to Admission medications    Medication Sig Start Date End Date Taking?  Authorizing Provider   Janumet 50-1,000 mg per tablet TAKE ONE TABLET BY MOUTH TWICE A DAY WITH A MEAL 10/30/21  Yes Jewell Felix MD   cyclobenzaprine (FLEXERIL) 10 mg tablet TAKE ONE-HALF TO ONE TABLET BY MOUTH DAILY AS NEEDED FOR BACK OR HIP PAIN 10/29/21  Yes Brandi Maya MD   fluticasone propion-salmeteroL (ADVAIR/WIXELA) 250-50 mcg/dose diskus inhaler INHALE ONE PUFF BY MOUTH EVERY 12 HOURS 10/27/21  Yes Brandi Maya MD   rosuvastatin (CRESTOR) 10 mg tablet TAKE ONE TABLET BY MOUTH EVERY NIGHT 10/18/21  Yes Melvin Crouch MD   diclofenac (VOLTAREN) 1 % gel APPLY 4 GRAMS TO AFFECTED AREA EVERY 6 HOURS 10/13/21  Yes Aminah Felix MD   albuterol (PROVENTIL VENTOLIN) 2.5 mg /3 mL (0.083 %) nebu 3 mL by Nebulization route every four (4) hours as needed for Wheezing. 10/11/21  Yes Melvin Crouch MD   albuterol (PROVENTIL HFA, VENTOLIN HFA, PROAIR HFA) 90 mcg/actuation inhaler Take 2 Puffs by inhalation every four (4) hours as needed for Wheezing. 10/11/21  Yes Melvin Crouch MD   montelukast (SINGULAIR) 10 mg tablet Take 1 Tablet by mouth daily. 9/28/21  Yes Melvin Crouch MD   levocetirizine (XYZAL) 5 mg tablet Take 1 Tablet by mouth daily. 9/28/21  Yes Melvin Crouch MD   lisinopriL (PRINIVIL, ZESTRIL) 20 mg tablet Take 1 Tablet by mouth daily. 9/23/21  Yes Deja Musa MD   ergocalciferol (ERGOCALCIFEROL) 1,250 mcg (50,000 unit) capsule TAKE ONE CAPSULE BY MOUTH ONCE WEEKLY 9/13/21  Yes Melvin Crouch MD   omeprazole (PRILOSEC) 20 mg capsule Take 1 Capsule by mouth daily. 9/13/21  Yes Melvin Crouch MD   mometasone (NASONEX) 50 mcg/actuation nasal spray 2 Sprays by Both Nostrils route daily. 9/3/21  Yes Melvin Crouch MD   amLODIPine (NORVASC) 5 mg tablet TAKE ONE TABLET BY MOUTH DAILY 6/17/21  Yes Aminah Felix MD   hydroCHLOROthiazide (MICROZIDE) 12.5 mg capsule Take 1 Capsule by mouth every other day.  TAKE ONE CAPSULE BY MOUTH DAILY 6/1/21  Yes Venecia Bryan NP   magnesium oxide (MAG-OX) 400 mg tablet TAKE ONE TABLET BY MOUTH ONCE DAILY 10/24/18  Yes Cynthia Leon PA     Allergies   Allergen Reactions    Banana Hives     Asthma attach, throat swelling, throat scratching, lip swelling    Iodine Rash    Keflex [Cephalexin] Hives    Seafood Hives    Watermelon Hives     Throat closes    Clonidine Other (comments)     Memory loss       Patient Active Problem List    Diagnosis Date Noted    Chest pain 03/25/2019    History of subarachnoid hemorrhage 01/27/2016    Hypokalemia 01/27/2016    Post-menopausal 01/27/2016    Iron deficiency anemia 06/30/2015    Gastroesophageal reflux disease without esophagitis 06/30/2015    Hypomagnesemia 06/30/2015    New onset of headaches after age 48 05/28/2015    Obesity (BMI 30.0-34.9) 04/15/2015    [facility] 03/18/2015    Anxiety and depression 07/15/2014    Type 2 diabetes mellitus with chronic kidney disease ([facility] 05/27/2014    Mixed hyperlipidemia 05/18/2013    Vitamin D deficiency 05/18/2013    Asthma 02/04/2010    Essential hypertension 09/05/2007     Current Outpatient Medications   Medication Sig Dispense Refill    Janumet 50-1,000 mg per tablet TAKE ONE TABLET BY MOUTH TWICE A DAY WITH A MEAL 90 Tablet 0    cyclobenzaprine (FLEXERIL) 10 mg tablet TAKE ONE-HALF TO ONE TABLET BY MOUTH DAILY AS NEEDED FOR BACK OR HIP PAIN 30 Tablet 0    fluticasone propion-salmeteroL (ADVAIR/WIXELA) 250-50 mcg/dose diskus inhaler INHALE ONE PUFF BY MOUTH EVERY 12 HOURS 3 Each 1    rosuvastatin (CRESTOR) 10 mg tablet TAKE ONE TABLET BY MOUTH EVERY NIGHT 90 Tablet 0    diclofenac (VOLTAREN) 1 % gel APPLY 4 GRAMS TO AFFECTED AREA EVERY 6 HOURS 1 Each 0    albuterol (PROVENTIL VENTOLIN) 2.5 mg /3 mL (0.083 %) nebu 3 mL by Nebulization route every four (4) hours as needed for Wheezing. 30 Nebule 2    albuterol (PROVENTIL HFA, VENTOLIN HFA, PROAIR HFA) 90 mcg/actuation inhaler Take 2 Puffs by inhalation every four (4) hours as needed for Wheezing. 17 g 5    montelukast (SINGULAIR) 10 mg tablet Take 1 Tablet by mouth daily. 90 Tablet 3    levocetirizine (XYZAL) 5 mg tablet Take 1 Tablet by mouth daily. 90 Tablet 3    lisinopriL (PRINIVIL, ZESTRIL) 20 mg tablet Take 1 Tablet by mouth daily.  90 Tablet 1    ergocalciferol (ERGOCALCIFEROL) 1,250 mcg (50,000 unit) capsule TAKE ONE CAPSULE BY MOUTH ONCE WEEKLY 12 Capsule 0    omeprazole (PRILOSEC) 20 mg capsule Take 1 Capsule by mouth daily. 90 Capsule 0    mometasone (NASONEX) 50 mcg/actuation nasal spray 2 Sprays by Both Nostrils route daily. 1 Each 5    amLODIPine (NORVASC) 5 mg tablet TAKE ONE TABLET BY MOUTH DAILY 90 Tablet 0    hydroCHLOROthiazide (MICROZIDE) 12.5 mg capsule Take 1 Capsule by mouth every other day. TAKE ONE CAPSULE BY MOUTH DAILY 90 Capsule 1    magnesium oxide (MAG-OX) 400 mg tablet TAKE ONE TABLET BY MOUTH ONCE DAILY 90 Tab 0     Allergies   Allergen Reactions    Banana Hives     Asthma attach, throat swelling, throat scratching, lip swelling    Iodine Rash    Keflex [Cephalexin] Hives    Seafood Hives    Watermelon Hives     Throat closes    Clonidine Other (comments)     Memory loss     Past Medical History:   Diagnosis Date    Ankle swelling     Asthma 02/04/2010    PFTs 2016 showed obstructive lung pattern    Brain bleed (HCC) 02/20/2015    Chronic diastolic heart failure ([facility] 3/18/2015    Diabetes mellitus ([facility] 5/27/2014    Diabetic eye exam ([facility] 01/26/2017    No retinopathy Best corrected vision 20/20 OU    HTN (hypertension) 2/4/2010    Hyperlipidemia LDL goal < 70 2014    Magnesium deficiency     Obesity, unspecified 4/15/2015    SAH (subarachnoid hemorrhage) ([facility] 2/20/15    admitted in [facility], Dr. Maria Guadalupe Cox.  [facility] and ref to Dr. Ifeoma Marquez, Neurology    Stroke [facility]) 2015    mini stroke    Vitamin D deficiency 2000     Past Surgical History:   Procedure Laterality Date    COLONOSCOPY N/A 8/15/2018    COLONOS[facility] Yenny [facility] at [facility] HX HYSTERECTOMY  2004    abd approach with ovaries and cervix intact    HX HYSTERECTOMY N/A 2004    HX OTHER SURGICAL  8/25/15    SAH, cerebral arteriopathy, Dr. Ifeoma Marquez, Interventional Neurology [facility]     Family History   Problem Relation Age of Onset    Diabetes Mother     Hypertension Mother              Heart Attack Mother 62    Alcohol abuse Father         cirrhosis    Diabetes Sister  Heart Attack Sister 64     Social History     Tobacco Use    Smoking status: Never Smoker    Smokeless tobacco: Never Used   Substance Use Topics    Alcohol use: Yes     Alcohol/week: 0.0 standard drinks     Comment: social       ROS      Objective: There were no vitals taken for this visit. General: alert, cooperative, no distress   Mental  status: normal mood, behavior, speech, dress, motor activity, and thought processes, able to follow commands   HENT: NCAT   Neck: no visualized mass   Resp: no respiratory distress   Neuro: no gross deficits   Skin: no discoloration or lesions of concern on visible areas   Psychiatric: normal affect, consistent with stated mood, no evidence of hallucinations     Additional exam findings: We discussed the expected course, resolution and complications of the diagnosis(es) in detail. Medication risks, benefits, costs, interactions, and alternatives were discussed as indicated. I advised her to contact the office if her condition worsens, changes or fails to improve as anticipated. She expressed understanding with the diagnosis(es) and plan. Cathy Kenney is a 59 y.o. female who was evaluated by a video visit encounter for concerns as above. Patient identification was verified prior to start of the visit. A caregiver was present when appropriate. Due to this being a TeleHealth encounter (During [facility]-20 public health emergency), evaluation of the following organ systems was limited: Vitals/Constitutional/EENT/Resp/CV/GI//MS/Neuro/Skin/Heme-Lymph-Imm. Pursuant to the emergency declaration under the [facility], [facility] waiver authority and the [employer] General Act, this Virtual  Visit was conducted, with patient's (and/or legal guardian's) consent, to reduce the patient's risk of exposure to COVID-19 and provide necessary medical care.      Services were provided through a video synchronous discussion virtually to substitute for in-person clinic visit. Patient and provider were located at their individual homes. Results for orders placed or performed during the hospital encounter of 11/01/21   [facility] SPECIMEN COL   Result Value Ref Range    [facility] SPECIMEN COLLN. Specimens collected/sent to [facility]. Please direct inquiries to ([phone]). Assessment & Plan:   Diagnoses and all orders for this visit:  Diagnoses and all orders for this visit:     1. Controlled type 2 diabetes mellitus with microalbuminuria, without long-term current use of insulin (HCC)  a1c 6  Controlled  Cont janumet  Cont lisinopril  Cont crestor- commended on better compliance  Due for eye exam dr Hubert Sosa- pt reminded  Foot exam 8/2021- normal  Labs prior to next visit  -     HEMOGLOBIN A1C WITH EAG; Future  -     METABOLIC PANEL, COMPREHENSIVE; Future       2. Gastroesophageal reflux disease without esophagitis  Stable  Cont prilosec     3. Mixed hyperlipidemia  LDL Goal <100  Discussed strategies for better compliance, cont crestor     4. Essential hypertension  Controlled  Cont norvasc,  lisinopril, hctz every other day,      5. Chronic diastolic heart failure (HCC)  Stable, euvolemic  On ace  Following dr. Dorie Daniels     6. Moderate persistent asthma with acute exacerbation  Stable  Cont advair, prn albuterol  Received covid vaccine    7. Back spasm  Intermittent  Switch po nsaids to topical voltaren with creatinine trending up  Cont prn flexeril  Back stretching/strengthening     8. Vitamin D deficiency  -     VITAMIN D, 25 HYDROXY; Future    9 Hypomagnesemia  -     MAGNESIUM;  Future    Ff-up in 3 months or sooner prn    Seun Zaman MD

## 2021-12-01 DIAGNOSIS — I10 ESSENTIAL HYPERTENSION: ICD-10-CM

## 2021-12-01 RX ORDER — AMLODIPINE BESYLATE 5 MG/1
TABLET ORAL
Qty: 90 TABLET | Refills: 0 | Status: SHIPPED | OUTPATIENT
Start: 2021-12-01 | End: 2022-03-07

## 2021-12-08 DIAGNOSIS — E55.9 VITAMIN D DEFICIENCY: ICD-10-CM

## 2021-12-09 RX ORDER — ERGOCALCIFEROL 1.25 MG/1
CAPSULE ORAL
Qty: 12 CAPSULE | Refills: 0 | Status: SHIPPED | OUTPATIENT
Start: 2021-12-09 | End: 2022-02-28

## 2021-12-21 DIAGNOSIS — E11.29 CONTROLLED TYPE 2 DIABETES MELLITUS WITH MICROALBUMINURIA, WITHOUT LONG-TERM CURRENT USE OF INSULIN (HCC): ICD-10-CM

## 2021-12-21 DIAGNOSIS — M62.830 BACK SPASM: ICD-10-CM

## 2021-12-21 DIAGNOSIS — R80.9 CONTROLLED TYPE 2 DIABETES MELLITUS WITH MICROALBUMINURIA, WITHOUT LONG-TERM CURRENT USE OF INSULIN (HCC): ICD-10-CM

## 2021-12-21 RX ORDER — SITAGLIPTIN AND METFORMIN HYDROCHLORIDE 50; 1000 MG/1; MG/1
TABLET, FILM COATED ORAL
Qty: 90 TABLET | Refills: 0 | Status: SHIPPED | OUTPATIENT
Start: 2021-12-21 | End: 2022-01-04 | Stop reason: SDUPTHER

## 2021-12-21 NOTE — TELEPHONE ENCOUNTER
Last OV: 11/8/2021  Last labs: 11/1/2021  Next OV and labs: due Feb 2022    [facility] [phone] for patient to contact [facility] to schedule Feb follow up appt with Dr. Adriana Hunt.

## 2021-12-22 RX ORDER — CYCLOBENZAPRINE HCL 10 MG
TABLET ORAL
Qty: 30 TABLET | Refills: 0 | Status: SHIPPED | OUTPATIENT
Start: 2021-12-22 | End: 2022-03-11 | Stop reason: SDUPTHER

## 2022-01-04 DIAGNOSIS — R80.9 CONTROLLED TYPE 2 DIABETES MELLITUS WITH MICROALBUMINURIA, WITHOUT LONG-TERM CURRENT USE OF INSULIN (HCC): ICD-10-CM

## 2022-01-04 DIAGNOSIS — E11.29 CONTROLLED TYPE 2 DIABETES MELLITUS WITH MICROALBUMINURIA, WITHOUT LONG-TERM CURRENT USE OF INSULIN (HCC): ICD-10-CM

## 2022-01-04 NOTE — TELEPHONE ENCOUNTER
Patient's [employer]et one tab BID script was sent in for #90. Medication needs to be sent in for a quantity of 180 since the medication is taken BID.

## 2022-01-04 NOTE — TELEPHONE ENCOUNTER
----- Message from [facility] sent at 1/3/2022  3:03 PM EST -----  Subject: Message to Provider    QUESTIONS  Information for Provider? [facility], Pharmacy Rep for [facility] in regards   to the patient on the Janumet 50-1,000 mg per tablet on the dispense   quantity and making sure of the request amount for the medication above. [facility] stated can call if any questions for them at the below number and   who answers will have access to patient info or to just contact the   patient pharmacy at ? Chitra Pizarro at [address] [facility], [address] [place] [address], Phone? [phone], Fax? [phone].   ---------------------------------------------------------------------------  --------------  Sailaja Richards INFO  What is the best way for the office to contact you? Do not leave any   message, patient will call back for answer  Preferred Call Back Phone Number? [phone]  ---------------------------------------------------------------------------  --------------  SCRIPT ANSWERS  Relationship to Patient? Third Party  Representative Name?  [facility]

## 2022-01-05 RX ORDER — SITAGLIPTIN AND METFORMIN HYDROCHLORIDE 50; 1000 MG/1; MG/1
TABLET, FILM COATED ORAL
Qty: 180 TABLET | Refills: 0 | Status: SHIPPED | OUTPATIENT
Start: 2022-01-05 | End: 2022-05-09

## 2022-02-27 DIAGNOSIS — E55.9 VITAMIN D DEFICIENCY: ICD-10-CM

## 2022-02-28 DIAGNOSIS — E78.2 MIXED HYPERLIPIDEMIA: ICD-10-CM

## 2022-02-28 RX ORDER — ERGOCALCIFEROL 1.25 MG/1
CAPSULE ORAL
Qty: 12 CAPSULE | Refills: 0 | Status: SHIPPED | OUTPATIENT
Start: 2022-02-28 | End: 2022-03-21 | Stop reason: ALTCHOICE

## 2022-03-01 RX ORDER — ROSUVASTATIN CALCIUM 10 MG/1
10 TABLET, COATED ORAL
Qty: 90 TABLET | Refills: 3 | Status: SHIPPED | OUTPATIENT
Start: 2022-03-01 | End: 2022-06-01 | Stop reason: SDUPTHER

## 2022-03-05 DIAGNOSIS — I10 ESSENTIAL HYPERTENSION: ICD-10-CM

## 2022-03-07 RX ORDER — LISINOPRIL 10 MG/1
TABLET ORAL
Qty: 90 TABLET | Refills: 0 | OUTPATIENT
Start: 2022-03-07

## 2022-03-07 RX ORDER — AMLODIPINE BESYLATE 5 MG/1
TABLET ORAL
Qty: 90 TABLET | Refills: 0 | Status: SHIPPED
Start: 2022-03-07 | End: 2022-05-23 | Stop reason: DRUGHIGH

## 2022-03-08 DIAGNOSIS — I10 ESSENTIAL HYPERTENSION: ICD-10-CM

## 2022-03-08 RX ORDER — LISINOPRIL 20 MG/1
20 TABLET ORAL DAILY
Qty: 90 TABLET | Refills: 1 | Status: SHIPPED
Start: 2022-03-08 | End: 2022-05-23 | Stop reason: SINTOL

## 2022-03-10 ENCOUNTER — OFFICE VISIT (OUTPATIENT)
Dept: CARDIOLOGY CLINIC | Age: 62
End: 2022-03-10
Payer: COMMERCIAL

## 2022-03-10 VITALS
DIASTOLIC BLOOD PRESSURE: 79 MMHG | BODY MASS INDEX: 31.02 KG/M2 | HEART RATE: 95 BPM | OXYGEN SATURATION: 99 % | SYSTOLIC BLOOD PRESSURE: 156 MMHG | HEIGHT: 66 IN | WEIGHT: 193 LBS

## 2022-03-10 DIAGNOSIS — E78.2 MIXED HYPERLIPIDEMIA: ICD-10-CM

## 2022-03-10 DIAGNOSIS — E66.9 OBESITY (BMI 30.0-34.9): ICD-10-CM

## 2022-03-10 DIAGNOSIS — I10 ESSENTIAL HYPERTENSION: ICD-10-CM

## 2022-03-10 DIAGNOSIS — I50.32 CHRONIC DIASTOLIC HEART FAILURE (HCC): Primary | ICD-10-CM

## 2022-03-10 PROCEDURE — 99214 OFFICE O/P EST MOD 30 MIN: CPT | Performed by: INTERNAL MEDICINE

## 2022-03-10 NOTE — PROGRESS NOTES
HISTORY OF PRESENT ILLNESS  Heath Suarez is a 59 y.o. female. Follow-up of CHF, hyperlipidemia, hypertension and obesity    Follow-up  Associated symptoms include shortness of breath. Pertinent negatives include no chest pain and no headaches. Leg Swelling  The history is provided by the patient. This is a recurrent problem. The current episode started more than 1 week ago. The problem occurs daily (R>L). The problem has been gradually improving. Associated symptoms include shortness of breath. Pertinent negatives include no chest pain and no headaches. The symptoms are aggravated by standing. The symptoms are relieved by rest.   Shortness of Breath  The history is provided by the patient. This is a new problem. The problem occurs intermittently. The current episode started more than 1 week ago (2/22). The problem has not changed since onset. Associated symptoms include leg swelling. Pertinent negatives include no fever, no headaches, no cough, no wheezing, no PND, no orthopnea, no chest pain, no vomiting, no rash and no claudication. The problem's precipitants include exercise and pollens (Sometimes at rest). She has tried beta-agonist inhalers for the symptoms. The treatment provided significant relief. Review of Systems   Constitutional: Negative for chills, fever, malaise/fatigue and weight loss. HENT: Negative for nosebleeds. Eyes: Negative for discharge. Respiratory: Positive for shortness of breath. Negative for cough and wheezing. Cardiovascular: Positive for leg swelling. Negative for chest pain, palpitations, orthopnea, claudication and PND. Gastrointestinal: Negative for diarrhea, nausea and vomiting. Genitourinary: Negative for dysuria and hematuria. Musculoskeletal: Negative for joint pain. Skin: Negative for rash. Neurological: Negative for dizziness, seizures, loss of consciousness and headaches. Endo/Heme/Allergies: Negative for polydipsia.  Does not bruise/bleed easily. Psychiatric/Behavioral: Negative for depression and substance abuse. The patient does not have insomnia. Allergies   Allergen Reactions    Banana Hives     Asthma attach, throat swelling, throat scratching, lip swelling    Iodine Rash    Keflex [Cephalexin] Hives    Seafood Hives    Watermelon Hives     Throat closes    Clonidine Other (comments)     Memory loss       Past Medical History:   Diagnosis Date    Ankle swelling     Asthma 02/04/2010    PFTs 2016 showed obstructive lung pattern    [facility] bleed [facility] [address] 02/20/2015    Chronic diastolic heart failure ([address] 3/18/2015    Diabetes mellitus ([address] 5/27/2014    Diabetic eye exam ([facility] [address] 01/26/2017    No retinopathy Best corrected vision 20/20 OU    HTN (hypertension) 2/4/2010    Hyperlipidemia LDL goal < 70 2014    Magnesium deficiency     Obesity, unspecified 4/15/2015    SAH (subarachnoid hemorrhage) ([facility] [address] 2/20/15    admitted in [facility]. John Lunch and ref to Dr. Chuy Garcia, Neurology    Stroke [facility]) 2015    mini stroke    Vitamin D deficiency 2000       Family History   Problem Relation Age of Onset    Diabetes Mother     Hypertension Mother              Heart Attack Mother 62    Alcohol abuse Father         cirrhosis    Diabetes Sister     Heart Attack Sister 64       Social History     Tobacco Use    Smoking status: Never Smoker    Smokeless tobacco: Never Used   Substance Use Topics    Alcohol use: Yes     Alcohol/week: 0.0 standard drinks     Comment: social    Drug use: No        Current Outpatient Medications   Medication Sig    lisinopriL (PRINIVIL, ZESTRIL) 20 mg tablet Take 1 Tablet by mouth daily.  amLODIPine (NORVASC) 5 mg tablet TAKE ONE TABLET BY MOUTH DAILY    rosuvastatin (CRESTOR) 10 mg tablet Take 1 Tablet by mouth nightly.     ergocalciferol (ERGOCALCIFEROL) 1,250 mcg (50,000 unit) capsule TAKE ONE CAPSULE BY MOUTH ONCE WEEKLY    SITagliptin-metFORMIN (Janumet) 50-1,000 mg per tablet TAKE ONE TABLET BY MOUTH TWICE A DAY WITH MEALS    cyclobenzaprine (FLEXERIL) 10 mg tablet TAKE ONE-HALF TO ONE TABLET BY MOUTH DAILY AS NEEDED FOR BACK OR HIP PAIN    fluticasone propion-salmeteroL (ADVAIR/WIXELA) 250-50 mcg/dose diskus inhaler INHALE ONE PUFF BY MOUTH EVERY 12 HOURS    diclofenac (VOLTAREN) 1 % gel APPLY 4 GRAMS TO AFFECTED AREA EVERY 6 HOURS    albuterol (PROVENTIL VENTOLIN) 2.5 mg /3 mL (0.083 %) nebu 3 mL by Nebulization route every four (4) hours as needed for Wheezing.  albuterol (PROVENTIL HFA, VENTOLIN HFA, PROAIR HFA) 90 mcg/actuation inhaler Take 2 Puffs by inhalation every four (4) hours as needed for Wheezing.  montelukast (SINGULAIR) 10 mg tablet Take 1 Tablet by mouth daily.  levocetirizine (XYZAL) 5 mg tablet Take 1 Tablet by mouth daily.  omeprazole (PRILOSEC) 20 mg capsule Take 1 Capsule by mouth daily.  mometasone (NASONEX) 50 mcg/actuation nasal spray 2 Sprays by Both Nostrils route daily. (Patient taking differently: 2 Sprays by Both Nostrils route daily as needed.)    hydroCHLOROthiazide (MICROZIDE) 12.5 mg capsule Take 1 Capsule by mouth every other day. TAKE ONE CAPSULE BY MOUTH DAILY    magnesium oxide (MAG-OX) 400 mg tablet TAKE ONE TABLET BY MOUTH ONCE DAILY     No current facility-administered medications for this visit. Past Surgical History:   Procedure Laterality Date    COLONOSCOPY N/A 8/15/2018    COLONOSCOPY performed by Bruna Kurtz [facility] at [facility] HX HYSTERECTOMY  2004    abd approach with ovaries and cervix intact    HX HYSTERECTOMY N/A 2004    HX OTHER SURGICAL  8/25/15    SAH, cerebral arteriopathy, Dr. Bryon Leon, Interventional Neurology [facility]       Diagnostic Studies:  2/18 Nuc Stress  Conclusion:   1. Normal perfusion scan. 2. Normal wall motion and ejection fractions calculated to be 51%. 3. No evidence of significant fixed or reversible defect suggesting ischemia or myocardial infarction noted from this nuclear study. 4.  Low risk scan. Visit Vitals  BP (!) 156/79 (BP 1 Location: Left upper arm, BP Patient Position: Sitting, BP Cuff Size: Adult long)   Pulse 95   Ht 5' 6\" (1.676 m)   Wt 87.5 kg (193 lb)   SpO2 99%   BMI 31.15 kg/m²       Ms. Shannon Wiggins has a reminder for a \"due or due soon\" health maintenance. I have asked that she contact her primary care provider for follow-up on this health maintenance. Physical Exam  Constitutional:       General: She is not in acute distress. Appearance: She is well-developed. She is obese. HENT:      Head: Normocephalic and atraumatic. Mouth/Throat:      Dentition: Normal dentition. Eyes:      General: No scleral icterus. Right eye: No discharge. Left eye: No discharge. Neck:      Thyroid: No thyromegaly. Vascular: No carotid bruit or JVD. Cardiovascular:      Rate and Rhythm: Normal rate and regular rhythm. Pulses: Intact distal pulses. Heart sounds: Normal heart sounds, S1 normal and S2 normal. No murmur heard. No friction rub. No gallop. Pulmonary:      Effort: Pulmonary effort is normal.      Breath sounds: Normal breath sounds. No wheezing or rales. Abdominal:      Palpations: Abdomen is soft. There is no mass. Tenderness: There is no abdominal tenderness. Musculoskeletal:      Cervical back: Neck supple. Right lower leg: Edema (Mostly nonpitting edema but trace pitting) present. Left lower leg: Edema (Mostly nonpitting with only trace pitting) present. Lymphadenopathy:      Cervical:      Right cervical: No superficial cervical adenopathy. Left cervical: No superficial cervical adenopathy. Skin:     General: Skin is warm and dry. Findings: No rash. Neurological:      Mental Status: She is alert and oriented to person, place, and time. Psychiatric:         Behavior: Behavior normal.         ASSESSMENT and PLAN    Discussed the patient's above normal BMI with her.   I have recommended the following interventions: dietary management education, guidance, and counseling . The BMI follow up plan is as follows: BMI is out of normal parameters and plan is as follows: I have counseled this patient on diet and exercise regimens     HLD : Results for Jessica Apple (MRN [id number]) as of 3/10/2022 12:14   Ref. Range 3/22/2021 00:00 7/26/2021 00:00 11/1/2021 00:00   Triglyceride Latest Ref Range: 0 - 149 mg/dL 72  71   Cholesterol, total Latest Ref Range: 100 - 199 mg/dL 196  142   HDL Cholesterol Latest Ref Range: >39 mg/dL 53  47   VLDL, calculated Latest Ref Range: 5 - 40 mg/dL 13  14   LDL, calculated Latest Ref Range: 0 - 99 mg/dL 130 (H)  81       CHF: NYHA2        5/11 Chronic diastolic CHF appears compensated. Blood pressure mildly elevated in the office but was excellent last week in PCPs office. Follow home BP chart before adjusting any medications. Low sodium diet. Continue walking for exercise. Diet and weight discussed in detail and she plans to lose more weight. She was congratulated that she is losing some weight already. 3/25/2021 blood pressure is well controlled. Reduce HCTZ to every other day. Patient told to watch her blood pressure at home closely and call if there is any rise. She was also told to take it daily if edema recurs. CHF is compensated NYHA class II. Lipids are not controlled. She admitted to noncompliance with Crestor. She will take it regularly and we can repeat the labs in 3 months. Diet weight and exercise discussed. Mediterranean diet guidelines printed. 9/23/2021 CHF is compensated well. She has gained some weight back. Diet and exercise discussed. Mediterranean diet guidelines printed. Lipids need to be followed since she is taking Crestor regularly now. Blood pressure is elevated. Increase lisinopril and follow home chart. Labs as ordered. Diagnoses and all orders for this visit:    1. Chronic diastolic heart failure ([address]    2.  Essential hypertension  -     [facility]    3. Mixed hyperlipidemia    4. Obesity (BMI 30.0-34. 9)        Pertinent laboratory and test data reviewed and discussed with patient. See patient instructions also for other medical advice given    There are no discontinued medications. Follow-up and Dispositions    · Return in about 6 months (around 9/10/2022), or if symptoms worsen or fail to improve, for BP log x 4-5 days post med changes. 3/10/2022 CHF is compensated NYHA class II. New shortness of breath is likely due to change in seasonal allergies as it gets relieved with inhalers. Blood pressure is elevated today but in 120s over 70s at home. Check home chart before adjusting the medicine. Lipids are now well controlled and continue statins. Diet weight and exercise discussed again.

## 2022-03-10 NOTE — PROGRESS NOTES
1. Have you been to the ER, urgent care clinic since your last visit? Hospitalized since your last visit? No    2. Have you seen or consulted any other health care providers outside of the [address] [place] [address] since your last visit? Include any pap smears or colon screening. No    3. Since your last visit, have you had any of the following symptoms? shortness of breath and swelling in legs/arms. 4.  Have you had any blood work, X-rays or cardiac testing? Yes When: November 2021 Where: [facility] Reason for visit: routine     Requested: NO     In [facility]: YES    5. Where do you normally have your labs drawn? [facility]    6. Do you need any refills today?    No

## 2022-03-10 NOTE — PATIENT INSTRUCTIONS
There are no discontinued medications. A Healthy Heart: Care Instructions  Your Care Instructions     Coronary artery disease, also called heart disease, occurs when a substance called plaque builds up in the vessels that supply oxygen-rich blood to your heart muscle. This can narrow the blood vessels and reduce blood flow. A heart attack happens when blood flow is completely blocked. A high-fat diet, smoking, and other factors increase the risk of heart disease. Your doctor has found that you have a chance of having heart disease. You can do lots of things to keep your heart healthy. It may not be easy, but you can change your diet, exercise more, and quit smoking. These steps really work to lower your chance of heart disease. Follow-up care is a key part of your treatment and safety. Be sure to make and go to all appointments, and call your doctor if you are having problems. It's also a good idea to know your test results and keep a list of the medicines you take. How can you care for yourself at home? Diet    · Use less salt when you cook and eat. This helps lower your blood pressure. Taste food before salting. Add only a little salt when you think you need it. With time, your taste buds will adjust to less salt.     · Eat fewer snack items, fast foods, canned soups, and other high-salt, high-fat, processed foods.     · Read food labels and try to avoid saturated and trans fats. They increase your risk of heart disease by raising cholesterol levels.     · Limit the amount of solid fat-butter, margarine, and shortening-you eat. Use olive, peanut, or canola oil when you cook. Bake, broil, and steam foods instead of frying them.     · Eat a variety of fruit and vegetables every day. Dark green, deep orange, red, or yellow fruits and vegetables are especially good for you.  Examples include spinach, carrots, peaches, and berries.     · Foods high in fiber can reduce your cholesterol and provide important vitamins and minerals. High-fiber foods include whole-grain cereals and breads, oatmeal, beans, brown rice, citrus fruits, and apples.     · Eat lean proteins. Heart-healthy proteins include seafood, lean meats and poultry, eggs, beans, peas, nuts, seeds, and soy products.     · Limit drinks and foods with added sugar. These include candy, desserts, and soda pop. Lifestyle changes    · If your doctor recommends it, get more exercise. Walking is a good choice. Bit by bit, increase the amount you walk every day. Try for at least 30 minutes on most days of the week. You also may want to swim, bike, or do other activities.     · Do not smoke. If you need help quitting, talk to your doctor about stop-smoking programs and medicines. These can increase your chances of quitting for good. Quitting smoking may be the most important step you can take to protect your heart. It is never too late to quit.     · Limit alcohol to 2 drinks a day for men and 1 drink a day for women. Too much alcohol can cause health problems.     · Manage other health problems such as diabetes, high blood pressure, and high cholesterol. If you think you may have a problem with alcohol or drug use, talk to your doctor. Medicines    · Take your medicines exactly as prescribed. Call your doctor if you think you are having a problem with your medicine.     · If your doctor recommends aspirin, take the amount directed each day. Make sure you take aspirin and not another kind of pain reliever, such as acetaminophen (Tylenol). When should you call for help? Call 911 if you have symptoms of a heart attack. These may include:    · Chest pain or pressure, or a strange feeling in the chest.     · Sweating.     · Shortness of breath.     · Pain, pressure, or a strange feeling in the back, neck, jaw, or upper belly or in one or both shoulders or arms.     · Lightheadedness or sudden weakness.     · A fast or irregular heartbeat.    After you call 911, the  may tell you to chew 1 adult-strength or 2 to 4 low-dose aspirin. Wait for an ambulance. Do not try to drive yourself. Watch closely for changes in your health, and be sure to contact your doctor if you have any problems. Where can you learn more? Go to http://www.randolph.com/  Enter F075 in the search box to learn more about \"A Healthy Heart: Care Instructions. \"  Current as of: January 10, 2022               Content Version: 13.2  © 2006-2022 [employer]. Care instructions adapted under license by [employer] (which disclaims liability or warranty for this information). If you have questions about a medical condition or this instruction, always ask your healthcare professional. [address] any warranty or liability for your use of this information.

## 2022-03-11 DIAGNOSIS — I10 ESSENTIAL HYPERTENSION: Primary | ICD-10-CM

## 2022-03-11 DIAGNOSIS — M62.830 BACK SPASM: ICD-10-CM

## 2022-03-14 ENCOUNTER — HOSPITAL ENCOUNTER (OUTPATIENT)
Dept: LAB | Age: 62
Discharge: HOME | End: 2022-03-14

## 2022-03-14 LAB — XX-LABCORP SPECIMEN COL,LCBCF: NORMAL

## 2022-03-14 PROCEDURE — 99001 SPECIMEN HANDLING PT-LAB: CPT

## 2022-03-14 RX ORDER — CYCLOBENZAPRINE HCL 10 MG
TABLET ORAL
Qty: 30 TABLET | Refills: 0 | Status: SHIPPED | OUTPATIENT
Start: 2022-03-14 | End: 2022-06-01 | Stop reason: SDUPTHER

## 2022-03-15 LAB
25(OH)D3+25(OH)D2 SERPL-MCNC: 106 NG/ML (ref 30–100)
ALBUMIN SERPL-MCNC: 4.6 G/DL (ref 3.8–4.8)
ALBUMIN/GLOB SERPL: 1.4 {RATIO} (ref 1.2–2.2)
ALP SERPL-CCNC: 75 IU/L (ref 44–121)
ALT SERPL-CCNC: 13 IU/L (ref 0–32)
AST SERPL-CCNC: 14 IU/L (ref 0–40)
BILIRUB SERPL-MCNC: 0.5 MG/DL (ref 0–1.2)
BUN SERPL-MCNC: 16 MG/DL (ref 8–27)
BUN/CREAT SERPL: 14 (ref 12–28)
CALCIUM SERPL-MCNC: 9.7 MG/DL (ref 8.7–10.3)
CHLORIDE SERPL-SCNC: 102 MMOL/L (ref 96–106)
CO2 SERPL-SCNC: 21 MMOL/L (ref 20–29)
CREAT SERPL-MCNC: 1.12 MG/DL (ref 0.57–1)
EGFR: 55 ML/MIN/1.73
EST. AVERAGE GLUCOSE BLD GHB EST-MCNC: 120 MG/DL
GLOBULIN SER CALC-MCNC: 3.3 G/DL (ref 1.5–4.5)
GLUCOSE SERPL-MCNC: 95 MG/DL (ref 65–99)
HBA1C MFR BLD: 5.8 % (ref 4.8–5.6)
MAGNESIUM SERPL-MCNC: 1.8 MG/DL (ref 1.6–2.3)
POTASSIUM SERPL-SCNC: 4.1 MMOL/L (ref 3.5–5.2)
PROT SERPL-MCNC: 7.9 G/DL (ref 6–8.5)
SODIUM SERPL-SCNC: 143 MMOL/L (ref 134–144)

## 2022-03-18 PROBLEM — R07.9 CHEST PAIN: Status: ACTIVE | Noted: 2019-03-25

## 2022-03-19 PROBLEM — M62.830 BACK SPASM: Status: ACTIVE | Noted: 2021-11-12

## 2022-03-21 ENCOUNTER — OFFICE VISIT (OUTPATIENT)
Dept: FAMILY MEDICINE CLINIC | Age: 62
End: 2022-03-21
Payer: COMMERCIAL

## 2022-03-21 VITALS
TEMPERATURE: 97.6 F | BODY MASS INDEX: 31.02 KG/M2 | DIASTOLIC BLOOD PRESSURE: 66 MMHG | HEART RATE: 80 BPM | WEIGHT: 192.2 LBS | RESPIRATION RATE: 16 BRPM | OXYGEN SATURATION: 100 % | SYSTOLIC BLOOD PRESSURE: 150 MMHG

## 2022-03-21 DIAGNOSIS — J45.41 MODERATE PERSISTENT ASTHMA WITH ACUTE EXACERBATION: ICD-10-CM

## 2022-03-21 DIAGNOSIS — E83.42 HYPOMAGNESEMIA: ICD-10-CM

## 2022-03-21 DIAGNOSIS — I50.32 CHRONIC DIASTOLIC HEART FAILURE (HCC): ICD-10-CM

## 2022-03-21 DIAGNOSIS — E11.22 TYPE 2 DIABETES MELLITUS WITH STAGE 3 CHRONIC KIDNEY DISEASE, WITHOUT LONG-TERM CURRENT USE OF INSULIN, UNSPECIFIED WHETHER STAGE 3A OR 3B CKD (HCC): ICD-10-CM

## 2022-03-21 DIAGNOSIS — E78.2 MIXED HYPERLIPIDEMIA: ICD-10-CM

## 2022-03-21 DIAGNOSIS — M62.830 BACK SPASM: ICD-10-CM

## 2022-03-21 DIAGNOSIS — E55.9 VITAMIN D DEFICIENCY: ICD-10-CM

## 2022-03-21 DIAGNOSIS — N18.30 TYPE 2 DIABETES MELLITUS WITH STAGE 3 CHRONIC KIDNEY DISEASE, WITHOUT LONG-TERM CURRENT USE OF INSULIN, UNSPECIFIED WHETHER STAGE 3A OR 3B CKD (HCC): ICD-10-CM

## 2022-03-21 DIAGNOSIS — I10 ESSENTIAL HYPERTENSION: Primary | ICD-10-CM

## 2022-03-21 DIAGNOSIS — K21.9 GASTROESOPHAGEAL REFLUX DISEASE WITHOUT ESOPHAGITIS: ICD-10-CM

## 2022-03-21 PROCEDURE — 3044F HG A1C LEVEL LT 7.0%: CPT | Performed by: FAMILY MEDICINE

## 2022-03-21 PROCEDURE — 99214 OFFICE O/P EST MOD 30 MIN: CPT | Performed by: FAMILY MEDICINE

## 2022-03-21 RX ORDER — LANOLIN ALCOHOL/MO/W.PET/CERES
CREAM (GRAM) TOPICAL
Qty: 90 TABLET | Refills: 0 | Status: SHIPPED | OUTPATIENT
Start: 2022-03-21 | End: 2022-06-24 | Stop reason: SDUPTHER

## 2022-03-21 RX ORDER — AMLODIPINE BESYLATE 2.5 MG/1
2.5 TABLET ORAL DAILY
Qty: 90 TABLET | Refills: 0 | Status: SHIPPED
Start: 2022-03-21 | End: 2022-04-25

## 2022-03-21 NOTE — PROGRESS NOTES
Chantale Courser, 59 y.o.,  female    SUBJECTIVE  Ff-up    Ff-up     DM w/ CKD- reports [facility] 1teen's. Currently on janumet. Following with dr. Carole Holland for eye exams      HTN/diastolic CHF- w/ h/o [address] no residual deficit. Currently on amlodipine, hctz, lisinopril. Following Dr. Kim Lopez, reviewed note NYHA class 2, pt denies any symptoms. Has  reduced HCTZ to every other day.    reports home readings similar -150's    HL- on crestor, reviewed labs improved LDL, better compliance     GERD- responding well to prilosec     Asthma- no recent exacerbations, says using advair regularly, prn albuterol     Back spasm- on and off for few years now, responding to prn flexeril/nsaids. She works at [employer], long days with lifting usually trigger.     ROS:  See HPI, all others negative        Patient Active Problem List   Diagnosis Code    Asthma J45.909    Mixed hyperlipidemia E78.2    Vitamin D deficiency E55.9    Anxiety and depression F41.9, F32. A    Type 2 diabetes mellitus with chronic kidney disease (HCC) E11.22    Essential hypertension I10    Chronic diastolic heart failure (HCC) I50.32    Obesity (BMI 30.0-34. 9) E66.9    New onset of headaches after age 51 R50.7    Iron deficiency anemia D50.9    Gastroesophageal reflux disease without esophagitis K21.9    Hypomagnesemia E83.42    History of subarachnoid hemorrhage Z86.79    Hypokalemia E87.6    Post-menopausal Z78.0    Chest pain R07.9    Back spasm M62.830       Current Outpatient Medications   Medication Sig Dispense Refill    magnesium oxide (MAG-OX) 400 mg tablet TAKE ONE TABLET BY MOUTH ONCE DAILY 90 Tablet 0    amLODIPine (NORVASC) 2.5 mg tablet Take 1 Tablet by mouth daily. 90 Tablet 0    cyclobenzaprine (FLEXERIL) 10 mg tablet TAKE ONE-HALF TO ONE TABLET BY MOUTH DAILY AS NEEDED FOR BACK OR HIP PAIN 30 Tablet 0    lisinopriL (PRINIVIL, ZESTRIL) 20 mg tablet Take 1 Tablet by mouth daily.  90 Tablet 1    amLODIPine (NORVASC) 5 mg tablet TAKE ONE TABLET BY MOUTH DAILY 90 Tablet 0    rosuvastatin (CRESTOR) 10 mg tablet Take 1 Tablet by mouth nightly. 90 Tablet 3    SITagliptin-metFORMIN (Janumet) 50-1,000 mg per tablet TAKE ONE TABLET BY MOUTH TWICE A DAY WITH MEALS 180 Tablet 0    fluticasone propion-salmeteroL (ADVAIR/WIXELA) 250-50 mcg/dose diskus inhaler INHALE ONE PUFF BY MOUTH EVERY 12 HOURS 3 Each 1    diclofenac (VOLTAREN) 1 % gel APPLY 4 GRAMS TO AFFECTED AREA EVERY 6 HOURS 1 Each 0    albuterol (PROVENTIL VENTOLIN) 2.5 mg /3 mL (0.083 %) nebu 3 mL by Nebulization route every four (4) hours as needed for Wheezing. 30 Nebule 2    albuterol (PROVENTIL HFA, VENTOLIN HFA, PROAIR HFA) 90 mcg/actuation inhaler Take 2 Puffs by inhalation every four (4) hours as needed for Wheezing. 17 g 5    montelukast (SINGULAIR) 10 mg tablet Take 1 Tablet by mouth daily. 90 Tablet 3    levocetirizine (XYZAL) 5 mg tablet Take 1 Tablet by mouth daily. 90 Tablet 3    mometasone (NASONEX) 50 mcg/actuation nasal spray 2 Sprays by Both Nostrils route daily. (Patient taking differently: 2 Sprays by Both Nostrils route daily as needed.) 1 Each 5    hydroCHLOROthiazide (MICROZIDE) 12.5 mg capsule Take 1 Capsule by mouth every other day.  TAKE ONE CAPSULE BY MOUTH DAILY 90 Capsule 1    omeprazole (PRILOSEC) 20 mg capsule TAKE ONE CAPSULE BY MOUTH DAILY 90 Capsule 3       Allergies   Allergen Reactions    Banana Hives     Asthma attach, throat swelling, throat scratching, lip swelling    Iodine Rash    Keflex [Cephalexin] Hives    Seafood Hives    Watermelon Hives     Throat closes    Clonidine Other (comments)     Memory loss       Past Medical History:   Diagnosis Date    Ankle swelling     Asthma 02/04/2010    PFTs 2016 showed obstructive lung pattern    Brain bleed (HCC) 02/20/2015    Chronic diastolic heart failure ([facility] [address] 3/18/2015    Diabetes mellitus ([facility] [address] 5/27/2014    Diabetic eye exam ([facility] [address] 01/26/2017    No retinopathy Best corrected vision 20/20 OU    HTN (hypertension) 2/4/2010    Hyperlipidemia LDL goal < 70 2014    Magnesium deficiency     Obesity, unspecified 4/15/2015    SAH (subarachnoid hemorrhage) ([facility] [address] 2/20/15    admitted in [facility], Dr. Serg Urena. Rafael Senior and ref to Dr. Ayesha Canales, Neurology    Stroke [facility]) 2015    mini stroke    Vitamin D deficiency 2000       Social History     Socioeconomic History    Marital status: SINGLE     Spouse name: Not on file    Number of children: 1    Years of education: 13    Highest education level: Not on file   Occupational History     Employer: [employer]     Comment: unemployed since Jan 2014, applied to [employer] for job July 2014   Tobacco Use    Smoking status: Never Smoker    Smokeless tobacco: Never Used   Substance and Sexual Activity    Alcohol use: Yes     Alcohol/week: 0.0 standard drinks     Comment: social    Drug use: No    Sexual activity: Yes     Partners: Male     Birth control/protection: Condom   Other Topics Concern     Service No    Blood Transfusions Yes    Caffeine Concern Yes    Occupational Exposure No    Hobby Hazards No    Sleep Concern No    Stress Concern No    Weight Concern No    Special Diet No    Back Care No    Exercise No    Bike Helmet No    Seat Belt Yes    Self-Exams Yes   Social History Narrative    Not on file     Social Determinants of Health     Financial Resource Strain:     Difficulty of Paying Living Expenses: Not on file   Food Insecurity:     Worried About Running Out of Food in the Last Year: Not on file    Adeline of Food in the Last Year: Not on file   Transportation Needs:     Lack of Transportation (Medical): Not on file    Lack of Transportation (Non-Medical):  Not on file   Physical Activity:     Days of Exercise per Week: Not on file    Minutes of Exercise per Session: Not on file   Stress:     Feeling of Stress : Not on file   Social Connections:     Frequency of Communication with Friends and Family: Not on file    Frequency of Social Gatherings with Friends and Family: Not on file    Attends Jainism Services: Not on file    Active Member of Clubs or Organizations: Not on file    Attends Club or Organization Meetings: Not on file    Marital Status: Not on file   Intimate Partner Violence:     Fear of Current or Ex-Partner: Not on file    Emotionally Abused: Not on file    Physically Abused: Not on file    Sexually Abused: Not on file   Housing Stability:     Unable to Pay for Housing in the Last Year: Not on file    Number of [place] in the Last Year: Not on file    Unstable Housing in the Last Year: Not on file       Family History   Problem Relation Age of Onset    Diabetes Mother     Hypertension Mother              Heart Attack Mother 62    Alcohol abuse Father         cirrhosis    Diabetes Sister     Heart Attack Sister 64         OBJECTIVE    Physical Exam:     Visit Vitals  BP (!) 150/66 (BP 1 Location: Left upper arm, BP Patient Position: Sitting, BP Cuff Size: Adult)   Pulse 80   Temp 97.6 °F (36.4 °C) (Temporal)   Resp 16   Wt 192 lb 3.2 oz (87.2 kg)   SpO2 100%   BMI 31.02 kg/m²       General: alert, well-appearing, in no apparent distress or pain  Neck: supple, no adenopathy palpated  CVS: normal rate, regular rhythm, distinct S1 and S2  Lungs:clear to ausculation bilaterally, no crackles, wheezing or rhonchi noted  Abdomen: normoactive bowel sounds, soft, non-tender  Extremities: no edema, no cyanosis, MSK grossly normal  Skin: warm, no lesions, rashes noted  Psych:  mood and affect normal    Results for orders placed or performed during the hospital encounter of 03/14/22   [facility] SPECIMEN COL   Result Value Ref Range    [facility] SPECIMEN [facility]. Specimens collected/sent to [facility]. Please direct inquiries to ([phone]). ASSESSMENT/PLAN    1.  Controlled type 2 diabetes mellitus with microalbuminuria, without long-term current use of insulin (HCC)  a1c 6>5.8  Controlled  Cont janumet  Cont lisinopril  Cont crestor- commended on better compliance  Due for eye exam dr Anand Norris- pt reminded  Foot exam 8/2021- normal       2. Gastroesophageal reflux disease without esophagitis  Stable  Cont prilosec     3. Mixed hyperlipidemia  LDL Goal <100   cont crestor     4. Essential hypertension  Needs better control  Increase norvasc to 7.5 mg ( 5mg +2.5 mg)  Cont   lisinopril  Cont hctz every other day  (Pt reports hx worsening asthma on BB)   BRING BP MONITOR TO COMPARE ON NEXT VISIT    5. Chronic diastolic heart failure (HCC)  Stable, euvolemic  On ace  Following dr. Rene Calabrese     6. Moderate persistent asthma with acute exacerbation  Stable  Cont advair, prn albuterol       7. Back spasm  Intermittent  Switch po nsaids to topical voltaren with creatinine trending up  Cont prn flexeril  Back stretching/strengthening     8. Vitamin D deficiency  Elevated levels  Advised to stop 50k weekly vitamin D    9 Hypomagnesemia  Wnl, cont current replacement dose while on hctz  -     magnesium oxide (MAG-OX) 400 mg tablet; TAKE ONE TABLET BY MOUTH ONCE DAILY      Other orders  -     amLODIPine (NORVASC) 2.5 mg tablet; Take 1 Tablet by mouth daily. Follow-up and Dispositions    · Return in about 4 weeks (around 4/18/2022), or if symptoms worsen or fail to improve, for BP recheck, routine chronic illness care. Patient understands plan of care. Patient has provided input and agrees with goals.

## 2022-03-21 NOTE — PROGRESS NOTES
1. Have you been to the ER, urgent care clinic since your last visit? Hospitalized since your last visit? No    2. Have you seen or consulted any other health care providers outside of the [address] [place] [address] since your last visit? Include any pap smears or colon screening.  No      Chief Complaint   Patient presents with    Diabetes    Cholesterol Problem    Hypertension    GERD

## 2022-03-21 NOTE — PATIENT INSTRUCTIONS
Learning About Carbohydrate (Carb) Counting and Eating Out When You Have Diabetes  Why plan your meals? Meal planning can be a key part of managing diabetes. Planning meals and snacks with the right balance of carbohydrate, protein, and fat can help you keep your blood sugar at the target level you set with your doctor. You don't have to eat special foods. You can eat what your family eats, including sweets once in a while. But you do have to pay attention to how often you eat and how much you eat of certain foods. You may want to work with a dietitian or a diabetes educator. They can give you tips and meal ideas and can answer your questions about meal planning. This health professional can also help you reach a healthy weight if that is one of your goals. What should you know about eating carbs? Managing the amount of carbohydrate (carbs) you eat is an important part of healthy meals when you have diabetes. Carbohydrate is found in many foods. · Learn which foods have carbs. And learn the amounts of carbs in different foods. ? Bread, cereal, pasta, and rice have about 15 grams of carbs in a serving. A serving is 1 slice of bread (1 ounce), ½ cup of cooked cereal, or 1/3 cup of cooked pasta or rice. ? Fruits have 15 grams of carbs in a serving. A serving is 1 small fresh fruit, such as an apple or orange; ½ of a banana; ½ cup of cooked or canned fruit; ½ cup of fruit juice; 1 cup of melon or raspberries; or 2 tablespoons of dried fruit. ? Milk and no-sugar-added yogurt have 15 grams of carbs in a serving. A serving is 1 cup of milk or 3/4 cup (6 oz) of no-sugar-added yogurt. ? Starchy vegetables have 15 grams of carbs in a serving. A serving is ½ cup of mashed potatoes or sweet potato; 1 cup winter squash; ½ of a small baked potato; ½ cup of cooked beans; or ½ cup cooked corn or green peas.   · Learn how much carbs to eat each day and at each meal. A dietitian or certified diabetes educator can teach you how to keep track of the amount of carbs you eat. This is called carbohydrate counting. · If you are not sure how to count carbohydrate grams, use the plate method to plan meals. It is a quick way to make sure that you have a balanced meal. It also can help you manage the amount of carbohydrate you eat at meals. ? Divide your plate by types of foods. Put non-starchy vegetables on half the plate, meat or other protein food on one-quarter of the plate, and a grain or starchy vegetable in the final quarter of the plate. To this you can add a small piece of fruit and 1 cup of milk or yogurt, depending on how many carbs you are supposed to eat at a meal.  · Try to eat about the same amount of carbs at each meal. Do not \"save up\" your daily allowance of carbs to eat at one meal.  · Proteins have very little or no carbs. Examples of proteins are beef, chicken, turkey, fish, eggs, tofu, cheese, cottage cheese, and peanut butter. How can you eat out and still eat healthy? · Learn to estimate the serving sizes of foods that have carbohydrate. If you measure food at home, it will be easier to estimate the amount in a serving of restaurant food. · If the meal you order has too much carbohydrate (such as potatoes, corn, or baked beans), ask to have a low-carbohydrate food instead. Ask for a salad or non-starchy vegetables like broccoli, cauliflower, green beans, or peppers. · If you eat more carbohydrate at a meal than you had planned, take a walk or do other exercise. This will help lower your blood sugar. What are some tips for eating healthy? · Limit saturated fat, such as the fat from meat and dairy products. This is a healthy choice because people who have diabetes are at higher risk of heart disease. So choose lean cuts of meat and nonfat or low-fat dairy products. Use olive or canola oil instead of butter or shortening when cooking. · Don't skip meals.  Your blood sugar may drop too low if you skip meals and take insulin or certain medicines for diabetes. · Check with your doctor before you drink alcohol. Alcohol can cause your blood sugar to drop too low. Alcohol can also cause a bad reaction if you take certain diabetes medicines. Follow-up care is a key part of your treatment and safety. Be sure to make and go to all appointments, and call your doctor if you are having problems. It's also a good idea to know your test results and keep a list of the medicines you take. Where can you learn more? Go to http://www.randolph.com/  Enter I147 in the search box to learn more about \"Learning About Carbohydrate (Carb) Counting and Eating Out When You Have Diabetes. \"  Current as of: September 8, 2021               Content Version: 13.2  © 2006-2022 [employer]. Care instructions adapted under license by [employer] (which disclaims liability or warranty for this information). If you have questions about a medical condition or this instruction, always ask your healthcare professional. [place] [address] any warranty or liability for your use of this information.

## 2022-04-12 DIAGNOSIS — I10 ESSENTIAL HYPERTENSION: ICD-10-CM

## 2022-04-13 RX ORDER — HYDROCHLOROTHIAZIDE 12.5 MG/1
CAPSULE ORAL
Qty: 45 CAPSULE | Refills: 1 | Status: SHIPPED | OUTPATIENT
Start: 2022-04-13 | End: 2022-04-25 | Stop reason: SDUPTHER

## 2022-04-20 ENCOUNTER — HOSPITAL ENCOUNTER (OUTPATIENT)
Dept: LAB | Age: 62
Discharge: HOME | End: 2022-04-20
Payer: COMMERCIAL

## 2022-04-20 LAB
25(OH)D3 SERPL-MCNC: 102.9 NG/ML (ref 30–100)
ALBUMIN SERPL-MCNC: 4.1 G/DL (ref 3.4–5)
ALBUMIN/GLOB SERPL: 1.1 {RATIO} (ref 0.8–1.7)
ALP SERPL-CCNC: 75 U/L (ref 45–117)
ALT SERPL-CCNC: 19 U/L (ref 13–56)
ANION GAP SERPL CALC-SCNC: 1 MMOL/L (ref 3–18)
AST SERPL-CCNC: 15 U/L (ref 10–38)
BILIRUB SERPL-MCNC: 0.7 MG/DL (ref 0.2–1)
BUN SERPL-MCNC: 16 MG/DL (ref 7–18)
BUN/CREAT SERPL: 14 (ref 12–20)
CALCIUM SERPL-MCNC: 9.3 MG/DL (ref 8.5–10.1)
CHLORIDE SERPL-SCNC: 107 MMOL/L (ref 100–111)
CO2 SERPL-SCNC: 32 MMOL/L (ref 21–32)
CREAT SERPL-MCNC: 1.11 MG/DL (ref 0.6–1.3)
EST. AVERAGE GLUCOSE BLD GHB EST-MCNC: 123 MG/DL
GLOBULIN SER CALC-MCNC: 3.9 G/DL (ref 2–4)
GLUCOSE SERPL-MCNC: 109 MG/DL (ref 74–99)
HBA1C MFR BLD: 5.9 % (ref 4.2–5.6)
MAGNESIUM SERPL-MCNC: 1.9 MG/DL (ref 1.6–2.6)
POTASSIUM SERPL-SCNC: 4.3 MMOL/L (ref 3.5–5.5)
PROT SERPL-MCNC: 8 G/DL (ref 6.4–8.2)
SODIUM SERPL-SCNC: 140 MMOL/L (ref 136–145)

## 2022-04-20 PROCEDURE — 82306 VITAMIN D 25 HYDROXY: CPT

## 2022-04-20 PROCEDURE — 80053 COMPREHEN METABOLIC PANEL: CPT

## 2022-04-20 PROCEDURE — 83735 ASSAY OF MAGNESIUM: CPT

## 2022-04-20 PROCEDURE — 83036 HEMOGLOBIN GLYCOSYLATED A1C: CPT

## 2022-04-20 PROCEDURE — 36415 COLL VENOUS BLD VENIPUNCTURE: CPT

## 2022-04-25 ENCOUNTER — OFFICE VISIT (OUTPATIENT)
Dept: FAMILY MEDICINE CLINIC | Age: 62
End: 2022-04-25
Payer: COMMERCIAL

## 2022-04-25 VITALS
HEART RATE: 78 BPM | HEIGHT: 66 IN | BODY MASS INDEX: 30.37 KG/M2 | OXYGEN SATURATION: 98 % | RESPIRATION RATE: 16 BRPM | WEIGHT: 189 LBS | SYSTOLIC BLOOD PRESSURE: 142 MMHG | TEMPERATURE: 98.6 F | DIASTOLIC BLOOD PRESSURE: 68 MMHG

## 2022-04-25 DIAGNOSIS — E11.22 TYPE 2 DIABETES MELLITUS WITH CHRONIC KIDNEY DISEASE, WITHOUT LONG-TERM CURRENT USE OF INSULIN, UNSPECIFIED CKD STAGE (HCC): ICD-10-CM

## 2022-04-25 DIAGNOSIS — I10 ESSENTIAL HYPERTENSION: Primary | ICD-10-CM

## 2022-04-25 DIAGNOSIS — J31.0 CHRONIC RHINITIS: ICD-10-CM

## 2022-04-25 DIAGNOSIS — E83.42 HYPOMAGNESEMIA: ICD-10-CM

## 2022-04-25 DIAGNOSIS — I50.32 CHRONIC DIASTOLIC CONGESTIVE HEART FAILURE (HCC): ICD-10-CM

## 2022-04-25 DIAGNOSIS — E78.2 MIXED HYPERLIPIDEMIA: ICD-10-CM

## 2022-04-25 DIAGNOSIS — K21.9 GASTROESOPHAGEAL REFLUX DISEASE WITHOUT ESOPHAGITIS: ICD-10-CM

## 2022-04-25 DIAGNOSIS — J45.40 MODERATE PERSISTENT ASTHMA WITHOUT COMPLICATION: ICD-10-CM

## 2022-04-25 PROCEDURE — 99214 OFFICE O/P EST MOD 30 MIN: CPT | Performed by: FAMILY MEDICINE

## 2022-04-25 PROCEDURE — 3044F HG A1C LEVEL LT 7.0%: CPT | Performed by: FAMILY MEDICINE

## 2022-04-25 RX ORDER — HYDROCHLOROTHIAZIDE 12.5 MG/1
12.5 CAPSULE ORAL DAILY
Qty: 90 CAPSULE | Refills: 0
Start: 2022-04-25 | End: 2022-06-06 | Stop reason: SDUPTHER

## 2022-04-25 RX ORDER — AZELASTINE HYDROCHLORIDE, FLUTICASONE PROPIONATE 137; 50 UG/1; UG/1
1 SPRAY, METERED NASAL 2 TIMES DAILY
Qty: 1 EACH | Refills: 0 | Status: SHIPPED | OUTPATIENT
Start: 2022-04-25

## 2022-04-25 NOTE — PROGRESS NOTES
Joseph Knapp, 59 y.o.,  female    SUBJECTIVE  Ff-up     HTN/diastolic CHF- w/ h/o SAH 5563 no residual deficit. Currently on amlodipine, hctz every other day, lisinopril. Recently increased amlodipine dose to 7.5 mg from 5 mg, however causing headaches she says. Following Dr. Diana Azevedo, reviewed note NYHA class 2, pt denies any symptoms.  reports home readings similar -150's. She says she follows DASH diet, she is in more discomfort due to chronic rhinitis. DM w/ CKD- reports [facility] 1teen's. Currently on janumet. Following with dr. Angie Quintero for eye exams        HL- on crestor, reviewed labs improved LDL, better compliance     GERD- responding well to prilosec     Asthma- no recent exacerbations, says using advair regularly, prn albuterol     Back spasm- on and off for few years now, responding to prn flexeril/nsaids. She works at [employer], long days with lifting usually trigger.     ROS:  See HPI, all others negative        Patient Active Problem List   Diagnosis Code    Asthma J45.909    Mixed hyperlipidemia E78.2    Vitamin D deficiency E55.9    Anxiety and depression F41.9, F32. A    Type 2 diabetes mellitus with chronic kidney disease (HCC) E11.22    Essential hypertension I10    Chronic diastolic heart failure (HCC) I50.32    Obesity (BMI 30.0-34. 9) E66.9    New onset of headaches after age 51 R50.7    Iron deficiency anemia D50.9    Gastroesophageal reflux disease without esophagitis K21.9    Hypomagnesemia E83.42    History of subarachnoid hemorrhage Z86.79    Hypokalemia E87.6    Post-menopausal Z78.0    Chest pain R07.9    Back spasm M62.830       Current Outpatient Medications   Medication Sig Dispense Refill    hydroCHLOROthiazide (MICROZIDE) 12.5 mg capsule Take 1 Capsule by mouth daily. 90 Capsule 0    azelastine-fluticasone (Dymista) 137-50 mcg/spray 1 [place] by Both Nostrils route two (2) times a day.  1 Each 0    omeprazole (PRILOSEC) 20 mg capsule TAKE ONE CAPSULE BY MOUTH DAILY 90 Capsule 3    magnesium oxide (MAG-OX) 400 mg tablet TAKE ONE TABLET BY MOUTH ONCE DAILY 90 Tablet 0    cyclobenzaprine (FLEXERIL) 10 mg tablet TAKE ONE-HALF TO ONE TABLET BY MOUTH DAILY AS NEEDED FOR BACK OR HIP PAIN 30 Tablet 0    lisinopriL (PRINIVIL, ZESTRIL) 20 mg tablet Take 1 Tablet by mouth daily. 90 Tablet 1    amLODIPine (NORVASC) 5 mg tablet TAKE ONE TABLET BY MOUTH DAILY 90 Tablet 0    rosuvastatin (CRESTOR) 10 mg tablet Take 1 Tablet by mouth nightly. 90 Tablet 3    SITagliptin-metFORMIN (Janumet) 50-1,000 mg per tablet TAKE ONE TABLET BY MOUTH TWICE A DAY WITH MEALS 180 Tablet 0    fluticasone propion-salmeteroL (ADVAIR/WIXELA) 250-50 mcg/dose diskus inhaler INHALE ONE PUFF BY MOUTH EVERY 12 HOURS 3 Each 1    diclofenac (VOLTAREN) 1 % gel APPLY 4 GRAMS TO AFFECTED AREA EVERY 6 HOURS 1 Each 0    albuterol (PROVENTIL VENTOLIN) 2.5 mg /3 mL (0.083 %) nebu 3 mL by Nebulization route every four (4) hours as needed for Wheezing. 30 Nebule 2    albuterol (PROVENTIL HFA, VENTOLIN HFA, PROAIR HFA) 90 mcg/actuation inhaler Take 2 Puffs by inhalation every four (4) hours as needed for Wheezing. 17 g 5    levocetirizine (XYZAL) 5 mg tablet Take 1 Tablet by mouth daily.  90 Tablet 3       Allergies   Allergen Reactions    Banana Hives     Asthma attach, throat swelling, throat scratching, lip swelling    Iodine Rash    Keflex [Cephalexin] Hives    Seafood Hives    Watermelon Hives     Throat closes    Clonidine Other (comments)     Memory loss       Past Medical History:   Diagnosis Date    Ankle swelling     Asthma 02/04/2010    PFTs 2016 showed obstructive lung pattern    Brain bleed ([facility] [address] 02/20/2015    Chronic diastolic heart failure ([facility] [address] 3/18/2015    Diabetes mellitus ([facility] [address] 5/27/2014    [facility] 01/26/2017    No retinopathy Best corrected vision 20/20 OU    HTN (hypertension) 2/4/2010    Hyperlipidemia LDL goal < 70 2014    Magnesium deficiency     Obesity, unspecified 4/15/2015    SAH (subarachnoid hemorrhage) ([facility] [address] 2/20/15    admitted in [facility], Dr. Henna Brenner. Tashia Uribe and ref to Dr. Sylwia Reynolds, Neurology    Stroke [facility]) 2015    mini stroke    Vitamin D deficiency 2000       Social History     Socioeconomic History    Marital status: SINGLE     Spouse name: Not on file    Number of children: 1    Years of education: 13    Highest education level: Not on file   Occupational History     Employer: [employer]     Comment: unemployed since Jan 2014, applied to [employer] for job July 2014   Tobacco Use    Smoking status: Never Smoker    Smokeless tobacco: Never Used   Substance and Sexual Activity    Alcohol use: Yes     Alcohol/week: 0.0 standard drinks     Comment: social    Drug use: No    Sexual activity: Yes     Partners: Male     Birth control/protection: Condom   Other Topics Concern     Service No    Blood Transfusions Yes    Caffeine Concern Yes    Occupational Exposure No    Hobby Hazards No    Sleep Concern No    Stress Concern No    Weight Concern No    Special Diet No    Back Care No    Exercise No    Bike Helmet No    Seat Belt Yes    Self-Exams Yes   Social History Narrative    Not on file     Social Determinants of Health     Financial Resource Strain:     Difficulty of Paying Living Expenses: Not on file   Food Insecurity:     Worried About Running Out of Food in the Last Year: Not on file    Adeline of Food in the Last Year: Not on file   Transportation Needs:     Lack of Transportation (Medical): Not on file    Lack of Transportation (Non-Medical):  Not on file   Physical Activity:     Days of Exercise per Week: Not on file    Minutes of Exercise per Session: Not on file   Stress:     Feeling of Stress : Not on file   Social Connections:     Frequency of Communication with Friends and Family: Not on file    Frequency of Social Gatherings with Friends and Family: Not on file    Attends Restoration Services: Not on file    Active Member of Clubs or Organizations: Not on file    Attends Club or Organization Meetings: Not on file    Marital Status: Not on file   Intimate Partner Violence:     Fear of Current or Ex-Partner: Not on file    Emotionally Abused: Not on file    Physically Abused: Not on file    Sexually Abused: Not on file   Housing Stability:     Unable to Pay for Housing in the Last Year: Not on file    Number of Places Lived in the Last Year: Not on file    Unstable Housing in the Last Year: Not on file       Family History   Problem Relation Age of Onset    Diabetes Mother     Hypertension Mother              Heart Attack Mother 62    Alcohol abuse Father         cirrhosis    Diabetes Sister     Heart Attack Sister 64         OBJECTIVE    Physical Exam:     Visit Vitals  BP (!) 142/68 (BP 1 Location: Left upper arm, BP Patient Position: Sitting, BP Cuff Size: Adult)   Pulse 78   Temp 98.6 °F (37 °C) (Oral)   Resp 16   Ht 5' 6\" (1.676 m)   Wt 189 lb (85.7 kg)   SpO2 98%   BMI 30.51 kg/m²       General: alert, well-appearing, in no apparent distress or pain  Neck: supple, no adenopathy palpated  CVS: normal rate, regular rhythm, distinct S1 and S2  Lungs:clear to ausculation bilaterally, no crackles, wheezing or rhonchi noted  Abdomen: normoactive bowel sounds, soft, non-tender  Extremities: no edema, no cyanosis, MSK grossly normal  Skin: warm, no lesions, rashes noted  Psych:  mood and affect normal    Results for orders placed or performed during the hospital encounter of 04/20/22   MAGNESIUM   Result Value Ref Range    Magnesium 1.9 1.6 - 2.6 mg/dL   METABOLIC PANEL, COMPREHENSIVE   Result Value Ref Range    Sodium 140 136 - 145 mmol/L    Potassium 4.3 3.5 - 5.5 mmol/L    Chloride 107 100 - 111 mmol/L    CO2 32 21 - 32 mmol/L    Anion gap 1 (L) 3.0 - 18 mmol/L    Glucose 109 (H) 74 - 99 mg/dL    BUN 16 7.0 - 18 MG/DL    Creatinine 1.11 0.6 - 1.3 MG/DL    BUN/Creatinine ratio 14 12 - 20      GFR est AA >60 >60 ml/min/1.73m2    GFR est non-AA 50 (L) >60 ml/min/1.73m2    Calcium 9.3 8.5 - 10.1 MG/DL    Bilirubin, total 0.7 0.2 - 1.0 MG/DL    ALT (SGPT) 19 13 - 56 U/L    AST (SGOT) 15 10 - 38 U/L    Alk. phosphatase 75 45 - 117 U/L    Protein, total 8.0 6.4 - 8.2 g/dL    Albumin 4.1 3.4 - 5.0 g/dL    Globulin 3.9 2.0 - 4.0 g/dL    A-G Ratio 1.1 0.8 - 1.7     VITAMIN D, 25 HYDROXY   Result Value Ref Range    Vitamin D 25-Hydroxy 102.9 (H) 30 - 100 ng/mL   HEMOGLOBIN A1C WITH EAG   Result Value Ref Range    Hemoglobin A1c 5.9 (H) 4.2 - 5.6 %    Est. average glucose 123 mg/dL         ASSESSMENT/PLAN     Essential hypertension  Needs better control  Increased norvasc dose of  7.5 mg causing HAs, go back down to 5 mg.   Cont   lisinopril 20 mg  Increase hctz 12.5 mg daily instead of every other day, if persistently elevated >140/90 in 2 weeks, increase hctz to 25 mg (2 tabs of 12.5 mg)  (Pt reports hx worsening asthma on BB)    Chronic rhinitis  Needs better control  Advised ENT consult for allergy testing, she declines  Trial dymista, cont singulair, xyzal     Controlled type 2 diabetes mellitus with microalbuminuria, without long-term current use of insulin ([facility])  a1c 6>5.8  Controlled  Cont janumet  Cont lisinopril  Cont crestor- commended on better compliance  Due for eye exam dr Mitzi Taylor- pt reminded  Foot exam 8/2021- normal       Gastroesophageal reflux disease without esophagitis  Stable  Cont prilosec     Mixed hyperlipidemia  LDL Goal <100   cont crestor      Chronic diastolic heart failure (HCC)  Stable, euvolemic  On ace (intolerant to BB-asthma)  Following dr. Oliveira       Moderate persistent asthma with acute exacerbation  Stable  Cont advair, prn albuterol      Hypomagnesemia  Wnl, cont current replacement dose while on hctz  -     magnesium oxide (MAG-OX) 400 mg tablet; TAKE ONE TABLET BY MOUTH ONCE DAILY        Follow-up and Dispositions    · Return in about 4 weeks (around 5/23/2022), or if symptoms worsen or fail to improve, for routine chronic illness care. Patient understands plan of care. Patient has provided input and agrees with goals.

## 2022-04-25 NOTE — PATIENT INSTRUCTIONS
Increase hctz to 12.5 mg DAILY, if BP >140/90 then increase hctz 25 mg (2 tabs) daily    DASH Diet: Care Instructions  Your Care Instructions     The DASH diet is an eating plan that can help lower your blood pressure. DASH stands for Dietary Approaches to Stop Hypertension. Hypertension is high blood pressure. The DASH diet focuses on eating foods that are high in calcium, potassium, and magnesium. These nutrients can lower blood pressure. The foods that are highest in these nutrients are fruits, vegetables, low-fat dairy products, nuts, seeds, and legumes. But taking calcium, potassium, and magnesium supplements instead of eating foods that are high in those nutrients does not have the same effect. The DASH diet also includes whole grains, fish, and poultry. The DASH diet is one of several lifestyle changes your doctor may recommend to lower your high blood pressure. Your doctor may also want you to decrease the amount of sodium in your diet. Lowering sodium while following the DASH diet can lower blood pressure even further than just the DASH diet alone. Follow-up care is a key part of your treatment and safety. Be sure to make and go to all appointments, and call your doctor if you are having problems. It's also a good idea to know your test results and keep a list of the medicines you take. How can you care for yourself at home? Following the DASH diet  · Eat 4 to 5 servings of fruit each day. A serving is 1 medium-sized piece of fruit, ½ cup chopped or canned fruit, 1/4 cup dried fruit, or 4 ounces (½ cup) of fruit juice. Choose fruit more often than fruit juice. · Eat 4 to 5 servings of vegetables each day. A serving is 1 cup of lettuce or raw leafy vegetables, ½ cup of chopped or cooked vegetables, or 4 ounces (½ cup) of vegetable juice. Choose vegetables more often than vegetable juice. · Get 2 to 3 servings of low-fat and fat-free dairy each day.  A serving is 8 ounces of milk, 1 cup of yogurt, or 1 ½ ounces of cheese. · Eat 6 to 8 servings of grains each day. A serving is 1 slice of bread, 1 ounce of dry cereal, or ½ cup of cooked rice, pasta, or cooked cereal. Try to choose whole-grain products as much as possible. · Limit lean meat, poultry, and fish to 2 servings each day. A serving is 3 ounces, about the size of a deck of cards. · Eat 4 to 5 servings of nuts, seeds, and legumes (cooked dried beans, lentils, and split peas) each week. A serving is 1/3 cup of nuts, 2 tablespoons of seeds, or ½ cup of cooked beans or peas. · Limit fats and oils to 2 to 3 servings each day. A serving is 1 teaspoon of vegetable oil or 2 tablespoons of salad dressing. · Limit sweets and added sugars to 5 servings or less a week. A serving is 1 tablespoon jelly or jam, ½ cup sorbet, or 1 cup of lemonade. · Eat less than 2,300 milligrams (mg) of sodium a day. If you limit your sodium to 1,500 mg a day, you can lower your blood pressure even more. · Be aware that all of these are the suggested number of servings for people who eat 1,800 to 2,000 calories a day. Your recommended number of servings may be different if you need more or fewer calories. Tips for success  · Start small. Do not try to make dramatic changes to your diet all at once. You might feel that you are missing out on your favorite foods and then be more likely to not follow the plan. Make small changes, and stick with them. Once those changes become habit, add a few more changes. · Try some of the following:  ? Make it a goal to eat a fruit or vegetable at every meal and at snacks. This will make it easy to get the recommended amount of fruits and vegetables each day. ? Try yogurt topped with fruit and nuts for a snack or healthy dessert. ? Add lettuce, tomato, cucumber, and onion to sandwiches. ? Combine a ready-made pizza crust with low-fat mozzarella cheese and lots of vegetable toppings.  Try using tomatoes, squash, spinach, broccoli, carrots, cauliflower, and onions. ? Have a variety of cut-up vegetables with a low-fat dip as an appetizer instead of chips and dip. ? Sprinkle sunflower seeds or chopped almonds over salads. Or try adding chopped walnuts or almonds to cooked vegetables. ? Try some vegetarian meals using beans and peas. Add garbanzo or kidney beans to salads. Make burritos and tacos with mashed blood beans or black beans. Where can you learn more? Go to http://www.randolph.com/  Enter H967 in the search box to learn more about \"DASH Diet: Care Instructions. \"  Current as of: January 10, 2022               Content Version: 13.2  © 2006-2022 [employer]. Care instructions adapted under license by [employer] (which disclaims liability or warranty for this information). If you have questions about a medical condition or this instruction, always ask your healthcare professional. [address] any warranty or liability for your use of this information.

## 2022-05-23 ENCOUNTER — OFFICE VISIT (OUTPATIENT)
Dept: FAMILY MEDICINE CLINIC | Age: 62
End: 2022-05-23
Payer: COMMERCIAL

## 2022-05-23 VITALS
RESPIRATION RATE: 16 BRPM | DIASTOLIC BLOOD PRESSURE: 72 MMHG | SYSTOLIC BLOOD PRESSURE: 138 MMHG | HEIGHT: 66 IN | TEMPERATURE: 97.7 F | WEIGHT: 190 LBS | BODY MASS INDEX: 30.53 KG/M2 | HEART RATE: 73 BPM | OXYGEN SATURATION: 98 %

## 2022-05-23 DIAGNOSIS — E78.2 MIXED HYPERLIPIDEMIA: ICD-10-CM

## 2022-05-23 DIAGNOSIS — I50.32 CHRONIC DIASTOLIC CONGESTIVE HEART FAILURE (HCC): ICD-10-CM

## 2022-05-23 DIAGNOSIS — E83.42 HYPOMAGNESEMIA: ICD-10-CM

## 2022-05-23 DIAGNOSIS — E11.22 TYPE 2 DIABETES MELLITUS WITH CHRONIC KIDNEY DISEASE, WITHOUT LONG-TERM CURRENT USE OF INSULIN, UNSPECIFIED CKD STAGE (HCC): ICD-10-CM

## 2022-05-23 DIAGNOSIS — N18.30 STAGE 3 CHRONIC KIDNEY DISEASE, UNSPECIFIED WHETHER STAGE 3A OR 3B CKD (HCC): ICD-10-CM

## 2022-05-23 DIAGNOSIS — E55.9 VITAMIN D DEFICIENCY: ICD-10-CM

## 2022-05-23 DIAGNOSIS — J45.40 MODERATE PERSISTENT ASTHMA WITHOUT COMPLICATION: ICD-10-CM

## 2022-05-23 DIAGNOSIS — I10 PRIMARY HYPERTENSION: Primary | ICD-10-CM

## 2022-05-23 DIAGNOSIS — K21.9 GASTROESOPHAGEAL REFLUX DISEASE WITHOUT ESOPHAGITIS: ICD-10-CM

## 2022-05-23 PROCEDURE — 99214 OFFICE O/P EST MOD 30 MIN: CPT | Performed by: FAMILY MEDICINE

## 2022-05-23 PROCEDURE — 3044F HG A1C LEVEL LT 7.0%: CPT | Performed by: FAMILY MEDICINE

## 2022-05-23 RX ORDER — AMLODIPINE BESYLATE 10 MG/1
TABLET ORAL
COMMUNITY
Start: 2022-05-10 | End: 2022-06-06 | Stop reason: SDUPTHER

## 2022-05-23 NOTE — PROGRESS NOTES
1. Have you been to the ER, urgent care clinic since your last visit? Hospitalized since your last visit?05/09/2022    [facility]  2. Have you seen or consulted any other health care providers outside of the [address] [place] [address] since your last visit? Include any pap smears or colon screening.  No

## 2022-05-23 NOTE — PATIENT INSTRUCTIONS
DASH Diet: Care Instructions  Your Care Instructions     The DASH diet is an eating plan that can help lower your blood pressure. DASH stands for Dietary Approaches to Stop Hypertension. Hypertension is high blood pressure. The DASH diet focuses on eating foods that are high in calcium, potassium, and magnesium. These nutrients can lower blood pressure. The foods that are highest in these nutrients are fruits, vegetables, low-fat dairy products, nuts, seeds, and legumes. But taking calcium, potassium, and magnesium supplements instead of eating foods that are high in those nutrients does not have the same effect. The DASH diet also includes whole grains, fish, and poultry. The DASH diet is one of several lifestyle changes your doctor may recommend to lower your high blood pressure. Your doctor may also want you to decrease the amount of sodium in your diet. Lowering sodium while following the DASH diet can lower blood pressure even further than just the DASH diet alone. Follow-up care is a key part of your treatment and safety. Be sure to make and go to all appointments, and call your doctor if you are having problems. It's also a good idea to know your test results and keep a list of the medicines you take. How can you care for yourself at home? Following the DASH diet  · Eat 4 to 5 servings of fruit each day. A serving is 1 medium-sized piece of fruit, ½ cup chopped or canned fruit, 1/4 cup dried fruit, or 4 ounces (½ cup) of fruit juice. Choose fruit more often than fruit juice. · Eat 4 to 5 servings of vegetables each day. A serving is 1 cup of lettuce or raw leafy vegetables, ½ cup of chopped or cooked vegetables, or 4 ounces (½ cup) of vegetable juice. Choose vegetables more often than vegetable juice. · Get 2 to 3 servings of low-fat and fat-free dairy each day. A serving is 8 ounces of milk, 1 cup of yogurt, or 1 ½ ounces of cheese. · Eat 6 to 8 servings of grains each day.  A serving is 1 slice of bread, 1 ounce of dry cereal, or ½ cup of cooked rice, pasta, or cooked cereal. Try to choose whole-grain products as much as possible. · Limit lean meat, poultry, and fish to 2 servings each day. A serving is 3 ounces, about the size of a deck of cards. · Eat 4 to 5 servings of nuts, seeds, and legumes (cooked dried beans, lentils, and split peas) each week. A serving is 1/3 cup of nuts, 2 tablespoons of seeds, or ½ cup of cooked beans or peas. · Limit fats and oils to 2 to 3 servings each day. A serving is 1 teaspoon of vegetable oil or 2 tablespoons of salad dressing. · Limit sweets and added sugars to 5 servings or less a week. A serving is 1 tablespoon jelly or jam, ½ cup sorbet, or 1 cup of lemonade. · Eat less than 2,300 milligrams (mg) of sodium a day. If you limit your sodium to 1,500 mg a day, you can lower your blood pressure even more. · Be aware that all of these are the suggested number of servings for people who eat 1,800 to 2,000 calories a day. Your recommended number of servings may be different if you need more or fewer calories. Tips for success  · Start small. Do not try to make dramatic changes to your diet all at once. You might feel that you are missing out on your favorite foods and then be more likely to not follow the plan. Make small changes, and stick with them. Once those changes become habit, add a few more changes. · Try some of the following:  ? Make it a goal to eat a fruit or vegetable at every meal and at snacks. This will make it easy to get the recommended amount of fruits and vegetables each day. ? Try yogurt topped with fruit and nuts for a snack or healthy dessert. ? Add lettuce, tomato, cucumber, and onion to sandwiches. ? Combine a ready-made pizza crust with low-fat mozzarella cheese and lots of vegetable toppings. Try using tomatoes, squash, spinach, broccoli, carrots, cauliflower, and onions. ?  Have a variety of cut-up vegetables with a low-fat dip as an appetizer instead of chips and dip. ? Sprinkle sunflower seeds or chopped almonds over salads. Or try adding chopped walnuts or almonds to cooked vegetables. ? Try some vegetarian meals using beans and peas. Add garbanzo or kidney beans to salads. Make burritos and tacos with mashed blood beans or black beans. Where can you learn more? Go to http://www.randolph.com/  Enter H967 in the search box to learn more about \"DASH Diet: Care Instructions. \"  Current as of: January 10, 2022               Content Version: 13.2  © 4867-0403 [employer]. Care instructions adapted under license by [employer] (which disclaims liability or warranty for this information). If you have questions about a medical condition or this instruction, always ask your healthcare professional. [address] any warranty or liability for your use of this information.

## 2022-05-23 NOTE — PROGRESS NOTES
Rebecca Bustos, 59 y.o.,  female    SUBJECTIVE  Ff-up     HTN/diastolic CHF- w/ h/o SAH 5104 no residual deficit. ED visit for angioedema this month, thought to be due to lisinopril. Modified meds now on amlodipine 10 mg, hctz 12.5 mg daily. Following Dr. Elsi Awad, reviewed note NYHA class 2, pt denies any symptoms.  reports home readings similar -150's. She says she follows DASH diet, she is in more discomfort due to chronic rhinitis. DM w/ CKD- reports [facility] 1teen's. Currently on janumet. Following with dr. Suleman Parker for eye exams     HL- on crestor, reviewed labs improved LDL, better compliance     GERD- responding well to prilosec     Asthma- no recent exacerbations, says using advair regularly, prn albuterol     Back spasm- on and off for few years now, responding to prn flexeril/nsaids. She works at [employer], long days with lifting usually trigger.     Vitamin D level-elevated, has stopped supplement  ROS:  See HPI, all others negative        Patient Active Problem List   Diagnosis Code    Asthma J45.909    Mixed hyperlipidemia E78.2    Vitamin D deficiency E55.9    Anxiety and depression F41.9, F32. A    Type 2 diabetes mellitus with chronic kidney disease (HCC) E11.22    Essential hypertension I10    Chronic diastolic congestive heart failure (HCC) I50.32    Obesity (BMI 30.0-34. 9) E66.9    New onset of headaches after age 51 R50.7    Iron deficiency anemia D50.9    Gastroesophageal reflux disease without esophagitis K21.9    Hypomagnesemia E83.42    History of subarachnoid hemorrhage Z86.79    Hypokalemia E87.6    Post-menopausal Z78.0    Chest pain R07.9    Back spasm M62.830    Chronic renal disease, stage III N18.30       Current Outpatient Medications   Medication Sig Dispense Refill    amLODIPine (NORVASC) 10 mg tablet       fluticasone propion-salmeteroL (ADVAIR/WIXELA) 250-50 mcg/dose diskus inhaler INHALE ONE PUFF BY MOUTH EVERY 12 HOURS 3 Each 1    Janumet 50-1,000 mg per tablet TAKE ONE TABLET BY MOUTH TWICE A DAY WITH MEALS 180 Tablet 0    hydroCHLOROthiazide (MICROZIDE) 12.5 mg capsule Take 1 Capsule by mouth daily. 90 Capsule 0    azelastine-fluticasone (Dymista) 137-50 mcg/spray 1 [place] by Both Nostrils route two (2) times a day. 1 Each 0    omeprazole (PRILOSEC) 20 mg capsule TAKE ONE CAPSULE BY MOUTH DAILY 90 Capsule 3    magnesium oxide (MAG-OX) 400 mg tablet TAKE ONE TABLET BY MOUTH ONCE DAILY 90 Tablet 0    cyclobenzaprine (FLEXERIL) 10 mg tablet TAKE ONE-HALF TO ONE TABLET BY MOUTH DAILY AS NEEDED FOR BACK OR HIP PAIN 30 Tablet 0    rosuvastatin (CRESTOR) 10 mg tablet Take 1 Tablet by mouth nightly. 90 Tablet 3    diclofenac (VOLTAREN) 1 % gel APPLY 4 GRAMS TO AFFECTED AREA EVERY 6 HOURS 1 Each 0    albuterol (PROVENTIL VENTOLIN) 2.5 mg /3 mL (0.083 %) nebu 3 mL by Nebulization route every four (4) hours as needed for Wheezing. 30 Nebule 2    albuterol (PROVENTIL HFA, VENTOLIN HFA, PROAIR HFA) 90 mcg/actuation inhaler Take 2 Puffs by inhalation every four (4) hours as needed for Wheezing. 17 g 5    levocetirizine (XYZAL) 5 mg tablet Take 1 Tablet by mouth daily.  90 Tablet 3       Allergies   Allergen Reactions    Banana Hives     Asthma attach, throat swelling, throat scratching, lip swelling    Iodine Rash    Keflex [Cephalexin] Hives    Seafood Hives    Watermelon Hives     Throat closes    Clonidine Other (comments)     Memory loss    Lisinopril Angioedema       Past Medical History:   Diagnosis Date    Ankle swelling     Asthma 02/04/2010    PFTs 2016 showed obstructive lung pattern    Brain bleed ([facility] 02/20/2015    Chronic diastolic heart failure ([facility] 3/18/2015    Diabetes mellitus ([facility] 5/27/2014    Diabetic eye exam ([facility] 01/26/2017    No retinopathy Best corrected vision 20/20 OU    HTN (hypertension) 2/4/2010    Hyperlipidemia LDL goal < 70 2014    Magnesium deficiency     Obesity, unspecified 4/15/2015    SAH (subarachnoid hemorrhage) ([facility] 2/20/15    admitted in [facility], Dr. Esha Sanchez. Tong Esteban and ref to Dr. Salma Mensah, Neurology    Stroke [facility]) 2015    mini stroke    Vitamin D deficiency 2000       Social History     Socioeconomic History    Marital status: SINGLE     Spouse name: Not on file    Number of children: 1    Years of education: 13    Highest education level: Not on file   Occupational History     Employer: [employer]     Comment: unemployed since Jan 2014, applied to [employer] for job July 2014   Tobacco Use    Smoking status: Never Smoker    Smokeless tobacco: Never Used   Substance and Sexual Activity    Alcohol use: Yes     Alcohol/week: 0.0 standard drinks     Comment: social    Drug use: No    Sexual activity: Yes     Partners: Male     Birth control/protection: Condom   Other Topics Concern     Service No    Blood Transfusions Yes    Caffeine Concern Yes    Occupational Exposure No    Hobby Hazards No    Sleep Concern No    Stress Concern No    Weight Concern No    Special Diet No    Back Care No    Exercise No    Bike Helmet No    Seat Belt Yes    Self-Exams Yes   Social History Narrative    Not on file     Social Determinants of Health     Financial Resource Strain:     Difficulty of Paying Living Expenses: Not on file   Food Insecurity:     Worried About Running Out of Food in the Last Year: Not on file    Adeline of Food in the Last Year: Not on file   Transportation Needs:     Lack of Transportation (Medical): Not on file    Lack of Transportation (Non-Medical):  Not on file   Physical Activity:     Days of Exercise per Week: Not on file    Minutes of Exercise per Session: Not on file   Stress:     Feeling of Stress : Not on file   Social Connections:     Frequency of Communication with Friends and Family: Not on file    Frequency of Social Gatherings with Friends and Family: Not on file    Attends Yazdanism Services: Not on file    Active Member of Clubs or Organizations: Not on file   [facility] Attends Club or Organization Meetings: Not on file    Marital Status: Not on file   Intimate Partner Violence:     Fear of Current or Ex-Partner: Not on file    Emotionally Abused: Not on file    Physically Abused: Not on file    Sexually Abused: Not on file   Housing Stability:     Unable to Pay for Housing in the Last Year: Not on file    Number of Places Lived in the Last Year: Not on file    Unstable Housing in the Last Year: Not on file       Family History   Problem Relation Age of Onset    Diabetes Mother     Hypertension Mother              Heart Attack Mother 62    Alcohol abuse Father         cirrhosis    Diabetes Sister     Heart Attack Sister 64         OBJECTIVE    Physical Exam:     Visit Vitals  /72 (BP 1 Location: Left upper arm, BP Patient Position: Sitting, BP Cuff Size: Adult)   Pulse 73   Temp 97.7 °F (36.5 °C) (Oral)   Resp 16   Ht 5' 6\" (1.676 m)   Wt 190 lb (86.2 kg)   SpO2 98%   BMI 30.67 kg/m²       General: alert, well-appearing, in no apparent distress or pain  Neck: supple, no adenopathy palpated  CVS: normal rate, regular rhythm, distinct S1 and S2  Lungs:clear to ausculation bilaterally, no crackles, wheezing or rhonchi noted  Abdomen: normoactive bowel sounds, soft, non-tender  Extremities: no edema, no cyanosis, MSK grossly normal  Skin: warm, no lesions, rashes noted  Psych:  mood and affect normal    Results for orders placed or performed during the hospital encounter of 04/20/22   MAGNESIUM   Result Value Ref Range    Magnesium 1.9 1.6 - 2.6 mg/dL   METABOLIC PANEL, COMPREHENSIVE   Result Value Ref Range    Sodium 140 136 - 145 mmol/L    Potassium 4.3 3.5 - 5.5 mmol/L    Chloride 107 100 - 111 mmol/L    CO2 32 21 - 32 mmol/L    Anion gap 1 (L) 3.0 - 18 mmol/L    Glucose 109 (H) 74 - 99 mg/dL    BUN 16 7.0 - 18 MG/DL    Creatinine 1.11 0.6 - 1.3 MG/DL    BUN/Creatinine ratio 14 12 - 20      GFR est AA >60 >60 ml/min/1.73m2    GFR est non-AA 50 (L) >60 ml/min/1.73m2    Calcium 9.3 8.5 - 10.1 MG/DL    Bilirubin, total 0.7 0.2 - 1.0 MG/DL    ALT (SGPT) 19 13 - 56 U/L    AST (SGOT) 15 10 - 38 U/L    Alk. phosphatase 75 45 - 117 U/L    Protein, total 8.0 6.4 - 8.2 g/dL    Albumin 4.1 3.4 - 5.0 g/dL    Globulin 3.9 2.0 - 4.0 g/dL    A-G Ratio 1.1 0.8 - 1.7     VITAMIN D, 25 HYDROXY   Result Value Ref Range    Vitamin D 25-Hydroxy 102.9 (H) 30 - 100 ng/mL   HEMOGLOBIN A1C WITH EAG   Result Value Ref Range    Hemoglobin A1c 5.9 (H) 4.2 - 5.6 %    Est. average glucose 123 mg/dL         ASSESSMENT/PLAN    Primary hypertension  controlled  Cont norvasc dose 10 mg, cont hctz 12. 5 mg  Hx angioedema thought to be due to lisinopril. (Pt reports hx worsening asthma on BB)     Controlled type 2 diabetes mellitus with microalbuminuria, without long-term current use of insulin (HCC)  a1c 6>5.8  Controlled  Cont janumet  Cont lisinopril  Cont crestor- commended on better compliance  Due for eye exam dr Neno Vargas exam 8/2021- normal  Check a1c, cmp, lipid panel, microalbumin prior to next visit     Gastroesophageal reflux disease without esophagitis  Stable  Cont prilosec     Mixed hyperlipidemia  LDL Goal <100   cont crestor      Chronic diastolic heart failure (HCC)  Stable, euvolemic, on hctz  (intolerant to BB-asthma, ace angioedema)  Following dr. Edward Care      Moderate persistent asthma with acute exacerbation  Stable  Cont advair, prn albuterol      Hypomagnesemia  Wnl, cont current replacement dose while on hctz  -     magnesium oxide (MAG-OX) 400 mg tablet; TAKE ONE TABLET BY MOUTH ONCE DAILY    Vitamin d deficiency  Hold vitamin D supplement  Recheck levels prior to next visit      Follow-up and Dispositions    · Return in about 3 months (around 8/23/2022), or if symptoms worsen or fail to improve, for routine chronic illness care, fasting labs a week prior to your next visit. Patient understands plan of care. Patient has provided input and agrees with goals.

## 2022-06-01 DIAGNOSIS — E78.2 MIXED HYPERLIPIDEMIA: ICD-10-CM

## 2022-06-01 DIAGNOSIS — M62.830 BACK SPASM: ICD-10-CM

## 2022-06-02 RX ORDER — ROSUVASTATIN CALCIUM 10 MG/1
10 TABLET, COATED ORAL
Qty: 90 TABLET | Refills: 3 | Status: SHIPPED | OUTPATIENT
Start: 2022-06-02

## 2022-06-02 RX ORDER — CYCLOBENZAPRINE HCL 10 MG
TABLET ORAL
Qty: 30 TABLET | Refills: 0 | Status: SHIPPED | OUTPATIENT
Start: 2022-06-02 | End: 2022-07-25 | Stop reason: SDUPTHER

## 2022-06-06 ENCOUNTER — PATIENT MESSAGE (OUTPATIENT)
Dept: FAMILY MEDICINE CLINIC | Age: 62
End: 2022-06-06

## 2022-06-06 DIAGNOSIS — I10 ESSENTIAL HYPERTENSION: ICD-10-CM

## 2022-06-06 DIAGNOSIS — J45.30 MILD PERSISTENT ASTHMA WITHOUT COMPLICATION: ICD-10-CM

## 2022-06-06 RX ORDER — HYDROCHLOROTHIAZIDE 12.5 MG/1
12.5 CAPSULE ORAL DAILY
Qty: 90 CAPSULE | Refills: 2 | Status: SHIPPED
Start: 2022-06-06 | End: 2022-09-22 | Stop reason: DRUGHIGH

## 2022-06-06 RX ORDER — AMLODIPINE BESYLATE 10 MG/1
10 TABLET ORAL DAILY
Qty: 90 TABLET | Refills: 0 | Status: SHIPPED | OUTPATIENT
Start: 2022-06-06 | End: 2022-09-06

## 2022-06-06 NOTE — TELEPHONE ENCOUNTER
Requested Prescriptions     Pending Prescriptions Disp Refills    hydroCHLOROthiazide (MICROZIDE) 12.5 mg capsule 90 Capsule 2     Sig: Take 1 Capsule by mouth daily.

## 2022-06-24 DIAGNOSIS — E83.42 HYPOMAGNESEMIA: ICD-10-CM

## 2022-06-25 RX ORDER — LEVOCETIRIZINE DIHYDROCHLORIDE 5 MG/1
5 TABLET, FILM COATED ORAL DAILY
Qty: 90 TABLET | Refills: 3 | Status: SHIPPED | OUTPATIENT
Start: 2022-06-25

## 2022-06-25 RX ORDER — OMEPRAZOLE 20 MG/1
20 CAPSULE, DELAYED RELEASE ORAL DAILY
Qty: 90 CAPSULE | Refills: 3 | Status: SHIPPED | OUTPATIENT
Start: 2022-06-25

## 2022-06-25 RX ORDER — LANOLIN ALCOHOL/MO/W.PET/CERES
CREAM (GRAM) TOPICAL
Qty: 90 TABLET | Refills: 0 | Status: SHIPPED | OUTPATIENT
Start: 2022-06-25 | End: 2022-09-26

## 2022-07-25 DIAGNOSIS — M62.830 BACK SPASM: ICD-10-CM

## 2022-07-26 RX ORDER — CYCLOBENZAPRINE HCL 10 MG
TABLET ORAL
Qty: 30 TABLET | Refills: 0 | Status: SHIPPED | OUTPATIENT
Start: 2022-07-26 | End: 2022-09-14 | Stop reason: SDUPTHER

## 2022-08-11 DIAGNOSIS — E11.29 CONTROLLED TYPE 2 DIABETES MELLITUS WITH MICROALBUMINURIA, WITHOUT LONG-TERM CURRENT USE OF INSULIN (HCC): ICD-10-CM

## 2022-08-11 DIAGNOSIS — R80.9 CONTROLLED TYPE 2 DIABETES MELLITUS WITH MICROALBUMINURIA, WITHOUT LONG-TERM CURRENT USE OF INSULIN (HCC): ICD-10-CM

## 2022-08-11 RX ORDER — SITAGLIPTIN AND METFORMIN HYDROCHLORIDE 50; 1000 MG/1; MG/1
TABLET, FILM COATED ORAL
Qty: 180 TABLET | Refills: 0 | Status: SHIPPED | OUTPATIENT
Start: 2022-08-11

## 2022-09-05 RX ORDER — AMLODIPINE BESYLATE 10 MG/1
10 TABLET ORAL DAILY
Qty: 90 TABLET | Refills: 0 | Status: CANCELLED | OUTPATIENT
Start: 2022-09-05

## 2022-09-06 RX ORDER — AMLODIPINE BESYLATE 10 MG/1
TABLET ORAL
Qty: 90 TABLET | Refills: 0 | Status: SHIPPED | OUTPATIENT
Start: 2022-09-06

## 2022-09-14 DIAGNOSIS — M62.830 BACK SPASM: ICD-10-CM

## 2022-09-15 RX ORDER — CYCLOBENZAPRINE HCL 10 MG
TABLET ORAL
Qty: 30 TABLET | Refills: 0 | Status: SHIPPED | OUTPATIENT
Start: 2022-09-15 | End: 2022-11-02 | Stop reason: SDUPTHER

## 2022-09-19 ENCOUNTER — HOSPITAL ENCOUNTER (OUTPATIENT)
Dept: LAB | Age: 62
Discharge: HOME | End: 2022-09-19

## 2022-09-19 LAB — XX-LABCORP SPECIMEN COL,LCBCF: NORMAL

## 2022-09-19 PROCEDURE — 99001 SPECIMEN HANDLING PT-LAB: CPT

## 2022-09-20 LAB
25(OH)D3+25(OH)D2 SERPL-MCNC: 50.5 NG/ML (ref 30–100)
ALBUMIN SERPL-MCNC: 4.7 G/DL (ref 3.8–4.8)
ALBUMIN/GLOB SERPL: 1.5 {RATIO} (ref 1.2–2.2)
ALP SERPL-CCNC: 82 IU/L (ref 44–121)
ALT SERPL-CCNC: 18 IU/L (ref 0–32)
AST SERPL-CCNC: 19 IU/L (ref 0–40)
BILIRUB SERPL-MCNC: 0.6 MG/DL (ref 0–1.2)
BUN SERPL-MCNC: 12 MG/DL (ref 8–27)
BUN/CREAT SERPL: 12 (ref 12–28)
CALCIUM SERPL-MCNC: 9.6 MG/DL (ref 8.7–10.3)
CHLORIDE SERPL-SCNC: 105 MMOL/L (ref 96–106)
CHOLEST SERPL-MCNC: 133 MG/DL (ref 100–199)
CO2 SERPL-SCNC: 25 MMOL/L (ref 20–29)
CREAT SERPL-MCNC: 1.03 MG/DL (ref 0.57–1)
EGFR: 61 ML/MIN/1.73
EST. AVERAGE GLUCOSE BLD GHB EST-MCNC: 137 MG/DL
GLOBULIN SER CALC-MCNC: 3.2 G/DL (ref 1.5–4.5)
GLUCOSE SERPL-MCNC: 130 MG/DL (ref 65–99)
HBA1C MFR BLD: 6.4 % (ref 4.8–5.6)
HDLC SERPL-MCNC: 46 MG/DL
IMP & REVIEW OF LAB RESULTS: NORMAL
LDLC SERPL CALC-MCNC: 72 MG/DL (ref 0–99)
MAGNESIUM SERPL-MCNC: 1.8 MG/DL (ref 1.6–2.3)
POTASSIUM SERPL-SCNC: 4.1 MMOL/L (ref 3.5–5.2)
PROT SERPL-MCNC: 7.9 G/DL (ref 6–8.5)
SODIUM SERPL-SCNC: 144 MMOL/L (ref 134–144)
TRIGL SERPL-MCNC: 73 MG/DL (ref 0–149)
VLDLC SERPL CALC-MCNC: 15 MG/DL (ref 5–40)

## 2022-09-22 ENCOUNTER — OFFICE VISIT (OUTPATIENT)
Dept: CARDIOLOGY CLINIC | Age: 62
End: 2022-09-22
Payer: COMMERCIAL

## 2022-09-22 VITALS
HEART RATE: 104 BPM | DIASTOLIC BLOOD PRESSURE: 64 MMHG | BODY MASS INDEX: 30.22 KG/M2 | SYSTOLIC BLOOD PRESSURE: 150 MMHG | OXYGEN SATURATION: 100 % | WEIGHT: 188 LBS | HEIGHT: 66 IN

## 2022-09-22 DIAGNOSIS — E78.2 MIXED HYPERLIPIDEMIA: ICD-10-CM

## 2022-09-22 DIAGNOSIS — I50.32 CHRONIC DIASTOLIC HEART FAILURE (HCC): Primary | ICD-10-CM

## 2022-09-22 DIAGNOSIS — I10 ESSENTIAL HYPERTENSION: ICD-10-CM

## 2022-09-22 DIAGNOSIS — E66.9 OBESITY (BMI 30.0-34.9): ICD-10-CM

## 2022-09-22 PROCEDURE — 99214 OFFICE O/P EST MOD 30 MIN: CPT | Performed by: INTERNAL MEDICINE

## 2022-09-22 RX ORDER — HYDROCHLOROTHIAZIDE 25 MG/1
25 TABLET ORAL DAILY
Qty: 90 TABLET | Refills: 1 | Status: SHIPPED | OUTPATIENT
Start: 2022-09-22

## 2022-09-22 NOTE — PROGRESS NOTES
Chief Complaint   Patient presents with    Asthma    CHF    Cholesterol Problem    Chronic Kidney Disease    Diabetes    GERD    Hypertension    Results     Review of results            1. \"Have you been to the ER, urgent care clinic since your last visit? Hospitalized since your last visit? \" No    2. \"Have you seen or consulted any other health care providers outside of the [address] [place] [address] since your last visit? \" No     3. For patients aged 39-70: Has the patient had a colonoscopy / FIT/ Cologuard? Yes - no Care Gap present      If the patient is female:    4. For patients aged 41-77: Has the patient had a mammogram within the past 2 years? Yes - Care Gap present. Most recent result on file 2020      5. For patients aged 21-65: Has the patient had a pap smear? No hx of hysterectomy    Physician order obtained. Patient completed adult immunization consent form. Allergies, contraindications and recommendations reviewed with patient. Seasonal influenza vaccine administered IM right deltoid. Patient tolerated well. Patient remained in office for 15 minutes after injection and no adverse reactions were noted.     Lot # [id number]  Exp Date: June 30,2023  [address] # [phone]

## 2022-09-22 NOTE — PATIENT INSTRUCTIONS
Medications Discontinued During This Encounter   Medication Reason    hydroCHLOROthiazide (MICROZIDE) 12.5 mg capsule DOSE ADJUSTMENT       After the recommended changes have been made in blood pressure medicines, patient advised to keep BP/HR(pulse rate) chart twice daily and bring us results in next 4 to 5 days. Patient may send the results via \"My Chart\" if desired. Please rest for 5-10 minutes before checking blood pressure. Sit on a comfortable chair without crossing the legs and put your arm on a table. We recommend that you use an upper arm cuff. Check the blood pressure 3 times each time you check the blood pressure and record the lowest reading. If you check the blood pressure in both arms, use the higher reading. A Healthy Heart: Care Instructions  Your Care Instructions     Coronary artery disease, also called heart disease, occurs when a substance called plaque builds up in the vessels that supply oxygen-rich blood to your heart muscle. This can narrow the blood vessels and reduce blood flow. A heart attack happens when blood flow is completely blocked. A high-fat diet, smoking, and other factors increase the risk of heart disease. Your doctor has found that you have a chance of having heart disease. You can do lots of things to keep your heart healthy. It may not be easy, but you can change your diet, exercise more, and quit smoking. These steps really work to lower your chance of heart disease. Follow-up care is a key part of your treatment and safety. Be sure to make and go to all appointments, and call your doctor if you are having problems. It's also a good idea to know your test results and keep a list of the medicines you take. How can you care for yourself at home? Diet    Use less salt when you cook and eat. This helps lower your blood pressure. Taste food before salting. Add only a little salt when you think you need it. With time, your taste buds will adjust to less salt.      Eat fewer snack items, fast foods, canned soups, and other high-salt, high-fat, processed foods. Read food labels and try to avoid saturated and trans fats. They increase your risk of heart disease by raising cholesterol levels. Limit the amount of solid fat-butter, margarine, and shortening-you eat. Use olive, peanut, or canola oil when you cook. Bake, broil, and steam foods instead of frying them. Eat a variety of fruit and vegetables every day. Dark green, deep orange, red, or yellow fruits and vegetables are especially good for you. Examples include spinach, carrots, peaches, and berries. Foods high in fiber can reduce your cholesterol and provide important vitamins and minerals. High-fiber foods include whole-grain cereals and breads, oatmeal, beans, brown rice, citrus fruits, and apples. Eat lean proteins. Heart-healthy proteins include seafood, lean meats and poultry, eggs, beans, peas, nuts, seeds, and soy products. Limit drinks and foods with added sugar. These include candy, desserts, and soda pop. Lifestyle changes    If your doctor recommends it, get more exercise. Walking is a good choice. Bit by bit, increase the amount you walk every day. Try for at least 30 minutes on most days of the week. You also may want to swim, bike, or do other activities. Do not smoke. If you need help quitting, talk to your doctor about stop-smoking programs and medicines. These can increase your chances of quitting for good. Quitting smoking may be the most important step you can take to protect your heart. It is never too late to quit. Limit alcohol to 2 drinks a day for men and 1 drink a day for women. Too much alcohol can cause health problems. Manage other health problems such as diabetes, high blood pressure, and high cholesterol. If you think you may have a problem with alcohol or drug use, talk to your doctor. Medicines    Take your medicines exactly as prescribed.  Call your doctor if you think you are having a problem with your medicine. If your doctor recommends aspirin, take the amount directed each day. Make sure you take aspirin and not another kind of pain reliever, such as acetaminophen (Tylenol). When should you call for help? Call 911 if you have symptoms of a heart attack. These may include:    Chest pain or pressure, or a strange feeling in the chest.     Sweating. Shortness of breath. Pain, pressure, or a strange feeling in the back, neck, jaw, or upper belly or in one or both shoulders or arms. Lightheadedness or sudden weakness. A fast or irregular heartbeat. After you call 911, the  may tell you to chew 1 adult-strength or 2 to 4 low-dose aspirin. Wait for an ambulance. Do not try to drive yourself. Watch closely for changes in your health, and be sure to contact your doctor if you have any problems. Where can you learn more? Go to http://www.[employer].com/  Enter F075 in the search box to learn more about \"A Healthy Heart: Care Instructions. \"  Current as of: January 10, 2022               Content Version: 13.2  © 2006-2022 [employer]. Care instructions adapted under license by [employer] (which disclaims liability or warranty for this information). If you have questions about a medical condition or this instruction, always ask your healthcare professional. [place] [address] any warranty or liability for your use of this information.

## 2022-09-22 NOTE — PROGRESS NOTES
HISTORY OF PRESENT ILLNESS  Boo Bose is a 57 y.o. female. Follow-up of CHF, hyperlipidemia, hypertension and obesity  History of asthma    Follow-up  Associated symptoms include shortness of breath. Pertinent negatives include no chest pain and no headaches. Leg Swelling  The history is provided by the Patient. This is a recurrent problem. The current episode started more than 1 week ago. The problem occurs daily (R>L). The problem has been gradually improving. Associated symptoms include shortness of breath. Pertinent negatives include no chest pain and no headaches. The symptoms are aggravated by standing. The symptoms are relieved by rest.   Shortness of Breath  The history is provided by the Patient. This is a new problem. The problem occurs intermittently. The current episode started more than 1 week ago (2/22). The problem has not changed since onset. Associated symptoms include leg swelling. Pertinent negatives include no fever, no headaches, no cough, no wheezing, no PND, no orthopnea, no chest pain, no vomiting, no rash and no claudication. The problem's precipitants include exercise and pollens (Sometimes at rest). She has tried beta-agonist inhalers for the symptoms. The treatment provided significant relief. Review of Systems   Constitutional:  Negative for chills, fever, malaise/fatigue and weight loss. HENT:  Negative for nosebleeds. Eyes:  Negative for discharge. Respiratory:  Positive for shortness of breath. Negative for cough and wheezing. Cardiovascular:  Positive for leg swelling. Negative for chest pain, palpitations, orthopnea, claudication and PND. Gastrointestinal:  Negative for diarrhea, nausea and vomiting. Genitourinary:  Negative for dysuria and hematuria. Musculoskeletal:  Negative for joint pain. Skin:  Negative for rash. Neurological:  Negative for dizziness, seizures, loss of consciousness and headaches. Endo/Heme/Allergies:  Negative for polydipsia. Does not bruise/bleed easily. Psychiatric/Behavioral:  Negative for depression and substance abuse. The patient does not have insomnia. Allergies   Allergen Reactions    Banana Hives     Asthma attach, throat swelling, throat scratching, lip swelling    Iodine Rash    Keflex [Cephalexin] Hives    Seafood Hives    Watermelon Hives     Throat closes    Clonidine Other (comments)     Memory loss    Lisinopril Angioedema       Past Medical History:   Diagnosis Date    Ankle swelling     Asthma 02/04/2010    PFTs 2016 showed obstructive lung pattern    Brain bleed ([facility] [address] 02/20/2015    Chronic diastolic heart failure ([address] 3/18/2015    Diabetes mellitus ([address] 5/27/2014    Diabetic eye exam ([facility] [address] 01/26/2017    No retinopathy Best corrected vision 20/20 OU    HTN (hypertension) 2/4/2010    Hyperlipidemia LDL goal < 70 2014    Magnesium deficiency     Obesity, unspecified 4/15/2015    SAH (subarachnoid hemorrhage) ([facility] [address] 2/20/15    admitted in [facility], Dr. Angelique Lino. Marie Hilliard and ref to Dr. Prosper Felton, Neurology    Stroke [facility]) 2015    mini stroke    Vitamin D deficiency 2000       Family History   Problem Relation Age of Onset    Diabetes Mother     Hypertension Mother              Heart Attack Mother 62    Alcohol abuse Father         cirrhosis    Diabetes Sister     Heart Attack Sister 64       Social History     Tobacco Use    Smoking status: Never    Smokeless tobacco: Never   Substance Use Topics    Alcohol use: Yes     Alcohol/week: 0.0 standard drinks     Comment: social    Drug use: No        Current Outpatient Medications   Medication Sig    cyclobenzaprine (FLEXERIL) 10 mg tablet TAKE ONE-HALF TO ONE TABLET BY MOUTH DAILY AS NEEDED FOR BACK OR HIP PAIN    amLODIPine (NORVASC) 10 mg tablet TAKE ONE TABLET BY MOUTH DAILY    Janumet 50-1,000 mg per tablet TAKE ONE TABLET BY MOUTH TWICE A DAY WITH MEALS    levocetirizine (XYZAL) 5 mg tablet Take 1 Tablet by mouth daily.     omeprazole (PRILOSEC) 20 mg capsule Take 1 Capsule by mouth daily.    magnesium oxide (MAG-OX) 400 mg tablet TAKE ONE TABLET BY MOUTH ONCE DAILY    hydroCHLOROthiazide (MICROZIDE) 12.5 mg capsule Take 1 Capsule by mouth daily. rosuvastatin (CRESTOR) 10 mg tablet Take 1 Tablet by mouth nightly. fluticasone propion-salmeteroL (ADVAIR/WIXELA) 250-50 mcg/dose diskus inhaler INHALE ONE PUFF BY MOUTH EVERY 12 HOURS    azelastine-fluticasone (Dymista) 137-50 mcg/spray 1 [place] by Both Nostrils route two (2) times a day. diclofenac (VOLTAREN) 1 % gel APPLY 4 GRAMS TO AFFECTED AREA EVERY 6 HOURS    albuterol (PROVENTIL VENTOLIN) 2.5 mg /3 mL (0.083 %) nebu 3 mL by Nebulization route every four (4) hours as needed for Wheezing. albuterol (PROVENTIL HFA, VENTOLIN HFA, PROAIR HFA) 90 mcg/actuation inhaler Take 2 Puffs by inhalation every four (4) hours as needed for Wheezing. No current facility-administered medications for this visit. Past Surgical History:   Procedure Laterality Date    COLONOSCOPY N/A 8/15/2018    COLONOSCOPY performed by Jasmin Miner MD at [address] [place] [address] Dr  2004    abd approach with ovaries and cervix intact    HX HYSTERECTOMY N/A 2004    HX OTHER SURGICAL  8/25/15    SAH, cerebral arteriopathy, Dr. Celeste Uribe, Interventional Neurology [facility]       Diagnostic Studies:  2/18 Nuc Stress  Conclusion:   1. Normal perfusion scan. 2. Normal wall motion and ejection fractions calculated to be 51%. 3. No evidence of significant fixed or reversible defect suggesting ischemia or myocardial infarction noted from this nuclear study. 4. Low risk scan. Visit Vitals  BP (!) 150/64   Pulse (!) 104   Ht 5' 6\" (1.676 m)   Wt 85.3 kg (188 lb)   SpO2 100%   BMI 30.34 kg/m²       Ms. Robbie Ding has a reminder for a \"due or due soon\" health maintenance. I have asked that she contact her primary care provider for follow-up on this health maintenance. Physical Exam  Constitutional:       General: She is not in acute distress. Appearance: She is well-developed. She is obese. HENT:      Head: Normocephalic and atraumatic. Mouth/Throat:      Dentition: Normal dentition. Eyes:      General: No scleral icterus. Right eye: No discharge. Left eye: No discharge. Neck:      Thyroid: No thyromegaly. Vascular: No carotid bruit or JVD. Cardiovascular:      Rate and Rhythm: Normal rate and regular rhythm. Pulses: Intact distal pulses. Heart sounds: Normal heart sounds, S1 normal and S2 normal. No murmur heard. No friction rub. No gallop. Pulmonary:      Effort: Pulmonary effort is normal.      Breath sounds: Normal breath sounds. No wheezing or rales. Abdominal:      Palpations: Abdomen is soft. There is no mass. Tenderness: There is no abdominal tenderness. Musculoskeletal:      Cervical back: Neck supple. Right lower leg: Edema (Mostly nonpitting edema but trace pitting) present. Left lower leg: Edema (Mostly nonpitting with only trace pitting) present. Lymphadenopathy:      Cervical:      Right cervical: No superficial cervical adenopathy. Left cervical: No superficial cervical adenopathy. Skin:     General: Skin is warm and dry. Findings: No rash. Neurological:      Mental Status: She is alert and oriented to person, place, and time. Psychiatric:         Behavior: Behavior normal.       ASSESSMENT and PLAN    Discussed the patient's above normal BMI with her. I have recommended the following interventions: dietary management education, guidance, and counseling .   The BMI follow up plan is as follows: BMI is out of normal parameters and plan is as follows: I have counseled this patient on diet and exercise regimens     HLD :    Latest Reference Range & Units 4/10/19 00:00 3/4/20 00:00 3/22/21 00:00 11/1/21 00:00 9/19/22 00:00   Triglyceride 0 - 149 mg/dL 55 68 72 71 73   Cholesterol, total 100 - 199 mg/dL 145 139 196 142 133   HDL Cholesterol >39 mg/dL 45 45 53 47 46   LDL, calculated 0 - 99 mg/dL 89 80 130 (H) 81 72   (H): Data is abnormally high      CHF: Ryann Casillas        6/35 Chronic diastolic CHF appears compensated. Blood pressure mildly elevated in the office but was excellent last week in PCPs office. Follow home BP chart before adjusting any medications. Low sodium diet. Continue walking for exercise. Diet and weight discussed in detail and she plans to lose more weight. She was congratulated that she is losing some weight already. 3/25/2021 blood pressure is well controlled. Reduce HCTZ to every other day. Patient told to watch her blood pressure at home closely and call if there is any rise. She was also told to take it daily if edema recurs. CHF is compensated NYHA class II. Lipids are not controlled. She admitted to noncompliance with Crestor. She will take it regularly and we can repeat the labs in 3 months. Diet weight and exercise discussed. Mediterranean diet guidelines printed. 9/23/2021 CHF is compensated well. She has gained some weight back. Diet and exercise discussed. Mediterranean diet guidelines printed. Lipids need to be followed since she is taking Crestor regularly now. Blood pressure is elevated. Increase lisinopril and follow home chart. Labs as ordered. 3/10/2022 CHF is compensated NYHA class II. New shortness of breath is likely due to change in seasonal allergies as it gets relieved with inhalers. Blood pressure is elevated today but in 120s over 70s at home. Check home chart before adjusting the medicine. Lipids are now well controlled and continue statins. Diet weight and exercise discussed again. Diagnoses and all orders for this visit:    1. Chronic diastolic heart failure (HCC)  -     hydroCHLOROthiazide (HYDRODIURIL) 25 mg tablet; Take 1 Tablet by mouth daily.  -     METABOLIC PANEL, BASIC; Future    2. Essential hypertension    3. Mixed hyperlipidemia    4. Obesity (BMI 30.0-34. 9)        Pertinent laboratory and test data reviewed and discussed with patient. See patient instructions also for other medical advice given    Medications Discontinued During This Encounter   Medication Reason    hydroCHLOROthiazide (MICROZIDE) 12.5 mg capsule DOSE ADJUSTMENT       Follow-up and Dispositions    Return in about 6 months (around 3/22/2023), or if symptoms worsen or fail to improve, for BP log x 4-5 days post med changes, post test.       9/22/2022 CHF is stable NYHA class I-II. Blood pressure and heart rate are higher today as she just used albuterol 20 minutes before she came. Blood pressure at home yesterday was 127/70. Check home BP chart. Increase HCTZ to 25 mg daily due to chronic mild edema and see if it helps. Diet exercise and weight discussed. Mediterranean diet guidelines were given last visit and she tries to follow. Lipids are controlled well.

## 2022-09-22 NOTE — PROGRESS NOTES
1. Have you been to the ER, urgent care clinic since your last visit? Hospitalized since your last visit? Yes: 5/2022, [facility], facial swelling lisinopril allergy    2. Have you seen or consulted any other health care providers outside of the [address] [place] [address] since your last visit? Include any pap smears or colon screening. No    3. Since your last visit, have you had any of the following symptoms? shortness of breath. 4.  Have you had any blood work, X-rays or cardiac testing? Yes When: 5/2022 Where: [address]: NO     In [facility]: NO    5. Where do you normally have your labs drawn? [facility]    6. Do you need any refills today?    Magnesium

## 2022-09-26 ENCOUNTER — OFFICE VISIT (OUTPATIENT)
Dept: FAMILY MEDICINE CLINIC | Age: 62
End: 2022-09-26
Payer: COMMERCIAL

## 2022-09-26 VITALS
OXYGEN SATURATION: 100 % | WEIGHT: 185 LBS | SYSTOLIC BLOOD PRESSURE: 138 MMHG | DIASTOLIC BLOOD PRESSURE: 76 MMHG | HEART RATE: 66 BPM | TEMPERATURE: 98.1 F | RESPIRATION RATE: 16 BRPM | BODY MASS INDEX: 29.73 KG/M2 | HEIGHT: 66 IN

## 2022-09-26 DIAGNOSIS — Z86.79 HISTORY OF SUBARACHNOID HEMORRHAGE: ICD-10-CM

## 2022-09-26 DIAGNOSIS — E83.42 HYPOMAGNESEMIA: ICD-10-CM

## 2022-09-26 DIAGNOSIS — Z12.31 BREAST CANCER SCREENING BY MAMMOGRAM: ICD-10-CM

## 2022-09-26 DIAGNOSIS — E78.2 MIXED HYPERLIPIDEMIA: ICD-10-CM

## 2022-09-26 DIAGNOSIS — Z23 ENCOUNTER FOR IMMUNIZATION: ICD-10-CM

## 2022-09-26 DIAGNOSIS — E11.22 TYPE 2 DIABETES MELLITUS WITH CHRONIC KIDNEY DISEASE, WITHOUT LONG-TERM CURRENT USE OF INSULIN, UNSPECIFIED CKD STAGE (HCC): Primary | ICD-10-CM

## 2022-09-26 DIAGNOSIS — E55.9 VITAMIN D DEFICIENCY: ICD-10-CM

## 2022-09-26 DIAGNOSIS — J45.40 MODERATE PERSISTENT ASTHMA WITHOUT COMPLICATION: ICD-10-CM

## 2022-09-26 DIAGNOSIS — N18.30 STAGE 3 CHRONIC KIDNEY DISEASE, UNSPECIFIED WHETHER STAGE 3A OR 3B CKD (HCC): ICD-10-CM

## 2022-09-26 DIAGNOSIS — I10 ESSENTIAL HYPERTENSION: ICD-10-CM

## 2022-09-26 DIAGNOSIS — I50.32 CHRONIC DIASTOLIC CONGESTIVE HEART FAILURE (HCC): ICD-10-CM

## 2022-09-26 PROCEDURE — 90686 IIV4 VACC NO PRSV 0.5 ML IM: CPT | Performed by: FAMILY MEDICINE

## 2022-09-26 PROCEDURE — 99214 OFFICE O/P EST MOD 30 MIN: CPT | Performed by: FAMILY MEDICINE

## 2022-09-26 PROCEDURE — 90471 IMMUNIZATION ADMIN: CPT | Performed by: FAMILY MEDICINE

## 2022-09-26 PROCEDURE — 3044F HG A1C LEVEL LT 7.0%: CPT | Performed by: FAMILY MEDICINE

## 2022-09-26 RX ORDER — EPINEPHRINE 0.3 MG/.3ML
0.3 INJECTION SUBCUTANEOUS AS NEEDED
COMMUNITY
Start: 2022-05-09

## 2022-09-26 NOTE — PROGRESS NOTES
Esa Badillo, 57 y.o.,  female    SUBJECTIVE  Ff-up     HTN/diastolic CHF- w/ h/o SAH 2756 no residual deficit. l.  Modified meds now on amlodipine 10 mg, hctz 25 mg daily. Following Dr. Gasper Seip, reviewed note NYHA class 2, pt denies any symptoms. Hx angioedema on Ace    DM w/ CKD- reports [facility] 1teen's. Currently on janumet. Following with dr. Melody Johnson for eye exams. A1c 6.4    HL- on crestor, reviewed labs improved LDL, better compliance     GERD- responding well to prilosec     Asthma- no recent exacerbations, says using advair regularly, prn albuterol     Back spasm- on and off for few years now, responding to prn flexeril/nsaids. She works at [employer], long days with lifting usually trigger. Vitamin D level-previously elevated, now wnl  ROS:  See HPI, all others negative        Patient Active Problem List   Diagnosis Code    Asthma J45.909    Mixed hyperlipidemia E78.2    Vitamin D deficiency E55.9    Anxiety and depression F41.9, F32. A    Type 2 diabetes mellitus with chronic kidney disease (HCC) E11.22    Essential hypertension I10    Chronic diastolic congestive heart failure (HCC) I50.32    Obesity (BMI 30.0-34. 9) E66.9    New onset of headaches after age 51 R50.7    Iron deficiency anemia D50.9    Gastroesophageal reflux disease without esophagitis K21.9    Hypomagnesemia E83.42    History of subarachnoid hemorrhage Z86.79    Hypokalemia E87.6    Post-menopausal Z78.0    Chest pain R07.9    Back spasm M62.830    Chronic renal disease, stage III N18.30       Current Outpatient Medications   Medication Sig Dispense Refill    EPINEPHrine (EPIPEN) 0.3 mg/0.3 mL injection 0.3 mg by IntraMUSCular route as needed. trolamine salicylate-aloe vera 67% (ASPERCREME) topical cream Apply  to affected area as needed for Pain.      hydroCHLOROthiazide (HYDRODIURIL) 25 mg tablet Take 1 Tablet by mouth daily.  90 Tablet 1    cyclobenzaprine (FLEXERIL) 10 mg tablet TAKE ONE-HALF TO ONE TABLET BY MOUTH DAILY AS NEEDED FOR BACK OR HIP PAIN 30 Tablet 0    amLODIPine (NORVASC) 10 mg tablet TAKE ONE TABLET BY MOUTH DAILY 90 Tablet 0    Janumet 50-1,000 mg per tablet TAKE ONE TABLET BY MOUTH TWICE A DAY WITH MEALS 180 Tablet 0    levocetirizine (XYZAL) 5 mg tablet Take 1 Tablet by mouth daily. 90 Tablet 3    omeprazole (PRILOSEC) 20 mg capsule Take 1 Capsule by mouth daily. 90 Capsule 3    magnesium oxide (MAG-OX) 400 mg tablet TAKE ONE TABLET BY MOUTH ONCE DAILY 90 Tablet 0    rosuvastatin (CRESTOR) 10 mg tablet Take 1 Tablet by mouth nightly. 90 Tablet 3    fluticasone propion-salmeteroL (ADVAIR/WIXELA) 250-50 mcg/dose diskus inhaler INHALE ONE PUFF BY MOUTH EVERY 12 HOURS 3 Each 1    azelastine-fluticasone (Dymista) 137-50 mcg/spray 1 [place] by Both Nostrils route two (2) times a day. 1 Each 0    albuterol (PROVENTIL VENTOLIN) 2.5 mg /3 mL (0.083 %) nebu 3 mL by Nebulization route every four (4) hours as needed for Wheezing. 30 Nebule 2    albuterol (PROVENTIL HFA, VENTOLIN HFA, PROAIR HFA) 90 mcg/actuation inhaler Take 2 Puffs by inhalation every four (4) hours as needed for Wheezing.  17 g 5       Allergies   Allergen Reactions    Ace Inhibitors Angioedema    Banana Hives     Asthma attach, throat swelling, throat scratching, lip swelling    Iodine Rash    Keflex [Cephalexin] Hives    Lisinopril Angioedema    Seafood Hives    Watermelon Hives     Throat closes    Clonidine Other (comments)     Memory loss       Past Medical History:   Diagnosis Date    Ankle swelling     Asthma 02/04/2010    PFTs 2016 showed obstructive lung pattern    Brain bleed ([facility] [address] 02/20/2015    Chronic diastolic heart failure ([facility] [address] 3/18/2015    Diabetes mellitus ([facility] [address] 5/27/2014    Diabetic eye exam ([facility] [address] 01/26/2017    No retinopathy Best corrected vision 20/20 OU    HTN (hypertension) 2/4/2010    Hyperlipidemia LDL goal < 70 2014    Magnesium deficiency     Obesity, unspecified 4/15/2015    SAH (subarachnoid hemorrhage) ([facility] [address] 2/20/15    admitted in [facility]ifer and ref to Dr. Gabrielle Fuentes, Neurology    Stroke [facility]) 2015    mini stroke    Vitamin D deficiency 2000       Social History     Socioeconomic History    Marital status: SINGLE     Spouse name: Not on file    Number of children: 1    Years of education: 13    Highest education level: Not on file   Occupational History     Employer: [employer]     Comment: unemployed since Jan 2014, applied to [employer] for job July 2014   Tobacco Use    Smoking status: Never    Smokeless tobacco: Never   Substance and Sexual Activity    Alcohol use:  Yes     Alcohol/week: 0.0 standard drinks     Comment: social    Drug use: No    Sexual activity: Yes     Partners: Male     Birth control/protection: Condom   Other Topics Concern     Service No    Blood Transfusions Yes    Caffeine Concern Yes    Occupational Exposure No    Hobby Hazards No    Sleep Concern No    Stress Concern No    Weight Concern No    Special Diet No    Back Care No    Exercise No    Bike Helmet No    Seat Belt Yes    Self-Exams Yes   Social History Narrative    Not on file     Social Determinants of Health     Financial Resource Strain: Not on file   Food Insecurity: Not on file   Transportation Needs: Not on file   Physical Activity: Not on file   Stress: Not on file   Social Connections: Not on file   Intimate Partner Violence: Not on file   Housing Stability: Not on file       Family History   Problem Relation Age of Onset    Diabetes Mother     Hypertension Mother              Heart Attack Mother 62    Alcohol abuse Father         cirrhosis    Diabetes Sister     Heart Attack Sister 64         OBJECTIVE    Physical Exam:     Visit Vitals  /76 (BP 1 Location: Right arm, BP Patient Position: Sitting, BP Cuff Size: Large adult)   Pulse 66   Temp 98.1 °F (36.7 °C) (Temporal)   Resp 16   Ht 5' 6\" (1.676 m)   Wt 185 lb (83.9 kg)   SpO2 100%   BMI 29.86 kg/m²       General: alert, well-appearing, in no apparent distress or pain  Neck: supple, no adenopathy palpated  CVS: normal rate, regular rhythm, distinct S1 and S2  Lungs:clear to ausculation bilaterally, no crackles, wheezing or rhonchi noted  Abdomen: normoactive bowel sounds, soft, non-tender  Extremities: no edema, no cyanosis, MSK grossly normal  Skin: warm, no lesions, rashes noted  Psych:  mood and affect normal    Results for orders placed or performed during the hospital encounter of 09/19/22   [facility] SPECIMEN COL   Result Value Ref Range    [facility] SPECIM[facility]. Specimens collected/sent to [facility]. Please direct inquiries to ([phone]). ASSESSMENT/PLAN    Primary hypertension  controlled  Cont norvasc dose 10 mg, cont hctz 25 mg  Hx angioedema thought to be due to lisinopril. (Pt reports hx worsening asthma on BB)     Controlled type 2 diabetes mellitus with microalbuminuria, without long-term current use of insulin (HCC)  a1c 6>5.8>6.4  Controlled  Cont janumet  Cont crestor  Due for eye exam dr Cheryl Bhatia exam 8/2021- normal  Check a1c, cmp, microalbumin prior to next visit     Gastroesophageal reflux disease without esophagitis  Stable  Cont prilosec     Mixed hyperlipidemia  LDL Goal <100   cont crestor      Chronic diastolic heart failure (HCC)  Stable, euvolemic, on hctz  (intolerant to BB-asthma, ace angioedema)  Following dr. Marielos Burnette      Moderate persistent asthma with acute exacerbation  Stable  Cont advair, prn albuterol  Flu shot given today        Hypomagnesemia  Wnl, cont current replacement dose while on hctz  -     magnesium oxide (MAG-OX) 400 mg tablet; TAKE ONE TABLET BY MOUTH ONCE DAILY    Breast cancer screening by mammogram  Mammogram order placed    Encounter for immunization  Flu vaccine given  Follow-up and Dispositions    Return in about 3 months (around 12/26/2022), or if symptoms worsen or fail to improve, for routine chronic illness care, non-fasting labs prior to your next visit. Patient understands plan of care.  Patient has provided input and agrees with goals.

## 2022-09-26 NOTE — PATIENT INSTRUCTIONS
Learning About Carbohydrate (Carb) Counting and Eating Out When You Have Diabetes  Why plan your meals? Meal planning can be a key part of managing diabetes. Planning meals and snacks with the right balance of carbohydrate, protein, and fat can help you keep your blood sugar at the target level you set with your doctor. You don't have to eat special foods. You can eat what your family eats, including sweets once in a while. But you do have to pay attention to how often you eat and how much you eat of certain foods. You may want to work with a dietitian or a diabetes educator. They can give you tips and meal ideas and can answer your questions about meal planning. This health professional can also help you reach a healthy weight if that is one of your goals. What should you know about eating carbs? Managing the amount of carbohydrate (carbs) you eat is an important part of healthy meals when you have diabetes. Carbohydrate is found in many foods. Learn which foods have carbs. And learn the amounts of carbs in different foods. Bread, cereal, pasta, and rice have about 15 grams of carbs in a serving. A serving is 1 slice of bread (1 ounce), ½ cup of cooked cereal, or 1/3 cup of cooked pasta or rice. Fruits have 15 grams of carbs in a serving. A serving is 1 small fresh fruit, such as an apple or orange; ½ of a banana; ½ cup of cooked or canned fruit; ½ cup of fruit juice; 1 cup of melon or raspberries; or 2 tablespoons of dried fruit. Milk and no-sugar-added yogurt have 15 grams of carbs in a serving. A serving is 1 cup of milk or 3/4 cup (6 oz) of no-sugar-added yogurt. Starchy vegetables have 15 grams of carbs in a serving. A serving is ½ cup of mashed potatoes or sweet potato; 1 cup winter squash; ½ of a small baked potato; ½ cup of cooked beans; or ½ cup cooked corn or green peas.   Learn how much carbs to eat each day and at each meal. A dietitian or certified diabetes educator can teach you how to keep track of the amount of carbs you eat. This is called carbohydrate counting. If you are not sure how to count carbohydrate grams, use the plate method to plan meals. It is a quick way to make sure that you have a balanced meal. It also can help you manage the amount of carbohydrate you eat at meals. Divide your plate by types of foods. Put non-starchy vegetables on half the plate, meat or other protein food on one-quarter of the plate, and a grain or starchy vegetable in the final quarter of the plate. To this you can add a small piece of fruit and 1 cup of milk or yogurt, depending on how many carbs you are supposed to eat at a meal.  Try to eat about the same amount of carbs at each meal. Do not \"save up\" your daily allowance of carbs to eat at one meal.  Proteins have very little or no carbs. Examples of proteins are beef, chicken, turkey, fish, eggs, tofu, cheese, cottage cheese, and peanut butter. How can you eat out and still eat healthy? Learn to estimate the serving sizes of foods that have carbohydrate. If you measure food at home, it will be easier to estimate the amount in a serving of restaurant food. If the meal you order has too much carbohydrate (such as potatoes, corn, or baked beans), ask to have a low-carbohydrate food instead. Ask for a salad or non-starchy vegetables like broccoli, cauliflower, green beans, or peppers. If you eat more carbohydrate at a meal than you had planned, take a walk or do other exercise. This will help lower your blood sugar. What are some tips for eating healthy? Limit saturated fat, such as the fat from meat and dairy products. This is a healthy choice because people who have diabetes are at higher risk of heart disease. So choose lean cuts of meat and nonfat or low-fat dairy products. Use olive or canola oil instead of butter or shortening when cooking. Don't skip meals.  Your blood sugar may drop too low if you skip meals and take insulin or certain medicines for diabetes. Check with your doctor before you drink alcohol. Alcohol can cause your blood sugar to drop too low. Alcohol can also cause a bad reaction if you take certain diabetes medicines. Follow-up care is a key part of your treatment and safety. Be sure to make and go to all appointments, and call your doctor if you are having problems. It's also a good idea to know your test results and keep a list of the medicines you take. Where can you learn more? Go to http://www.randolph.com/  Enter I147 in the search box to learn more about \"Learning About Carbohydrate (Carb) Counting and Eating Out When You Have Diabetes. \"  Current as of: September 8, 2021               Content Version: 13.2  © 2006-2022 [employer]wise[employer] Inc[employer]. Care instructions adapted under license by [employer] (which disclaims liability or warranty for this information). If you have questions about a medical condition or this instruction, always ask your healthcare professional. [place] [address] any warranty or liability for your use of this information.

## 2022-11-02 DIAGNOSIS — M62.830 BACK SPASM: ICD-10-CM

## 2022-11-02 RX ORDER — CYCLOBENZAPRINE HCL 10 MG
TABLET ORAL
Qty: 30 TABLET | Refills: 0 | Status: SHIPPED | OUTPATIENT
Start: 2022-11-02

## 2022-11-11 DIAGNOSIS — J45.30 MILD PERSISTENT ASTHMA WITHOUT COMPLICATION: ICD-10-CM

## 2022-11-11 DIAGNOSIS — E11.29 CONTROLLED TYPE 2 DIABETES MELLITUS WITH MICROALBUMINURIA, WITHOUT LONG-TERM CURRENT USE OF INSULIN (HCC): ICD-10-CM

## 2022-11-11 DIAGNOSIS — R80.9 CONTROLLED TYPE 2 DIABETES MELLITUS WITH MICROALBUMINURIA, WITHOUT LONG-TERM CURRENT USE OF INSULIN (HCC): ICD-10-CM

## 2022-11-11 RX ORDER — FLUTICASONE PROPIONATE AND SALMETEROL 250; 50 UG/1; UG/1
POWDER RESPIRATORY (INHALATION)
Qty: 3 EACH | Refills: 1 | Status: SHIPPED | OUTPATIENT
Start: 2022-11-11

## 2022-11-11 RX ORDER — SITAGLIPTIN AND METFORMIN HYDROCHLORIDE 50; 1000 MG/1; MG/1
TABLET, FILM COATED ORAL
Qty: 180 TABLET | Refills: 0 | Status: SHIPPED | OUTPATIENT
Start: 2022-11-11

## 2022-12-01 DIAGNOSIS — I50.32 CHRONIC DIASTOLIC HEART FAILURE (HCC): ICD-10-CM

## 2022-12-05 RX ORDER — AMLODIPINE BESYLATE 10 MG/1
TABLET ORAL
Qty: 90 TABLET | Refills: 0 | Status: SHIPPED | OUTPATIENT
Start: 2022-12-05

## 2022-12-05 RX ORDER — AMLODIPINE BESYLATE 10 MG/1
10 TABLET ORAL DAILY
Qty: 90 TABLET | Refills: 0 | Status: CANCELLED | OUTPATIENT
Start: 2022-12-05

## 2022-12-12 ENCOUNTER — HOSPITAL ENCOUNTER (OUTPATIENT)
Dept: LAB | Age: 62
Discharge: HOME | End: 2022-12-12

## 2022-12-12 ENCOUNTER — HOSPITAL ENCOUNTER (OUTPATIENT)
Dept: MAMMOGRAPHY | Age: 62
Discharge: HOME | End: 2022-12-12
Attending: FAMILY MEDICINE
Payer: COMMERCIAL

## 2022-12-12 DIAGNOSIS — Z12.31 BREAST CANCER SCREENING BY MAMMOGRAM: ICD-10-CM

## 2022-12-12 LAB
XX-LABCORP SPECIMEN COL,LCBCF: NORMAL
XX-LABCORP SPECIMEN COL,LCBCF: NORMAL

## 2022-12-12 PROCEDURE — 99001 SPECIMEN HANDLING PT-LAB: CPT

## 2022-12-12 PROCEDURE — 77063 BREAST TOMOSYNTHESIS BI: CPT

## 2022-12-13 ENCOUNTER — PATIENT OUTREACH (OUTPATIENT)
Dept: CASE MANAGEMENT | Age: 62
End: 2022-12-13

## 2022-12-13 NOTE — PROGRESS NOTES
Complex Case Management Outreach    Ambulatory Care Coordination    1st attempt to reach patient.   Michelle Hebert LPN

## 2022-12-14 ENCOUNTER — PATIENT OUTREACH (OUTPATIENT)
Dept: CASE MANAGEMENT | Age: 62
End: 2022-12-14

## 2022-12-14 NOTE — PROGRESS NOTES
Complex Case Management Outreach    Ambulatory Care Coordination    2nd attempt to reach patient.   Brad Maravilla LPN

## 2022-12-19 ENCOUNTER — OFFICE VISIT (OUTPATIENT)
Dept: FAMILY MEDICINE CLINIC | Age: 62
End: 2022-12-19
Payer: COMMERCIAL

## 2022-12-19 DIAGNOSIS — N18.30 STAGE 3 CHRONIC KIDNEY DISEASE, UNSPECIFIED WHETHER STAGE 3A OR 3B CKD (HCC): ICD-10-CM

## 2022-12-19 DIAGNOSIS — R80.9 CONTROLLED TYPE 2 DIABETES MELLITUS WITH MICROALBUMINURIA, WITHOUT LONG-TERM CURRENT USE OF INSULIN (HCC): ICD-10-CM

## 2022-12-19 DIAGNOSIS — R79.89 ELEVATED SERUM CREATININE: ICD-10-CM

## 2022-12-19 DIAGNOSIS — J45.30 MILD PERSISTENT ASTHMA WITHOUT COMPLICATION: ICD-10-CM

## 2022-12-19 DIAGNOSIS — E11.29 CONTROLLED TYPE 2 DIABETES MELLITUS WITH MICROALBUMINURIA, WITHOUT LONG-TERM CURRENT USE OF INSULIN (HCC): ICD-10-CM

## 2022-12-19 DIAGNOSIS — K21.9 GASTROESOPHAGEAL REFLUX DISEASE WITHOUT ESOPHAGITIS: ICD-10-CM

## 2022-12-19 DIAGNOSIS — E78.2 MIXED HYPERLIPIDEMIA: ICD-10-CM

## 2022-12-19 DIAGNOSIS — E83.42 HYPOMAGNESEMIA: ICD-10-CM

## 2022-12-19 DIAGNOSIS — M62.830 BACK SPASM: ICD-10-CM

## 2022-12-19 DIAGNOSIS — I10 ESSENTIAL HYPERTENSION: Primary | ICD-10-CM

## 2022-12-19 DIAGNOSIS — I50.32 CHRONIC DIASTOLIC CONGESTIVE HEART FAILURE (HCC): ICD-10-CM

## 2022-12-19 PROCEDURE — 99214 OFFICE O/P EST MOD 30 MIN: CPT | Performed by: FAMILY MEDICINE

## 2022-12-19 PROCEDURE — 3044F HG A1C LEVEL LT 7.0%: CPT | Performed by: FAMILY MEDICINE

## 2022-12-19 PROCEDURE — 3078F DIAST BP <80 MM HG: CPT | Performed by: FAMILY MEDICINE

## 2022-12-19 PROCEDURE — 3074F SYST BP LT 130 MM HG: CPT | Performed by: FAMILY MEDICINE

## 2022-12-19 RX ORDER — LANOLIN ALCOHOL/MO/W.PET/CERES
CREAM (GRAM) TOPICAL
Qty: 90 TABLET | Refills: 1 | Status: SHIPPED | OUTPATIENT
Start: 2022-12-19

## 2022-12-19 RX ORDER — CYCLOBENZAPRINE HCL 10 MG
TABLET ORAL
Qty: 30 TABLET | Refills: 0 | Status: SHIPPED | OUTPATIENT
Start: 2022-12-19

## 2022-12-19 NOTE — PROGRESS NOTES
1. Have you been to the ER, urgent care clinic since your last visit? Hospitalized since your last visit? No    2. Have you seen or consulted any other health care providers outside of the [address] [place] [address] since your last visit? Include any pap smears or colon screening.  Dr. Belem Gray: 2022    Chief Complaint   Patient presents with    Hypertension    Diabetes    GERD    Cholesterol Problem    Asthma    CHF    Other     Hypermagnesemia

## 2022-12-20 VITALS
HEART RATE: 98 BPM | WEIGHT: 184.6 LBS | SYSTOLIC BLOOD PRESSURE: 138 MMHG | OXYGEN SATURATION: 99 % | RESPIRATION RATE: 16 BRPM | BODY MASS INDEX: 29.67 KG/M2 | TEMPERATURE: 97.2 F | HEIGHT: 66 IN | DIASTOLIC BLOOD PRESSURE: 72 MMHG

## 2022-12-20 NOTE — PROGRESS NOTES
Darrian Elder, 57 y.o.,  female    SUBJECTIVE  Ff-up     HTN/diastolic CHF- w/ h/o SAH 1751 no residual deficit. currently on amlodipine 10 mg, hctz 25 mg daily. Following Dr. Christine Arauz, reviewed note NYHA class 2, pt denies any symptoms. Hx angioedema on Ace    DM w/ CKD- reports [facility] 1teen's. Currently on janumet. Following with dr. Iman Mckeon for eye exams. A1c 6.5    HL- on crestor, reviewed labs improved LDL, better compliance     GERD- responding well to prilosec     Asthma- no recent exacerbations, says using advair regularly, prn albuterol     Back spasm- on and off for few years now, responding to prn flexeril/nsaids. She works at [employer], long days with lifting usually trigger. Vitamin D level-previously elevated, now wnl  ROS:  See HPI, all others negative        Patient Active Problem List   Diagnosis Code    Asthma J45.909    Mixed hyperlipidemia E78.2    Vitamin D deficiency E55.9    Anxiety and depression F41.9, F32. A    Type 2 diabetes mellitus with chronic kidney disease (HCC) E11.22    Essential hypertension I10    Chronic diastolic congestive heart failure (HCC) I50.32    Obesity (BMI 30.0-34. 9) E66.9    New onset of headaches after age 51 R50.7    Iron deficiency anemia D50.9    Gastroesophageal reflux disease without esophagitis K21.9    Hypomagnesemia E83.42    History of subarachnoid hemorrhage Z86.79    Hypokalemia E87.6    Post-menopausal Z78.0    Chest pain R07.9    Back spasm M62.830    Chronic renal disease, stage III N18.30       Current Outpatient Medications   Medication Sig Dispense Refill    magnesium oxide (MAG-OX) 400 mg tablet TAKE ONE TABLET BY MOUTH DAILY 90 Tablet 1    cyclobenzaprine (FLEXERIL) 10 mg tablet TAKE ONE-HALF TO ONE TABLET BY MOUTH DAILY AS NEEDED FOR BACK OR HIP PAIN 30 Tablet 0    amLODIPine (NORVASC) 10 mg tablet TAKE ONE TABLET BY MOUTH DAILY 90 Tablet 0    fluticasone propion-salmeteroL (ADVAIR/WIXELA) 250-50 mcg/dose diskus inhaler INHALE ONE PUFF BY MOUTH EVERY 12 HOURS 3 Each 1    Janumet 50-1,000 mg per tablet TAKE ONE TABLET BY MOUTH TWICE A DAY WITH MEALS 180 Tablet 0    albuterol (PROVENTIL VENTOLIN) 2.5 mg /3 mL (0.083 %) nebu 3 mL by Nebulization route every four (4) hours as needed for Wheezing. 30 Each 1    albuterol (PROVENTIL HFA, VENTOLIN HFA, PROAIR HFA) 90 mcg/actuation inhaler Take 2 Puffs by inhalation every four (4) hours as needed for Wheezing. 17 g 5    EPINEPHrine (EPIPEN) 0.3 mg/0.3 mL injection 0.3 mg by IntraMUSCular route as needed. trolamine salicylate-aloe vera 49% (ASPERCREME) topical cream Apply  to affected area as needed for Pain.      hydroCHLOROthiazide (HYDRODIURIL) 25 mg tablet Take 1 Tablet by mouth daily. 90 Tablet 1    levocetirizine (XYZAL) 5 mg tablet Take 1 Tablet by mouth daily. 90 Tablet 3    omeprazole (PRILOSEC) 20 mg capsule Take 1 Capsule by mouth daily. 90 Capsule 3    rosuvastatin (CRESTOR) 10 mg tablet Take 1 Tablet by mouth nightly. 90 Tablet 3    azelastine-fluticasone (Dymista) 137-50 mcg/spray 1 [place] by Both Nostrils route two (2) times a day. 1 Each 0       Allergies   Allergen Reactions    Ace Inhibitors Angioedema    Banana Hives     Asthma attach, throat swelling, throat scratching, lip swelling    Iodine Rash    Keflex [Cephalexin] Hives    Lisinopril Angioedema    Seafood Hives    Watermelon Hives     Throat closes    Clonidine Other (comments)     Memory loss       Past Medical History:   Diagnosis Date    Ankle swelling     Asthma 02/04/2010    PFTs 2016 showed obstructive lung pattern    Brain bleed ([address] 02/20/2015    Chronic diastolic heart failure ([facility] 3/18/2015    Diabetes mellitus ([address] 5/27/2014    Diabetic eye exam ([facility] 01/26/2017    No retinopathy Best corrected vision 20/20 OU    HTN (hypertension) 2/4/2010    Hyperlipidemia LDL goal < 70 2014    Magnesium deficiency     Obesity, unspecified 4/15/2015    SAH (subarachnoid hemorrhage) ([address] 2/20/15    admitted in [facility], Dr. Julien Aguayo.  [place] and ref to Dr. Wan Cobos, Neurology    Stroke [facility]) 2015    mini stroke    Vitamin D deficiency 2000       Social History     Socioeconomic History    Marital status: SINGLE     Spouse name: Not on file    Number of children: 1    Years of education: 13    Highest education level: Not on file   Occupational History     Employer: [employer]     Comment: unemployed since Jan 2014, applied to [employer] for job July 2014   Tobacco Use    Smoking status: Never    Smokeless tobacco: Never   Substance and Sexual Activity    Alcohol use:  Yes     Alcohol/week: 0.0 standard drinks     Comment: social    Drug use: No    Sexual activity: Yes     Partners: Male     Birth control/protection: Condom   Other Topics Concern     Service No    Blood Transfusions Yes    Caffeine Concern Yes    Occupational Exposure No    Hobby Hazards No    Sleep Concern No    Stress Concern No    Weight Concern No    Special Diet No    Back Care No    Exercise No    Bike Helmet No    Seat Belt Yes    Self-Exams Yes   Social History Narrative    Not on file     Social Determinants of Health     Financial Resource Strain: Medium Risk    Difficulty of Paying Living Expenses: Somewhat hard   Food Insecurity: Food Insecurity Present    Worried About Running Out of Food in the Last Year: Sometimes true    Ran Out of Food in the Last Year: Sometimes true   Transportation Needs: Not on file   Physical Activity: Not on file   Stress: Not on file   Social Connections: Not on file   Intimate Partner Violence: Not on file   Housing Stability: Not on file       Family History   Problem Relation Age of Onset    Diabetes Mother     Hypertension Mother              Heart Attack Mother 62    Alcohol abuse Father         cirrhosis    Diabetes Sister     Heart Attack Sister 64         OBJECTIVE    Physical Exam:     Visit Vitals  /72 (BP 1 Location: Right upper arm, BP Patient Position: Sitting, BP Cuff Size: Large adult)   Pulse 98   Temp 97.2 °F (36.2 °C) (Temporal) Resp 16   Ht 5' 6\" (1.676 m)   Wt 184 lb 9.6 oz (83.7 kg)   SpO2 99%   BMI 29.80 kg/m²       General: alert, well-appearing, in no apparent distress or pain  Neck: supple, no adenopathy palpated  CVS: normal rate, regular rhythm, distinct S1 and S2  Lungs:clear to ausculation bilaterally, no crackles, wheezing or rhonchi noted  Extremities: no edema, no cyanosis, MSK grossly normal  Skin: warm, no lesions, rashes noted  Psych:  mood and affect normal    Results for orders placed or performed during the hospital encounter of 12/12/22   [facility] SPECIMEN COL   Result Value Ref Range    [facility]. Specimens collected/sent to [facility]. Please direct inquiries to ([phone]). [facility] SPECIMEN COL   Result Value Ref Range    [facility]. Specimens collected/sent to [facility]. Please direct inquiries to ([phone]). ASSESSMENT/PLAN    Primary hypertension  controlled  Cont norvasc dose 10 mg, cont hctz 25 mg  Hx angioedema thought to be due to lisinopril.    (Pt reports hx worsening asthma on BB)     Controlled type 2 diabetes mellitus with microalbuminuria, without long-term current use of insulin (HCC)  a1c 6>5.8>6.4>6.5  Controlled  Cont janumet  Cont crestor  Creatinine slightly worse, will monitor, consider reducing metformin dose if continues to be elvated  Due for eye exam dr Mazin Oh exam 8/2021- normal  BMP/mg prior to next visit    Gastroesophageal reflux disease without esophagitis  Stable  Cont prilosec     Mixed hyperlipidemia  LDL Goal <100   cont crestor      Chronic diastolic heart failure (HCC)  Stable, euvolemic, on hctz  (intolerant to BB-asthma, ace angioedema)  Following dr. Dona Juarez      Mild persistent asthma without complication  Stable  Cont advair, prn albuterol      Hypomagnesemia  cont current replacement dose while on hctz  Recheck Mg level prior to next visit  -     magnesium oxide (MAG-OX) 400 mg tablet; TAKE ONE TABLET BY MOUTH ONCE DAILY    Elevated serum creatinine  See #2    Back spasm  Intermittent, responding to prn flexeril    Follow-up and Dispositions    Return in about 6 weeks (around 1/30/2023), or if symptoms worsen or fail to improve, for non-fasting labs prior to your next visit, routine chronic illness care. Patient understands plan of care. Patient has provided input and agrees with goals.

## 2022-12-21 ENCOUNTER — PATIENT OUTREACH (OUTPATIENT)
Dept: CASE MANAGEMENT | Age: 62
End: 2022-12-21

## 2022-12-21 NOTE — LETTER
12/21/2022 11:29 AM    Ms. Javier Robb  [address] [facility] [address]        My name is Raymundo Rai LPN. I am a Care Coordinator with [facility]. I often work with patients who could benefit from additional support understanding and managing their health. We are committed to providing you excellent care. I have been unable to reach you on this at [phone]. Please contact me at [phone] if you would like additional help with community resources. We appreciate the confidence you've shown by selecting us to provide your healthcare needs and I look forward to hearing from you soon.            Sincerely,      Raymundo Rai LPN

## 2022-12-21 NOTE — PROGRESS NOTES
Complex Case Management Deferred 90 days      Date/Time:  12/21/2022 11:31 AM    Method of communication with patient:phone    LPN Care Coordinator(LPN Cc) attempted contact the patient by telephone to perform Ambulatory Care Coordination. For three unsuccessful calls. Care Coordination Services calls will be suspended for 90 days. Letter mailed to patient.

## 2022-12-22 NOTE — PROGRESS NOTES
Complex Case Management Denial       Date/Time:  2022 9:31 AM    Method of communication with patient:phone    (Patient returned call)LPN Care Coordinator(LPN CC) contacted the patient by telephone to perform Ambulatory Care Coordination. Verified name and  with patient as identifiers. Provided introduction to self, and explanation of the LPN CC's role. Patient given an opportunity to ask questions. The patient Declines Care Coordination Services at this time. The patient agrees to contact the PCP office or the LPN CC for questions related to their healthcare. LPN CC provided contact information for future reference.

## 2022-12-29 RX ORDER — OMEPRAZOLE 20 MG/1
20 CAPSULE, DELAYED RELEASE ORAL DAILY
Qty: 90 CAPSULE | Refills: 3 | Status: SHIPPED | OUTPATIENT
Start: 2022-12-29

## 2023-01-16 DIAGNOSIS — J45.20 MILD INTERMITTENT ASTHMA WITHOUT COMPLICATION: ICD-10-CM

## 2023-01-16 RX ORDER — ALBUTEROL SULFATE 90 UG/1
2 AEROSOL, METERED RESPIRATORY (INHALATION)
Qty: 17 G | Refills: 5 | Status: SHIPPED | OUTPATIENT
Start: 2023-01-16

## 2023-02-05 DIAGNOSIS — M62.830 BACK SPASM: ICD-10-CM

## 2023-02-06 RX ORDER — LEVOCETIRIZINE DIHYDROCHLORIDE 5 MG/1
5 TABLET, FILM COATED ORAL DAILY
Qty: 90 TABLET | Refills: 3 | Status: SHIPPED | OUTPATIENT
Start: 2023-02-06

## 2023-02-06 RX ORDER — CYCLOBENZAPRINE HCL 10 MG
TABLET ORAL
Qty: 30 TABLET | Refills: 0 | Status: SHIPPED | OUTPATIENT
Start: 2023-02-06

## 2023-02-16 NOTE — PROGRESS NOTES
Chief Complaint   Patient presents with    Results     Review of results     Cholesterol Problem    Congestive Heart Failure    Chronic Kidney Disease    Diabetes    Gastroesophageal Reflux    Other        1. \"Have you been to the ER, urgent care clinic since your last visit? Hospitalized since your last visit? \" No    2. \"Have you seen or consulted any other health care providers outside of the [address] [place] [address] since your last visit? \" No     3. For patients aged 39-70: Has the patient had a colonoscopy / FIT/ Cologuard? Yes - no Care Gap present      If the patient is female:    4. For patients aged 41-77: Has the patient had a mammogram within the past 2 years? Yes - no Care Gap present due December 2023      5. For patients aged 21-65: Has the patient had a pap smear? No hx of hysterectomy     Annual eye exam: 11/14/2022  Pneumococcal vaccine: 2015 and 2019  Flu vaccine: 09/26/2022  Patient instructed to remove shoes:  Yes

## 2023-02-20 ENCOUNTER — HOSPITAL ENCOUNTER (OUTPATIENT)
Facility: HOSPITAL | Age: 63
Discharge: HOME | End: 2023-02-23
Payer: COMMERCIAL

## 2023-02-20 LAB
ANION GAP SERPL CALC-SCNC: 2 MMOL/L (ref 3–18)
ANION GAP SERPL CALC-SCNC: 3 MMOL/L (ref 3–18)
BUN SERPL-MCNC: 12 MG/DL (ref 7–18)
BUN SERPL-MCNC: 12 MG/DL (ref 7–18)
BUN/CREAT SERPL: 12 (ref 12–20)
BUN/CREAT SERPL: 12 (ref 12–20)
CALCIUM SERPL-MCNC: 9.6 MG/DL (ref 8.5–10.1)
CALCIUM SERPL-MCNC: 9.7 MG/DL (ref 8.5–10.1)
CHLORIDE SERPL-SCNC: 106 MMOL/L (ref 100–111)
CHLORIDE SERPL-SCNC: 107 MMOL/L (ref 100–111)
CO2 SERPL-SCNC: 30 MMOL/L (ref 21–32)
CO2 SERPL-SCNC: 31 MMOL/L (ref 21–32)
CREAT SERPL-MCNC: 0.98 MG/DL (ref 0.6–1.3)
CREAT SERPL-MCNC: 1.01 MG/DL (ref 0.6–1.3)
GLUCOSE SERPL-MCNC: 121 MG/DL (ref 74–99)
GLUCOSE SERPL-MCNC: 126 MG/DL (ref 74–99)
MAGNESIUM SERPL-MCNC: 1.8 MG/DL (ref 1.6–2.6)
POTASSIUM SERPL-SCNC: 4.5 MMOL/L (ref 3.5–5.5)
POTASSIUM SERPL-SCNC: 4.6 MMOL/L (ref 3.5–5.5)
SODIUM SERPL-SCNC: 138 MMOL/L (ref 136–145)
SODIUM SERPL-SCNC: 141 MMOL/L (ref 136–145)

## 2023-02-20 PROCEDURE — 80048 BASIC METABOLIC PNL TOTAL CA: CPT

## 2023-02-20 PROCEDURE — 36415 COLL VENOUS BLD VENIPUNCTURE: CPT

## 2023-02-20 PROCEDURE — 83735 ASSAY OF MAGNESIUM: CPT

## 2023-02-23 RX ORDER — AMLODIPINE BESYLATE 10 MG/1
TABLET ORAL
Qty: 90 TABLET | Refills: 1 | Status: SHIPPED | OUTPATIENT
Start: 2023-02-23

## 2023-02-27 ENCOUNTER — OFFICE VISIT (OUTPATIENT)
Age: 63
End: 2023-02-27
Payer: COMMERCIAL

## 2023-02-27 VITALS
OXYGEN SATURATION: 97 % | RESPIRATION RATE: 18 BRPM | TEMPERATURE: 97.9 F | HEART RATE: 93 BPM | SYSTOLIC BLOOD PRESSURE: 136 MMHG | WEIGHT: 192 LBS | DIASTOLIC BLOOD PRESSURE: 74 MMHG | HEIGHT: 66 IN | BODY MASS INDEX: 30.86 KG/M2

## 2023-02-27 DIAGNOSIS — E83.42 HYPOMAGNESEMIA: ICD-10-CM

## 2023-02-27 DIAGNOSIS — Z11.59 ENCOUNTER FOR HEPATITIS C SCREENING TEST FOR LOW RISK PATIENT: ICD-10-CM

## 2023-02-27 DIAGNOSIS — E11.29 TYPE 2 DIABETES MELLITUS WITH OTHER DIABETIC KIDNEY COMPLICATION (HCC): Primary | ICD-10-CM

## 2023-02-27 DIAGNOSIS — J45.20 MILD INTERMITTENT ASTHMA WITHOUT COMPLICATION: ICD-10-CM

## 2023-02-27 DIAGNOSIS — E55.9 VITAMIN D DEFICIENCY: ICD-10-CM

## 2023-02-27 DIAGNOSIS — K21.9 GASTROESOPHAGEAL REFLUX DISEASE WITHOUT ESOPHAGITIS: ICD-10-CM

## 2023-02-27 DIAGNOSIS — N18.30 STAGE 3 CHRONIC KIDNEY DISEASE, UNSPECIFIED WHETHER STAGE 3A OR 3B CKD (HCC): ICD-10-CM

## 2023-02-27 DIAGNOSIS — I50.32 CHRONIC DIASTOLIC CONGESTIVE HEART FAILURE (HCC): ICD-10-CM

## 2023-02-27 DIAGNOSIS — I50.32 CHRONIC DIASTOLIC (CONGESTIVE) HEART FAILURE (HCC): ICD-10-CM

## 2023-02-27 PROCEDURE — 3075F SYST BP GE 130 - 139MM HG: CPT | Performed by: FAMILY MEDICINE

## 2023-02-27 PROCEDURE — 99214 OFFICE O/P EST MOD 30 MIN: CPT | Performed by: FAMILY MEDICINE

## 2023-02-27 PROCEDURE — 3078F DIAST BP <80 MM HG: CPT | Performed by: FAMILY MEDICINE

## 2023-02-27 RX ORDER — DOXYCYCLINE HYCLATE 100 MG
100 TABLET ORAL 2 TIMES DAILY
Qty: 14 TABLET | Refills: 0 | Status: SHIPPED | OUTPATIENT
Start: 2023-02-27 | End: 2023-03-06

## 2023-02-27 RX ORDER — FLUTICASONE PROPIONATE AND SALMETEROL 250; 50 UG/1; UG/1
POWDER RESPIRATORY (INHALATION)
COMMUNITY
Start: 2022-11-11

## 2023-02-27 RX ORDER — SITAGLIPTIN AND METFORMIN HYDROCHLORIDE 1000; 50 MG/1; MG/1
TABLET, FILM COATED ORAL
Qty: 180 TABLET | Refills: 0 | Status: SHIPPED | OUTPATIENT
Start: 2023-02-27

## 2023-02-27 SDOH — ECONOMIC STABILITY: HOUSING INSECURITY
IN THE LAST 12 MONTHS, WAS THERE A TIME WHEN YOU DID NOT HAVE A STEADY PLACE TO SLEEP OR SLEPT IN A SHELTER (INCLUDING NOW)?: NO

## 2023-02-27 SDOH — ECONOMIC STABILITY: FOOD INSECURITY: WITHIN THE PAST 12 MONTHS, THE FOOD YOU BOUGHT JUST DIDN'T LAST AND YOU DIDN'T HAVE MONEY TO GET MORE.: SOMETIMES TRUE

## 2023-02-27 SDOH — ECONOMIC STABILITY: INCOME INSECURITY: HOW HARD IS IT FOR YOU TO PAY FOR THE VERY BASICS LIKE FOOD, HOUSING, MEDICAL CARE, AND HEATING?: SOMEWHAT HARD

## 2023-02-27 SDOH — ECONOMIC STABILITY: FOOD INSECURITY: WITHIN THE PAST 12 MONTHS, YOU WORRIED THAT YOUR FOOD WOULD RUN OUT BEFORE YOU GOT MONEY TO BUY MORE.: SOMETIMES TRUE

## 2023-02-27 SDOH — ECONOMIC STABILITY: TRANSPORTATION INSECURITY
IN THE PAST 12 MONTHS, HAS LACK OF TRANSPORTATION KEPT YOU FROM MEETINGS, WORK, OR FROM GETTING THINGS NEEDED FOR DAILY LIVING?: NO

## 2023-02-27 ASSESSMENT — PATIENT HEALTH QUESTIONNAIRE - PHQ9
2. FEELING DOWN, DEPRESSED OR HOPELESS: 0
SUM OF ALL RESPONSES TO PHQ QUESTIONS 1-9: 0
SUM OF ALL RESPONSES TO PHQ QUESTIONS 1-9: 0
4. FEELING TIRED OR HAVING LITTLE ENERGY: 0
SUM OF ALL RESPONSES TO PHQ9 QUESTIONS 1 & 2: 0
8. MOVING OR SPEAKING SO SLOWLY THAT OTHER PEOPLE COULD HAVE NOTICED. OR THE OPPOSITE, BEING SO FIGETY OR RESTLESS THAT YOU HAVE BEEN MOVING AROUND A LOT MORE THAN USUAL: 0
3. TROUBLE FALLING OR STAYING ASLEEP: 0
9. THOUGHTS THAT YOU WOULD BE BETTER OFF DEAD, OR OF HURTING YOURSELF: 0
6. FEELING BAD ABOUT YOURSELF - OR THAT YOU ARE A FAILURE OR HAVE LET YOURSELF OR YOUR FAMILY DOWN: 0
7. TROUBLE CONCENTRATING ON THINGS, SUCH AS READING THE NEWSPAPER OR WATCHING TELEVISION: 0
1. LITTLE INTEREST OR PLEASURE IN DOING THINGS: 0
5. POOR APPETITE OR OVEREATING: 0
10. IF YOU CHECKED OFF ANY PROBLEMS, HOW DIFFICULT HAVE THESE PROBLEMS MADE IT FOR YOU TO DO YOUR WORK, TAKE CARE OF THINGS AT HOME, OR GET ALONG WITH OTHER PEOPLE: 0
SUM OF ALL RESPONSES TO PHQ QUESTIONS 1-9: 0
SUM OF ALL RESPONSES TO PHQ QUESTIONS 1-9: 0

## 2023-02-27 NOTE — PROGRESS NOTES
Elizabeth Smith, 57 y.o.,  female    SUBJECTIVE  Ff-up    HTN/diastolic CHF- w/ h/o SAH 9644 no residual deficit. currently on amlodipine 10 mg, hctz 25 mg prn for leg edema. Following Dr. Estelle Alcantara, reviewed note NYHA class 2, pt denies any symptoms. Hx angioedema on Ace    DM w/ CKD- reports [facility] 1teen's. Currently on janumet. Following with dr. Baeza Fearing for eye exams. A1c 6.5    HL- on crestor  GERD- responding well to prilosec     Asthma- no recent exacerbations, says using advair regularly, prn albuterol. Reports nasal congestion with purulent thick greenish discharge, past 3 weeks. Says usually gets sinus infection once a year,using nasal spray regularly. Back spasm- on and off for few years now, responding to prn flexeril/nsaids. She works at [employer], long days with lifting usually trigger. ROS:  See HPI, all others negative        Patient Active Problem List   Diagnosis Code    Asthma J45.909    Mixed hyperlipidemia E78.2    Vitamin D deficiency E55.9    Anxiety and depression F41.9, F32. A    Type 2 diabetes mellitus with chronic kidney disease (HCC) E11.22    Essential hypertension I10    Chronic diastolic congestive heart failure (HCC) I50.32    Obesity (BMI 30.0-34. 9) E66.9    New onset of headaches after age 51 R50.7    Iron deficiency anemia D50.9    Gastroesophageal reflux disease without esophagitis K21.9    Hypomagnesemia E83.42    History of subarachnoid hemorrhage Z86.79    Hypokalemia E87.6    Post-menopausal Z78.0    Chest pain R07.9    Back spasm M62.830    Chronic renal disease, stage III N18.30       Scheduled Meds:  Continuous Infusions:  PRN Meds:.         Allergies   Allergen Reactions    Ace Inhibitors Angioedema    Banana Hives     Asthma attach, throat swelling, throat scratching, lip swelling    Iodine Rash    Keflex [Cephalexin] Hives    Lisinopril Angioedema    Seafood Hives    Watermelon Hives     Throat closes    Clonidine Other (comments)     Memory loss       Past Medical History:   Diagnosis Date    Ankle swelling     Asthma 02/04/2010    PFTs 2016 showed obstructive lung pattern    Brain bleed ([facility] 02/20/2015    Chronic diastolic heart failure ([facility] 3/18/2015    Diabetes mellitus ([facility] 5/27/2014    Diabetic eye exam ([facility] 01/26/2017    No retinopathy Best corrected vision 20/20 OU    HTN (hypertension) 2/4/2010    Hyperlipidemia LDL goal < 70 2014    Magnesium deficiency     Obesity, unspecified 4/15/2015    SAH (subarachnoid hemorrhage) ([facility] 2/20/15    admitted in [facility], Dr. Ilda Laird and ref to Dr. Kike Smart, Neurology    Stroke [facility]) 2015    mini stroke    Vitamin D deficiency 2000       Social History     Socioeconomic History    Marital status: SINGLE     Spouse name: Not on file    Number of children: 1    Years of education: 13    Highest education level: Not on file   Occupational History     Employer: [employer]     Comment: unemployed since Jan 2014, applied to [employer] for job July 2014   Tobacco Use    Smoking status: Never    Smokeless tobacco: Never   Substance and Sexual Activity    Alcohol use:  Yes     Alcohol/week: 0.0 standard drinks     Comment: social    Drug use: No    Sexual activity: Yes     Partners: Male     Birth control/protection: Condom   Other Topics Concern     Service No    Blood Transfusions Yes    Caffeine Concern Yes    Occupational Exposure No    Hobby Hazards No    Sleep Concern No    Stress Concern No    Weight Concern No    Special Diet No    Back Care No    Exercise No    Bike Helmet No    Seat Belt Yes    Self-Exams Yes   Social History Narrative    Not on file     Social Determinants of Health     Financial Resource Strain: Medium Risk    Difficulty of Paying Living Expenses: Somewhat hard   Food Insecurity: Food Insecurity Present    Worried About Running Out of Food in the Last Year: Sometimes true    Ran Out of Food in the Last Year: Sometimes true   Transportation Needs: Not on file   Physical Activity: Not on file   Stress: Not on file Social Connections: Not on file   Intimate Partner Violence: Not on file   Housing Stability: Not on file       Family History   Problem Relation Age of Onset    Diabetes Mother     Hypertension Mother              Heart Attack Mother 62    Alcohol abuse Father         cirrhosis    Diabetes Sister     Heart Attack Sister 64         OBJECTIVE    Physical Exam:   /74 (Site: Left Upper Arm, Position: Sitting, Cuff Size: Large Adult)   Pulse 93   Temp 97.9 °F (36.6 °C) (Temporal)   Resp 18   Ht 5' 6\" (1.676 m)   Wt 192 lb (87.1 kg)   SpO2 97%   BMI 30.99 kg/m²         General: alert, well-appearing, in no apparent distress or pain  HEENT: ears eac patent, tm intact, nasal mucosa boggy  Neck: supple, no adenopathy palpated  CVS: normal rate, regular rhythm, distinct S1 and S2  Lungs:clear to ausculation bilaterally, no crackles, wheezing or rhonchi noted  Extremities: feet: no lesions, normal monofilament  Skin: warm, no lesions, rashes noted  Psych:  mood and affect normal    CMP:   Lab Results   Component Value Date/Time     02/20/2023 02:13 PM    K 4.6 02/20/2023 02:13 PM     02/20/2023 02:13 PM    CO2 31 02/20/2023 02:13 PM    BUN 12 02/20/2023 02:13 PM    CREATININE 1.01 02/20/2023 02:13 PM    GLUCOSE 121 02/20/2023 02:13 PM    CALCIUM 9.6 02/20/2023 02:13 PM    PROT 8.3 12/12/2022 12:00 AM    LABALBU 4.9 12/12/2022 12:00 AM    BILITOT 0.6 12/12/2022 12:00 AM    AST 15 12/12/2022 12:00 AM    ALT 13 12/12/2022 12:00 AM        CBC:   Lab Results   Component Value Date/Time    WBC 5.6 06/26/2020 12:34 PM    RBC 4.10 06/26/2020 12:34 PM    HGB 11.8 06/26/2020 12:34 PM    HCT 36.1 06/26/2020 12:34 PM    MCV 88.0 06/26/2020 12:34 PM    MCH 28.8 06/26/2020 12:34 PM    MCHC 32.7 06/26/2020 12:34 PM    RDW 13.0 06/26/2020 12:34 PM     06/26/2020 12:34 PM    MPV 9.4 06/26/2020 12:34 PM        Lipids   Lab Results   Component Value Date/Time    CHOL 163 12/12/2022 12:00 AM    TRIG 64 12/12/2022 12:00 AM    LDLCALC 97 12/12/2022 12:00 AM    LABVLDL 14 03/04/2020 12:00 AM         Imaging results last 24 hrs :No results found. Imaging results impression onlyNo results found. No orders to display       A1c:   Hemoglobin A1C   Date Value Ref Range Status   12/12/2022 6.5 (H) 4.8 - 5.6 % Final     Comment:              Prediabetes: 5.7 - 6.4           Diabetes: >6.4           Glycemic control for adults with diabetes: <7.0     09/19/2022 6.4 (H) 4.8 - 5.6 % Final     Comment:              Prediabetes: 5.7 - 6.4           Diabetes: >6.4           Glycemic control for adults with diabetes: <7.0     04/20/2022 5.9 (H) 4.2 - 5.6 % Final     Comment:     (NOTE)  HbA1C Interpretive Ranges  <5.7              Normal  5.7 - 6.4         Consider Prediabetes  >6.5              Consider Diabetes           ASSESSMENT/PLAN    Primary hypertension  controlled  Cont norvasc dose 10 mg daily,  cont hctz 25 mg PRN for leg edema  Hx angioedema thought to be due to lisinopril.    (Pt reports hx worsening asthma on BB)     Controlled type 2 diabetes mellitus with microalbuminuria, without long-term current use of insulin (HCC)  a1c 6>5.8>6.4>6.5  Controlled  Cont janumet  Cont crestor  Due for eye exam dr Susanna King exam 2/2023 normal  Creatinine improved with reduction of hctz to prn from daily  Check a1c/cmp/lipid panel/microalbumin/ vit d prior to next visit    Gastroesophageal reflux disease without esophagitis  Stable  Cont prilosec     Mixed hyperlipidemia  LDL Goal <100   cont crestor      Chronic diastolic heart failure (HCC)  Stable, euvolemic, on hctz prn  (intolerant to BB-asthma, ace angioedema)  Following dr. Eloise Zhao      Mild persistent asthma without complication  Stable  Cont advair, prn albuterol      Hypomagnesemia  cont current replacement dose while on hctz  Recheck Mg level prior to next visit  -     magnesium oxide (MAG-OX) 400 mg tablet; TAKE ONE TABLET BY MOUTH ONCE DAILY    Acute maxillary sinusitis, non recurrent  Start doxy 100 mg bid x 7 days  Cont azelastine spray    Back spasm  Intermittent, responding to prn flexeril    Follow-up and Dispositions    Return in 3 months, or if symptoms worsen or fail to improve, for non-fasting labs prior to your next visit, routine chronic illness care. Patient understands plan of care. Patient has provided input and agrees with goals.

## 2023-02-27 NOTE — TELEPHONE ENCOUNTER
Requested Prescriptions     Pending Prescriptions Disp Refills    JANUMET  MG per tablet [Pharmacy Med Name: JANUMET 50-1,000 MG TABLET] 180 tablet      Sig: TAKE ONE TABLET BY MOUTH TWICE A DAY WITH MEALS      Last OV 12/19/2022  Last lab 02/20/2023  Next OV 02/27/2023

## 2023-03-22 ENCOUNTER — OFFICE VISIT (OUTPATIENT)
Age: 63
End: 2023-03-22
Payer: COMMERCIAL

## 2023-03-22 VITALS
DIASTOLIC BLOOD PRESSURE: 66 MMHG | SYSTOLIC BLOOD PRESSURE: 153 MMHG | OXYGEN SATURATION: 100 % | HEART RATE: 109 BPM | HEIGHT: 66 IN | WEIGHT: 191 LBS | BODY MASS INDEX: 30.7 KG/M2

## 2023-03-22 DIAGNOSIS — I50.32 CHRONIC DIASTOLIC (CONGESTIVE) HEART FAILURE (HCC): Primary | ICD-10-CM

## 2023-03-22 DIAGNOSIS — I10 ESSENTIAL (PRIMARY) HYPERTENSION: ICD-10-CM

## 2023-03-22 DIAGNOSIS — E66.9 OBESITY (BMI 30.0-34.9): ICD-10-CM

## 2023-03-22 DIAGNOSIS — E78.2 MIXED HYPERLIPIDEMIA: ICD-10-CM

## 2023-03-22 PROCEDURE — 93000 ELECTROCARDIOGRAM COMPLETE: CPT | Performed by: INTERNAL MEDICINE

## 2023-03-22 PROCEDURE — 99214 OFFICE O/P EST MOD 30 MIN: CPT | Performed by: INTERNAL MEDICINE

## 2023-03-22 PROCEDURE — 3077F SYST BP >= 140 MM HG: CPT | Performed by: INTERNAL MEDICINE

## 2023-03-22 PROCEDURE — 3078F DIAST BP <80 MM HG: CPT | Performed by: INTERNAL MEDICINE

## 2023-03-22 ASSESSMENT — ENCOUNTER SYMPTOMS
RESPIRATORY NEGATIVE: 1
EYES NEGATIVE: 1
GASTROINTESTINAL NEGATIVE: 1

## 2023-03-22 NOTE — PROGRESS NOTES
1. Have you been to the ER, urgent care clinic since your last visit? Hospitalized since your last visit?     no      2. Where do you normally have your labs drawn?   [facility]    3. Do you need any refills today?   no    4. Which local pharmacy do you use (enter pharmacy)? [address] [place] [address]    5. Which mail order pharmacy do you use (enter pharmacy)?   no     6. Are you here for surgical clearance and if so who will be doing your     procedure/surgery (care team)?    no
throat scratching, lip swelling    Cephalexin Hives    Citrullus Vulgaris Hives     Throat closes    Iodine Rash    Shellfish-Derived Products Hives    Clonidine Other (See Comments)     Memory loss       Past Medical History:   Diagnosis Date    Ankle swelling     Asthma 02/04/2010    PFTs 2016 showed obstructive lung pattern    B[facility] bleed ([facility] 02/20/2015    Chronic diastolic heart failure ([facility] 3/18/2015    Diabetes mellitus ([facility] 5/27/2014    Diabetic eye exam ([facility] 01/26/2017    No retinopathy Best corrected vision 20/20 OU    HTN (hypertension) 2/4/2010    Hyperlipidemia LDL goal < 70 2014    Magnesium deficiency     Obesity, unspecified 4/15/2015    SAH (subarachnoid hemorrhage) ([facility] 2/20/15    admitted in [facility], Dr. Argentina Garrison. [employer] and ref to Dr. Aguilar Waggoner, Neurology    Stroke [facility]) 2015    mini stroke    Vitamin D deficiency 2000       Family History   Problem Relation Age of Onset    Heart Attack Sister 64    Diabetes Mother     Hypertension Mother              Heart Attack Mother 62    Alcohol Abuse Father         cirrhosis    Diabetes Sister        Social History     Tobacco Use    Smoking status: Never    Smokeless tobacco: Never   Vaping Use    Vaping Use: Never used   Substance Use Topics    Alcohol use:  Yes     Alcohol/week: 0.0 standard drinks    Drug use: Never        Current Outpatient Medications   Medication Sig Dispense Refill    JANUMET  MG per tablet TAKE ONE TABLET BY MOUTH TWICE A DAY WITH MEALS 180 tablet 0    fluticasone-salmeterol (ADVAIR) 250-50 MCG/ACT AEPB diskus inhaler INHALE ONE PUFF BY MOUTH EVERY 12 HOURS      diphenhydrAMINE (SOMINEX) 25 MG tablet Take 25 mg by mouth every 6 hours as needed      amLODIPine (NORVASC) 10 MG tablet TAKE ONE TABLET BY MOUTH DAILY 90 tablet 1    albuterol sulfate HFA (PROVENTIL;VENTOLIN;PROAIR) 108 (90 Base) MCG/ACT inhaler Inhale 2 puffs into the lungs every 4 hours as needed      albuterol (PROVENTIL) (2.5 MG/3ML) 0.083% nebulizer solution Inhale

## 2023-03-30 RX ORDER — CYCLOBENZAPRINE HCL 10 MG
TABLET ORAL
Qty: 30 TABLET | Refills: 1 | Status: SHIPPED | OUTPATIENT
Start: 2023-03-30

## 2023-03-31 RX ORDER — HYDROCHLOROTHIAZIDE 25 MG/1
TABLET ORAL
Qty: 90 TABLET | Refills: 2 | Status: SHIPPED | OUTPATIENT
Start: 2023-03-31

## 2023-04-20 DIAGNOSIS — I10 ESSENTIAL HYPERTENSION: Primary | ICD-10-CM

## 2023-04-20 DIAGNOSIS — I50.32 CHRONIC DIASTOLIC CONGESTIVE HEART FAILURE (HCC): ICD-10-CM

## 2023-04-20 RX ORDER — LISINOPRIL 10 MG/1
10 TABLET ORAL DAILY
COMMUNITY
End: 2023-04-20 | Stop reason: SINTOL

## 2023-04-20 RX ORDER — DOXAZOSIN MESYLATE 1 MG/1
1 TABLET ORAL DAILY
Qty: 30 TABLET | Refills: 3 | Status: SHIPPED | OUTPATIENT
Start: 2023-04-20

## 2023-04-20 RX ORDER — LISINOPRIL 10 MG/1
10 TABLET ORAL DAILY
Qty: 90 TABLET | Refills: 3 | Status: CANCELLED | OUTPATIENT
Start: 2023-04-20

## 2023-04-20 NOTE — TELEPHONE ENCOUNTER
I related lisinopril due to allergy. Instead we will use doxazosin. Send a prescription  Thanks for checking into allergy. Please get a heart rate and blood pressure check on patient next week.

## 2023-04-20 NOTE — TELEPHONE ENCOUNTER
Patient has a allergen to ACE inhibitors         Requested Prescriptions     Pending Prescriptions Disp Refills    lisinopril (PRINIVIL;ZESTRIL) 10 MG tablet 30 tablet 3     Sig: Take 1 tablet by mouth daily

## 2023-05-18 ENCOUNTER — HOSPITAL ENCOUNTER (OUTPATIENT)
Facility: HOSPITAL | Age: 63
End: 2023-05-18
Payer: COMMERCIAL

## 2023-05-18 ENCOUNTER — HOSPITAL ENCOUNTER (OUTPATIENT)
Facility: HOSPITAL | Age: 63
Discharge: HOME | End: 2023-05-18
Payer: COMMERCIAL

## 2023-05-18 LAB
25(OH)D3 SERPL-MCNC: 46.6 NG/ML (ref 30–100)
ALBUMIN SERPL-MCNC: 4 G/DL (ref 3.4–5)
ALBUMIN/GLOB SERPL: 1 (ref 0.8–1.7)
ALP SERPL-CCNC: 92 U/L (ref 45–117)
ALT SERPL-CCNC: 34 U/L (ref 13–56)
ANION GAP SERPL CALC-SCNC: 3 MMOL/L (ref 3–18)
ANION GAP SERPL CALC-SCNC: 4 MMOL/L (ref 3–18)
AST SERPL-CCNC: 23 U/L (ref 10–38)
BILIRUB SERPL-MCNC: 0.6 MG/DL (ref 0.2–1)
BUN SERPL-MCNC: 10 MG/DL (ref 7–18)
BUN SERPL-MCNC: 11 MG/DL (ref 7–18)
BUN/CREAT SERPL: 11 (ref 12–20)
BUN/CREAT SERPL: 13 (ref 12–20)
CALCIUM SERPL-MCNC: 9.4 MG/DL (ref 8.5–10.1)
CALCIUM SERPL-MCNC: 9.5 MG/DL (ref 8.5–10.1)
CHLORIDE SERPL-SCNC: 106 MMOL/L (ref 100–111)
CHLORIDE SERPL-SCNC: 107 MMOL/L (ref 100–111)
CHOLEST SERPL-MCNC: 152 MG/DL
CO2 SERPL-SCNC: 31 MMOL/L (ref 21–32)
CO2 SERPL-SCNC: 31 MMOL/L (ref 21–32)
CREAT SERPL-MCNC: 0.88 MG/DL (ref 0.6–1.3)
CREAT SERPL-MCNC: 0.93 MG/DL (ref 0.6–1.3)
CREAT UR-MCNC: 51 MG/DL (ref 30–125)
EST. AVERAGE GLUCOSE BLD GHB EST-MCNC: 131 MG/DL
GLOBULIN SER CALC-MCNC: 4.1 G/DL (ref 2–4)
GLUCOSE SERPL-MCNC: 115 MG/DL (ref 74–99)
GLUCOSE SERPL-MCNC: 116 MG/DL (ref 74–99)
HBA1C MFR BLD: 6.2 % (ref 4.2–5.6)
HDLC SERPL-MCNC: 70 MG/DL (ref 40–60)
HDLC SERPL: 2.2 (ref 0–5)
LDLC SERPL CALC-MCNC: 69.2 MG/DL (ref 0–100)
LIPID PANEL: ABNORMAL
MICROALBUMIN UR-MCNC: 0.63 MG/DL (ref 0–3)
MICROALBUMIN/CREAT UR-RTO: 12 MG/G (ref 0–30)
POTASSIUM SERPL-SCNC: 4.1 MMOL/L (ref 3.5–5.5)
POTASSIUM SERPL-SCNC: 4.2 MMOL/L (ref 3.5–5.5)
PROT SERPL-MCNC: 8.1 G/DL (ref 6.4–8.2)
SODIUM SERPL-SCNC: 141 MMOL/L (ref 136–145)
SODIUM SERPL-SCNC: 141 MMOL/L (ref 136–145)
TRIGL SERPL-MCNC: 64 MG/DL
VLDLC SERPL CALC-MCNC: 12.8 MG/DL

## 2023-05-18 PROCEDURE — 36415 COLL VENOUS BLD VENIPUNCTURE: CPT

## 2023-05-18 PROCEDURE — 82570 ASSAY OF URINE CREATININE: CPT

## 2023-05-18 PROCEDURE — 80053 COMPREHEN METABOLIC PANEL: CPT

## 2023-05-18 PROCEDURE — 83036 HEMOGLOBIN GLYCOSYLATED A1C: CPT

## 2023-05-18 PROCEDURE — 80061 LIPID PANEL: CPT

## 2023-05-18 PROCEDURE — 82043 UR ALBUMIN QUANTITATIVE: CPT

## 2023-05-18 PROCEDURE — 82306 VITAMIN D 25 HYDROXY: CPT

## 2023-05-18 PROCEDURE — 86803 HEPATITIS C AB TEST: CPT

## 2023-05-19 LAB
HCV AB SER IA-ACNC: 0.21 INDEX
HCV AB SERPL QL IA: NEGATIVE
Lab: NORMAL

## 2023-05-22 ENCOUNTER — OFFICE VISIT (OUTPATIENT)
Age: 63
End: 2023-05-22
Payer: COMMERCIAL

## 2023-05-22 VITALS
SYSTOLIC BLOOD PRESSURE: 138 MMHG | HEART RATE: 91 BPM | BODY MASS INDEX: 31.43 KG/M2 | RESPIRATION RATE: 16 BRPM | WEIGHT: 195.6 LBS | OXYGEN SATURATION: 97 % | DIASTOLIC BLOOD PRESSURE: 62 MMHG | TEMPERATURE: 97.2 F | HEIGHT: 66 IN

## 2023-05-22 DIAGNOSIS — I10 ESSENTIAL HYPERTENSION: Primary | ICD-10-CM

## 2023-05-22 DIAGNOSIS — I50.32 CHRONIC DIASTOLIC CONGESTIVE HEART FAILURE (HCC): ICD-10-CM

## 2023-05-22 DIAGNOSIS — E83.42 HYPOMAGNESEMIA: ICD-10-CM

## 2023-05-22 DIAGNOSIS — N18.30 TYPE 2 DIABETES MELLITUS WITH STAGE 3 CHRONIC KIDNEY DISEASE, WITHOUT LONG-TERM CURRENT USE OF INSULIN, UNSPECIFIED WHETHER STAGE 3A OR 3B CKD (HCC): ICD-10-CM

## 2023-05-22 DIAGNOSIS — E11.22 TYPE 2 DIABETES MELLITUS WITH STAGE 3 CHRONIC KIDNEY DISEASE, WITHOUT LONG-TERM CURRENT USE OF INSULIN, UNSPECIFIED WHETHER STAGE 3A OR 3B CKD (HCC): ICD-10-CM

## 2023-05-22 DIAGNOSIS — M62.830 BACK SPASM: ICD-10-CM

## 2023-05-22 DIAGNOSIS — J31.0 CHRONIC RHINITIS: ICD-10-CM

## 2023-05-22 DIAGNOSIS — E78.2 MIXED HYPERLIPIDEMIA: ICD-10-CM

## 2023-05-22 DIAGNOSIS — Z86.79 HISTORY OF SUBARACHNOID HEMORRHAGE: ICD-10-CM

## 2023-05-22 DIAGNOSIS — J45.20 MILD INTERMITTENT ASTHMA WITHOUT COMPLICATION: ICD-10-CM

## 2023-05-22 PROCEDURE — 3078F DIAST BP <80 MM HG: CPT | Performed by: FAMILY MEDICINE

## 2023-05-22 PROCEDURE — 3075F SYST BP GE 130 - 139MM HG: CPT | Performed by: FAMILY MEDICINE

## 2023-05-22 PROCEDURE — 3044F HG A1C LEVEL LT 7.0%: CPT | Performed by: FAMILY MEDICINE

## 2023-05-22 PROCEDURE — 99214 OFFICE O/P EST MOD 30 MIN: CPT | Performed by: FAMILY MEDICINE

## 2023-05-22 RX ORDER — CYCLOBENZAPRINE HCL 10 MG
TABLET ORAL
Qty: 30 TABLET | Refills: 1 | Status: SHIPPED | OUTPATIENT
Start: 2023-05-22

## 2023-05-22 SDOH — ECONOMIC STABILITY: INCOME INSECURITY: HOW HARD IS IT FOR YOU TO PAY FOR THE VERY BASICS LIKE FOOD, HOUSING, MEDICAL CARE, AND HEATING?: SOMEWHAT HARD

## 2023-05-22 SDOH — ECONOMIC STABILITY: FOOD INSECURITY: WITHIN THE PAST 12 MONTHS, YOU WORRIED THAT YOUR FOOD WOULD RUN OUT BEFORE YOU GOT MONEY TO BUY MORE.: SOMETIMES TRUE

## 2023-05-22 SDOH — ECONOMIC STABILITY: FOOD INSECURITY: WITHIN THE PAST 12 MONTHS, THE FOOD YOU BOUGHT JUST DIDN'T LAST AND YOU DIDN'T HAVE MONEY TO GET MORE.: SOMETIMES TRUE

## 2023-05-22 ASSESSMENT — PATIENT HEALTH QUESTIONNAIRE - PHQ9
2. FEELING DOWN, DEPRESSED OR HOPELESS: 0
1. LITTLE INTEREST OR PLEASURE IN DOING THINGS: 0
SUM OF ALL RESPONSES TO PHQ QUESTIONS 1-9: 2
4. FEELING TIRED OR HAVING LITTLE ENERGY: 1
10. IF YOU CHECKED OFF ANY PROBLEMS, HOW DIFFICULT HAVE THESE PROBLEMS MADE IT FOR YOU TO DO YOUR WORK, TAKE CARE OF THINGS AT HOME, OR GET ALONG WITH OTHER PEOPLE: 1
6. FEELING BAD ABOUT YOURSELF - OR THAT YOU ARE A FAILURE OR HAVE LET YOURSELF OR YOUR FAMILY DOWN: 0
3. TROUBLE FALLING OR STAYING ASLEEP: 1
7. TROUBLE CONCENTRATING ON THINGS, SUCH AS READING THE NEWSPAPER OR WATCHING TELEVISION: 0
SUM OF ALL RESPONSES TO PHQ9 QUESTIONS 1 & 2: 0
9. THOUGHTS THAT YOU WOULD BE BETTER OFF DEAD, OR OF HURTING YOURSELF: 0
5. POOR APPETITE OR OVEREATING: 0
8. MOVING OR SPEAKING SO SLOWLY THAT OTHER PEOPLE COULD HAVE NOTICED. OR THE OPPOSITE, BEING SO FIGETY OR RESTLESS THAT YOU HAVE BEEN MOVING AROUND A LOT MORE THAN USUAL: 0
SUM OF ALL RESPONSES TO PHQ QUESTIONS 1-9: 2

## 2023-05-22 NOTE — PROGRESS NOTES
Viviana Alfredo, 57 y.o.,  female    SUBJECTIVE  Ff-up    HTN/diastolic CHF- w/ h/o SAH 8768 no residual deficit. currently on amlodipine 10 mg, hctz 25 mg prn for leg edema. Following Dr. Dario Bess, reviewed note NYHA class 2, pt denies any symptoms. Hx angioedema on Ace, recent addition of doxazosin. DM w/ CKD- reports [facility] 1teen's. Currently on janumet. Following with dr. Alessandro Hendrickson for eye exams. A1c 6.5    HL- on crestor, reviewed labs LDL 69  GERD- responding well to prilosec     Asthma- no recent exacerbations, says using advair regularly, prn albuterol. Constant sinus pain/pressure, has had sinus infections about 3-4 x a year, reports completed allergy shots in the past without much improvement. Currently on dymista, xyzal, open to seeing ENT again. Back spasm- on and off for few years now, responding to prn flexeril/nsaids. She works at [employer], long days with lifting usually trigger. ROS:  See HPI, all others negative        Patient Active Problem List   Diagnosis Code    Asthma J45.909    Mixed hyperlipidemia E78.2    Vitamin D deficiency E55.9    Anxiety and depression F41.9, F32. A    Type 2 diabetes mellitus with chronic kidney disease (HCC) E11.22    Essential hypertension I10    Chronic diastolic congestive heart failure (HCC) I50.32    Obesity (BMI 30.0-34. 9) E66.9    New onset of headaches after age 51 R50.7    Iron deficiency anemia D50.9    Gastroesophageal reflux disease without esophagitis K21.9    Hypomagnesemia E83.42    History of subarachnoid hemorrhage Z86.79    Hypokalemia E87.6    Post-menopausal Z78.0    Chest pain R07.9    Back spasm M62.830    Chronic renal disease, stage III N18.30         Current Outpatient Medications:     cyclobenzaprine (FLEXERIL) 10 MG tablet, TAKE ONE-HALF TO ONE TABLET BY MOUTH DAILY AS NEEDED FOR BACK OR HIP PAIN, Disp: 30 tablet, Rfl: 1    doxazosin (CARDURA) 1 MG tablet, Take 1 tablet by mouth daily, Disp: 30 tablet, Rfl: 3    magnesium oxide (MAG-OX)

## 2023-06-01 RX ORDER — SITAGLIPTIN AND METFORMIN HYDROCHLORIDE 1000; 50 MG/1; MG/1
TABLET, FILM COATED ORAL
Qty: 180 TABLET | Refills: 1 | Status: SHIPPED | OUTPATIENT
Start: 2023-06-01

## 2023-06-26 RX ORDER — FLUTICASONE PROPIONATE AND SALMETEROL 250; 50 UG/1; UG/1
POWDER RESPIRATORY (INHALATION)
Qty: 180 EACH | Refills: 1 | Status: SHIPPED | OUTPATIENT
Start: 2023-06-26

## 2023-06-30 RX ORDER — OMEPRAZOLE 20 MG/1
20 CAPSULE, DELAYED RELEASE ORAL DAILY
Qty: 90 CAPSULE | Refills: 3 | Status: SHIPPED | OUTPATIENT
Start: 2023-06-30

## 2023-07-31 RX ORDER — DOXAZOSIN MESYLATE 1 MG/1
1 TABLET ORAL DAILY
Qty: 90 TABLET | Refills: 3 | Status: SHIPPED | OUTPATIENT
Start: 2023-07-31

## 2023-07-31 NOTE — TELEPHONE ENCOUNTER
Requested Prescriptions     Pending Prescriptions Disp Refills    amLODIPine (NORVASC) 10 MG tablet 90 tablet 1     Sig: TAKE ONE TABLET BY MOUTH DAILY     Last OV: 5/22/2023  Last labs: 5/18/2023  Next OV and labs: 9/11/2023

## 2023-08-01 RX ORDER — AMLODIPINE BESYLATE 10 MG/1
TABLET ORAL
Qty: 90 TABLET | Refills: 1 | Status: SHIPPED | OUTPATIENT
Start: 2023-08-01

## 2023-08-21 RX ORDER — ROSUVASTATIN CALCIUM 10 MG/1
TABLET, COATED ORAL
Qty: 90 TABLET | Refills: 3 | Status: SHIPPED | OUTPATIENT
Start: 2023-08-21

## 2023-08-30 RX ORDER — CYCLOBENZAPRINE HCL 10 MG
TABLET ORAL
Qty: 30 TABLET | Refills: 1 | Status: SHIPPED | OUTPATIENT
Start: 2023-08-30

## 2023-09-21 ENCOUNTER — HOSPITAL ENCOUNTER (OUTPATIENT)
Facility: HOSPITAL | Age: 63
Discharge: HOME | End: 2023-09-24

## 2023-09-21 LAB — LABCORP SPECIMEN COLLECTION: NORMAL

## 2023-09-22 LAB
ALBUMIN SERPL-MCNC: 4.8 G/DL (ref 3.9–4.9)
ALBUMIN/GLOB SERPL: 1.5 {RATIO} (ref 1.2–2.2)
ALP SERPL-CCNC: 96 IU/L (ref 44–121)
ALT SERPL-CCNC: 12 IU/L (ref 0–32)
AST SERPL-CCNC: 14 IU/L (ref 0–40)
BILIRUB SERPL-MCNC: 0.6 MG/DL (ref 0–1.2)
BUN SERPL-MCNC: 11 MG/DL (ref 8–27)
BUN/CREAT SERPL: 12 (ref 12–28)
CALCIUM SERPL-MCNC: 10.1 MG/DL (ref 8.7–10.3)
CHLORIDE SERPL-SCNC: 98 MMOL/L (ref 96–106)
CO2 SERPL-SCNC: 22 MMOL/L (ref 20–29)
CREAT SERPL-MCNC: 0.93 MG/DL (ref 0.57–1)
EGFRCR SERPLBLD CKD-EPI 2021: 68 ML/MIN/1.73
GLOBULIN SER CALC-MCNC: 3.3 G/DL (ref 1.5–4.5)
GLUCOSE SERPL-MCNC: 144 MG/DL (ref 70–99)
HBA1C MFR BLD: 6.9 % (ref 4.8–5.6)
POTASSIUM SERPL-SCNC: 4 MMOL/L (ref 3.5–5.2)
PROT SERPL-MCNC: 8.1 G/DL (ref 6–8.5)
SODIUM SERPL-SCNC: 140 MMOL/L (ref 134–144)
SPECIMEN STATUS REPORT: NORMAL

## 2023-09-25 ENCOUNTER — OFFICE VISIT (OUTPATIENT)
Age: 63
End: 2023-09-25
Payer: COMMERCIAL

## 2023-09-25 VITALS
BODY MASS INDEX: 30.86 KG/M2 | WEIGHT: 192 LBS | OXYGEN SATURATION: 98 % | HEIGHT: 66 IN | RESPIRATION RATE: 16 BRPM | TEMPERATURE: 98.4 F | SYSTOLIC BLOOD PRESSURE: 136 MMHG | DIASTOLIC BLOOD PRESSURE: 78 MMHG | HEART RATE: 84 BPM

## 2023-09-25 DIAGNOSIS — J45.40 MODERATE PERSISTENT ASTHMA WITHOUT COMPLICATION: ICD-10-CM

## 2023-09-25 DIAGNOSIS — I50.32 CHRONIC DIASTOLIC CONGESTIVE HEART FAILURE (HCC): ICD-10-CM

## 2023-09-25 DIAGNOSIS — E78.2 MIXED HYPERLIPIDEMIA: ICD-10-CM

## 2023-09-25 DIAGNOSIS — M62.830 BACK SPASM: ICD-10-CM

## 2023-09-25 DIAGNOSIS — J31.0 CHRONIC RHINITIS: ICD-10-CM

## 2023-09-25 DIAGNOSIS — I10 ESSENTIAL HYPERTENSION: ICD-10-CM

## 2023-09-25 DIAGNOSIS — N18.30 TYPE 2 DIABETES MELLITUS WITH STAGE 3 CHRONIC KIDNEY DISEASE, WITHOUT LONG-TERM CURRENT USE OF INSULIN, UNSPECIFIED WHETHER STAGE 3A OR 3B CKD (HCC): Primary | ICD-10-CM

## 2023-09-25 DIAGNOSIS — E11.22 TYPE 2 DIABETES MELLITUS WITH STAGE 3 CHRONIC KIDNEY DISEASE, WITHOUT LONG-TERM CURRENT USE OF INSULIN, UNSPECIFIED WHETHER STAGE 3A OR 3B CKD (HCC): Primary | ICD-10-CM

## 2023-09-25 DIAGNOSIS — K21.9 GASTROESOPHAGEAL REFLUX DISEASE WITHOUT ESOPHAGITIS: ICD-10-CM

## 2023-09-25 PROCEDURE — 99214 OFFICE O/P EST MOD 30 MIN: CPT | Performed by: FAMILY MEDICINE

## 2023-09-25 PROCEDURE — 1123F ACP DISCUSS/DSCN MKR DOCD: CPT | Performed by: FAMILY MEDICINE

## 2023-09-25 PROCEDURE — 3044F HG A1C LEVEL LT 7.0%: CPT | Performed by: FAMILY MEDICINE

## 2023-09-25 PROCEDURE — 3078F DIAST BP <80 MM HG: CPT | Performed by: FAMILY MEDICINE

## 2023-09-25 PROCEDURE — 3075F SYST BP GE 130 - 139MM HG: CPT | Performed by: FAMILY MEDICINE

## 2023-09-25 RX ORDER — TIOTROPIUM BROMIDE 18 UG/1
18 CAPSULE ORAL; RESPIRATORY (INHALATION) DAILY
Qty: 90 CAPSULE | Refills: 0 | Status: SHIPPED | OUTPATIENT
Start: 2023-09-25

## 2023-09-25 RX ORDER — MONTELUKAST SODIUM 10 MG/1
10 TABLET ORAL DAILY
Qty: 90 TABLET | Refills: 0 | Status: SHIPPED
Start: 2023-09-25 | End: 2023-09-25 | Stop reason: SINTOL

## 2023-09-25 NOTE — PROGRESS NOTES
1. \"Have you been to the ER, urgent care clinic since your last visit? Hospitalized since your last visit? \" No    2. \"Have you seen or consulted any other health care providers outside of the [address] [place] [address] since your last visit? \" No     3. For patients aged 43-73: Has the patient had a colonoscopy / FIT/ Cologuard? Yes - no Care Gap present      If the patient is female:    4. For patients aged 43-66: Has the patient had a mammogram within the past 2 years? Yes - Care Gap present. Rooming MA/LPN to request most recent results      5. For patients aged 21-65: Has the patient had a pap smear?  Yes - no Care Gap present
inhalation capsule, Inhale 1 capsule into the lungs daily, Disp: 90 capsule, Rfl: 0    cyclobenzaprine (FLEXERIL) 10 MG tablet, TAKE ONE-HALF TO ONE TABLET BY MOUTH DAILY AS NEEDED FOR BACK OR HIP PAIN, Disp: 30 tablet, Rfl: 1    rosuvastatin (CRESTOR) 10 MG tablet, TAKE ONE TABLET BY MOUTH ONCE NIGHTLY, Disp: 90 tablet, Rfl: 3    amLODIPine (NORVASC) 10 MG tablet, TAKE ONE TABLET BY MOUTH DAILY, Disp: 90 tablet, Rfl: 1    doxazosin (CARDURA) 1 MG tablet, Take 1 tablet by mouth daily, Disp: 90 tablet, Rfl: 3    omeprazole (PRILOSEC) 20 MG delayed release capsule, Take 1 capsule by mouth daily, Disp: 90 capsule, Rfl: 3    fluticasone-salmeterol (ADVAIR) 250-50 MCG/ACT AEPB diskus inhaler, INHALE ONE PUFF BY MOUTH EVERY 12 HOURS, Disp: 180 each, Rfl: 1    JANUMET  MG per tablet, TAKE ONE TABLET BY MOUTH TWICE A DAY WITH MEALS, Disp: 180 tablet, Rfl: 1    magnesium oxide (MAG-OX) 400 MG tablet, Take 1 tablet by mouth daily, Disp: 90 tablet, Rfl: 1    hydroCHLOROthiazide (HYDRODIURIL) 25 MG tablet, TAKE ONE TABLET BY MOUTH DAILY, Disp: 90 tablet, Rfl: 2    albuterol sulfate HFA (PROVENTIL;VENTOLIN;PROAIR) 108 (90 Base) MCG/ACT inhaler, Inhale 2 puffs into the lungs every 4 hours as needed, Disp: , Rfl:     albuterol (PROVENTIL) (2.5 MG/3ML) 0.083% nebulizer solution, Inhale 3 mLs into the lungs every 4 hours as needed, Disp: , Rfl:     Azelastine-Fluticasone 137-50 MCG/ACT SUSP, 1 spray by Nasal route 2 times daily, Disp: , Rfl:     EPINEPHrine (EPIPEN) 0.3 MG/0.3ML SOAJ injection, Inject 0.3 mLs into the muscle as needed, Disp: , Rfl:     levocetirizine (XYZAL) 5 MG tablet, Take 1 tablet by mouth daily (Patient not taking: Reported on 9/25/2023), Disp: , Rfl:         Allergies   Allergen Reactions    Ace Inhibitors Angioedema    Banana Hives     Asthma attach, throat swelling, throat scratching, lip swelling    Iodine Rash    Keflex [Cephalexin] Hives    Lisinopril Angioedema    Seafood Hives    Watermelon Hives

## 2023-09-26 NOTE — PROGRESS NOTES
1. Have you been to the ER, urgent care clinic since your last visit? Hospitalized since your last visit? No    2. Have you seen or consulted any other health care providers outside of the [address] [place] [address] since your last visit? Include any pap smears or colon screening. No     3. Since your last visit, have you had any of the following symptoms? shortness of breath and swelling in legs/arms. 4.  Have you had any blood work, X-rays or cardiac testing? No    5. Where do you normally have your labs drawn? [facility]    6. Do you need any refills today?    Yes Nsaids Counseling: NSAID Counseling: I discussed with the patient that NSAIDs should be taken with food. Prolonged use of NSAIDs can result in the development of stomach ulcers.  Patient advised to stop taking NSAIDs if abdominal pain occurs.  The patient verbalized understanding of the proper use and possible adverse effects of NSAIDs.  All of the patient's questions and concerns were addressed.

## 2023-10-02 ENCOUNTER — OFFICE VISIT (OUTPATIENT)
Age: 63
End: 2023-10-02
Payer: COMMERCIAL

## 2023-10-02 VITALS
TEMPERATURE: 97.9 F | HEIGHT: 66 IN | SYSTOLIC BLOOD PRESSURE: 150 MMHG | BODY MASS INDEX: 31.02 KG/M2 | WEIGHT: 193 LBS | HEART RATE: 105 BPM | DIASTOLIC BLOOD PRESSURE: 76 MMHG | OXYGEN SATURATION: 97 % | RESPIRATION RATE: 18 BRPM

## 2023-10-02 DIAGNOSIS — Z91.09 ENVIRONMENTAL ALLERGIES: ICD-10-CM

## 2023-10-02 DIAGNOSIS — J44.89 ASTHMA WITH COPD: ICD-10-CM

## 2023-10-02 DIAGNOSIS — J45.40 MODERATE PERSISTENT ASTHMA WITHOUT COMPLICATION: Primary | ICD-10-CM

## 2023-10-02 PROCEDURE — 99204 OFFICE O/P NEW MOD 45 MIN: CPT | Performed by: INTERNAL MEDICINE

## 2023-10-02 PROCEDURE — 1123F ACP DISCUSS/DSCN MKR DOCD: CPT | Performed by: INTERNAL MEDICINE

## 2023-10-02 PROCEDURE — 94060 EVALUATION OF WHEEZING: CPT | Performed by: INTERNAL MEDICINE

## 2023-10-02 PROCEDURE — 3078F DIAST BP <80 MM HG: CPT | Performed by: INTERNAL MEDICINE

## 2023-10-02 PROCEDURE — 3077F SYST BP >= 140 MM HG: CPT | Performed by: INTERNAL MEDICINE

## 2023-10-02 RX ORDER — FLUTICASONE FUROATE, UMECLIDINIUM BROMIDE AND VILANTEROL TRIFENATATE 200; 62.5; 25 UG/1; UG/1; UG/1
1 POWDER RESPIRATORY (INHALATION) DAILY
Qty: 1 EACH | Refills: 5 | Status: SHIPPED | OUTPATIENT
Start: 2023-10-02

## 2023-10-02 RX ORDER — MAGNESIUM OXIDE TAB 400 MG (241.3 MG ELEMENTAL MG) 400 (241.3 MG) MG
TAB ORAL
COMMUNITY
Start: 2023-07-18 | End: 2023-10-02

## 2023-10-02 RX ORDER — MONTELUKAST SODIUM 10 MG/1
TABLET ORAL
COMMUNITY
Start: 2023-09-25

## 2023-10-02 RX ORDER — FLUTICASONE FUROATE, UMECLIDINIUM BROMIDE AND VILANTEROL TRIFENATATE 200; 62.5; 25 UG/1; UG/1; UG/1
1 POWDER RESPIRATORY (INHALATION) DAILY
Qty: 1 EACH | Refills: 0 | Status: SHIPPED | COMMUNITY
Start: 2023-10-02

## 2023-10-02 ASSESSMENT — ENCOUNTER SYMPTOMS
SINUS PAIN: 0
TROUBLE SWALLOWING: 0
VOICE CHANGE: 0
PHOTOPHOBIA: 0
ABDOMINAL DISTENTION: 0
EYE REDNESS: 0
APNEA: 0
STRIDOR: 0
SORE THROAT: 0
EYE ITCHING: 0
VOMITING: 0
WHEEZING: 1
DIARRHEA: 0
CHEST TIGHTNESS: 0
EYE PAIN: 0
CHOKING: 0
CONSTIPATION: 0
ABDOMINAL PAIN: 0
COLOR CHANGE: 0
NAUSEA: 0
SHORTNESS OF BREATH: 1
EYE DISCHARGE: 0
BLOOD IN STOOL: 0
BACK PAIN: 0
COUGH: 1

## 2023-10-02 NOTE — PROGRESS NOTES
Romayne Parsley presents today for   Chief Complaint   Patient presents with    Asthma    New Patient     Referred by Dr. Saida Vigil for Asthma    Results     Zoila Kristine done in office today       Is someone accompanying this pt? No    Is the patient using any DME equipment during OV? No    -DME Company NA    Depression Screenin/22/2023     9:20 AM   PHQ-9 Questionaire   Little interest or pleasure in doing things 0   Feeling down, depressed, or hopeless 0   Trouble falling or staying asleep, or sleeping too much 1   Feeling tired or having little energy 1   Poor appetite or overeating 0   Feeling bad about yourself - or that you are a failure or have let yourself or your family down 0   Trouble concentrating on things, such as reading the newspaper or watching television 0   Moving or speaking so slowly that other people could have noticed. Or the opposite - being so fidgety or restless that you have been moving around a lot more than usual 0   Thoughts that you would be better off dead, or of hurting yourself in some way 0   PHQ-9 Total Score 2   If you checked off any problems, how difficult have these problems made it for you to do your work, take care of things at home, or get along with other people? 1       Learning Needs Questionnaire:     No question data found. Fall Risk:         2023     9:52 AM   Fall Risk   2 or more falls in past year? no   Fall with injury in past year? no        Abuse Screenin/27/2023     9:00 AM   AMB Abuse Screening   Are you in a relationship with someone who physically or mentally threatens you? N   Is it safe for you to go home? Y         Coordination of Care:    1. Have you been to the ER, urgent care clinic since your last visit? Hospitalized since your last visit? No    2. Have you seen or consulted any other health care providers outside of the [address] [place] [address] since your last visit? Include any pap smears or colon screening.

## 2023-10-02 NOTE — PROGRESS NOTES
Sobeida Sprague (:  1960) is a 70 y.o. female,New patient, here for evaluation of the following chief complaint(s):  New Patient (Referred by Dr. Jefry Sanchez for Asthma), Results Sam Garzon done in office today), Cough, and Wheezing         ASSESSMENT/PLAN:  1. Moderate persistent asthma without complication  -     AMB POC SPIROMETRY W/BRONCHODILATOR  -     CBC with Auto Differential; Future  -     IgE; Future  -     Allergen (24) Panel; Future  2. Asthma with COPD  3. Environmental allergies    Pt with daily symptoms despite triple therapy, although Spiriva was only started recently. Will switch to Trelegy for improved adherence and ease of use. Sample provided, Rx sent. Continue Albuterol prn. Inhaler technique education provided to pt. Check IgE, CBC/diff count, allergen panel  Further intervention pending response to therapy and test results  Return in about 3 months (around 2024). Subjective   SUBJECTIVE/OBJECTIVE:  Pt is a non smoker with a history of asthma since childhood, treated with rescue inhaler for many years. She was started on Advair a few years ago and then added Spiriva ~ 1 week ago. Pt noted worsening symptoms of chest congestion, SOB, episodic wheezing, relieved by Albuterol which pt now uses once a day. Other symptoms include cough with occasional clear tenacious phlegm, wheezing, SOB. Denies chest pain, hemoptysis, fever. Review of Systems   Constitutional:  Negative for activity change, appetite change, chills, diaphoresis, fatigue, fever and unexpected weight change. HENT:  Negative for congestion, hearing loss, nosebleeds, sinus pain, sore throat, tinnitus, trouble swallowing and voice change. Eyes:  Negative for photophobia, pain, discharge, redness, itching and visual disturbance. Respiratory:  Positive for cough, shortness of breath (occasional) and wheezing (occasional). Negative for apnea, choking, chest tightness and stridor.     Cardiovascular:

## 2023-10-23 ENCOUNTER — OFFICE VISIT (OUTPATIENT)
Age: 63
End: 2023-10-23
Payer: COMMERCIAL

## 2023-10-23 VITALS
HEIGHT: 66 IN | RESPIRATION RATE: 16 BRPM | WEIGHT: 197 LBS | HEART RATE: 88 BPM | DIASTOLIC BLOOD PRESSURE: 76 MMHG | BODY MASS INDEX: 31.66 KG/M2 | OXYGEN SATURATION: 99 % | SYSTOLIC BLOOD PRESSURE: 132 MMHG | TEMPERATURE: 97.1 F

## 2023-10-23 DIAGNOSIS — I50.32 CHRONIC DIASTOLIC CONGESTIVE HEART FAILURE (HCC): ICD-10-CM

## 2023-10-23 DIAGNOSIS — J31.0 CHRONIC RHINITIS: ICD-10-CM

## 2023-10-23 DIAGNOSIS — J45.40 MODERATE PERSISTENT ASTHMA WITHOUT COMPLICATION: Primary | ICD-10-CM

## 2023-10-23 DIAGNOSIS — J34.89 SINUS PAIN: ICD-10-CM

## 2023-10-23 PROCEDURE — 3078F DIAST BP <80 MM HG: CPT | Performed by: FAMILY MEDICINE

## 2023-10-23 PROCEDURE — 99214 OFFICE O/P EST MOD 30 MIN: CPT | Performed by: FAMILY MEDICINE

## 2023-10-23 PROCEDURE — 1123F ACP DISCUSS/DSCN MKR DOCD: CPT | Performed by: FAMILY MEDICINE

## 2023-10-23 PROCEDURE — 3075F SYST BP GE 130 - 139MM HG: CPT | Performed by: FAMILY MEDICINE

## 2023-10-23 RX ORDER — MAGNESIUM OXIDE 400 MG/1
1 TABLET ORAL DAILY
Qty: 90 TABLET | Refills: 1 | Status: SHIPPED | OUTPATIENT
Start: 2023-10-23

## 2023-10-23 RX ORDER — ALBUTEROL SULFATE 90 UG/1
2 AEROSOL, METERED RESPIRATORY (INHALATION) EVERY 4 HOURS PRN
Qty: 18 G | Refills: 5 | Status: SHIPPED | OUTPATIENT
Start: 2023-10-23

## 2023-10-23 NOTE — PROGRESS NOTES
1. \"Have you been to the ER, urgent care clinic since your last visit? Hospitalized since your last visit? \" No    2. \"Have you seen or consulted any other health care providers outside of the [address] [place] [address] since your last visit? \" No     3. For patients aged 43-73: Has the patient had a colonoscopy / FIT/ Cologuard? Yes - no Care Gap present 08/15/2018-08/15/2028 (10y)      If the patient is female:    4. For patients aged 43-66: Has the patient had a mammogram within the past 2 years? Yes - no Care Gap present 12/12/2022 Due 12/12/2023       5. For patients aged 21-65: Has the patient had a pap smear?  NA - based on age or sex NI
into the lungs daily, Disp: 1 each, Rfl: 0    Coenzyme Q10 (CO Q 10 PO), Take by mouth, Disp: , Rfl:     cyclobenzaprine (FLEXERIL) 10 MG tablet, TAKE ONE-HALF TO ONE TABLET BY MOUTH DAILY AS NEEDED FOR BACK OR HIP PAIN, Disp: 30 tablet, Rfl: 1    rosuvastatin (CRESTOR) 10 MG tablet, TAKE ONE TABLET BY MOUTH ONCE NIGHTLY, Disp: 90 tablet, Rfl: 3    amLODIPine (NORVASC) 10 MG tablet, TAKE ONE TABLET BY MOUTH DAILY, Disp: 90 tablet, Rfl: 1    doxazosin (CARDURA) 1 MG tablet, Take 1 tablet by mouth daily, Disp: 90 tablet, Rfl: 3    omeprazole (PRILOSEC) 20 MG delayed release capsule, Take 1 capsule by mouth daily, Disp: 90 capsule, Rfl: 3    JANUMET  MG per tablet, TAKE ONE TABLET BY MOUTH TWICE A DAY WITH MEALS, Disp: 180 tablet, Rfl: 1    hydroCHLOROthiazide (HYDRODIURIL) 25 MG tablet, TAKE ONE TABLET BY MOUTH DAILY, Disp: 90 tablet, Rfl: 2    albuterol (PROVENTIL) (2.5 MG/3ML) 0.083% nebulizer solution, Inhale 3 mLs into the lungs every 4 hours as needed, Disp: , Rfl:     Azelastine-Fluticasone 137-50 MCG/ACT SUSP, 1 spray by Nasal route 2 times daily, Disp: , Rfl:     levocetirizine (XYZAL) 5 MG tablet, Take 1 tablet by mouth daily, Disp: , Rfl:     montelukast (SINGULAIR) 10 MG tablet, , Disp: , Rfl:     fluticasone-umeclidin-vilant (TRELEGY ELLIPTA) 200-62.5-25 MCG/ACT AEPB inhaler, Inhale 1 puff into the lungs daily, Disp: 1 each, Rfl: 5    EPINEPHrine (EPIPEN) 0.3 MG/0.3ML SOAJ injection, Inject 0.3 mLs into the muscle as needed, Disp: , Rfl:         Allergies   Allergen Reactions    Ace Inhibitors Angioedema    Banana Hives     Asthma attach, throat swelling, throat scratching, lip swelling    Iodine Rash    Keflex [Cephalexin] Hives    Lisinopril Angioedema    Seafood Hives    Watermelon Hives     Throat closes    Clonidine Other (comments)     Memory loss       Past Medical History:   Diagnosis Date    Ankle swelling     Asthma 02/04/2010    PFTs 2016 showed obstructive lung pattern    Brain bleed

## 2023-10-30 ENCOUNTER — OFFICE VISIT (OUTPATIENT)
Age: 63
End: 2023-10-30
Payer: COMMERCIAL

## 2023-10-30 VITALS
SYSTOLIC BLOOD PRESSURE: 154 MMHG | BODY MASS INDEX: 31.5 KG/M2 | HEIGHT: 66 IN | DIASTOLIC BLOOD PRESSURE: 72 MMHG | WEIGHT: 196 LBS | OXYGEN SATURATION: 98 % | HEART RATE: 91 BPM

## 2023-10-30 DIAGNOSIS — E66.9 OBESITY (BMI 30.0-34.9): ICD-10-CM

## 2023-10-30 DIAGNOSIS — I50.32 CHRONIC DIASTOLIC CONGESTIVE HEART FAILURE (HCC): Primary | ICD-10-CM

## 2023-10-30 DIAGNOSIS — I10 ESSENTIAL HYPERTENSION: ICD-10-CM

## 2023-10-30 DIAGNOSIS — E78.2 MIXED HYPERLIPIDEMIA: ICD-10-CM

## 2023-10-30 PROCEDURE — 3077F SYST BP >= 140 MM HG: CPT | Performed by: INTERNAL MEDICINE

## 2023-10-30 PROCEDURE — 3078F DIAST BP <80 MM HG: CPT | Performed by: INTERNAL MEDICINE

## 2023-10-30 PROCEDURE — 1123F ACP DISCUSS/DSCN MKR DOCD: CPT | Performed by: INTERNAL MEDICINE

## 2023-10-30 PROCEDURE — 99214 OFFICE O/P EST MOD 30 MIN: CPT | Performed by: INTERNAL MEDICINE

## 2023-10-30 RX ORDER — DOXAZOSIN 2 MG/1
2 TABLET ORAL DAILY
Qty: 90 TABLET | Refills: 3 | Status: SHIPPED | OUTPATIENT
Start: 2023-10-30

## 2023-10-30 ASSESSMENT — ENCOUNTER SYMPTOMS
EYES NEGATIVE: 1
SHORTNESS OF BREATH: 1
GASTROINTESTINAL NEGATIVE: 1

## 2023-10-30 NOTE — PROGRESS NOTES
1. Have you been to the ER, urgent care clinic since your last visit? Hospitalized since your last visit?     no      2. Where do you normally have your labs drawn? [facility]     3. Do you need any refills today?   no    4. Which local pharmacy do you use (enter pharmacy)? [address]     5. Which mail order pharmacy do you use (enter pharmacy)?    no    6. Are you here for surgical clearance and if so who will be doing your     procedure/surgery (care team)?    no
have counseled this patient on diet and exercise regimens     CHF: Cassius Martinez        0/68 Chronic diastolic CHF appears compensated. Blood pressure mildly elevated in the office but was excellent last week in PCPs office. Follow home BP chart before adjusting any medications. Low sodium diet. Continue walking for exercise. Diet and weight discussed in detail and she plans to lose more weight. She was congratulated that she is losing some weight already. 3/25/2021 blood pressure is well controlled. Reduce HCTZ to every other day. Patient told to watch her blood pressure at home closely and call if there is any rise. She was also told to take it daily if edema recurs. CHF is compensated NYHA class II. Lipids are not controlled. She admitted to noncompliance with Crestor. She will take it regularly and we can repeat the labs in 3 months. Diet weight and exercise discussed. Mediterranean diet guidelines printed. 9/23/2021 CHF is compensated well. She has gained some weight back. Diet and exercise discussed. Mediterranean diet guidelines printed. Lipids need to be followed since she is taking Crestor regularly now. Blood pressure is elevated. Increase lisinopril and follow home chart. Labs as ordered. 3/10/2022 CHF is compensated NYHA class II. New shortness of breath is likely due to change in seasonal allergies as it gets relieved with inhalers. Blood pressure is elevated today but in 120s over 70s at home. Check home chart before adjusting the medicine. Lipids are now well controlled and continue statins. Diet weight and exercise discussed again. 9/22/2022 CHF is stable NYHA class I-II. Blood pressure and heart rate are higher today as she just used albuterol 20 minutes before she came. Blood pressure at home yesterday was 127/70. Check home BP chart. Increase HCTZ to 25 mg daily due to chronic mild edema and see if it helps. Diet exercise and weight discussed.   Mediterranean

## 2023-11-20 ENCOUNTER — HOSPITAL ENCOUNTER (OUTPATIENT)
Facility: HOSPITAL | Age: 63
Discharge: HOME | End: 2023-11-23
Payer: COMMERCIAL

## 2023-11-20 DIAGNOSIS — J33.9 NASAL POLYPS: ICD-10-CM

## 2023-11-20 PROCEDURE — 70486 CT MAXILLOFACIAL W/O DYE: CPT

## 2023-11-30 RX ORDER — CYCLOBENZAPRINE HCL 10 MG
TABLET ORAL
Qty: 30 TABLET | Refills: 1 | Status: SHIPPED | OUTPATIENT
Start: 2023-11-30

## 2023-12-11 RX ORDER — SITAGLIPTIN AND METFORMIN HYDROCHLORIDE 1000; 50 MG/1; MG/1
1 TABLET, FILM COATED ORAL 2 TIMES DAILY WITH MEALS
Qty: 180 TABLET | Refills: 1 | Status: SHIPPED | OUTPATIENT
Start: 2023-12-11

## 2023-12-11 NOTE — TELEPHONE ENCOUNTER
Result Value Ref Range    Specimen Status Report COMMENT    Comprehensive Metabolic Panel   Result Value Ref Range    Glucose 144 (H) 70 - 99 mg/dL    BUN 11 8 - 27 mg/dL    Creatinine 0.93 0.57 - 1.00 mg/dL    Est, Glomerular Filtration Rate 68 >59 mL/min/1.73    BUN/Creatinine Ratio 12 12 - 28    Sodium 140 134 - 144 mmol/L    Potassium 4.0 3.5 - 5.2 mmol/L    Chloride 98 96 - 106 mmol/L    CO2 22 20 - 29 mmol/L    Calcium 10.1 8.7 - 10.3 mg/dL    Total Protein 8.1 6.0 - 8.5 g/dL    Albumin 4.8 3.9 - 4.9 g/dL    Globulin, Total 3.3 1.5 - 4.5 g/dL    Albumin/Globulin Ratio 1.5 1.2 - 2.2    Total Bilirubin 0.6 0.0 - 1.2 mg/dL    Alkaline Phosphatase 96 44 - 121 IU/L    AST 14 0 - 40 IU/L    ALT 12 0 - 32 IU/L         Thank you.,

## 2023-12-22 NOTE — TELEPHONE ENCOUNTER
Is this medication request appropriate?  Please confirm if the medication is d/c'd.    tiotropium (SPIRIVA HANDIHALER) 18 MCG inhalation capsule [[id number]]  DISCONTINUED    Order Details  Dose: 18 mcg Route: Inhalation Frequency: DAILY   Dispense Quantity: 90 capsule Refills: 0          Sig: Inhale 1 capsule into the lungs daily         Start Date: 09/25/23 End Date: 10/02/23   Discontinued by: Jayde Roy MD on 10/2/2023 13:47   Reason: Alternate therapy         Written Date: 09/25/23 Expiration Date: 09/24/24   Providers    Authorizing Provider: Dixie Lima MD NPI: [id number]   Ordering User: Dixie Lima MD          Pharmacy    [facility] [id number] [facility] [address] [facility] - P [phone] - F [phone]  [address] [facility] [address]  Phone: [phone]  Fax: [phone]

## 2023-12-22 NOTE — TELEPHONE ENCOUNTER
Please call patient and ask her to speak with DR. Roy's office as this was discontinued by them in October.

## 2023-12-22 NOTE — TELEPHONE ENCOUNTER
Attemped to contact Dr. Roy's office but they're closed for the holiday. I will try again on 12/26/23.

## 2023-12-26 RX ORDER — TIOTROPIUM BROMIDE 18 UG/1
CAPSULE ORAL; RESPIRATORY (INHALATION)
Qty: 90 CAPSULE | Refills: 0 | Status: SHIPPED | OUTPATIENT
Start: 2023-12-26 | End: 2024-01-15

## 2023-12-26 RX ORDER — FLUTICASONE PROPIONATE AND SALMETEROL 250; 50 UG/1; UG/1
POWDER RESPIRATORY (INHALATION)
OUTPATIENT
Start: 2023-12-26

## 2023-12-26 NOTE — TELEPHONE ENCOUNTER
Confirmed with Dr. Roy's office that the medication was d/c'd due to alternative treatment prescribed.

## 2024-01-08 ENCOUNTER — HOSPITAL ENCOUNTER (OUTPATIENT)
Facility: HOSPITAL | Age: 64
Discharge: HOME | End: 2024-01-11
Payer: COMMERCIAL

## 2024-01-08 DIAGNOSIS — I10 ESSENTIAL HYPERTENSION: ICD-10-CM

## 2024-01-08 DIAGNOSIS — E78.2 MIXED HYPERLIPIDEMIA: ICD-10-CM

## 2024-01-08 DIAGNOSIS — E11.22 TYPE 2 DIABETES MELLITUS WITH STAGE 3 CHRONIC KIDNEY DISEASE, WITHOUT LONG-TERM CURRENT USE OF INSULIN, UNSPECIFIED WHETHER STAGE 3A OR 3B CKD (HCC): ICD-10-CM

## 2024-01-08 DIAGNOSIS — J45.40 MODERATE PERSISTENT ASTHMA WITHOUT COMPLICATION: ICD-10-CM

## 2024-01-08 DIAGNOSIS — N18.30 TYPE 2 DIABETES MELLITUS WITH STAGE 3 CHRONIC KIDNEY DISEASE, WITHOUT LONG-TERM CURRENT USE OF INSULIN, UNSPECIFIED WHETHER STAGE 3A OR 3B CKD (HCC): ICD-10-CM

## 2024-01-08 LAB
25(OH)D3 SERPL-MCNC: 45.7 NG/ML (ref 30–100)
ALBUMIN SERPL-MCNC: 4.2 G/DL (ref 3.4–5)
ALBUMIN SERPL-MCNC: 4.2 G/DL (ref 3.4–5)
ALBUMIN/GLOB SERPL: 0.9 (ref 0.8–1.7)
ALBUMIN/GLOB SERPL: 1 (ref 0.8–1.7)
ALP SERPL-CCNC: 90 U/L (ref 45–117)
ALP SERPL-CCNC: 94 U/L (ref 45–117)
ALT SERPL-CCNC: 18 U/L (ref 13–56)
ALT SERPL-CCNC: 19 U/L (ref 13–56)
ANION GAP SERPL CALC-SCNC: 5 MMOL/L (ref 3–18)
ANION GAP SERPL CALC-SCNC: 6 MMOL/L (ref 3–18)
AST SERPL-CCNC: 12 U/L (ref 10–38)
AST SERPL-CCNC: 13 U/L (ref 10–38)
BASOPHILS # BLD: 0.1 K/UL (ref 0–0.1)
BASOPHILS NFR BLD: 1 % (ref 0–2)
BILIRUB DIRECT SERPL-MCNC: 0.2 MG/DL (ref 0–0.2)
BILIRUB SERPL-MCNC: 0.7 MG/DL (ref 0.2–1)
BILIRUB SERPL-MCNC: 0.8 MG/DL (ref 0.2–1)
BUN SERPL-MCNC: 12 MG/DL (ref 7–18)
BUN SERPL-MCNC: 12 MG/DL (ref 7–18)
BUN/CREAT SERPL: 11 (ref 12–20)
BUN/CREAT SERPL: 11 (ref 12–20)
CALCIUM SERPL-MCNC: 9.6 MG/DL (ref 8.5–10.1)
CALCIUM SERPL-MCNC: 9.6 MG/DL (ref 8.5–10.1)
CHLORIDE SERPL-SCNC: 104 MMOL/L (ref 100–111)
CHLORIDE SERPL-SCNC: 105 MMOL/L (ref 100–111)
CHOLEST SERPL-MCNC: 165 MG/DL
CO2 SERPL-SCNC: 30 MMOL/L (ref 21–32)
CO2 SERPL-SCNC: 30 MMOL/L (ref 21–32)
CREAT SERPL-MCNC: 1.06 MG/DL (ref 0.6–1.3)
CREAT SERPL-MCNC: 1.08 MG/DL (ref 0.6–1.3)
DIFFERENTIAL METHOD BLD: ABNORMAL
EOSINOPHIL # BLD: 0.8 K/UL (ref 0–0.4)
EOSINOPHIL NFR BLD: 15 % (ref 0–5)
ERYTHROCYTE [DISTWIDTH] IN BLOOD BY AUTOMATED COUNT: 13.7 % (ref 11.6–14.5)
EST. AVERAGE GLUCOSE BLD GHB EST-MCNC: 131 MG/DL
GLOBULIN SER CALC-MCNC: 4.3 G/DL (ref 2–4)
GLOBULIN SER CALC-MCNC: 4.5 G/DL (ref 2–4)
GLUCOSE SERPL-MCNC: 111 MG/DL (ref 74–99)
GLUCOSE SERPL-MCNC: 111 MG/DL (ref 74–99)
HBA1C MFR BLD: 6.2 % (ref 4.2–5.6)
HCT VFR BLD AUTO: 38.7 % (ref 35–45)
HDLC SERPL-MCNC: 63 MG/DL (ref 40–60)
HDLC SERPL: 2.6 (ref 0–5)
HGB BLD-MCNC: 12.4 G/DL (ref 12–16)
IMM GRANULOCYTES # BLD AUTO: 0 K/UL (ref 0–0.04)
IMM GRANULOCYTES NFR BLD AUTO: 0 % (ref 0–0.5)
LDLC SERPL CALC-MCNC: 84.8 MG/DL (ref 0–100)
LIPID PANEL: ABNORMAL
LYMPHOCYTES # BLD: 1.4 K/UL (ref 0.9–3.6)
LYMPHOCYTES NFR BLD: 25 % (ref 21–52)
MCH RBC QN AUTO: 28.2 PG (ref 24–34)
MCHC RBC AUTO-ENTMCNC: 32 G/DL (ref 31–37)
MCV RBC AUTO: 88.2 FL (ref 78–100)
MONOCYTES # BLD: 0.3 K/UL (ref 0.05–1.2)
MONOCYTES NFR BLD: 5 % (ref 3–10)
NEUTS SEG # BLD: 3 K/UL (ref 1.8–8)
NEUTS SEG NFR BLD: 54 % (ref 40–73)
NRBC # BLD: 0 K/UL (ref 0–0.01)
NRBC BLD-RTO: 0 PER 100 WBC
PLATELET # BLD AUTO: 368 K/UL (ref 135–420)
PMV BLD AUTO: 9.6 FL (ref 9.2–11.8)
POTASSIUM SERPL-SCNC: 3.9 MMOL/L (ref 3.5–5.5)
POTASSIUM SERPL-SCNC: 4.1 MMOL/L (ref 3.5–5.5)
PROT SERPL-MCNC: 8.5 G/DL (ref 6.4–8.2)
PROT SERPL-MCNC: 8.7 G/DL (ref 6.4–8.2)
RBC # BLD AUTO: 4.39 M/UL (ref 4.2–5.3)
SODIUM SERPL-SCNC: 140 MMOL/L (ref 136–145)
SODIUM SERPL-SCNC: 140 MMOL/L (ref 136–145)
TRIGL SERPL-MCNC: 86 MG/DL
VLDLC SERPL CALC-MCNC: 17.2 MG/DL
WBC # BLD AUTO: 5.7 K/UL (ref 4.6–13.2)

## 2024-01-08 PROCEDURE — 82785 ASSAY OF IGE: CPT

## 2024-01-08 PROCEDURE — 80076 HEPATIC FUNCTION PANEL: CPT

## 2024-01-08 PROCEDURE — 36415 COLL VENOUS BLD VENIPUNCTURE: CPT

## 2024-01-08 PROCEDURE — 82306 VITAMIN D 25 HYDROXY: CPT

## 2024-01-08 PROCEDURE — 80061 LIPID PANEL: CPT

## 2024-01-08 PROCEDURE — 80053 COMPREHEN METABOLIC PANEL: CPT

## 2024-01-08 PROCEDURE — 86003 ALLG SPEC IGE CRUDE XTRC EA: CPT

## 2024-01-08 PROCEDURE — 83036 HEMOGLOBIN GLYCOSYLATED A1C: CPT

## 2024-01-08 PROCEDURE — 85025 COMPLETE CBC W/AUTO DIFF WBC: CPT

## 2024-01-10 LAB
A ALTERNATA IGE QN: 6.44 KU/L
A FUMIGATUS IGE QN: 3.08 KU/L
BERMUDA GRASS IGE QN: 1.17 KU/L
BOXELDER IGE QN: 0.32 KU/L
C HERBARUM IGE QN: 1.31 KU/L
CAT DANDER IGG QN: 15.5 KU/L
COMMON RAGWEED IGE QN: 4.23 KU/L
COTTONWOOD IGE QN: 0.29 KU/L
D FARINAE IGE QN: 5.79 KU/L
D PTERONYSS IGE QN: 3.53 KU/L
DEPRECATED IGE QN: 0.15 KU/L
DOG DANDER IGE QN: 2.7 KU/L
IGE SERPL-ACNC: 854 IU/ML (ref 6–495)
JOHNSON GRASS IGE QN: 0.97 KU/L
Lab: ABNORMAL
MOUSE URINE PROT IGE QN: <0.1 KU/L
MT JUNIPER IGE QN: 0.25 KU/L
P NOTATUM IGE QN: 0.52 KU/L
PECAN/HICK TREE IGE QN: 0.64 KU/L
ROACH IGG QN: <0.1 KU/L
SHEEP SORREL IGE QN: 0.1 KU/L
TIMOTHY IGE QN: 3.95 KU/L
WHITE BIRCH IGE QN: 0.83 KU/L
WHITE ELM IGG QN: 0.57 KU/L
WHITE MULBERRY IGE QN: <0.1 KU/L
WHITE OAK IGE QN: 7.2 KU/L

## 2024-01-10 RX ORDER — AMLODIPINE BESYLATE 10 MG/1
TABLET ORAL
Qty: 90 TABLET | Refills: 1 | Status: SHIPPED | OUTPATIENT
Start: 2024-01-10

## 2024-01-15 ENCOUNTER — OFFICE VISIT (OUTPATIENT)
Age: 64
End: 2024-01-15
Payer: COMMERCIAL

## 2024-01-15 VITALS
HEART RATE: 101 BPM | RESPIRATION RATE: 18 BRPM | DIASTOLIC BLOOD PRESSURE: 74 MMHG | WEIGHT: 194 LBS | OXYGEN SATURATION: 98 % | TEMPERATURE: 97.5 F | SYSTOLIC BLOOD PRESSURE: 151 MMHG | HEIGHT: 66 IN | BODY MASS INDEX: 31.18 KG/M2

## 2024-01-15 DIAGNOSIS — J44.9 ASTHMA-COPD OVERLAP SYNDROME: ICD-10-CM

## 2024-01-15 DIAGNOSIS — D72.19 PERIPHERAL EOSINOPHILIA: ICD-10-CM

## 2024-01-15 DIAGNOSIS — Z91.09 ENVIRONMENTAL ALLERGIES: ICD-10-CM

## 2024-01-15 DIAGNOSIS — J45.40 MODERATE PERSISTENT ASTHMA WITHOUT COMPLICATION: Primary | ICD-10-CM

## 2024-01-15 DIAGNOSIS — R76.8 ELEVATED IGE LEVEL: ICD-10-CM

## 2024-01-15 PROCEDURE — G8400 PT W/DXA NO RESULTS DOC: HCPCS | Performed by: INTERNAL MEDICINE

## 2024-01-15 PROCEDURE — 3023F SPIROM DOC REV: CPT | Performed by: INTERNAL MEDICINE

## 2024-01-15 PROCEDURE — 3017F COLORECTAL CA SCREEN DOC REV: CPT | Performed by: INTERNAL MEDICINE

## 2024-01-15 PROCEDURE — 1036F TOBACCO NON-USER: CPT | Performed by: INTERNAL MEDICINE

## 2024-01-15 PROCEDURE — G8484 FLU IMMUNIZE NO ADMIN: HCPCS | Performed by: INTERNAL MEDICINE

## 2024-01-15 PROCEDURE — 3077F SYST BP >= 140 MM HG: CPT | Performed by: INTERNAL MEDICINE

## 2024-01-15 PROCEDURE — G8417 CALC BMI ABV UP PARAM F/U: HCPCS | Performed by: INTERNAL MEDICINE

## 2024-01-15 PROCEDURE — 1123F ACP DISCUSS/DSCN MKR DOCD: CPT | Performed by: INTERNAL MEDICINE

## 2024-01-15 PROCEDURE — 3078F DIAST BP <80 MM HG: CPT | Performed by: INTERNAL MEDICINE

## 2024-01-15 PROCEDURE — 1090F PRES/ABSN URINE INCON ASSESS: CPT | Performed by: INTERNAL MEDICINE

## 2024-01-15 PROCEDURE — 99214 OFFICE O/P EST MOD 30 MIN: CPT | Performed by: INTERNAL MEDICINE

## 2024-01-15 PROCEDURE — G8427 DOCREV CUR MEDS BY ELIG CLIN: HCPCS | Performed by: INTERNAL MEDICINE

## 2024-01-15 ASSESSMENT — ENCOUNTER SYMPTOMS
SORE THROAT: 0
ABDOMINAL DISTENTION: 0
VOICE CHANGE: 0
BLOOD IN STOOL: 0
DIARRHEA: 0
TROUBLE SWALLOWING: 0
SINUS PAIN: 0
COLOR CHANGE: 0
EYE ITCHING: 0
EYE DISCHARGE: 0
CHEST TIGHTNESS: 0
VOMITING: 0
WHEEZING: 1
ABDOMINAL PAIN: 0
COUGH: 0
STRIDOR: 0
NAUSEA: 0
CONSTIPATION: 0
SHORTNESS OF BREATH: 1
EYE REDNESS: 0
PHOTOPHOBIA: 0
EYE PAIN: 0
APNEA: 0
BACK PAIN: 0
CHOKING: 0

## 2024-01-15 NOTE — PROGRESS NOTES
Loretta Echeverria (:  1960) is a 63 y.o. female,New patient, here for evaluation of the following chief complaint(s):  Asthma, COPD, and Follow-up         ASSESSMENT/PLAN:  1. Moderate persistent asthma without complication  2. Asthma-COPD overlap syndrome  3. Environmental allergies  4. Peripheral eosinophilia  5. Elevated IgE level    Pt with significant response to Trelegy, will continue at same dose.  Trigger avoidance counseled. Allergy panel reviewed  Continue Albuterol prn.    Should symptoms worsen on triple therapy, pt is a candidate for biologics, likely Nucala given elevation in IgE and Eosinophilia.   Return in about 6 months (around 7/15/2024).         Subjective   SUBJECTIVE/OBJECTIVE:  Pt is a non smoker with a history of asthma since childhood, treated with rescue inhaler for many years. She was started on Advair a few years ago then Spiriva was added prior to her initial visit. She was switched to Trelegy for ease of use and reports significant improvement in baseline and exertional dyspnea. No interval rescue inhaler use, ED or hospital admissions for asthma. Denies chest pain, hemoptysis, fever.         Review of Systems   Constitutional:  Negative for activity change, appetite change, chills, diaphoresis, fatigue, fever and unexpected weight change.   HENT:  Negative for congestion, hearing loss, nosebleeds, sinus pain, sore throat, tinnitus, trouble swallowing and voice change.    Eyes:  Negative for photophobia, pain, discharge, redness, itching and visual disturbance.   Respiratory:  Positive for shortness of breath (rare) and wheezing (rare). Negative for apnea, cough, choking, chest tightness and stridor.    Cardiovascular:  Positive for leg swelling (occasional). Negative for chest pain and palpitations.   Gastrointestinal:  Negative for abdominal distention, abdominal pain, blood in stool, constipation, diarrhea, nausea and vomiting.   Endocrine: Negative for cold intolerance, heat

## 2024-01-15 NOTE — PROGRESS NOTES
Loretta Echeverria presents today for   Chief Complaint   Patient presents with    Asthma    COPD    Follow-up       Is someone accompanying this pt? No    Is the patient using any DME equipment during OV? No    -DME Company NA    Depression Screenin/22/2023     9:20 AM   PHQ-9 Questionaire   Little interest or pleasure in doing things 0   Feeling down, depressed, or hopeless 0   Trouble falling or staying asleep, or sleeping too much 1   Feeling tired or having little energy 1   Poor appetite or overeating 0   Feeling bad about yourself - or that you are a failure or have let yourself or your family down 0   Trouble concentrating on things, such as reading the newspaper or watching television 0   Moving or speaking so slowly that other people could have noticed. Or the opposite - being so fidgety or restless that you have been moving around a lot more than usual 0   Thoughts that you would be better off dead, or of hurting yourself in some way 0   PHQ-9 Total Score 2   If you checked off any problems, how difficult have these problems made it for you to do your work, take care of things at home, or get along with other people? 1       Learning Needs Questionnaire:     No question data found.      Fall Risk:         2023     9:52 AM   Fall Risk   2 or more falls in past year? no   Fall with injury in past year? no        Abuse Screenin/27/2023     9:00 AM   AMB Abuse Screening   Are you in a relationship with someone who physically or mentally threatens you? N   Is it safe for you to go home? Y         Coordination of Care:    1. Have you been to the ER, urgent care clinic since your last visit? Hospitalized since your last visit? No    2. Have you seen or consulted any other health care providers outside of the [facility] since your last visit? Include any pap smears or colon screening. No    Medication list has been update per patient.

## 2024-01-29 ENCOUNTER — OFFICE VISIT (OUTPATIENT)
Age: 64
End: 2024-01-29
Payer: COMMERCIAL

## 2024-01-29 VITALS
TEMPERATURE: 98 F | OXYGEN SATURATION: 98 % | SYSTOLIC BLOOD PRESSURE: 136 MMHG | HEIGHT: 66 IN | BODY MASS INDEX: 30.86 KG/M2 | HEART RATE: 82 BPM | RESPIRATION RATE: 18 BRPM | DIASTOLIC BLOOD PRESSURE: 76 MMHG | WEIGHT: 192 LBS

## 2024-01-29 DIAGNOSIS — N18.30 TYPE 2 DIABETES MELLITUS WITH STAGE 3 CHRONIC KIDNEY DISEASE, WITHOUT LONG-TERM CURRENT USE OF INSULIN, UNSPECIFIED WHETHER STAGE 3A OR 3B CKD (HCC): Primary | ICD-10-CM

## 2024-01-29 DIAGNOSIS — I50.32 CHRONIC DIASTOLIC CONGESTIVE HEART FAILURE (HCC): ICD-10-CM

## 2024-01-29 DIAGNOSIS — J31.0 CHRONIC RHINITIS: ICD-10-CM

## 2024-01-29 DIAGNOSIS — E83.42 HYPOMAGNESEMIA: ICD-10-CM

## 2024-01-29 DIAGNOSIS — E78.2 MIXED HYPERLIPIDEMIA: ICD-10-CM

## 2024-01-29 DIAGNOSIS — J45.40 MODERATE PERSISTENT ASTHMA WITHOUT COMPLICATION: ICD-10-CM

## 2024-01-29 DIAGNOSIS — I10 ESSENTIAL HYPERTENSION: ICD-10-CM

## 2024-01-29 DIAGNOSIS — Z78.0 POST-MENOPAUSAL: ICD-10-CM

## 2024-01-29 DIAGNOSIS — N18.31 STAGE 3A CHRONIC KIDNEY DISEASE (HCC): ICD-10-CM

## 2024-01-29 DIAGNOSIS — M62.830 BACK SPASM: ICD-10-CM

## 2024-01-29 DIAGNOSIS — Z12.31 BREAST CANCER SCREENING BY MAMMOGRAM: ICD-10-CM

## 2024-01-29 DIAGNOSIS — E11.22 TYPE 2 DIABETES MELLITUS WITH STAGE 3 CHRONIC KIDNEY DISEASE, WITHOUT LONG-TERM CURRENT USE OF INSULIN, UNSPECIFIED WHETHER STAGE 3A OR 3B CKD (HCC): Primary | ICD-10-CM

## 2024-01-29 PROCEDURE — G8428 CUR MEDS NOT DOCUMENT: HCPCS | Performed by: FAMILY MEDICINE

## 2024-01-29 PROCEDURE — 3044F HG A1C LEVEL LT 7.0%: CPT | Performed by: FAMILY MEDICINE

## 2024-01-29 PROCEDURE — 1123F ACP DISCUSS/DSCN MKR DOCD: CPT | Performed by: FAMILY MEDICINE

## 2024-01-29 PROCEDURE — 1090F PRES/ABSN URINE INCON ASSESS: CPT | Performed by: FAMILY MEDICINE

## 2024-01-29 PROCEDURE — G8400 PT W/DXA NO RESULTS DOC: HCPCS | Performed by: FAMILY MEDICINE

## 2024-01-29 PROCEDURE — 3078F DIAST BP <80 MM HG: CPT | Performed by: FAMILY MEDICINE

## 2024-01-29 PROCEDURE — 2022F DILAT RTA XM EVC RTNOPTHY: CPT | Performed by: FAMILY MEDICINE

## 2024-01-29 PROCEDURE — 3075F SYST BP GE 130 - 139MM HG: CPT | Performed by: FAMILY MEDICINE

## 2024-01-29 PROCEDURE — 1036F TOBACCO NON-USER: CPT | Performed by: FAMILY MEDICINE

## 2024-01-29 PROCEDURE — G8417 CALC BMI ABV UP PARAM F/U: HCPCS | Performed by: FAMILY MEDICINE

## 2024-01-29 PROCEDURE — 3017F COLORECTAL CA SCREEN DOC REV: CPT | Performed by: FAMILY MEDICINE

## 2024-01-29 PROCEDURE — G8484 FLU IMMUNIZE NO ADMIN: HCPCS | Performed by: FAMILY MEDICINE

## 2024-01-29 PROCEDURE — 99214 OFFICE O/P EST MOD 30 MIN: CPT | Performed by: FAMILY MEDICINE

## 2024-01-29 RX ORDER — DOXYCYCLINE HYCLATE 100 MG
TABLET ORAL
COMMUNITY
Start: 2024-01-22

## 2024-01-29 RX ORDER — PREDNISONE 10 MG/1
TABLET ORAL
COMMUNITY
Start: 2024-01-22

## 2024-01-29 ASSESSMENT — PATIENT HEALTH QUESTIONNAIRE - PHQ9
SUM OF ALL RESPONSES TO PHQ9 QUESTIONS 1 & 2: 0
8. MOVING OR SPEAKING SO SLOWLY THAT OTHER PEOPLE COULD HAVE NOTICED. OR THE OPPOSITE, BEING SO FIGETY OR RESTLESS THAT YOU HAVE BEEN MOVING AROUND A LOT MORE THAN USUAL: 0
SUM OF ALL RESPONSES TO PHQ QUESTIONS 1-9: 0
2. FEELING DOWN, DEPRESSED OR HOPELESS: 0
10. IF YOU CHECKED OFF ANY PROBLEMS, HOW DIFFICULT HAVE THESE PROBLEMS MADE IT FOR YOU TO DO YOUR WORK, TAKE CARE OF THINGS AT HOME, OR GET ALONG WITH OTHER PEOPLE: 0
5. POOR APPETITE OR OVEREATING: 0
SUM OF ALL RESPONSES TO PHQ QUESTIONS 1-9: 0
1. LITTLE INTEREST OR PLEASURE IN DOING THINGS: 0
3. TROUBLE FALLING OR STAYING ASLEEP: 0
SUM OF ALL RESPONSES TO PHQ QUESTIONS 1-9: 0
SUM OF ALL RESPONSES TO PHQ QUESTIONS 1-9: 0
6. FEELING BAD ABOUT YOURSELF - OR THAT YOU ARE A FAILURE OR HAVE LET YOURSELF OR YOUR FAMILY DOWN: 0
4. FEELING TIRED OR HAVING LITTLE ENERGY: 0
7. TROUBLE CONCENTRATING ON THINGS, SUCH AS READING THE NEWSPAPER OR WATCHING TELEVISION: 0
9. THOUGHTS THAT YOU WOULD BE BETTER OFF DEAD, OR OF HURTING YOURSELF: 0

## 2024-01-29 NOTE — PROGRESS NOTES
\"Have you been to the ER, urgent care clinic since your last visit?  Hospitalized since your last visit?\"    NO    “Have you seen or consulted any other health care providers outside of [facility] since your last visit?”    YES, ENT, Dr. Underwood, seen 1 week ago       Have you had a mammogram?”   YES -  12/12/22 Due 2023 Nurse/CMA to request most recent records if not in the chart      
      A1c:   Hemoglobin A1C   Date Value Ref Range Status   01/08/2024 6.2 (H) 4.2 - 5.6 % Final     Comment:     (NOTE)  HbA1C Interpretive Ranges  <5.7              Normal  5.7 - 6.4         Consider Prediabetes  >6.5              Consider Diabetes     09/21/2023 6.9 (H) 4.8 - 5.6 % Final     Comment:              Prediabetes: 5.7 - 6.4           Diabetes: >6.4           Glycemic control for adults with diabetes: <7.0     05/18/2023 6.2 (H) 4.2 - 5.6 % Final     Comment:     (NOTE)  HbA1C Interpretive Ranges  <5.7              Normal  5.7 - 6.4         Consider Prediabetes  >6.5              Consider Diabetes           ASSESSMENT/PLAN    Primary hypertension  controlled  Cont norvasc dose 10 mg daily,  cont hctz 25 mg PRN for leg edema  Cont doxazosin,following  cardiologist dr. powell  Hx angioedema thought to be due to lisinopril.   (Pt reports hx worsening asthma on BB)     Controlled type 2 diabetes mellitus with microalbuminuria, without long-term current use of insulin (HCC)  a1c 6.2  Controlled  Cont janumet  Cont crestor  eye exam dr arnold- request records  Foot exam 2/2023 normal  Labs prior to next visit  -     Microalbumin / Creatinine Urine Ratio; Future  -     Hemoglobin A1C; Future  -     Comprehensive Metabolic Panel; Future    Gastroesophageal reflux disease without esophagitis  Stable  Cont prilosec     Mixed hyperlipidemia  LDL Goal <100   cont crestor      Chronic diastolic heart failure (HCC)  Stable, euvolemic, on hctz prn  (intolerant to BB-asthma, ace angioedema)  Following dr. powell     Moderate persistent asthma without complication  Fair control  On trelegy,  prn albuterol  Following pulm      Hypomagnesemia  cont current replacement dose while on hctz  cont  -     magnesium oxide (MAG-OX) 400 mg tablet; TAKE ONE TABLET BY MOUTH ONCE DAILY    Chronic rhinitis  Resumption of allergy shots w/ ENT    Back spasm  Intermittent, responding to prn flexeril      Type 2 diabetes mellitus with

## 2024-02-07 RX ORDER — ROSUVASTATIN CALCIUM 10 MG/1
10 TABLET, COATED ORAL NIGHTLY
Qty: 90 TABLET | Refills: 3 | Status: SHIPPED | OUTPATIENT
Start: 2024-02-07

## 2024-02-13 RX ORDER — HYDROCHLOROTHIAZIDE 25 MG/1
25 TABLET ORAL DAILY
Qty: 90 TABLET | Refills: 2 | Status: SHIPPED | OUTPATIENT
Start: 2024-02-13

## 2024-02-19 RX ORDER — LEVOCETIRIZINE DIHYDROCHLORIDE 5 MG/1
5 TABLET, FILM COATED ORAL DAILY
Qty: 90 TABLET | Refills: 3 | Status: SHIPPED | OUTPATIENT
Start: 2024-02-19

## 2024-02-26 ENCOUNTER — HOSPITAL ENCOUNTER (OUTPATIENT)
Facility: HOSPITAL | Age: 64
Discharge: HOME | End: 2024-02-29
Attending: FAMILY MEDICINE
Payer: COMMERCIAL

## 2024-02-26 VITALS — HEIGHT: 66 IN | WEIGHT: 192 LBS | BODY MASS INDEX: 30.86 KG/M2

## 2024-02-26 DIAGNOSIS — Z12.31 BREAST CANCER SCREENING BY MAMMOGRAM: ICD-10-CM

## 2024-02-26 DIAGNOSIS — Z78.0 POST-MENOPAUSAL: ICD-10-CM

## 2024-02-26 PROCEDURE — 77063 BREAST TOMOSYNTHESIS BI: CPT

## 2024-02-26 PROCEDURE — 77080 DXA BONE DENSITY AXIAL: CPT

## 2024-03-25 RX ORDER — OMEPRAZOLE 20 MG/1
20 CAPSULE, DELAYED RELEASE ORAL DAILY
Qty: 90 CAPSULE | Refills: 3 | Status: SHIPPED | OUTPATIENT
Start: 2024-03-25

## 2024-04-12 ENCOUNTER — TELEPHONE (OUTPATIENT)
Age: 64
End: 2024-04-12

## 2024-04-12 RX ORDER — SITAGLIPTIN AND METFORMIN HYDROCHLORIDE 1000; 50 MG/1; MG/1
1 TABLET, FILM COATED ORAL 2 TIMES DAILY WITH MEALS
Qty: 180 TABLET | Refills: 1 | Status: SHIPPED | OUTPATIENT
Start: 2024-04-12

## 2024-04-12 NOTE — TELEPHONE ENCOUNTER
----- Message from Loretta Echeverria sent at 4/11/2024 10:29 PM EDT -----  Regarding: Janumet   Contact: [phone]  Thank you for checking, but I’m out of my medicine. There is a generic brand for Janumet , or I have a coupon for this medicine for $5 a refill.   It doesn’t have to go through my insurance.  I need my medicine please

## 2024-04-15 RX ORDER — FLUTICASONE FUROATE, UMECLIDINIUM BROMIDE AND VILANTEROL TRIFENATATE 200; 62.5; 25 UG/1; UG/1; UG/1
POWDER RESPIRATORY (INHALATION)
Qty: 1 EACH | Refills: 5 | Status: SHIPPED | OUTPATIENT
Start: 2024-04-15

## 2024-04-23 NOTE — PROGRESS NOTES
Denied request for janumet by insurance  Will separate to januvia and metformin, same dose (2 pills)  Pls notify pt

## 2024-05-20 RX ORDER — CYCLOBENZAPRINE HCL 10 MG
TABLET ORAL
Qty: 30 TABLET | Refills: 1 | Status: SHIPPED | OUTPATIENT
Start: 2024-05-20

## 2024-06-03 ENCOUNTER — HOSPITAL ENCOUNTER (OUTPATIENT)
Facility: HOSPITAL | Age: 64
Discharge: HOME | End: 2024-06-06
Payer: COMMERCIAL

## 2024-06-03 DIAGNOSIS — N18.30 TYPE 2 DIABETES MELLITUS WITH STAGE 3 CHRONIC KIDNEY DISEASE, WITHOUT LONG-TERM CURRENT USE OF INSULIN, UNSPECIFIED WHETHER STAGE 3A OR 3B CKD (HCC): ICD-10-CM

## 2024-06-03 DIAGNOSIS — E11.22 TYPE 2 DIABETES MELLITUS WITH STAGE 3 CHRONIC KIDNEY DISEASE, WITHOUT LONG-TERM CURRENT USE OF INSULIN, UNSPECIFIED WHETHER STAGE 3A OR 3B CKD (HCC): ICD-10-CM

## 2024-06-03 LAB
ALBUMIN SERPL-MCNC: 4 G/DL (ref 3.4–5)
ALBUMIN/GLOB SERPL: 1 (ref 0.8–1.7)
ALP SERPL-CCNC: 96 U/L (ref 45–117)
ALT SERPL-CCNC: 19 U/L (ref 13–56)
ANION GAP SERPL CALC-SCNC: 4 MMOL/L (ref 3–18)
AST SERPL-CCNC: 15 U/L (ref 10–38)
BILIRUB SERPL-MCNC: 0.6 MG/DL (ref 0.2–1)
BUN SERPL-MCNC: 9 MG/DL (ref 7–18)
BUN/CREAT SERPL: 9 (ref 12–20)
CALCIUM SERPL-MCNC: 9.5 MG/DL (ref 8.5–10.1)
CHLORIDE SERPL-SCNC: 103 MMOL/L (ref 100–111)
CO2 SERPL-SCNC: 30 MMOL/L (ref 21–32)
CREAT SERPL-MCNC: 1 MG/DL (ref 0.6–1.3)
CREAT UR-MCNC: 121 MG/DL (ref 30–125)
EST. AVERAGE GLUCOSE BLD GHB EST-MCNC: 151 MG/DL
GLOBULIN SER CALC-MCNC: 4.2 G/DL (ref 2–4)
GLUCOSE SERPL-MCNC: 148 MG/DL (ref 74–99)
HBA1C MFR BLD: 6.9 % (ref 4.2–5.6)
MICROALBUMIN UR-MCNC: 0.82 MG/DL (ref 0–3)
MICROALBUMIN/CREAT UR-RTO: 7 MG/G (ref 0–30)
POTASSIUM SERPL-SCNC: 4.3 MMOL/L (ref 3.5–5.5)
PROT SERPL-MCNC: 8.2 G/DL (ref 6.4–8.2)
SODIUM SERPL-SCNC: 137 MMOL/L (ref 136–145)

## 2024-06-03 PROCEDURE — 83036 HEMOGLOBIN GLYCOSYLATED A1C: CPT

## 2024-06-03 PROCEDURE — 82570 ASSAY OF URINE CREATININE: CPT

## 2024-06-03 PROCEDURE — 36415 COLL VENOUS BLD VENIPUNCTURE: CPT

## 2024-06-03 PROCEDURE — 80053 COMPREHEN METABOLIC PANEL: CPT

## 2024-06-03 PROCEDURE — 82043 UR ALBUMIN QUANTITATIVE: CPT

## 2024-06-05 NOTE — PROGRESS NOTES
\"Have you been to the ER, urgent care clinic since your last visit?  Hospitalized since your last visit?\"    {YES/NO:[id number]}    “Have you seen or consulted any other health care providers outside of [facility] since your last visit?”    {YES/NO:[id number]}            Click Here for Release of Records Request

## 2024-06-06 ENCOUNTER — OFFICE VISIT (OUTPATIENT)
Facility: CLINIC | Age: 64
End: 2024-06-06
Payer: COMMERCIAL

## 2024-06-06 VITALS
WEIGHT: 172 LBS | BODY MASS INDEX: 27.64 KG/M2 | DIASTOLIC BLOOD PRESSURE: 60 MMHG | OXYGEN SATURATION: 98 % | HEIGHT: 66 IN | HEART RATE: 87 BPM | SYSTOLIC BLOOD PRESSURE: 136 MMHG | TEMPERATURE: 96.8 F | RESPIRATION RATE: 16 BRPM

## 2024-06-06 DIAGNOSIS — N18.31 TYPE 2 DIABETES MELLITUS WITH STAGE 3A CHRONIC KIDNEY DISEASE, WITHOUT LONG-TERM CURRENT USE OF INSULIN (HCC): Primary | ICD-10-CM

## 2024-06-06 DIAGNOSIS — M62.830 BACK SPASM: ICD-10-CM

## 2024-06-06 DIAGNOSIS — I50.32 CHRONIC DIASTOLIC CONGESTIVE HEART FAILURE (HCC): ICD-10-CM

## 2024-06-06 DIAGNOSIS — J45.40 MODERATE PERSISTENT ASTHMA WITHOUT COMPLICATION: ICD-10-CM

## 2024-06-06 DIAGNOSIS — N18.31 STAGE 3A CHRONIC KIDNEY DISEASE (HCC): ICD-10-CM

## 2024-06-06 DIAGNOSIS — E78.2 MIXED HYPERLIPIDEMIA: ICD-10-CM

## 2024-06-06 DIAGNOSIS — E11.22 TYPE 2 DIABETES MELLITUS WITH STAGE 3A CHRONIC KIDNEY DISEASE, WITHOUT LONG-TERM CURRENT USE OF INSULIN (HCC): Primary | ICD-10-CM

## 2024-06-06 DIAGNOSIS — E83.42 HYPOMAGNESEMIA: ICD-10-CM

## 2024-06-06 DIAGNOSIS — I10 ESSENTIAL HYPERTENSION: ICD-10-CM

## 2024-06-06 DIAGNOSIS — K21.9 GASTROESOPHAGEAL REFLUX DISEASE WITHOUT ESOPHAGITIS: ICD-10-CM

## 2024-06-06 PROCEDURE — 1123F ACP DISCUSS/DSCN MKR DOCD: CPT | Performed by: FAMILY MEDICINE

## 2024-06-06 PROCEDURE — 1090F PRES/ABSN URINE INCON ASSESS: CPT | Performed by: FAMILY MEDICINE

## 2024-06-06 PROCEDURE — 3044F HG A1C LEVEL LT 7.0%: CPT | Performed by: FAMILY MEDICINE

## 2024-06-06 PROCEDURE — 99214 OFFICE O/P EST MOD 30 MIN: CPT | Performed by: FAMILY MEDICINE

## 2024-06-06 PROCEDURE — G8417 CALC BMI ABV UP PARAM F/U: HCPCS | Performed by: FAMILY MEDICINE

## 2024-06-06 PROCEDURE — 3075F SYST BP GE 130 - 139MM HG: CPT | Performed by: FAMILY MEDICINE

## 2024-06-06 PROCEDURE — 3078F DIAST BP <80 MM HG: CPT | Performed by: FAMILY MEDICINE

## 2024-06-06 PROCEDURE — G8427 DOCREV CUR MEDS BY ELIG CLIN: HCPCS | Performed by: FAMILY MEDICINE

## 2024-06-06 PROCEDURE — 1036F TOBACCO NON-USER: CPT | Performed by: FAMILY MEDICINE

## 2024-06-06 PROCEDURE — 2022F DILAT RTA XM EVC RTNOPTHY: CPT | Performed by: FAMILY MEDICINE

## 2024-06-06 PROCEDURE — 3017F COLORECTAL CA SCREEN DOC REV: CPT | Performed by: FAMILY MEDICINE

## 2024-06-06 PROCEDURE — G8399 PT W/DXA RESULTS DOCUMENT: HCPCS | Performed by: FAMILY MEDICINE

## 2024-06-06 RX ORDER — EPINEPHRINE 0.3 MG/.3ML
0.3 INJECTION SUBCUTANEOUS PRN
Qty: 1 EACH | Refills: 1 | Status: SHIPPED | OUTPATIENT
Start: 2024-06-06

## 2024-06-06 SDOH — ECONOMIC STABILITY: INCOME INSECURITY: HOW HARD IS IT FOR YOU TO PAY FOR THE VERY BASICS LIKE FOOD, HOUSING, MEDICAL CARE, AND HEATING?: SOMEWHAT HARD

## 2024-06-06 SDOH — ECONOMIC STABILITY: FOOD INSECURITY: WITHIN THE PAST 12 MONTHS, THE FOOD YOU BOUGHT JUST DIDN'T LAST AND YOU DIDN'T HAVE MONEY TO GET MORE.: NEVER TRUE

## 2024-06-06 SDOH — ECONOMIC STABILITY: FOOD INSECURITY: WITHIN THE PAST 12 MONTHS, YOU WORRIED THAT YOUR FOOD WOULD RUN OUT BEFORE YOU GOT MONEY TO BUY MORE.: NEVER TRUE

## 2024-06-06 ASSESSMENT — PATIENT HEALTH QUESTIONNAIRE - PHQ9
SUM OF ALL RESPONSES TO PHQ9 QUESTIONS 1 & 2: 0
1. LITTLE INTEREST OR PLEASURE IN DOING THINGS: NOT AT ALL
SUM OF ALL RESPONSES TO PHQ QUESTIONS 1-9: 0
2. FEELING DOWN, DEPRESSED OR HOPELESS: NOT AT ALL

## 2024-06-06 NOTE — PROGRESS NOTES
\"Have you been to the ER, urgent care clinic since your last visit?  Hospitalized since your last visit?\"    NO    “Have you seen or consulted any other health care providers outside of [facility] since your last visit?”    Dr. Dayan Underwood,  [facility] [place]: 4/2025.    Chief Complaint   Patient presents with    Hypertension    Diabetes    Cholesterol Problem    Medication Check     Wants to discuss Tradjenta and why she has to take it. She wants to know if she needs to continuing taking her other medications along with Tradjenta.       Last dm eye exam Dr. Tatyana Navarro: 2023, per patient. Record has been requested.    Last dm foot exam - None; patient states she does not see podiatry and Dr. Lima checks her feet. Shoes/Socks removed for exam.              Click Here for Release of Records Request

## 2024-06-06 NOTE — PROGRESS NOTES
Loretta Echeverria, 63 y.o.,  female    SUBJECTIVE  Ff-up    DM w/ CKD- reports FBG 1teen's.-120's Currently was on janumet, however insurance no longer covers this and prescribed Tradjenta and metformin.  She says she has not yet taken this, still has leftover Janumet at home and confused about the change.  This has been clarified today and she voices understanding.  Following with dr. Navarro for eye exams, per pt utd,  reviewed results A1c 6.9    HTN/diastolic CHF- w/ h/o SAH 2015 no residual deficit. currently on doxazosin, amlodipine 10 mg, hctz 25 mg prn for leg edema.    Following Dr. Anna, reviewed note NYHA class 2, pt denies any symptoms. Hx angioedema on Ace,    HL- on crestor    GERD- responding well to prilosec     Asthma- says better on tregely, also to resume allergy shots with ENT.  Following dr. clements    Back spasm- on and off for few years, responding to prn flexeril/nsaids.     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    Essential hypertension I10    Chronic diastolic congestive heart failure (HCC) I50.32    Obesity (BMI 30.0-34.9) E66.9    New onset of headaches after age 50 R51.9    Iron deficiency anemia D50.9    Gastroesophageal reflux disease without esophagitis K21.9    Hypomagnesemia E83.42    History of subarachnoid hemorrhage Z86.79    Hypokalemia E87.6    Post-menopausal Z78.0    Chest pain R07.9    Back spasm M62.830    Chronic renal disease, stage III N18.30         Current Outpatient Medications:     EPINEPHrine (EPIPEN) 0.3 MG/0.3ML SOAJ injection, Inject 0.3 mLs into the muscle as needed (anaphylaxis), Disp: 1 each, Rfl: 1    cyclobenzaprine (FLEXERIL) 10 MG tablet, TAKE 1/2 TO 1 TABLET BY MOUTH DAILY AS NEEDED FOR BACK OR HIP PAIN, Disp: 30 tablet, Rfl: 1

## 2024-06-06 NOTE — PATIENT INSTRUCTIONS
[facility] Financial Resources*  (Call [employer]/[facility] if need more resources.)       Medical  [facility] Financial Assistance  What they offer: The [employer] Financial Assistance Program helps uninsured patients who do not qualify for government-sponsored health insurance and cannot afford to pay for their medical care. Insured patient may also qualify for assistance based on family income, family size, and medical needs.   Phone Number: [phone]  How to apply for the [employer] Financial Assistance Program:  Option 1: To apply for financial assistance, a patient (or their family or other               provider) should fill out the Financial Assistance Application. Copies of the Financial             Assistance Application and the FAP may be obtained for free by calling the [facility]              [employer] service department at [phone].  Option 2:  The Financial Assistance Application and policy may be obtained for free               by downloading a copy from the [employer] website:                       https://www.[employer]/patient-resources/financial-assistance                       [employer]        www.pafcares.org  What they offer: The [facility] Uninsured Program can assist you if you’re uninsured and have been diagnosed with chronic, debilitating, or life-threatening disease.   Phone Number: 1-283.894.2084  Website: https://www.patientadvocate.org/     [employer]  What they offer: Mobile health clinic for uninsured community members  Phone number: [phone]        [employer]  What they offer: Partnership with the [place] Department of . Assist with   finding and applying for government funded programs and benefits.  You can also update  your benefits or report changes through [employer].  Website: https://www.[employer].[place].gov/  Phone Number: 572-8BALLVA (758-037-5139)      Mindy Grant

## 2024-06-13 RX ORDER — LANOLIN ALCOHOL/MO/W.PET/CERES
400 CREAM (GRAM) TOPICAL DAILY
Qty: 90 TABLET | Refills: 1 | Status: SHIPPED | OUTPATIENT
Start: 2024-06-13

## 2024-06-18 RX ORDER — AMLODIPINE BESYLATE 10 MG/1
TABLET ORAL
Qty: 90 TABLET | Refills: 3 | Status: SHIPPED | OUTPATIENT
Start: 2024-06-18

## 2024-07-09 RX ORDER — LINAGLIPTIN 5 MG/1
5 TABLET, FILM COATED ORAL DAILY
Qty: 90 TABLET | Refills: 0 | Status: SHIPPED | OUTPATIENT
Start: 2024-07-09

## 2024-09-03 ENCOUNTER — HOSPITAL ENCOUNTER (OUTPATIENT)
Facility: HOSPITAL | Age: 64
Discharge: HOME | End: 2024-09-06
Payer: COMMERCIAL

## 2024-09-03 DIAGNOSIS — E11.22 TYPE 2 DIABETES MELLITUS WITH STAGE 3A CHRONIC KIDNEY DISEASE, WITHOUT LONG-TERM CURRENT USE OF INSULIN (HCC): ICD-10-CM

## 2024-09-03 DIAGNOSIS — N18.31 TYPE 2 DIABETES MELLITUS WITH STAGE 3A CHRONIC KIDNEY DISEASE, WITHOUT LONG-TERM CURRENT USE OF INSULIN (HCC): ICD-10-CM

## 2024-09-03 LAB
ALBUMIN SERPL-MCNC: 4 G/DL (ref 3.4–5)
ALBUMIN/GLOB SERPL: 1.1 (ref 0.8–1.7)
ALP SERPL-CCNC: 89 U/L (ref 45–117)
ALT SERPL-CCNC: 17 U/L (ref 13–56)
ANION GAP SERPL CALC-SCNC: 7 MMOL/L (ref 3–18)
AST SERPL-CCNC: 12 U/L (ref 10–38)
BILIRUB SERPL-MCNC: 0.9 MG/DL (ref 0.2–1)
BUN SERPL-MCNC: 11 MG/DL (ref 7–18)
BUN/CREAT SERPL: 10 (ref 12–20)
CALCIUM SERPL-MCNC: 9.5 MG/DL (ref 8.5–10.1)
CHLORIDE SERPL-SCNC: 101 MMOL/L (ref 100–111)
CO2 SERPL-SCNC: 30 MMOL/L (ref 21–32)
CREAT SERPL-MCNC: 1.08 MG/DL (ref 0.6–1.3)
EST. AVERAGE GLUCOSE BLD GHB EST-MCNC: 171 MG/DL
GLOBULIN SER CALC-MCNC: 3.8 G/DL (ref 2–4)
GLUCOSE SERPL-MCNC: 146 MG/DL (ref 74–99)
HBA1C MFR BLD: 7.6 % (ref 4.2–5.6)
POTASSIUM SERPL-SCNC: 4.4 MMOL/L (ref 3.5–5.5)
PROT SERPL-MCNC: 7.8 G/DL (ref 6.4–8.2)
SODIUM SERPL-SCNC: 138 MMOL/L (ref 136–145)

## 2024-09-03 PROCEDURE — 83036 HEMOGLOBIN GLYCOSYLATED A1C: CPT

## 2024-09-03 PROCEDURE — 36415 COLL VENOUS BLD VENIPUNCTURE: CPT

## 2024-09-03 PROCEDURE — 80053 COMPREHEN METABOLIC PANEL: CPT

## 2024-09-16 RX ORDER — CYCLOBENZAPRINE HCL 10 MG
TABLET ORAL
Qty: 30 TABLET | Refills: 1 | Status: SHIPPED | OUTPATIENT
Start: 2024-09-16

## 2024-09-23 ENCOUNTER — OFFICE VISIT (OUTPATIENT)
Facility: CLINIC | Age: 64
End: 2024-09-23
Payer: COMMERCIAL

## 2024-09-23 VITALS
HEART RATE: 95 BPM | TEMPERATURE: 96.6 F | DIASTOLIC BLOOD PRESSURE: 70 MMHG | SYSTOLIC BLOOD PRESSURE: 138 MMHG | HEIGHT: 66 IN | OXYGEN SATURATION: 98 % | BODY MASS INDEX: 30.86 KG/M2 | WEIGHT: 192 LBS | RESPIRATION RATE: 16 BRPM

## 2024-09-23 DIAGNOSIS — N18.31 STAGE 3A CHRONIC KIDNEY DISEASE (HCC): ICD-10-CM

## 2024-09-23 DIAGNOSIS — Z23 NEEDS FLU SHOT: ICD-10-CM

## 2024-09-23 DIAGNOSIS — E78.2 MIXED HYPERLIPIDEMIA: ICD-10-CM

## 2024-09-23 DIAGNOSIS — N18.31 TYPE 2 DIABETES MELLITUS WITH STAGE 3A CHRONIC KIDNEY DISEASE, WITHOUT LONG-TERM CURRENT USE OF INSULIN (HCC): ICD-10-CM

## 2024-09-23 DIAGNOSIS — J45.40 MODERATE PERSISTENT ASTHMA WITHOUT COMPLICATION: ICD-10-CM

## 2024-09-23 DIAGNOSIS — I10 ESSENTIAL HYPERTENSION: Primary | ICD-10-CM

## 2024-09-23 DIAGNOSIS — J31.0 CHRONIC RHINITIS: ICD-10-CM

## 2024-09-23 DIAGNOSIS — E11.22 TYPE 2 DIABETES MELLITUS WITH STAGE 3A CHRONIC KIDNEY DISEASE, WITHOUT LONG-TERM CURRENT USE OF INSULIN (HCC): ICD-10-CM

## 2024-09-23 DIAGNOSIS — M62.830 BACK SPASM: ICD-10-CM

## 2024-09-23 DIAGNOSIS — E83.42 HYPOMAGNESEMIA: ICD-10-CM

## 2024-09-23 DIAGNOSIS — I50.32 CHRONIC DIASTOLIC CONGESTIVE HEART FAILURE (HCC): ICD-10-CM

## 2024-09-23 DIAGNOSIS — Z86.79 HISTORY OF SUBARACHNOID HEMORRHAGE: ICD-10-CM

## 2024-09-23 PROCEDURE — 1036F TOBACCO NON-USER: CPT | Performed by: FAMILY MEDICINE

## 2024-09-23 PROCEDURE — 3075F SYST BP GE 130 - 139MM HG: CPT | Performed by: FAMILY MEDICINE

## 2024-09-23 PROCEDURE — 1090F PRES/ABSN URINE INCON ASSESS: CPT | Performed by: FAMILY MEDICINE

## 2024-09-23 PROCEDURE — 99214 OFFICE O/P EST MOD 30 MIN: CPT | Performed by: FAMILY MEDICINE

## 2024-09-23 PROCEDURE — 90653 IIV ADJUVANT VACCINE IM: CPT | Performed by: FAMILY MEDICINE

## 2024-09-23 PROCEDURE — 3051F HG A1C>EQUAL 7.0%<8.0%: CPT | Performed by: FAMILY MEDICINE

## 2024-09-23 PROCEDURE — 90471 IMMUNIZATION ADMIN: CPT | Performed by: FAMILY MEDICINE

## 2024-09-23 PROCEDURE — 2022F DILAT RTA XM EVC RTNOPTHY: CPT | Performed by: FAMILY MEDICINE

## 2024-09-23 PROCEDURE — G8399 PT W/DXA RESULTS DOCUMENT: HCPCS | Performed by: FAMILY MEDICINE

## 2024-09-23 PROCEDURE — 3017F COLORECTAL CA SCREEN DOC REV: CPT | Performed by: FAMILY MEDICINE

## 2024-09-23 PROCEDURE — G8417 CALC BMI ABV UP PARAM F/U: HCPCS | Performed by: FAMILY MEDICINE

## 2024-09-23 PROCEDURE — 3078F DIAST BP <80 MM HG: CPT | Performed by: FAMILY MEDICINE

## 2024-09-23 PROCEDURE — G8427 DOCREV CUR MEDS BY ELIG CLIN: HCPCS | Performed by: FAMILY MEDICINE

## 2024-09-23 PROCEDURE — 1123F ACP DISCUSS/DSCN MKR DOCD: CPT | Performed by: FAMILY MEDICINE

## 2024-10-11 DIAGNOSIS — I10 ESSENTIAL HYPERTENSION: ICD-10-CM

## 2024-10-11 RX ORDER — DOXAZOSIN 2 MG/1
2 TABLET ORAL DAILY
Qty: 30 TABLET | OUTPATIENT
Start: 2024-10-11

## 2024-10-16 ENCOUNTER — OFFICE VISIT (OUTPATIENT)
Age: 64
End: 2024-10-16
Payer: COMMERCIAL

## 2024-10-16 VITALS
HEIGHT: 66 IN | HEART RATE: 85 BPM | OXYGEN SATURATION: 99 % | DIASTOLIC BLOOD PRESSURE: 68 MMHG | SYSTOLIC BLOOD PRESSURE: 142 MMHG | BODY MASS INDEX: 30.86 KG/M2 | WEIGHT: 192 LBS

## 2024-10-16 DIAGNOSIS — E66.811 OBESITY (BMI 30.0-34.9): ICD-10-CM

## 2024-10-16 DIAGNOSIS — I10 ESSENTIAL HYPERTENSION: ICD-10-CM

## 2024-10-16 DIAGNOSIS — I50.32 CHRONIC DIASTOLIC CONGESTIVE HEART FAILURE (HCC): Primary | ICD-10-CM

## 2024-10-16 DIAGNOSIS — E78.2 MIXED HYPERLIPIDEMIA: ICD-10-CM

## 2024-10-16 PROCEDURE — 93000 ELECTROCARDIOGRAM COMPLETE: CPT | Performed by: INTERNAL MEDICINE

## 2024-10-16 PROCEDURE — G8417 CALC BMI ABV UP PARAM F/U: HCPCS | Performed by: INTERNAL MEDICINE

## 2024-10-16 PROCEDURE — G8427 DOCREV CUR MEDS BY ELIG CLIN: HCPCS | Performed by: INTERNAL MEDICINE

## 2024-10-16 PROCEDURE — 3017F COLORECTAL CA SCREEN DOC REV: CPT | Performed by: INTERNAL MEDICINE

## 2024-10-16 PROCEDURE — 1036F TOBACCO NON-USER: CPT | Performed by: INTERNAL MEDICINE

## 2024-10-16 PROCEDURE — G8482 FLU IMMUNIZE ORDER/ADMIN: HCPCS | Performed by: INTERNAL MEDICINE

## 2024-10-16 PROCEDURE — 1090F PRES/ABSN URINE INCON ASSESS: CPT | Performed by: INTERNAL MEDICINE

## 2024-10-16 PROCEDURE — 99214 OFFICE O/P EST MOD 30 MIN: CPT | Performed by: INTERNAL MEDICINE

## 2024-10-16 PROCEDURE — 1123F ACP DISCUSS/DSCN MKR DOCD: CPT | Performed by: INTERNAL MEDICINE

## 2024-10-16 PROCEDURE — 3078F DIAST BP <80 MM HG: CPT | Performed by: INTERNAL MEDICINE

## 2024-10-16 PROCEDURE — 3077F SYST BP >= 140 MM HG: CPT | Performed by: INTERNAL MEDICINE

## 2024-10-16 PROCEDURE — G8399 PT W/DXA RESULTS DOCUMENT: HCPCS | Performed by: INTERNAL MEDICINE

## 2024-10-16 RX ORDER — DOXAZOSIN 2 MG/1
2 TABLET ORAL DAILY
Qty: 90 TABLET | Refills: 3 | Status: SHIPPED | OUTPATIENT
Start: 2024-10-16

## 2024-10-16 ASSESSMENT — ENCOUNTER SYMPTOMS
EYES NEGATIVE: 1
GASTROINTESTINAL NEGATIVE: 1
SHORTNESS OF BREATH: 1

## 2024-10-16 NOTE — PROGRESS NOTES
1. Have you been to the ER, urgent care clinic since your last visit?  Hospitalized since your last visit?     no      2.  Where do you normally have your labs drawn?       3. Do you need any refills today?   no    4. Which local pharmacy do you use (enter pharmacy)?   [facility]    5. Which mail order pharmacy do you use (enter pharmacy)?        6. Are you here for surgical clearance and if so who will be doing your     procedure/surgery (care team)?   georgi

## 2024-10-16 NOTE — PROGRESS NOTES
Loretta Echeverria is a 64 y.o. year old female.    Follow-up of CHF, hyperlipidemia, hypertension and obesity  History of asthma    3/22/2023 continues to have edema.  Blood pressure intermittently elevated at home but mostly less than 130 systolic when she is resting.  Dyspnea is about the same and secondary to asthma as it responds to inhalers well.  Does more than 10,000 steps a day.  Admits to eating processed salty meats.  And she does not take HCTZ regularly.  10/30/2023 trying but not fully compliant with diet. Taking meds regularly.  Sometimes takes HCTZ only half tablet.  SOB improving with pulmonary rx.  Gets edema almost every day in the evening.  Denies chest pain, dizziness or palpitations.  10/16/2024 edema is improving.  Shortness of breath significantly better since she started taking Trelegy.  Going to switch Janumet to metformin due to insurance reasons.  No chest pains.  BP at home is 120s over 70s.  Continues to get more than 10,000 steps a day.          Review of Systems   Constitutional: Negative.    HENT: Negative.     Eyes: Negative.    Respiratory:  Positive for shortness of breath.    Cardiovascular:  Positive for leg swelling.   Gastrointestinal: Negative.    Endocrine: Negative.    Genitourinary: Negative.    Musculoskeletal: Negative.    Neurological: Negative.    Psychiatric/Behavioral: Negative.     All other systems reviewed and are negative.        Physical Exam  Vitals and nursing note reviewed.   Constitutional:       Appearance: Normal appearance. She is obese.   HENT:      Head: Normocephalic and atraumatic.      Nose: Nose normal.   Eyes:      Conjunctiva/sclera: Conjunctivae normal.   Cardiovascular:      Rate and Rhythm: Normal rate and regular rhythm.      Pulses: Normal pulses.      Heart sounds: Normal heart sounds.   Pulmonary:      Effort: Pulmonary effort is normal.      Breath sounds: Normal breath sounds.   Abdominal:      General: Bowel sounds are normal.      Palpations:

## 2024-10-23 RX ORDER — FLUTICASONE FUROATE, UMECLIDINIUM BROMIDE AND VILANTEROL TRIFENATATE 200; 62.5; 25 UG/1; UG/1; UG/1
1 POWDER RESPIRATORY (INHALATION) DAILY
Qty: 180 EACH | Refills: 1 | Status: SHIPPED | OUTPATIENT
Start: 2024-10-23

## 2024-10-23 NOTE — TELEPHONE ENCOUNTER
Refill request for trelegy last seen 1/24 - [facility]

## 2024-10-28 RX ORDER — FLUTICASONE FUROATE, UMECLIDINIUM BROMIDE AND VILANTEROL TRIFENATATE 200; 62.5; 25 UG/1; UG/1; UG/1
POWDER RESPIRATORY (INHALATION)
Qty: 3 EACH | Refills: 3 | Status: SHIPPED | OUTPATIENT
Start: 2024-10-28

## 2024-11-06 ENCOUNTER — OFFICE VISIT (OUTPATIENT)
Facility: CLINIC | Age: 64
End: 2024-11-06
Payer: COMMERCIAL

## 2024-11-06 VITALS
SYSTOLIC BLOOD PRESSURE: 160 MMHG | HEART RATE: 81 BPM | HEIGHT: 66 IN | DIASTOLIC BLOOD PRESSURE: 70 MMHG | OXYGEN SATURATION: 97 % | WEIGHT: 189 LBS | BODY MASS INDEX: 30.37 KG/M2 | TEMPERATURE: 97.1 F | RESPIRATION RATE: 16 BRPM

## 2024-11-06 DIAGNOSIS — J45.41 MODERATE PERSISTENT ASTHMA WITH ACUTE EXACERBATION: ICD-10-CM

## 2024-11-06 DIAGNOSIS — J01.90 ACUTE NON-RECURRENT SINUSITIS, UNSPECIFIED LOCATION: Primary | ICD-10-CM

## 2024-11-06 DIAGNOSIS — I10 ESSENTIAL HYPERTENSION: ICD-10-CM

## 2024-11-06 PROCEDURE — 3078F DIAST BP <80 MM HG: CPT | Performed by: FAMILY MEDICINE

## 2024-11-06 PROCEDURE — G8399 PT W/DXA RESULTS DOCUMENT: HCPCS | Performed by: FAMILY MEDICINE

## 2024-11-06 PROCEDURE — 1090F PRES/ABSN URINE INCON ASSESS: CPT | Performed by: FAMILY MEDICINE

## 2024-11-06 PROCEDURE — G8417 CALC BMI ABV UP PARAM F/U: HCPCS | Performed by: FAMILY MEDICINE

## 2024-11-06 PROCEDURE — 1036F TOBACCO NON-USER: CPT | Performed by: FAMILY MEDICINE

## 2024-11-06 PROCEDURE — 3017F COLORECTAL CA SCREEN DOC REV: CPT | Performed by: FAMILY MEDICINE

## 2024-11-06 PROCEDURE — G8427 DOCREV CUR MEDS BY ELIG CLIN: HCPCS | Performed by: FAMILY MEDICINE

## 2024-11-06 PROCEDURE — G8482 FLU IMMUNIZE ORDER/ADMIN: HCPCS | Performed by: FAMILY MEDICINE

## 2024-11-06 PROCEDURE — 1123F ACP DISCUSS/DSCN MKR DOCD: CPT | Performed by: FAMILY MEDICINE

## 2024-11-06 PROCEDURE — 3077F SYST BP >= 140 MM HG: CPT | Performed by: FAMILY MEDICINE

## 2024-11-06 PROCEDURE — 99214 OFFICE O/P EST MOD 30 MIN: CPT | Performed by: FAMILY MEDICINE

## 2024-11-06 RX ORDER — METHYLPREDNISOLONE 4 MG/1
TABLET ORAL
Qty: 1 KIT | Refills: 0 | Status: SHIPPED | OUTPATIENT
Start: 2024-11-06 | End: 2024-11-12

## 2024-11-06 NOTE — PROGRESS NOTES
\"Have you been to the ER, urgent care clinic since your last visit?  Hospitalized since your last visit?\"    NO    “Have you seen or consulted any other health care providers outside our system since your last visit?”    NO      “Have you had a diabetic eye exam?”    NO     Date of last diabetic eye exam: 11/14/2022          
1.676 m (5' 6\")   Wt 85.7 kg (189 lb)   SpO2 97%   BMI 30.51 kg/m²     General: alert, well-appearing,  in no apparent distress or pain  Head: atraumatic. Non-tender maxillary and frontal sinuses  Eyes: Lids with no discharge, no matting, conjunctivae clear and non injected, full EOMs, PERLLA  Ears: pinna non-tender, external auditory canal patent, TM intact  Mouth/throat:tonsils non enlarged, pharynx non erythematous and no lesion, nasal mucosa boggy  Neck: supple, no adenopathy palpated  CVS: normal rate, regular rhythm, distinct S1 and S2  Lungs:clear to ausculation bilaterally, no crackles, wheezing or rhonchi noted  Extremities: no edema, no cyanosis, MSK grossly normal  Skin: warm, no lesions, rashes noted  Psych:  mood and affect normal        ASSESSMENT/PLAN  Loretta was seen today for sinus problem.    Diagnoses and all orders for this visit:    Acute non-recurrent sinusitis, unspecified location  Start Augmentin continue Flonase  Work excuse note provided  Essential hypertension  Elevated today acutely ill  Previously normotensive  Avoid over-the-counter medicines that may cause BP elevation    Moderate persistent asthma with acute exacerbation  Start Medrol Dosepak  Continue Trelegy, as needed albuterol    Other orders  -     methylPREDNISolone (MEDROL DOSEPACK) 4 MG tablet; Take by mouth.  -     amoxicillin-clavulanate (AUGMENTIN) 875-125 MG per tablet; Take 1 tablet by mouth 2 times daily for 10 days      Keep appointment in December or sooner as needed      Patient understands plan of care. Patient has provided input and agrees with goals.

## 2024-11-11 RX ORDER — HYDROCHLOROTHIAZIDE 25 MG/1
25 TABLET ORAL DAILY
Qty: 30 TABLET | Refills: 6 | Status: SHIPPED | OUTPATIENT
Start: 2024-11-11

## 2024-12-27 RX ORDER — LANOLIN ALCOHOL/MO/W.PET/CERES
400 CREAM (GRAM) TOPICAL DAILY
Qty: 90 TABLET | Refills: 1 | Status: SHIPPED | OUTPATIENT
Start: 2024-12-27

## 2024-12-30 RX ORDER — ALBUTEROL SULFATE 90 UG/1
2 INHALANT RESPIRATORY (INHALATION) EVERY 4 HOURS PRN
Qty: 8.5 G | Refills: 2 | Status: SHIPPED | OUTPATIENT
Start: 2024-12-30

## 2025-01-02 RX ORDER — FLUTICASONE FUROATE, UMECLIDINIUM BROMIDE AND VILANTEROL TRIFENATATE 200; 62.5; 25 UG/1; UG/1; UG/1
1 POWDER RESPIRATORY (INHALATION) DAILY
Qty: 3 EACH | Refills: 3 | Status: SHIPPED | OUTPATIENT
Start: 2025-01-02

## 2025-01-09 RX ORDER — CYCLOBENZAPRINE HCL 10 MG
TABLET ORAL
Qty: 30 TABLET | Refills: 1 | Status: SHIPPED | OUTPATIENT
Start: 2025-01-09

## 2025-01-23 ENCOUNTER — HOSPITAL ENCOUNTER (OUTPATIENT)
Facility: HOSPITAL | Age: 65
Setting detail: SPECIMEN
Discharge: HOME | End: 2025-01-26

## 2025-01-23 LAB — LABCORP SPECIMEN COLLECTION: NORMAL

## 2025-01-23 PROCEDURE — 99001 SPECIMEN HANDLING PT-LAB: CPT

## 2025-01-24 SDOH — ECONOMIC STABILITY: INCOME INSECURITY: IN THE LAST 12 MONTHS, WAS THERE A TIME WHEN YOU WERE NOT ABLE TO PAY THE MORTGAGE OR RENT ON TIME?: NO

## 2025-01-24 SDOH — ECONOMIC STABILITY: FOOD INSECURITY: WITHIN THE PAST 12 MONTHS, YOU WORRIED THAT YOUR FOOD WOULD RUN OUT BEFORE YOU GOT MONEY TO BUY MORE.: NEVER TRUE

## 2025-01-24 SDOH — ECONOMIC STABILITY: FOOD INSECURITY: WITHIN THE PAST 12 MONTHS, THE FOOD YOU BOUGHT JUST DIDN'T LAST AND YOU DIDN'T HAVE MONEY TO GET MORE.: NEVER TRUE

## 2025-01-24 SDOH — ECONOMIC STABILITY: TRANSPORTATION INSECURITY
IN THE PAST 12 MONTHS, HAS THE LACK OF TRANSPORTATION KEPT YOU FROM MEDICAL APPOINTMENTS OR FROM GETTING MEDICATIONS?: NO

## 2025-01-27 ENCOUNTER — OFFICE VISIT (OUTPATIENT)
Facility: CLINIC | Age: 65
End: 2025-01-27
Payer: COMMERCIAL

## 2025-01-27 VITALS
RESPIRATION RATE: 18 BRPM | SYSTOLIC BLOOD PRESSURE: 138 MMHG | OXYGEN SATURATION: 99 % | DIASTOLIC BLOOD PRESSURE: 70 MMHG | WEIGHT: 186 LBS | HEART RATE: 108 BPM | BODY MASS INDEX: 29.89 KG/M2 | TEMPERATURE: 97.7 F | HEIGHT: 66 IN

## 2025-01-27 DIAGNOSIS — E11.22 TYPE 2 DIABETES MELLITUS WITH STAGE 3A CHRONIC KIDNEY DISEASE, WITHOUT LONG-TERM CURRENT USE OF INSULIN (HCC): Primary | ICD-10-CM

## 2025-01-27 DIAGNOSIS — M62.830 BACK SPASM: ICD-10-CM

## 2025-01-27 DIAGNOSIS — I10 ESSENTIAL HYPERTENSION: ICD-10-CM

## 2025-01-27 DIAGNOSIS — E55.9 VITAMIN D DEFICIENCY: ICD-10-CM

## 2025-01-27 DIAGNOSIS — N18.31 STAGE 3A CHRONIC KIDNEY DISEASE (HCC): ICD-10-CM

## 2025-01-27 DIAGNOSIS — E78.2 MIXED HYPERLIPIDEMIA: ICD-10-CM

## 2025-01-27 DIAGNOSIS — I50.32 CHRONIC DIASTOLIC CONGESTIVE HEART FAILURE (HCC): ICD-10-CM

## 2025-01-27 DIAGNOSIS — E83.42 HYPOMAGNESEMIA: ICD-10-CM

## 2025-01-27 DIAGNOSIS — K21.9 GASTROESOPHAGEAL REFLUX DISEASE WITHOUT ESOPHAGITIS: ICD-10-CM

## 2025-01-27 DIAGNOSIS — Z23 ENCOUNTER FOR IMMUNIZATION: ICD-10-CM

## 2025-01-27 DIAGNOSIS — N18.31 TYPE 2 DIABETES MELLITUS WITH STAGE 3A CHRONIC KIDNEY DISEASE, WITHOUT LONG-TERM CURRENT USE OF INSULIN (HCC): ICD-10-CM

## 2025-01-27 DIAGNOSIS — E11.22 TYPE 2 DIABETES MELLITUS WITH STAGE 3A CHRONIC KIDNEY DISEASE, WITHOUT LONG-TERM CURRENT USE OF INSULIN (HCC): ICD-10-CM

## 2025-01-27 DIAGNOSIS — Z86.79 HISTORY OF SUBARACHNOID HEMORRHAGE: ICD-10-CM

## 2025-01-27 DIAGNOSIS — J31.0 CHRONIC RHINITIS: ICD-10-CM

## 2025-01-27 DIAGNOSIS — N18.31 TYPE 2 DIABETES MELLITUS WITH STAGE 3A CHRONIC KIDNEY DISEASE, WITHOUT LONG-TERM CURRENT USE OF INSULIN (HCC): Primary | ICD-10-CM

## 2025-01-27 DIAGNOSIS — J45.41 MODERATE PERSISTENT ASTHMA WITH ACUTE EXACERBATION: ICD-10-CM

## 2025-01-27 DIAGNOSIS — Z12.31 ENCOUNTER FOR SCREENING MAMMOGRAM FOR BREAST CANCER: ICD-10-CM

## 2025-01-27 PROBLEM — R07.9 CHEST PAIN: Status: RESOLVED | Noted: 2019-03-25 | Resolved: 2025-01-27

## 2025-01-27 PROCEDURE — 1123F ACP DISCUSS/DSCN MKR DOCD: CPT | Performed by: FAMILY MEDICINE

## 2025-01-27 PROCEDURE — 3052F HG A1C>EQUAL 8.0%<EQUAL 9.0%: CPT | Performed by: FAMILY MEDICINE

## 2025-01-27 PROCEDURE — 99214 OFFICE O/P EST MOD 30 MIN: CPT | Performed by: FAMILY MEDICINE

## 2025-01-27 PROCEDURE — 3075F SYST BP GE 130 - 139MM HG: CPT | Performed by: FAMILY MEDICINE

## 2025-01-27 PROCEDURE — 3078F DIAST BP <80 MM HG: CPT | Performed by: FAMILY MEDICINE

## 2025-01-27 PROCEDURE — 90471 IMMUNIZATION ADMIN: CPT | Performed by: FAMILY MEDICINE

## 2025-01-27 PROCEDURE — 90677 PCV20 VACCINE IM: CPT | Performed by: FAMILY MEDICINE

## 2025-01-27 ASSESSMENT — PATIENT HEALTH QUESTIONNAIRE - PHQ9
SUM OF ALL RESPONSES TO PHQ QUESTIONS 1-9: 0
1. LITTLE INTEREST OR PLEASURE IN DOING THINGS: NOT AT ALL
2. FEELING DOWN, DEPRESSED OR HOPELESS: NOT AT ALL
SUM OF ALL RESPONSES TO PHQ QUESTIONS 1-9: 0
SUM OF ALL RESPONSES TO PHQ9 QUESTIONS 1 & 2: 0
SUM OF ALL RESPONSES TO PHQ QUESTIONS 1-9: 0
SUM OF ALL RESPONSES TO PHQ QUESTIONS 1-9: 0

## 2025-01-27 NOTE — PROGRESS NOTES
Loretta Echeverria, 63 y.o.,  female    SUBJECTIVE  Ff-up    DM w/ CKD- reports -150's.  Admits to dietary leniency over the holidays fruit juices, now getting back on track. Currently still taking janumet until it runs out, then per insurance coverage is prescribed Tradjenta and metformin thereafter due to cost.  .  Following with dr. Navarro for eye exams, per pt overdue patient reminded,  reviewed results A1c 6.9> 7.6>8.1     HTN/diastolic CHF- w/ h/o SAH 2015 no residual deficit. currently on doxazosin, amlodipine 10 mg, hctz 25 mg prn for leg edema.    Following Dr. Anna, reviewed note NYHA class 2, pt denies any symptoms. Hx angioedema on Ace,  She reports need for work accommodation to be off for 2 to consecutive days, with worsening asthma and leg edema when she works straight.    HL- on crestor    GERD- responding well to prilosec     Asthma- reports to be doing fairly well, currently on tregely, also to resume allergy shots with ENT.  Following dr. clements    Back spasm- on and off for few years, responding to prn flexeril/nsaids.     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    Essential hypertension I10    Chronic diastolic congestive heart failure (HCC) I50.32    Obesity (BMI 30.0-34.9) E66.9    New onset of headaches after age 48 R51.9    Iron deficiency anemia D50.9    Gastroesophageal reflux disease without esophagitis K21.9    Hypomagnesemia E83.42    History of subarachnoid hemorrhage Z86.79    Hypokalemia E87.6    Post-menopausal Z78.0    Chest pain R07.9    Back spasm M62.830    Chronic renal disease, stage III N18.30         Current Outpatient Medications:     cyclobenzaprine (FLEXERIL) 10 MG tablet, TAKE 1/2 TABLET TO 1 TABLET BY MOUTH DAILY AS DIRECTED AS NEEDED

## 2025-01-27 NOTE — PROGRESS NOTES
Loretta Echeverria presents today for   Chief Complaint   Patient presents with    Follow-up Chronic Condition           \"Have you been to the ER, urgent care clinic since your last visit?  Hospitalized since your last visit?\"    NO    “Have you seen or consulted any other health care providers outside of [facility] since your last visit?”    NO

## 2025-03-02 RX ORDER — ROSUVASTATIN CALCIUM 10 MG/1
10 TABLET, COATED ORAL NIGHTLY
Qty: 90 TABLET | Refills: 0 | Status: SHIPPED | OUTPATIENT
Start: 2025-03-02

## 2025-03-17 ENCOUNTER — OFFICE VISIT (OUTPATIENT)
Age: 65
End: 2025-03-17
Payer: COMMERCIAL

## 2025-03-17 VITALS
DIASTOLIC BLOOD PRESSURE: 81 MMHG | BODY MASS INDEX: 29.99 KG/M2 | SYSTOLIC BLOOD PRESSURE: 166 MMHG | TEMPERATURE: 97.3 F | RESPIRATION RATE: 18 BRPM | HEART RATE: 110 BPM | OXYGEN SATURATION: 96 % | WEIGHT: 185.8 LBS

## 2025-03-17 DIAGNOSIS — J44.89 ASTHMA-COPD OVERLAP SYNDROME (HCC): ICD-10-CM

## 2025-03-17 DIAGNOSIS — R76.8 ELEVATED IGE LEVEL: ICD-10-CM

## 2025-03-17 DIAGNOSIS — J45.40 MODERATE PERSISTENT ASTHMA WITHOUT COMPLICATION: Primary | ICD-10-CM

## 2025-03-17 DIAGNOSIS — D72.19 PERIPHERAL EOSINOPHILIA: ICD-10-CM

## 2025-03-17 PROCEDURE — 99214 OFFICE O/P EST MOD 30 MIN: CPT | Performed by: INTERNAL MEDICINE

## 2025-03-17 PROCEDURE — 1123F ACP DISCUSS/DSCN MKR DOCD: CPT | Performed by: INTERNAL MEDICINE

## 2025-03-17 PROCEDURE — 3077F SYST BP >= 140 MM HG: CPT | Performed by: INTERNAL MEDICINE

## 2025-03-17 PROCEDURE — 3079F DIAST BP 80-89 MM HG: CPT | Performed by: INTERNAL MEDICINE

## 2025-03-17 ASSESSMENT — ENCOUNTER SYMPTOMS
APNEA: 0
EYE REDNESS: 0
ABDOMINAL PAIN: 0
SINUS PAIN: 0
BACK PAIN: 0
CONSTIPATION: 0
EYE PAIN: 0
VOMITING: 0
EYE ITCHING: 0
CHOKING: 0
WHEEZING: 1
TROUBLE SWALLOWING: 0
COUGH: 0
BLOOD IN STOOL: 0
VOICE CHANGE: 0
CHEST TIGHTNESS: 0
ABDOMINAL DISTENTION: 0
EYE DISCHARGE: 0
COLOR CHANGE: 0
STRIDOR: 0
NAUSEA: 0
PHOTOPHOBIA: 0
SHORTNESS OF BREATH: 1
SORE THROAT: 0
DIARRHEA: 0

## 2025-03-17 NOTE — PROGRESS NOTES
Loretta Echeverria (:  1960) is a 64 y.o. female,New patient, here for evaluation of the following chief complaint(s):  Asthma and Follow-up      Assessment & Plan   ASSESSMENT/PLAN:  1. Moderate persistent asthma without complication  2. Asthma-COPD overlap syndrome (HCC)  3. Peripheral eosinophilia  4. Elevated IgE level      Pt with significant response to Trelegy, will continue at same dose.  Continue prn Albuterol  Trigger avoidance counseled. Allergy panel reviewed  Continue Albuterol prn.    Should symptoms worsen on triple therapy, pt is a candidate for biologics, likely Nucala given elevation in IgE and Eosinophilia.   Return in about 6 months (around 2025).         Subjective   SUBJECTIVE/OBJECTIVE:  Pt is a non smoker with a history of asthma since childhood, treated with rescue inhaler for many years. She was started on Advair a few years ago then Spiriva was added prior to her initial visit. She was switched to Trelegy for ease of use and reports significant improvement in baseline and exertional dyspnea. No interval rescue inhaler use, ED or hospital admissions for asthma. Denies chest pain, hemoptysis, fever. Pt is active all day in her work as a , and is on her feet for most of her workday.        Review of Systems   Constitutional:  Negative for activity change, appetite change, chills, diaphoresis, fatigue, fever and unexpected weight change.   HENT:  Negative for congestion, hearing loss, nosebleeds, sinus pain, sore throat, tinnitus, trouble swallowing and voice change.    Eyes:  Negative for photophobia, pain, discharge, redness, itching and visual disturbance.   Respiratory:  Positive for shortness of breath (resolved) and wheezing (rare). Negative for apnea, cough, choking, chest tightness and stridor.    Cardiovascular:  Positive for leg swelling (chronic since age 9). Negative for chest pain and palpitations.   Gastrointestinal:  Negative for abdominal distention,

## 2025-03-17 NOTE — PROGRESS NOTES
Loretta Echeverria presents today for   Chief Complaint   Patient presents with    Asthma    Follow-up       Is someone accompanying this pt? NO    Is the patient using any DME equipment during OV? NO    -DME Company NA    Depression Screenin/27/2025    10:55 AM   PHQ-9 Questionaire   Little interest or pleasure in doing things 0   Feeling down, depressed, or hopeless 0   PHQ-9 Total Score 0       Learning Needs Questionnaire:     No question data found.      Fall Risk:         2025    10:55 AM 2024     1:27 PM 2023     9:52 AM   Fall Risk   Do you feel unsteady or are you worried about falling?  no no no   2 or more falls in past year? no no no   Fall with injury in past year? no no no        Abuse Screenin/29/2024    11:00 AM 2023     9:00 AM   AMB Abuse Screening   Do you ever feel afraid of your partner? N    Are you in a relationship with someone who physically or mentally threatens you? N N   Is it safe for you to go home? Y Y         Coordination of Care:    1. Have you been to the ER, urgent care clinic since your last visit? Hospitalized since your last visit? No    2. Have you seen or consulted any other health care providers outside of the [facility] since your last visit? Include any pap smears or colon screening. NO    Medication list has been update per patient.

## 2025-03-24 ENCOUNTER — HOSPITAL ENCOUNTER (OUTPATIENT)
Facility: HOSPITAL | Age: 65
Discharge: HOME | End: 2025-03-27
Attending: FAMILY MEDICINE
Payer: COMMERCIAL

## 2025-03-24 VITALS — WEIGHT: 185.85 LBS | BODY MASS INDEX: 29.87 KG/M2 | HEIGHT: 66 IN

## 2025-03-24 DIAGNOSIS — Z12.31 ENCOUNTER FOR SCREENING MAMMOGRAM FOR BREAST CANCER: ICD-10-CM

## 2025-03-24 PROCEDURE — 77067 SCR MAMMO BI INCL CAD: CPT

## 2025-04-01 RX ORDER — OMEPRAZOLE 20 MG/1
20 CAPSULE, DELAYED RELEASE ORAL DAILY
Qty: 90 CAPSULE | Refills: 3 | Status: SHIPPED | OUTPATIENT
Start: 2025-04-01

## 2025-04-02 RX ORDER — CYCLOBENZAPRINE HCL 10 MG
TABLET ORAL
Qty: 30 TABLET | Refills: 1 | Status: SHIPPED | OUTPATIENT
Start: 2025-04-02

## 2025-05-23 ENCOUNTER — HOSPITAL ENCOUNTER (OUTPATIENT)
Age: 65
Discharge: HOME | End: 2025-05-26

## 2025-05-23 LAB — LABCORP SPECIMEN COLLECTION: NORMAL

## 2025-05-23 NOTE — PROGRESS NOTES
Have you been to the ER, urgent care clinic since your last visit?  Hospitalized since your last visit?   NO    Have you seen or consulted any other health care providers outside our system since your last visit?   NO      “Have you had a diabetic eye exam?”    NO     Date of last diabetic eye exam: 12/21/2023

## 2025-05-24 LAB
25(OH)D3+25(OH)D2 SERPL-MCNC: 33.8 NG/ML (ref 30–100)
ALBUMIN SERPL-MCNC: 4.5 G/DL (ref 3.9–4.9)
ALBUMIN/CREAT UR: 10 MG/G CREAT (ref 0–29)
ALP SERPL-CCNC: 90 IU/L (ref 44–121)
ALT SERPL-CCNC: 12 IU/L (ref 0–32)
AST SERPL-CCNC: 15 IU/L (ref 0–40)
BASOPHILS # BLD AUTO: 0.1 X10E3/UL (ref 0–0.2)
BASOPHILS NFR BLD AUTO: 1 %
BILIRUB SERPL-MCNC: 0.6 MG/DL (ref 0–1.2)
BUN SERPL-MCNC: 12 MG/DL (ref 8–27)
BUN/CREAT SERPL: 11 (ref 12–28)
CALCIUM SERPL-MCNC: 10.1 MG/DL (ref 8.7–10.3)
CHLORIDE SERPL-SCNC: 99 MMOL/L (ref 96–106)
CHOLEST SERPL-MCNC: 133 MG/DL (ref 100–199)
CO2 SERPL-SCNC: 25 MMOL/L (ref 20–29)
CREAT SERPL-MCNC: 1.06 MG/DL (ref 0.57–1)
CREAT UR-MCNC: 98.7 MG/DL
EGFRCR SERPLBLD CKD-EPI 2021: 58 ML/MIN/1.73
EOSINOPHIL # BLD AUTO: 0.5 X10E3/UL (ref 0–0.4)
EOSINOPHIL NFR BLD AUTO: 10 %
ERYTHROCYTE [DISTWIDTH] IN BLOOD BY AUTOMATED COUNT: 13.7 % (ref 11.7–15.4)
GLOBULIN SER CALC-MCNC: 3.1 G/DL (ref 1.5–4.5)
GLUCOSE SERPL-MCNC: 120 MG/DL (ref 70–99)
HBA1C MFR BLD: 6.6 % (ref 4.8–5.6)
HCT VFR BLD AUTO: 34.7 % (ref 34–46.6)
HDLC SERPL-MCNC: 53 MG/DL
HGB BLD-MCNC: 11.2 G/DL (ref 11.1–15.9)
IMM GRANULOCYTES # BLD AUTO: 0 X10E3/UL (ref 0–0.1)
IMM GRANULOCYTES NFR BLD AUTO: 0 %
LDLC SERPL CALC-MCNC: 68 MG/DL (ref 0–99)
LYMPHOCYTES # BLD AUTO: 1.2 X10E3/UL (ref 0.7–3.1)
LYMPHOCYTES NFR BLD AUTO: 23 %
MAGNESIUM SERPL-MCNC: 1.6 MG/DL (ref 1.6–2.3)
MCH RBC QN AUTO: 27.8 PG (ref 26.6–33)
MCHC RBC AUTO-ENTMCNC: 32.3 G/DL (ref 31.5–35.7)
MCV RBC AUTO: 86 FL (ref 79–97)
MICROALBUMIN UR-MCNC: 9.7 UG/ML
MONOCYTES # BLD AUTO: 0.3 X10E3/UL (ref 0.1–0.9)
MONOCYTES NFR BLD AUTO: 5 %
NEUTROPHILS # BLD AUTO: 3.1 X10E3/UL (ref 1.4–7)
NEUTROPHILS NFR BLD AUTO: 61 %
PLATELET # BLD AUTO: 418 X10E3/UL (ref 150–450)
POTASSIUM SERPL-SCNC: 3.9 MMOL/L (ref 3.5–5.2)
PROT SERPL-MCNC: 7.6 G/DL (ref 6–8.5)
RBC # BLD AUTO: 4.03 X10E6/UL (ref 3.77–5.28)
SODIUM SERPL-SCNC: 143 MMOL/L (ref 134–144)
TRIGL SERPL-MCNC: 57 MG/DL (ref 0–149)
VLDLC SERPL CALC-MCNC: 12 MG/DL (ref 5–40)
WBC # BLD AUTO: 5.2 X10E3/UL (ref 3.4–10.8)

## 2025-05-26 SDOH — ECONOMIC STABILITY: FOOD INSECURITY: WITHIN THE PAST 12 MONTHS, YOU WORRIED THAT YOUR FOOD WOULD RUN OUT BEFORE YOU GOT MONEY TO BUY MORE.: SOMETIMES TRUE

## 2025-05-26 SDOH — ECONOMIC STABILITY: FOOD INSECURITY: WITHIN THE PAST 12 MONTHS, THE FOOD YOU BOUGHT JUST DIDN'T LAST AND YOU DIDN'T HAVE MONEY TO GET MORE.: SOMETIMES TRUE

## 2025-05-26 SDOH — ECONOMIC STABILITY: INCOME INSECURITY: IN THE LAST 12 MONTHS, WAS THERE A TIME WHEN YOU WERE NOT ABLE TO PAY THE MORTGAGE OR RENT ON TIME?: YES

## 2025-05-27 ENCOUNTER — OFFICE VISIT (OUTPATIENT)
Facility: CLINIC | Age: 65
End: 2025-05-27
Payer: COMMERCIAL

## 2025-05-27 VITALS
OXYGEN SATURATION: 100 % | HEART RATE: 98 BPM | RESPIRATION RATE: 16 BRPM | DIASTOLIC BLOOD PRESSURE: 72 MMHG | TEMPERATURE: 97.3 F | SYSTOLIC BLOOD PRESSURE: 138 MMHG | BODY MASS INDEX: 29.92 KG/M2 | HEIGHT: 66 IN | WEIGHT: 186.2 LBS

## 2025-05-27 DIAGNOSIS — N18.31 TYPE 2 DIABETES MELLITUS WITH STAGE 3A CHRONIC KIDNEY DISEASE, WITHOUT LONG-TERM CURRENT USE OF INSULIN (HCC): ICD-10-CM

## 2025-05-27 DIAGNOSIS — N18.31 TYPE 2 DIABETES MELLITUS WITH STAGE 3A CHRONIC KIDNEY DISEASE, WITHOUT LONG-TERM CURRENT USE OF INSULIN (HCC): Primary | ICD-10-CM

## 2025-05-27 DIAGNOSIS — E78.2 MIXED HYPERLIPIDEMIA: ICD-10-CM

## 2025-05-27 DIAGNOSIS — E11.22 TYPE 2 DIABETES MELLITUS WITH STAGE 3A CHRONIC KIDNEY DISEASE, WITHOUT LONG-TERM CURRENT USE OF INSULIN (HCC): Primary | ICD-10-CM

## 2025-05-27 DIAGNOSIS — J45.40 MODERATE PERSISTENT ASTHMA WITHOUT COMPLICATION: ICD-10-CM

## 2025-05-27 DIAGNOSIS — E11.22 TYPE 2 DIABETES MELLITUS WITH STAGE 3A CHRONIC KIDNEY DISEASE, WITHOUT LONG-TERM CURRENT USE OF INSULIN (HCC): ICD-10-CM

## 2025-05-27 DIAGNOSIS — E83.42 HYPOMAGNESEMIA: ICD-10-CM

## 2025-05-27 DIAGNOSIS — I10 ESSENTIAL HYPERTENSION: ICD-10-CM

## 2025-05-27 DIAGNOSIS — I50.32 CHRONIC DIASTOLIC CONGESTIVE HEART FAILURE (HCC): ICD-10-CM

## 2025-05-27 DIAGNOSIS — K21.9 GASTROESOPHAGEAL REFLUX DISEASE WITHOUT ESOPHAGITIS: ICD-10-CM

## 2025-05-27 DIAGNOSIS — M62.830 BACK SPASM: ICD-10-CM

## 2025-05-27 DIAGNOSIS — N18.31 STAGE 3A CHRONIC KIDNEY DISEASE (HCC): ICD-10-CM

## 2025-05-27 PROCEDURE — 3078F DIAST BP <80 MM HG: CPT | Performed by: FAMILY MEDICINE

## 2025-05-27 PROCEDURE — 3075F SYST BP GE 130 - 139MM HG: CPT | Performed by: FAMILY MEDICINE

## 2025-05-27 PROCEDURE — 99214 OFFICE O/P EST MOD 30 MIN: CPT | Performed by: FAMILY MEDICINE

## 2025-05-27 PROCEDURE — 1123F ACP DISCUSS/DSCN MKR DOCD: CPT | Performed by: FAMILY MEDICINE

## 2025-05-27 PROCEDURE — 3044F HG A1C LEVEL LT 7.0%: CPT | Performed by: FAMILY MEDICINE

## 2025-05-27 NOTE — PROGRESS NOTES
Loretta Echeverria, 63 y.o.,  female    SUBJECTIVE  Ff-up    DM w/ CKD- reports  improved lifestyle/diet. Currently still taking janumet until it runs out, then per insurance coverage is prescribed Tradjenta and metformin thereafter due to cost.  .Following with dr. Navarro for eye exams, per pt overdue patient reminded,  reviewed results A1c 6.9> 7.6>8.1>6.6    HTN/diastolic CHF- w/ h/o SAH 2015 no residual deficit. currently on doxazosin, amlodipine 10 mg, hctz 25 mg prn for leg edema.    Following Dr. Anna, reviewed note NYHA class 2, pt denies any symptoms. Hx angioedema on Ace    HL- on crestor    GERD- responding well to prilosec     Asthma- reports to be doing fairly well, currently on tregely, also to resume allergy shots with ENT.  Following dr. clements    Back spasm- on and off for few years, responding to prn flexeril/nsaids.     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    Essential hypertension I10    Chronic diastolic congestive heart failure (HCC) I50.32    Obesity (BMI 30.0-34.9) E66.9    New onset of headaches after age 48 R51.9    Iron deficiency anemia D50.9    Gastroesophageal reflux disease without esophagitis K21.9    Hypomagnesemia E83.42    History of subarachnoid hemorrhage Z86.79    Hypokalemia E87.6    Post-menopausal Z78.0    Chest pain R07.9    Back spasm M62.830    Chronic renal disease, stage III N18.30         Current Outpatient Medications:     cyclobenzaprine (FLEXERIL) 10 MG tablet, TAKE 1/2 TABLET TO 1 TABLET BY MOUTH DAILY AS DIRECTED AS NEEDED FOR BACK OR HIP PAIN, Disp: 30 tablet, Rfl: 1    omeprazole (PRILOSEC) 20 MG delayed release capsule, TAKE 1 CAPSULE BY MOUTH DAILY, Disp: 90 capsule, Rfl: 3    rosuvastatin (CRESTOR) 10 MG tablet, TAKE ONE TABLET BY

## 2025-05-29 RX ORDER — LEVOCETIRIZINE DIHYDROCHLORIDE 5 MG/1
5 TABLET, FILM COATED ORAL DAILY
Qty: 90 TABLET | Refills: 3 | Status: SHIPPED | OUTPATIENT
Start: 2025-05-29

## 2025-06-02 RX ORDER — ROSUVASTATIN CALCIUM 10 MG/1
TABLET, COATED ORAL
Qty: 90 TABLET | Refills: 3 | Status: SHIPPED | OUTPATIENT
Start: 2025-06-02

## 2025-06-02 RX ORDER — AMLODIPINE BESYLATE 10 MG/1
TABLET ORAL
Qty: 90 TABLET | Refills: 3 | Status: SHIPPED | OUTPATIENT
Start: 2025-06-02

## 2025-06-06 RX ORDER — HYDROCHLOROTHIAZIDE 25 MG/1
25 TABLET ORAL DAILY
Qty: 90 TABLET | Refills: 3 | Status: SHIPPED | OUTPATIENT
Start: 2025-06-06

## 2025-06-16 RX ORDER — CYCLOBENZAPRINE HCL 10 MG
TABLET ORAL
Qty: 30 TABLET | Refills: 1 | OUTPATIENT
Start: 2025-06-16

## 2025-06-16 RX ORDER — CYCLOBENZAPRINE HCL 10 MG
TABLET ORAL
Qty: 30 TABLET | Refills: 1 | Status: SHIPPED | OUTPATIENT
Start: 2025-06-15

## 2025-06-23 RX ORDER — LANOLIN ALCOHOL/MO/W.PET/CERES
400 CREAM (GRAM) TOPICAL DAILY
Qty: 90 TABLET | Refills: 1 | Status: SHIPPED | OUTPATIENT
Start: 2025-06-23

## 2025-08-28 RX ORDER — CYCLOBENZAPRINE HCL 10 MG
TABLET ORAL
Qty: 30 TABLET | Refills: 1 | Status: SHIPPED | OUTPATIENT
Start: 2025-08-28

## (undated) DEVICE — CATH IV SAFE STR 22GX1IN BLU -- PROTECTIV PLUS

## (undated) DEVICE — FLUFF AND POLYMER UNDERPAD,EXTRA HEAVY: Brand: WINGS

## (undated) DEVICE — Device